# Patient Record
Sex: MALE | Race: WHITE | NOT HISPANIC OR LATINO | Employment: OTHER | URBAN - METROPOLITAN AREA
[De-identification: names, ages, dates, MRNs, and addresses within clinical notes are randomized per-mention and may not be internally consistent; named-entity substitution may affect disease eponyms.]

---

## 2017-01-03 DIAGNOSIS — I10 ESSENTIAL HYPERTENSION: ICD-10-CM

## 2017-01-05 RX ORDER — TERAZOSIN 5 MG/1
CAPSULE ORAL
Qty: 60 CAP | Refills: 11 | Status: SHIPPED | OUTPATIENT
Start: 2017-01-05 | End: 2018-01-28 | Stop reason: SDUPTHER

## 2017-02-10 ENCOUNTER — TELEPHONE (OUTPATIENT)
Dept: CARDIOLOGY | Facility: MEDICAL CENTER | Age: 67
End: 2017-02-10

## 2017-02-10 DIAGNOSIS — I10 ESSENTIAL HYPERTENSION: ICD-10-CM

## 2017-02-10 RX ORDER — CLONIDINE HYDROCHLORIDE 0.2 MG/1
0.2 TABLET ORAL 2 TIMES DAILY
Qty: 60 TAB | Refills: 11 | OUTPATIENT
Start: 2017-02-10 | End: 2018-02-20 | Stop reason: SDUPTHER

## 2017-02-10 NOTE — TELEPHONE ENCOUNTER
Received call back from patient. Advised him of Dr. Zimmer's instructions and medication precautions. Patient verbalized understanding and agrees with plan.    Updated Clonidine prescription called in to [facility].    LOLI DON

## 2017-02-10 NOTE — TELEPHONE ENCOUNTER
----- Message from Carolyn Salvador sent at 2/10/2017  9:08 AM PST -----  Regarding: clonidine not working well  Contact: [phone]  IVANA/sophia    Pt calling about med change RG made 2 months ago, clonidine is not working as RG had anticipated. Please call pt for details & to discuss, [phone].

## 2017-02-10 NOTE — TELEPHONE ENCOUNTER
Left patient a voicemail with instructions to call the office for instructions from Dr. Zimmer.    LOLI RN

## 2017-02-10 NOTE — TELEPHONE ENCOUNTER
Called patient. He states that his blood pressure has been elevated, and he would like to know if his Clonidine dose should be increased. He currently takes Clonidine 0.1 mg BID. He also takes Amlodipine 10 mg daily, Fosinopril 40 mg daily, Carvedilol 25 mg BID, and Terazosin 10 mg QHS.    Patient's blood pressure reading last night (2/9/2017) was 162/83 with HR 67. About a week ago his blood pressure was 168/86. He has not been recording his other blood pressure readings, but he states that his blood pressure is usually in the 160s/80s.    Forwarded to Dr. Zimmer, please advise. Thank you.    LOLI RN

## 2017-02-10 NOTE — ADDENDUM NOTE
Addended by: ARABELLA CONTRERAS on: 2/10/2017 03:43 PM     Modules accepted: Orders, Medications

## 2017-04-30 DIAGNOSIS — E78.5 HYPERLIPIDEMIA, UNSPECIFIED HYPERLIPIDEMIA TYPE: ICD-10-CM

## 2017-05-01 RX ORDER — LOVASTATIN 20 MG/1
TABLET ORAL
Qty: 90 TAB | Refills: 2 | Status: SHIPPED | OUTPATIENT
Start: 2017-05-01 | End: 2018-02-20 | Stop reason: SDUPTHER

## 2017-06-11 DIAGNOSIS — I10 ESSENTIAL HYPERTENSION: ICD-10-CM

## 2017-06-12 RX ORDER — CARVEDILOL 25 MG/1
25 TABLET ORAL 2 TIMES DAILY WITH MEALS
Qty: 60 TAB | Refills: 5 | OUTPATIENT
Start: 2017-06-12 | End: 2017-12-23 | Stop reason: SDUPTHER

## 2017-07-21 ENCOUNTER — OFFICE VISIT (OUTPATIENT)
Dept: CARDIOLOGY | Facility: MEDICAL CENTER | Age: 67
End: 2017-07-21
Payer: COMMERCIAL

## 2017-07-21 VITALS
DIASTOLIC BLOOD PRESSURE: 78 MMHG | HEART RATE: 56 BPM | SYSTOLIC BLOOD PRESSURE: 138 MMHG | OXYGEN SATURATION: 97 % | HEIGHT: 72 IN | BODY MASS INDEX: 34.67 KG/M2 | WEIGHT: 256 LBS

## 2017-07-21 DIAGNOSIS — I10 ESSENTIAL HYPERTENSION: ICD-10-CM

## 2017-07-21 DIAGNOSIS — G47.30 SLEEP APNEA, UNSPECIFIED TYPE: ICD-10-CM

## 2017-07-21 DIAGNOSIS — E78.5 HYPERLIPIDEMIA, UNSPECIFIED HYPERLIPIDEMIA TYPE: ICD-10-CM

## 2017-07-21 PROCEDURE — 99213 OFFICE O/P EST LOW 20 MIN: CPT | Performed by: INTERNAL MEDICINE

## 2017-07-21 RX ORDER — TRIAMTERENE AND HYDROCHLOROTHIAZIDE 37.5; 25 MG/1; MG/1
1 CAPSULE ORAL EVERY MORNING
COMMUNITY
End: 2018-02-02

## 2017-07-21 RX ORDER — GABAPENTIN 300 MG/1
300 CAPSULE ORAL 3 TIMES DAILY
COMMUNITY
End: 2018-02-02

## 2017-07-21 ASSESSMENT — ENCOUNTER SYMPTOMS
COUGH: 0
FEVER: 0
CHILLS: 0
BLURRED VISION: 0
SHORTNESS OF BREATH: 0
DIZZINESS: 0
BACK PAIN: 1
PALPITATIONS: 0
HEADACHES: 0
SENSORY CHANGE: 1
NERVOUS/ANXIOUS: 0
PND: 0
HEARTBURN: 0
NAUSEA: 0
MYALGIAS: 0
EYE DISCHARGE: 0
DEPRESSION: 0
BRUISES/BLEEDS EASILY: 0

## 2017-07-21 NOTE — MR AVS SNAPSHOT
"        Ramu Allred May   2017 10:00 AM   Office Visit   MRN: [id number]    Department:  Heart [facility]   Dept Phone:  [phone]    Description:  Male : 1950   Provider:  Bear Zimmer M.D.           Allergies as of 2017     No Known Allergies      You were diagnosed with     Essential hypertension   [2250606]       Hyperlipidemia, unspecified hyperlipidemia type   [6175194]       Sleep apnea, unspecified type   [9774111]         Vital Signs     Blood Pressure Pulse Height Weight Body Mass Index Oxygen Saturation    138/78 mmHg 56 1.829 m (6' 0.01\") 116.121 kg (256 lb) 34.71 kg/m2 97%    Smoking Status                   Never Smoker            Basic Information     Date Of Birth Sex Race Ethnicity Preferred Language    1950 Male White Non- English      Problem List              ICD-10-CM Priority Class Noted - Resolved    Blood glucose abnormal (Chronic) R73.09   Unknown - Present    Polycythemia, secondary (Chronic) D75.1   Unknown - Present    Sleep apnea (Chronic) G47.30   Unknown - Present    HTN (hypertension) (Chronic) I10   Unknown - Present    Overweight (Chronic) E66.3   Unknown - Present    Pyoderma gangrenosum (Chronic) L88   Unknown - Present    Spinal stenosis, lumbar region, without neurogenic claudication M48.06   2015 - Present    Right inguinal hernia K40.90   2016 - Present      Health Maintenance        Date Due Completion Dates    DIABETES MONOFILAMENT / LE EXAM 3/22/1951 ---    RETINAL SCREENING 1968 ---    URINE ACR / MICROALBUMIN 1968 ---    COLONOSCOPY 2000 ---    IMM ZOSTER VACCINE 2010 ---    A1C SCREENING 2015    IMM PNEUMOCOCCAL 65+ (ADULT) LOW/MEDIUM RISK SERIES (1 of 2 - PCV13) 2016    FASTING LIPID PROFILE 2016, 2015    SERUM CREATININE 2017, 2015, 2015, 2014, 2013    IMM INFLUENZA (1) 2017    IMM DTaP/Tdap/Td Vaccine " (2 - Td) 7/26/2024 7/26/2014            Current Immunizations     Influenza TIV (IM) 11/1/2014    Pneumococcal polysaccharide vaccine (PPSV-23) 1/23/2015  8:29 AM    Tdap Vaccine 7/26/2014      Below and/or attached are the medications your provider expects you to take. Review all of your home medications and newly ordered medications with your provider and/or pharmacist. Follow medication instructions as directed by your provider and/or pharmacist. Please keep your medication list with you and share with your provider. Update the information when medications are discontinued, doses are changed, or new medications (including over-the-counter products) are added; and carry medication information at all times in the event of emergency situations     Allergies:  No Known Allergies          Medications  Valid as of: July 21, 2017 - 10:20 AM    Generic Name Brand Name Tablet Size Instructions for use    AmLODIPine Besylate (Tab) NORVASC 10 MG TAKE ONE TABLET BY MOUTH DAILY        Aspirin (Tab) aspirin 81 MG Take 81 mg by mouth every day.        Carvedilol (Tab) COREG 25 MG Take 1 Tab by mouth 2 times a day, with meals.        CloNIDine HCl (Tab) CATAPRESS 0.2 MG Take 1 Tab by mouth 2 times a day.        Fosinopril Sodium (Tab) MONOPRIL 40 MG TAKE ONE TABLET BY MOUTH DAILY GENERIC FOR MONOPRIL        Furosemide (Tab) LASIX 20 MG TAKE ONE TABLET BY MOUTH DAILY (GENERIC FOR LASIX)        Gabapentin (Cap) NEURONTIN 300 MG Take 300 mg by mouth 3 times a day.        Lovastatin (Tab) MEVACOR 20 MG TAKE ONE TABLET BY MOUTH EVERY EVENING (GENERIC MEVACOR)        MetFORMIN HCl (Tab) GLUCOPHAGE 850 MG Take 850 mg by mouth 2 times a day.        Terazosin HCl (Cap) HYTRIN 5 MG TAKE TWO CAPSULES BY MOUTH EVERY NIGHT AT BEDTIME  (GENERIC HYTRIN)        Triamterene-HCTZ (Cap) MAXZIDE-25/DYAZIDE 37.5-25 MG Take 1 Cap by mouth every morning.        .                 Medicines prescribed today were sent to:     \[facility]\"" [facility] #[id number] -  [address] [place] [address]    Phone: [phone] Fax: [phone]    Open 24 Hours?: No      Medication refill instructions:       If your prescription bottle indicates you have medication refills left, it is not necessary to call your provider’s office. Please contact your pharmacy and they will refill your medication.    If your prescription bottle indicates you do not have any refills left, you may request refills at any time through one of the following ways: The online [employer] system (except Urgent Care), by calling your provider’s office, or by asking your pharmacy to contact your provider’s office with a refill request. Medication refills are processed only during regular business hours and may not be available until the next business day. Your provider may request additional information or to have a follow-up visit with you prior to refilling your medication.   *Please Note: Medication refills are assigned a new Rx number when refilled electronically. Your pharmacy may indicate that no refills were authorized even though a new prescription for the same medication is available at the pharmacy. Please request the medicine by name with the pharmacy before contacting your provider for a refill.           [employer] Access Code: [email or web address]  Expires: 8/20/2017 10:20 AM    [employer]  A secure, online tool to manage your health information     [employer]® is a secure, online tool that connects you to your personalized health information from the privacy of your home -- day or night - making it very easy for you to manage your healthcare. Once the activation process is completed, you can even access your medical information using the [employer] agusto, which is available for free in the Apple Agusto store or Google Play store.     [employer] provides the following levels of access (as shown below):   My Chart Features   [facility] Primary Care Doctor [facility]  Specialists  [facility]  Urgent  Care [facility]  Primary Care  Doctor   Email your healthcare team securely and privately 24/7 X X X    Manage appointments: schedule your next appointment; view details of past/upcoming appointments X      Request prescription refills. X      View recent personal medical records, including lab and immunizations X X X X   View health record, including health history, allergies, medications X X X X   Read reports about your outpatient visits, procedures, consult and ER notes X X X X   See your discharge summary, which is a recap of your hospital and/or ER visit that includes your diagnosis, lab results, and care plan. X X       How to register for [employer]:  1. Go to  [email or web address].  2. Click on the Sign Up Now box, which takes you to the New Member Sign Up page. You will need to provide the following information:  a. Enter your [employer] Access Code exactly as it appears at the top of this page. (You will not need to use this code after you’ve completed the sign-up process. If you do not sign up before the expiration date, you must request a new code.)   b. Enter your date of birth.   c. Enter your home email address.   d. Click Submit, and follow the next screen’s instructions.  3. Create a [employer] ID. This will be your [employer] login ID and cannot be changed, so think of one that is secure and easy to remember.  4. Create a [employer] password. You can change your password at any time.  5. Enter your Password Reset Question and Answer. This can be used at a later time if you forget your password.   6. Enter your e-mail address. This allows you to receive e-mail notifications when new information is available in [employer].  7. Click Sign Up. You can now view your health information.    For assistance activating your [employer] account, call [phone]        Quit Tobacco Information     Do you want to quit using tobacco?    Quitting tobacco decreases risks of cancer, heart and lung disease,  increases life expectancy, improves sense of taste and smell, and increases spending money, among other benefits.    If you are thinking about quitting, we can help.  • [facility] Quit Tobacco Program: 738.917.8222  o Program occurs weekly for four weeks and includes pharmacist consultation on products to support quitting smoking or chewing tobacco. A provider referral is needed for pharmacist consultation.  • Tobacco Users Help Hotline: 2-531-QUIT-NOW (783-6489) or https://[place].quitlogix.org/  o Free, confidential telephone and online coaching for [place] residents. Sessions are designed on a schedule that is convenient for you. Eligible clients receive free nicotine replacement therapy.  • Nationally: www.smokefree.gov  o Information and professional assistance to support both immediate and long-term needs as you become, and remain, a non-smoker. Smokefree.gov allows you to choose the help that best fits your needs.

## 2017-07-21 NOTE — PROGRESS NOTES
"Subjective:   Ramu Barber is a 66 y.o. male who presents today in follow-up for hypertension and sleep apnea.  Compliant with sleep apnea  He has symptoms reportedly \"peripheral neuropathy\" possibly related to previous lumbar surgery. He is due to see a neurologist. He has chronic low back pain.  He states that all home blood pressures are less than 140 systolic with a typical diastolic of below 90.  He has a slight bit of chronic lower extremity ankle edema, nonprogressive    Past Medical History   Diagnosis Date   • Abnormal glucose    • Polycythemia, secondary    • HTN (hypertension)    • Overweight(278.02)    • Type II or unspecified type diabetes mellitus without mention of complication, not stated as uncontrolled      oral meds only   • Sleep apnea      cpap   • High cholesterol    • Snoring    • Arthritis      back, hips     Past Surgical History   Procedure Laterality Date   • Lumbar fusion posterior  1/21/2015     Performed by Ko Suarez M.D. at SURGERY [facility]   • Other orthopedic surgery  2002     shoulder   • Other neurological surg       neck fusion 2000, lumbar laminectomy 2008   • Inguinal hernia repair Right 2/16/2016     Procedure: INGUINAL HERNIA REPAIR;  Surgeon: Sj Baird M.D.;  Location: SURGERY [facility];  Service:      No family history on file.  History   Smoking status   • Never Smoker    Smokeless tobacco   • Current User   • Types: Chew     No Known Allergies  Outpatient Encounter Prescriptions as of 7/21/2017   Medication Sig Dispense Refill   • gabapentin (NEURONTIN) 300 MG Cap Take 300 mg by mouth 3 times a day.     • triamterene/hctz (MAXZIDE-25/DYAZIDE) 37.5-25 MG Cap Take 1 Cap by mouth every morning.     • carvedilol (COREG) 25 MG Tab Take 1 Tab by mouth 2 times a day, with meals. 60 Tab 5   • fosinopril (MONOPRIL) 40 MG tablet TAKE ONE TABLET BY MOUTH DAILY GENERIC FOR MONOPRIL 90 Tab 0   • amlodipine (NORVASC) 10 MG Tab TAKE ONE TABLET BY MOUTH " "DAILY 90 Tab 0   • lovastatin (MEVACOR) 20 MG Tab TAKE ONE TABLET BY MOUTH EVERY EVENING (GENERIC MEVACOR) 90 Tab 2   • terazosin (HYTRIN) 5 MG Cap TAKE TWO CAPSULES BY MOUTH EVERY NIGHT AT BEDTIME  (GENERIC HYTRIN) 60 Cap 11   • metformin (GLUCOPHAGE) 850 MG Tab Take 850 mg by mouth 2 times a day.     • aspirin 81 MG tablet Take 81 mg by mouth every day.     • clonidine (CATAPRESS) 0.2 MG Tab Take 1 Tab by mouth 2 times a day. 60 Tab 11   • furosemide (LASIX) 20 MG Tab TAKE ONE TABLET BY MOUTH DAILY (GENERIC FOR LASIX) 90 Tab 3     No facility-administered encounter medications on file as of 7/21/2017.     Review of Systems   Constitutional: Negative for fever, chills and malaise/fatigue.   Eyes: Negative for blurred vision and discharge.   Respiratory: Negative for cough and shortness of breath.    Cardiovascular: Negative for chest pain, palpitations, leg swelling and PND.   Gastrointestinal: Negative for heartburn and nausea.   Genitourinary: Positive for frequency. Negative for dysuria and urgency.   Musculoskeletal: Positive for back pain and joint pain. Negative for myalgias.   Skin: Negative for itching and rash.   Neurological: Positive for sensory change. Negative for dizziness and headaches.   Endo/Heme/Allergies: Negative for environmental allergies. Does not bruise/bleed easily.   Psychiatric/Behavioral: Negative for depression. The patient is not nervous/anxious.         Objective:   /78 mmHg  Pulse 56  Ht 1.829 m (6' 0.01\")  Wt 116.121 kg (256 lb)  BMI 34.71 kg/m2  SpO2 97%    Physical Exam   Constitutional: He is oriented to person, place, and time. He appears well-developed and well-nourished.   HENT:   Head: Normocephalic and atraumatic.   Eyes: Conjunctivae and EOM are normal. No scleral icterus.   Neck: Neck supple. No JVD present. No thyromegaly present.   Cardiovascular: Normal rate, regular rhythm and normal heart sounds.  Exam reveals no gallop and no friction rub.    No murmur " heard.  Pulmonary/Chest: Effort normal and breath sounds normal. No respiratory distress. He has no wheezes. He has no rales. He exhibits no tenderness.   Abdominal: Soft. Bowel sounds are normal. He exhibits no distension and no mass. There is no tenderness.   Truncal obesity   Musculoskeletal: He exhibits edema.   Trace pedal edema   Neurological: He is alert and oriented to person, place, and time. Coordination normal.   Skin: Skin is warm and dry. No rash noted. No pallor.   Psychiatric: He has a normal mood and affect. His behavior is normal. Judgment and thought content normal.       Assessment:     1. Essential hypertension     2. Hyperlipidemia, unspecified hyperlipidemia type     3. Sleep apnea, unspecified type         Medical Decision Making:  Today's Assessment / Status / Plan:   Blood pressure appears well controlled at this time with current medication  No change in medication  Continued drugs including clonidine twice daily  Follow-up annually if no problems.

## 2017-07-21 NOTE — Clinical Note
"     [facility]   [address]   [address]  [place] [address]  Phone: [phone]  Fax: [phone]              Ramu Barber  1950    Encounter Date: 7/21/2017    Bear Zimmer M.D.          PROGRESS NOTE:  Subjective:   Ramu Barber is a 66 y.o. male who presents today in follow-up for hypertension and sleep apnea.  Compliant with sleep apnea  He has symptoms reportedly \"peripheral neuropathy\" possibly related to previous lumbar surgery. He is due to see a neurologist. He has chronic low back pain.  He states that all home blood pressures are less than 140 systolic with a typical diastolic of below 90.  He has a slight bit of chronic lower extremity ankle edema, nonprogressive    Past Medical History   Diagnosis Date   • Abnormal glucose    • Polycythemia, secondary    • HTN (hypertension)    • Overweight(278.02)    • Type II or unspecified type diabetes mellitus without mention of complication, not stated as uncontrolled      oral meds only   • Sleep apnea      cpap   • High cholesterol    • Snoring    • Arthritis      back, hips     Past Surgical History   Procedure Laterality Date   • Lumbar fusion posterior  1/21/2015     Performed by Ko Suarez M.D. at [facility]   • Other orthopedic surgery  2002     shoulder   • Other neurological surg       neck fusion 2000, lumbar laminectomy 2008   • Inguinal hernia repair Right 2/16/2016     Procedure: INGUINAL HERNIA REPAIR;  Surgeon: Sj Baird M.D.;  Location: SURGERY [facility];  Service:      No family history on file.  History   Smoking status   • Never Smoker    Smokeless tobacco   • Current User   • Types: Chew     No Known Allergies  Outpatient Encounter Prescriptions as of 7/21/2017   Medication Sig Dispense Refill   • gabapentin (NEURONTIN) 300 MG Cap Take 300 mg by mouth 3 times a day.     • triamterene/hctz (MAXZIDE-25/DYAZIDE) 37.5-25 " "MG Cap Take 1 Cap by mouth every morning.     • carvedilol (COREG) 25 MG Tab Take 1 Tab by mouth 2 times a day, with meals. 60 Tab 5   • fosinopril (MONOPRIL) 40 MG tablet TAKE ONE TABLET BY MOUTH DAILY GENERIC FOR MONOPRIL 90 Tab 0   • amlodipine (NORVASC) 10 MG Tab TAKE ONE TABLET BY MOUTH DAILY 90 Tab 0   • lovastatin (MEVACOR) 20 MG Tab TAKE ONE TABLET BY MOUTH EVERY EVENING (GENERIC MEVACOR) 90 Tab 2   • terazosin (HYTRIN) 5 MG Cap TAKE TWO CAPSULES BY MOUTH EVERY NIGHT AT BEDTIME  (GENERIC HYTRIN) 60 Cap 11   • metformin (GLUCOPHAGE) 850 MG Tab Take 850 mg by mouth 2 times a day.     • aspirin 81 MG tablet Take 81 mg by mouth every day.     • clonidine (CATAPRESS) 0.2 MG Tab Take 1 Tab by mouth 2 times a day. 60 Tab 11   • furosemide (LASIX) 20 MG Tab TAKE ONE TABLET BY MOUTH DAILY (GENERIC FOR LASIX) 90 Tab 3     No facility-administered encounter medications on file as of 7/21/2017.     Review of Systems   Constitutional: Negative for fever, chills and malaise/fatigue.   Eyes: Negative for blurred vision and discharge.   Respiratory: Negative for cough and shortness of breath.    Cardiovascular: Negative for chest pain, palpitations, leg swelling and PND.   Gastrointestinal: Negative for heartburn and nausea.   Genitourinary: Positive for frequency. Negative for dysuria and urgency.   Musculoskeletal: Positive for back pain and joint pain. Negative for myalgias.   Skin: Negative for itching and rash.   Neurological: Positive for sensory change. Negative for dizziness and headaches.   Endo/Heme/Allergies: Negative for environmental allergies. Does not bruise/bleed easily.   Psychiatric/Behavioral: Negative for depression. The patient is not nervous/anxious.         Objective:   /78 mmHg  Pulse 56  Ht 1.829 m (6' 0.01\")  Wt 116.121 kg (256 lb)  BMI 34.71 kg/m2  SpO2 97%    Physical Exam   Constitutional: He is oriented to person, place, and time. He appears well-developed and well-nourished.   HENT: "   Head: Normocephalic and atraumatic.   Eyes: Conjunctivae and EOM are normal. No scleral icterus.   Neck: Neck supple. No JVD present. No thyromegaly present.   Cardiovascular: Normal rate, regular rhythm and normal heart sounds.  Exam reveals no gallop and no friction rub.    No murmur heard.  Pulmonary/Chest: Effort normal and breath sounds normal. No respiratory distress. He has no wheezes. He has no rales. He exhibits no tenderness.   Abdominal: Soft. Bowel sounds are normal. He exhibits no distension and no mass. There is no tenderness.   Truncal obesity   Musculoskeletal: He exhibits edema.   Trace pedal edema   Neurological: He is alert and oriented to person, place, and time. Coordination normal.   Skin: Skin is warm and dry. No rash noted. No pallor.   Psychiatric: He has a normal mood and affect. His behavior is normal. Judgment and thought content normal.       Assessment:     1. Essential hypertension     2. Hyperlipidemia, unspecified hyperlipidemia type     3. Sleep apnea, unspecified type         Medical Decision Making:  Today's Assessment / Status / Plan:   Blood pressure appears well controlled at this time with current medication  No change in medication  Continued drugs including clonidine twice daily  Follow-up annually if no problems.        Chuck Chappell M.D.  [address]  [address]  [place] [address]  VIA Facsimile: [phone]

## 2017-09-11 DIAGNOSIS — I10 ESSENTIAL HYPERTENSION: ICD-10-CM

## 2017-09-11 RX ORDER — AMLODIPINE BESYLATE 10 MG/1
10 TABLET ORAL DAILY
Qty: 90 TAB | Refills: 3 | Status: SHIPPED | OUTPATIENT
Start: 2017-09-11 | End: 2018-10-31 | Stop reason: SDUPTHER

## 2017-09-11 RX ORDER — FOSINOPRIL SODIUM 40 MG/1
40 TABLET ORAL DAILY
Qty: 90 TAB | Refills: 3 | Status: SHIPPED | OUTPATIENT
Start: 2017-09-11 | End: 2018-10-31 | Stop reason: SDUPTHER

## 2017-12-23 DIAGNOSIS — I10 ESSENTIAL HYPERTENSION: ICD-10-CM

## 2017-12-28 RX ORDER — CARVEDILOL 25 MG/1
TABLET ORAL
Qty: 180 TAB | Refills: 2 | Status: SHIPPED | OUTPATIENT
Start: 2017-12-28 | End: 2018-10-18 | Stop reason: SDUPTHER

## 2018-01-28 DIAGNOSIS — I10 ESSENTIAL HYPERTENSION: ICD-10-CM

## 2018-01-29 RX ORDER — TERAZOSIN 5 MG/1
CAPSULE ORAL
Qty: 180 CAP | Refills: 10 | Status: ON HOLD | OUTPATIENT
Start: 2018-01-29 | End: 2023-04-16

## 2018-02-02 ENCOUNTER — HOSPITAL ENCOUNTER (OUTPATIENT)
Dept: RADIOLOGY | Facility: MEDICAL CENTER | Age: 68
DRG: 519 | End: 2018-02-02
Attending: NEUROLOGICAL SURGERY | Admitting: NEUROLOGICAL SURGERY
Payer: COMMERCIAL

## 2018-02-02 DIAGNOSIS — Z01.811 PRE-OPERATIVE RESPIRATORY EXAMINATION: ICD-10-CM

## 2018-02-02 DIAGNOSIS — Z01.810 PRE-OPERATIVE CARDIOVASCULAR EXAMINATION: ICD-10-CM

## 2018-02-02 DIAGNOSIS — Z01.812 PRE-OPERATIVE LABORATORY EXAMINATION: ICD-10-CM

## 2018-02-02 LAB
ANION GAP SERPL CALC-SCNC: 7 MMOL/L (ref 0–11.9)
APPEARANCE UR: CLEAR
APTT PPP: 24.5 SEC (ref 24.7–36)
BACTERIA #/AREA URNS HPF: NEGATIVE /HPF
BASOPHILS # BLD AUTO: 1.1 % (ref 0–1.8)
BASOPHILS # BLD: 0.08 K/UL (ref 0–0.12)
BILIRUB UR QL STRIP.AUTO: NEGATIVE
BUN SERPL-MCNC: 20 MG/DL (ref 8–22)
CALCIUM SERPL-MCNC: 8.8 MG/DL (ref 8.5–10.5)
CHLORIDE SERPL-SCNC: 104 MMOL/L (ref 96–112)
CO2 SERPL-SCNC: 27 MMOL/L (ref 20–33)
COLOR UR: YELLOW
CREAT SERPL-MCNC: 1.02 MG/DL (ref 0.5–1.4)
CULTURE IF INDICATED INDCX: YES UA CULTURE
EKG IMPRESSION: NORMAL
EOSINOPHIL # BLD AUTO: 0.37 K/UL (ref 0–0.51)
EOSINOPHIL NFR BLD: 4.9 % (ref 0–6.9)
EPI CELLS #/AREA URNS HPF: NEGATIVE /HPF
ERYTHROCYTE [DISTWIDTH] IN BLOOD BY AUTOMATED COUNT: 43.6 FL (ref 35.9–50)
EST. AVERAGE GLUCOSE BLD GHB EST-MCNC: 143 MG/DL
GLUCOSE SERPL-MCNC: 148 MG/DL (ref 65–99)
GLUCOSE UR STRIP.AUTO-MCNC: NEGATIVE MG/DL
HBA1C MFR BLD: 6.6 % (ref 0–5.6)
HCT VFR BLD AUTO: 43.2 % (ref 42–52)
HGB BLD-MCNC: 14.8 G/DL (ref 14–18)
HYALINE CASTS #/AREA URNS LPF: ABNORMAL /LPF
IMM GRANULOCYTES # BLD AUTO: 0.02 K/UL (ref 0–0.11)
IMM GRANULOCYTES NFR BLD AUTO: 0.3 % (ref 0–0.9)
INR PPP: 1.04 (ref 0.87–1.13)
KETONES UR STRIP.AUTO-MCNC: NEGATIVE MG/DL
LEUKOCYTE ESTERASE UR QL STRIP.AUTO: ABNORMAL
LYMPHOCYTES # BLD AUTO: 1.23 K/UL (ref 1–4.8)
LYMPHOCYTES NFR BLD: 16.4 % (ref 22–41)
MCH RBC QN AUTO: 32.1 PG (ref 27–33)
MCHC RBC AUTO-ENTMCNC: 34.3 G/DL (ref 33.7–35.3)
MCV RBC AUTO: 93.7 FL (ref 81.4–97.8)
MICRO URNS: ABNORMAL
MONOCYTES # BLD AUTO: 0.7 K/UL (ref 0–0.85)
MONOCYTES NFR BLD AUTO: 9.4 % (ref 0–13.4)
NEUTROPHILS # BLD AUTO: 5.08 K/UL (ref 1.82–7.42)
NEUTROPHILS NFR BLD: 67.9 % (ref 44–72)
NITRITE UR QL STRIP.AUTO: NEGATIVE
NRBC # BLD AUTO: 0 K/UL
NRBC BLD-RTO: 0 /100 WBC
PH UR STRIP.AUTO: 5.5 [PH]
PLATELET # BLD AUTO: 220 K/UL (ref 164–446)
PMV BLD AUTO: 10.4 FL (ref 9–12.9)
POTASSIUM SERPL-SCNC: 3.8 MMOL/L (ref 3.6–5.5)
PROT UR QL STRIP: NEGATIVE MG/DL
PROTHROMBIN TIME: 13.3 SEC (ref 12–14.6)
RBC # BLD AUTO: 4.61 M/UL (ref 4.7–6.1)
RBC # URNS HPF: ABNORMAL /HPF
RBC UR QL AUTO: NEGATIVE
SODIUM SERPL-SCNC: 138 MMOL/L (ref 135–145)
SP GR UR STRIP.AUTO: 1.02
UROBILINOGEN UR STRIP.AUTO-MCNC: 0.2 MG/DL
WBC # BLD AUTO: 7.5 K/UL (ref 4.8–10.8)
WBC #/AREA URNS HPF: ABNORMAL /HPF

## 2018-02-02 PROCEDURE — 85610 PROTHROMBIN TIME: CPT

## 2018-02-02 PROCEDURE — 83036 HEMOGLOBIN GLYCOSYLATED A1C: CPT

## 2018-02-02 PROCEDURE — 71046 X-RAY EXAM CHEST 2 VIEWS: CPT

## 2018-02-02 PROCEDURE — 85730 THROMBOPLASTIN TIME PARTIAL: CPT

## 2018-02-02 PROCEDURE — 93005 ELECTROCARDIOGRAM TRACING: CPT

## 2018-02-02 PROCEDURE — 85025 COMPLETE CBC W/AUTO DIFF WBC: CPT

## 2018-02-02 PROCEDURE — 81001 URINALYSIS AUTO W/SCOPE: CPT

## 2018-02-02 PROCEDURE — 87086 URINE CULTURE/COLONY COUNT: CPT

## 2018-02-02 PROCEDURE — 93010 ELECTROCARDIOGRAM REPORT: CPT | Performed by: INTERNAL MEDICINE

## 2018-02-02 PROCEDURE — 36415 COLL VENOUS BLD VENIPUNCTURE: CPT

## 2018-02-02 PROCEDURE — 80048 BASIC METABOLIC PNL TOTAL CA: CPT

## 2018-02-02 RX ORDER — IBUPROFEN 800 MG/1
800 TABLET ORAL EVERY 8 HOURS PRN
Status: ON HOLD | COMMUNITY
End: 2018-02-19

## 2018-02-02 RX ORDER — TRIAMTERENE AND HYDROCHLOROTHIAZIDE 37.5; 25 MG/1; MG/1
1 TABLET ORAL EVERY MORNING
COMMUNITY

## 2018-02-04 LAB
BACTERIA UR CULT: NORMAL
SIGNIFICANT IND 70042: NORMAL
SITE SITE: NORMAL
SOURCE SOURCE: NORMAL

## 2018-02-18 ENCOUNTER — APPOINTMENT (OUTPATIENT)
Dept: RADIOLOGY | Facility: MEDICAL CENTER | Age: 68
DRG: 519 | End: 2018-02-18
Attending: NEUROLOGICAL SURGERY
Payer: COMMERCIAL

## 2018-02-18 ENCOUNTER — HOSPITAL ENCOUNTER (INPATIENT)
Facility: MEDICAL CENTER | Age: 68
LOS: 1 days | DRG: 519 | End: 2018-02-19
Attending: NEUROLOGICAL SURGERY | Admitting: NEUROLOGICAL SURGERY
Payer: COMMERCIAL

## 2018-02-18 DIAGNOSIS — M48.062 SPINAL STENOSIS, LUMBAR REGION, WITH NEUROGENIC CLAUDICATION: ICD-10-CM

## 2018-02-18 DIAGNOSIS — M48.061 SPINAL STENOSIS, LUMBAR REGION, WITHOUT NEUROGENIC CLAUDICATION: ICD-10-CM

## 2018-02-18 LAB
GLUCOSE BLD-MCNC: 133 MG/DL (ref 65–99)
GLUCOSE BLD-MCNC: 142 MG/DL (ref 65–99)
GLUCOSE BLD-MCNC: 168 MG/DL (ref 65–99)
GLUCOSE BLD-MCNC: 194 MG/DL (ref 65–99)

## 2018-02-18 PROCEDURE — 0ST20ZZ RESECTION OF LUMBAR VERTEBRAL DISC, OPEN APPROACH: ICD-10-PCS | Performed by: NEUROLOGICAL SURGERY

## 2018-02-18 PROCEDURE — 500122 HCHG BOVIE, BLADE: Performed by: NEUROLOGICAL SURGERY

## 2018-02-18 PROCEDURE — 700102 HCHG RX REV CODE 250 W/ 637 OVERRIDE(OP)

## 2018-02-18 PROCEDURE — 160048 HCHG OR STATISTICAL LEVEL 1-5: Performed by: NEUROLOGICAL SURGERY

## 2018-02-18 PROCEDURE — 00Q20ZZ REPAIR DURA MATER, OPEN APPROACH: ICD-10-PCS | Performed by: NEUROLOGICAL SURGERY

## 2018-02-18 PROCEDURE — 700101 HCHG RX REV CODE 250: Performed by: NURSE PRACTITIONER

## 2018-02-18 PROCEDURE — A9270 NON-COVERED ITEM OR SERVICE: HCPCS | Performed by: PHYSICIAN ASSISTANT

## 2018-02-18 PROCEDURE — 160002 HCHG RECOVERY MINUTES (STAT): Performed by: NEUROLOGICAL SURGERY

## 2018-02-18 PROCEDURE — 82962 GLUCOSE BLOOD TEST: CPT | Mod: 91

## 2018-02-18 PROCEDURE — 770001 HCHG ROOM/CARE - MED/SURG/GYN PRIV*

## 2018-02-18 PROCEDURE — 700102 HCHG RX REV CODE 250 W/ 637 OVERRIDE(OP): Performed by: PHYSICIAN ASSISTANT

## 2018-02-18 PROCEDURE — A9270 NON-COVERED ITEM OR SERVICE: HCPCS | Performed by: NURSE PRACTITIONER

## 2018-02-18 PROCEDURE — 700112 HCHG RX REV CODE 229: Performed by: NURSE PRACTITIONER

## 2018-02-18 PROCEDURE — 95937 NEUROMUSCULAR JUNCTION TEST: CPT | Performed by: NEUROLOGICAL SURGERY

## 2018-02-18 PROCEDURE — 72020 X-RAY EXAM OF SPINE 1 VIEW: CPT

## 2018-02-18 PROCEDURE — A6223 GAUZE >16<=48 NO W/SAL W/O B: HCPCS | Performed by: NEUROLOGICAL SURGERY

## 2018-02-18 PROCEDURE — 95861 NEEDLE EMG 2 EXTREMITIES: CPT | Performed by: NEUROLOGICAL SURGERY

## 2018-02-18 PROCEDURE — 110454 HCHG SHELL REV 250: Performed by: NEUROLOGICAL SURGERY

## 2018-02-18 PROCEDURE — A4450 NON-WATERPROOF TAPE: HCPCS | Performed by: NEUROLOGICAL SURGERY

## 2018-02-18 PROCEDURE — 01NB0ZZ RELEASE LUMBAR NERVE, OPEN APPROACH: ICD-10-PCS | Performed by: NEUROLOGICAL SURGERY

## 2018-02-18 PROCEDURE — 160029 HCHG SURGERY MINUTES - 1ST 30 MINS LEVEL 4: Performed by: NEUROLOGICAL SURGERY

## 2018-02-18 PROCEDURE — 110371 HCHG SHELL REV 272: Performed by: NEUROLOGICAL SURGERY

## 2018-02-18 PROCEDURE — 501838 HCHG SUTURE GENERAL: Performed by: NEUROLOGICAL SURGERY

## 2018-02-18 PROCEDURE — 95940 IONM IN OPERATNG ROOM 15 MIN: CPT | Performed by: NEUROLOGICAL SURGERY

## 2018-02-18 PROCEDURE — 160035 HCHG PACU - 1ST 60 MINS PHASE I: Performed by: NEUROLOGICAL SURGERY

## 2018-02-18 PROCEDURE — 95938 SOMATOSENSORY TESTING: CPT | Performed by: NEUROLOGICAL SURGERY

## 2018-02-18 PROCEDURE — 700111 HCHG RX REV CODE 636 W/ 250 OVERRIDE (IP)

## 2018-02-18 PROCEDURE — 700101 HCHG RX REV CODE 250

## 2018-02-18 PROCEDURE — A9270 NON-COVERED ITEM OR SERVICE: HCPCS

## 2018-02-18 PROCEDURE — 160036 HCHG PACU - EA ADDL 30 MINS PHASE I: Performed by: NEUROLOGICAL SURGERY

## 2018-02-18 PROCEDURE — 160009 HCHG ANES TIME/MIN: Performed by: NEUROLOGICAL SURGERY

## 2018-02-18 PROCEDURE — 0SP004Z REMOVAL OF INTERNAL FIXATION DEVICE FROM LUMBAR VERTEBRAL JOINT, OPEN APPROACH: ICD-10-PCS | Performed by: NEUROLOGICAL SURGERY

## 2018-02-18 PROCEDURE — 160041 HCHG SURGERY MINUTES - EA ADDL 1 MIN LEVEL 4: Performed by: NEUROLOGICAL SURGERY

## 2018-02-18 PROCEDURE — 94760 N-INVAS EAR/PLS OXIMETRY 1: CPT

## 2018-02-18 PROCEDURE — 700102 HCHG RX REV CODE 250 W/ 637 OVERRIDE(OP): Performed by: NURSE PRACTITIONER

## 2018-02-18 PROCEDURE — 700111 HCHG RX REV CODE 636 W/ 250 OVERRIDE (IP): Performed by: NURSE PRACTITIONER

## 2018-02-18 PROCEDURE — 4A11X4G MONITORING OF PERIPHERAL NERVOUS ELECTRICAL ACTIVITY, INTRAOPERATIVE, EXTERNAL APPROACH: ICD-10-PCS | Performed by: NEUROLOGICAL SURGERY

## 2018-02-18 PROCEDURE — 502240 HCHG MISC OR SUPPLY RC 0272: Performed by: NEUROLOGICAL SURGERY

## 2018-02-18 DEVICE — DURASEAL SEALANT SYSTEM 5ML - (5/BX): Type: IMPLANTABLE DEVICE | Status: FUNCTIONAL

## 2018-02-18 RX ORDER — OXYCODONE HYDROCHLORIDE AND ACETAMINOPHEN 5; 325 MG/1; MG/1
TABLET ORAL
Status: COMPLETED
Start: 2018-02-18 | End: 2018-02-18

## 2018-02-18 RX ORDER — SODIUM CHLORIDE 9 MG/ML
INJECTION, SOLUTION INTRAVENOUS CONTINUOUS
Status: DISCONTINUED | OUTPATIENT
Start: 2018-02-18 | End: 2018-02-19 | Stop reason: HOSPADM

## 2018-02-18 RX ORDER — FOSINOPRIL SODIUM 20 MG/1
40 TABLET ORAL DAILY
Status: DISCONTINUED | OUTPATIENT
Start: 2018-02-19 | End: 2018-02-19 | Stop reason: HOSPADM

## 2018-02-18 RX ORDER — BACITRACIN 50000 [IU]/1
INJECTION, POWDER, FOR SOLUTION INTRAMUSCULAR
Status: DISCONTINUED | OUTPATIENT
Start: 2018-02-18 | End: 2018-02-18 | Stop reason: HOSPADM

## 2018-02-18 RX ORDER — AMOXICILLIN 250 MG
1 CAPSULE ORAL
Status: DISCONTINUED | OUTPATIENT
Start: 2018-02-18 | End: 2018-02-19 | Stop reason: HOSPADM

## 2018-02-18 RX ORDER — TIZANIDINE 4 MG/1
2 TABLET ORAL 3 TIMES DAILY PRN
Status: DISCONTINUED | OUTPATIENT
Start: 2018-02-18 | End: 2018-02-19 | Stop reason: HOSPADM

## 2018-02-18 RX ORDER — ONDANSETRON 4 MG/1
4 TABLET, ORALLY DISINTEGRATING ORAL EVERY 4 HOURS PRN
Status: DISCONTINUED | OUTPATIENT
Start: 2018-02-18 | End: 2018-02-19 | Stop reason: HOSPADM

## 2018-02-18 RX ORDER — SODIUM CHLORIDE AND POTASSIUM CHLORIDE 150; 900 MG/100ML; MG/100ML
INJECTION, SOLUTION INTRAVENOUS CONTINUOUS
Status: DISCONTINUED | OUTPATIENT
Start: 2018-02-18 | End: 2018-02-19

## 2018-02-18 RX ORDER — ENEMA 19; 7 G/133ML; G/133ML
1 ENEMA RECTAL
Status: DISCONTINUED | OUTPATIENT
Start: 2018-02-18 | End: 2018-02-19 | Stop reason: HOSPADM

## 2018-02-18 RX ORDER — DIPHENHYDRAMINE HYDROCHLORIDE 50 MG/ML
25 INJECTION INTRAMUSCULAR; INTRAVENOUS EVERY 6 HOURS PRN
Status: DISCONTINUED | OUTPATIENT
Start: 2018-02-18 | End: 2018-02-19 | Stop reason: HOSPADM

## 2018-02-18 RX ORDER — LIDOCAINE AND PRILOCAINE 25; 25 MG/G; MG/G
1 CREAM TOPICAL
Status: ACTIVE | OUTPATIENT
Start: 2018-02-18 | End: 2018-02-19

## 2018-02-18 RX ORDER — DIPHENHYDRAMINE HCL 25 MG
25 TABLET ORAL EVERY 6 HOURS PRN
Status: DISCONTINUED | OUTPATIENT
Start: 2018-02-18 | End: 2018-02-19 | Stop reason: HOSPADM

## 2018-02-18 RX ORDER — LIDOCAINE HYDROCHLORIDE 10 MG/ML
0.5 INJECTION, SOLUTION INFILTRATION; PERINEURAL
Status: ACTIVE | OUTPATIENT
Start: 2018-02-18 | End: 2018-02-19

## 2018-02-18 RX ORDER — TRIAMTERENE AND HYDROCHLOROTHIAZIDE 37.5; 25 MG/1; MG/1
1 TABLET ORAL DAILY
Status: DISCONTINUED | OUTPATIENT
Start: 2018-02-19 | End: 2018-02-19 | Stop reason: HOSPADM

## 2018-02-18 RX ORDER — CARVEDILOL 6.25 MG/1
25 TABLET ORAL 2 TIMES DAILY WITH MEALS
Status: DISCONTINUED | OUTPATIENT
Start: 2018-02-18 | End: 2018-02-19 | Stop reason: HOSPADM

## 2018-02-18 RX ORDER — TERAZOSIN 5 MG/1
10 CAPSULE ORAL EVERY EVENING
Status: DISCONTINUED | OUTPATIENT
Start: 2018-02-18 | End: 2018-02-19 | Stop reason: HOSPADM

## 2018-02-18 RX ORDER — ONDANSETRON 2 MG/ML
4 INJECTION INTRAMUSCULAR; INTRAVENOUS EVERY 4 HOURS PRN
Status: DISCONTINUED | OUTPATIENT
Start: 2018-02-18 | End: 2018-02-19 | Stop reason: HOSPADM

## 2018-02-18 RX ORDER — BISACODYL 10 MG
10 SUPPOSITORY, RECTAL RECTAL
Status: DISCONTINUED | OUTPATIENT
Start: 2018-02-18 | End: 2018-02-19 | Stop reason: HOSPADM

## 2018-02-18 RX ORDER — POLYETHYLENE GLYCOL 3350 17 G/17G
1 POWDER, FOR SOLUTION ORAL 2 TIMES DAILY PRN
Status: DISCONTINUED | OUTPATIENT
Start: 2018-02-18 | End: 2018-02-19 | Stop reason: HOSPADM

## 2018-02-18 RX ORDER — BUPIVACAINE HYDROCHLORIDE AND EPINEPHRINE 2.5; 5 MG/ML; UG/ML
INJECTION, SOLUTION EPIDURAL; INFILTRATION; INTRACAUDAL; PERINEURAL
Status: DISCONTINUED | OUTPATIENT
Start: 2018-02-18 | End: 2018-02-18 | Stop reason: HOSPADM

## 2018-02-18 RX ORDER — LOVASTATIN 20 MG/1
20 TABLET ORAL
Status: DISCONTINUED | OUTPATIENT
Start: 2018-02-18 | End: 2018-02-19 | Stop reason: HOSPADM

## 2018-02-18 RX ORDER — NICOTINE 21 MG/24HR
14 PATCH, TRANSDERMAL 24 HOURS TRANSDERMAL
Status: DISCONTINUED | OUTPATIENT
Start: 2018-02-18 | End: 2018-02-19 | Stop reason: HOSPADM

## 2018-02-18 RX ORDER — CEFAZOLIN SODIUM 2 G/100ML
2 INJECTION, SOLUTION INTRAVENOUS EVERY 8 HOURS
Status: COMPLETED | OUTPATIENT
Start: 2018-02-18 | End: 2018-02-18

## 2018-02-18 RX ORDER — AMLODIPINE BESYLATE 5 MG/1
10 TABLET ORAL DAILY
Status: DISCONTINUED | OUTPATIENT
Start: 2018-02-19 | End: 2018-02-19 | Stop reason: HOSPADM

## 2018-02-18 RX ORDER — DEXTROSE MONOHYDRATE 25 G/50ML
25 INJECTION, SOLUTION INTRAVENOUS
Status: DISCONTINUED | OUTPATIENT
Start: 2018-02-18 | End: 2018-02-19 | Stop reason: HOSPADM

## 2018-02-18 RX ORDER — CLONIDINE HYDROCHLORIDE 0.1 MG/1
0.2 TABLET ORAL 2 TIMES DAILY
Status: DISCONTINUED | OUTPATIENT
Start: 2018-02-18 | End: 2018-02-19 | Stop reason: HOSPADM

## 2018-02-18 RX ORDER — DOCUSATE SODIUM 100 MG/1
100 CAPSULE, LIQUID FILLED ORAL 2 TIMES DAILY
Status: DISCONTINUED | OUTPATIENT
Start: 2018-02-18 | End: 2018-02-19 | Stop reason: HOSPADM

## 2018-02-18 RX ADMIN — LOVASTATIN 20 MG: 20 TABLET ORAL at 21:53

## 2018-02-18 RX ADMIN — OXYCODONE AND ACETAMINOPHEN 1 TABLET: 5; 325 TABLET ORAL at 11:10

## 2018-02-18 RX ADMIN — DOCUSATE SODIUM 100 MG: 100 CAPSULE ORAL at 21:53

## 2018-02-18 RX ADMIN — POTASSIUM CHLORIDE AND SODIUM CHLORIDE: 900; 150 INJECTION, SOLUTION INTRAVENOUS at 14:23

## 2018-02-18 RX ADMIN — CEFAZOLIN SODIUM 2 G: 2 INJECTION, SOLUTION INTRAVENOUS at 14:27

## 2018-02-18 RX ADMIN — INSULIN HUMAN 1 UNITS: 100 INJECTION, SOLUTION PARENTERAL at 17:05

## 2018-02-18 RX ADMIN — CARVEDILOL 25 MG: 6.25 TABLET, FILM COATED ORAL at 17:05

## 2018-02-18 RX ADMIN — INSULIN HUMAN 1 UNITS: 100 INJECTION, SOLUTION PARENTERAL at 21:59

## 2018-02-18 RX ADMIN — METFORMIN HYDROCHLORIDE 850 MG: 850 TABLET ORAL at 21:53

## 2018-02-18 RX ADMIN — CEFAZOLIN SODIUM 2 G: 2 INJECTION, SOLUTION INTRAVENOUS at 21:52

## 2018-02-18 RX ADMIN — TERAZOSIN HYDROCHLORIDE ANHYDROUS 10 MG: 5 CAPSULE ORAL at 21:52

## 2018-02-18 RX ADMIN — CLONIDINE HYDROCHLORIDE 0.2 MG: 0.1 TABLET ORAL at 21:53

## 2018-02-18 RX ADMIN — NICOTINE 14 MG: 14 PATCH, EXTENDED RELEASE TRANSDERMAL at 18:28

## 2018-02-18 RX ADMIN — DOCUSATE SODIUM 100 MG: 100 CAPSULE ORAL at 14:27

## 2018-02-18 ASSESSMENT — PATIENT HEALTH QUESTIONNAIRE - PHQ9
2. FEELING DOWN, DEPRESSED, IRRITABLE, OR HOPELESS: NOT AT ALL
SUM OF ALL RESPONSES TO PHQ9 QUESTIONS 1 AND 2: 0
SUM OF ALL RESPONSES TO PHQ QUESTIONS 1-9: 0
1. LITTLE INTEREST OR PLEASURE IN DOING THINGS: NOT AT ALL

## 2018-02-18 ASSESSMENT — PAIN SCALES - GENERAL
PAINLEVEL_OUTOF10: 0
PAINLEVEL_OUTOF10: 3
PAINLEVEL_OUTOF10: 0

## 2018-02-18 ASSESSMENT — LIFESTYLE VARIABLES
EVER_SMOKED: NEVER
DO YOU DRINK ALCOHOL: NO

## 2018-02-18 NOTE — PROGRESS NOTES
Med rec completed by interview with patient and patient's wife at bedside with medication list  No abx in past 30 days  NKDA confirmed

## 2018-02-18 NOTE — PROGRESS NOTES
Updated wife Nona earlier via her cell phone. She will meet patient in his assigned room when PACU stay complete.

## 2018-02-18 NOTE — PROGRESS NOTES
Pt arrived on the unit A&Ox4. Pt is on bedrest but can roll side to side. Pt has refused the PCA machine at this time but understands that if his pain gets to be too much than we will place him on it. Pt has been educated on pain control and has agreed to call us to let us know when he wants pain medication. Pt is on 2L today and has brought his own CPAP machine with him from home to use. Call light, personal belongings and phone within reach.

## 2018-02-18 NOTE — OR SURGEON
Immediate Post OP Note    PreOp Diagnosis: 1) L23 lumbar fusion, with L12 decompression  2) L12 herniated disc with radiculopathy    PostOp Diagnosis: same    Procedure(s):  LUMBAR LAMINECTOMY DISKECTOMY- LT L1-2 HEMILAMI- - Wound Class: Clean  HARDWARE REMOVAL NEURO- L2-3 PEDICLE SCREWS - Wound Class: Clean    Surgeon(s):  Tom Pittman M.D.    Anesthesiologist/Type of Anesthesia:  Anesthesiologist: Sj Cutler M.D./General    Surgical Staff:  Circulator: Huber Mendez R.N.  Scrub Person: Ceasar Manning  First Assist: KIERA Koehler  Count [place]: David Parker R.N.  Radiology Technologist: Bear Lozoya    Specimens:  * No specimens in log *    Estimated Blood Loss: 70cc    Findings: as above    Complications: smal dural tear at the disc space at L12        2/18/2018 10:44 AM Tom Pittman

## 2018-02-18 NOTE — OP REPORT
DATE OF SERVICE:  02/18/2018    PREOPERATIVE DIAGNOSES:  1.  Status post L2-L3 fusion, and status post L2 laminectomy.  2.  L1-L2 herniated disk with radiculopathy.    POSTOPERATIVE DIAGNOSES:  1.  Status post L2-L3 fusion, and status post L2 laminectomy.  2.  L1-L2 herniated disk with radiculopathy.    PROCEDURES:  1.  Removal of hardware and inspection of fusion L2-L3 on the left.  2.  L1-L2 hemilaminotomy, medial facetectomy and diskectomy.  3.  Microscope use with microsurgical technique.  4.  Repair of durotomy.    SURGEON:  Tom Pittman MD    ASSISTANT:  MEHREEN Hallman    ANESTHESIA:  General anesthetic.    ANESTHESIOLOGIST:  Sj Cutler MD    PREPARATION:  Routine.    DESCRIPTION OF PROCEDURE:  The patient was brought to the operating room and   following induction, the patient was intubated and placed under general   anesthetic.  We attempted to place a Michaels catheter, but his meatus was   scarred in.  We did not place a Michaels, and therefore, I did this 2-hour   procedure without a Michaels catheter.  The patient was rolled prone onto the OSI   table using the chest, hip, thigh pads.  All pressure points were padded.    Sequential stockings were in place.  The back was prepped and draped in a   sterile fashion and a time-out was performed.  An incision was made at L1-L2   and L2-L3, carried down to the subcutaneous tissue.  We dissected out over the   instrumentation at L2-L3 and the lamina at L1-L2.  Set screws were removed   followed by removal of the franc and removal of the OIC pedicle screws.  We   found the fusion to be quite solid.  Going superiorly, we dissected off the   scar tissue off the previous laminectomy site at L2 and off the lamina of the   remaining of L1.    Under the scope, we drilled the medial facet to a thin shelf and dissected   thecal sac free.  The #2 and #3 Kerrisons were used to perform a medial   facetectomy and foraminotomy to decompress over the L1 and L2 nerve  root.    Drawing the thecal sac medially, there was a subligamentous bulging disk.  We   opened the disk space with 11-bladed scalpel and we were able to curette out   multiple disk fragments with downbiting curettes and pituitary rongeurs were   utilized in the disk space.  During our dissection, there was a small dural   tear from the nerve hook.  There was no CSF leak here, and I was able to close   this with 3 stitches from a 6-0 Gile-Jed.    Our somatosensory evoked potentials improved following our decompression.  We   found no further herniated disk.  We placed fat over the dural repair,   followed by blue glue and then obtained meticulous hemostasis, closing up   fascia with #1 Vicryl suture, subcutaneous tissue with 2-0 Vicryl,   subcuticular with 3-0 Vicryl, and skin with staples.  All sponge and needle   counts were correct.  Estimated blood loss was less than 60 mL.       ____________________________________     MD LASHELL WHELAN / ROSARIO    DD:  02/18/2018 10:51:23  DT:  02/18/2018 12:21:36    D#:  [id number]  Job#:  [id number]    cc: DION VERMA MD

## 2018-02-18 NOTE — PROGRESS NOTES
2 RN skin assessment done. Surgical incision on back and left ankle is a little pink and blanching.

## 2018-02-19 VITALS
SYSTOLIC BLOOD PRESSURE: 134 MMHG | DIASTOLIC BLOOD PRESSURE: 71 MMHG | BODY MASS INDEX: 35.92 KG/M2 | OXYGEN SATURATION: 94 % | TEMPERATURE: 99.3 F | RESPIRATION RATE: 17 BRPM | HEIGHT: 72 IN | HEART RATE: 66 BPM | WEIGHT: 265.21 LBS

## 2018-02-19 LAB
ANION GAP SERPL CALC-SCNC: 8 MMOL/L (ref 0–11.9)
BUN SERPL-MCNC: 19 MG/DL (ref 8–22)
CALCIUM SERPL-MCNC: 8.6 MG/DL (ref 8.5–10.5)
CHLORIDE SERPL-SCNC: 106 MMOL/L (ref 96–112)
CO2 SERPL-SCNC: 23 MMOL/L (ref 20–33)
CREAT SERPL-MCNC: 0.92 MG/DL (ref 0.5–1.4)
GLUCOSE BLD-MCNC: 113 MG/DL (ref 65–99)
GLUCOSE BLD-MCNC: 116 MG/DL (ref 65–99)
GLUCOSE BLD-MCNC: 130 MG/DL (ref 65–99)
GLUCOSE SERPL-MCNC: 134 MG/DL (ref 65–99)
POTASSIUM SERPL-SCNC: 3.9 MMOL/L (ref 3.6–5.5)
SODIUM SERPL-SCNC: 137 MMOL/L (ref 135–145)

## 2018-02-19 PROCEDURE — 82962 GLUCOSE BLOOD TEST: CPT

## 2018-02-19 PROCEDURE — 80048 BASIC METABOLIC PNL TOTAL CA: CPT

## 2018-02-19 PROCEDURE — 700102 HCHG RX REV CODE 250 W/ 637 OVERRIDE(OP): Performed by: NURSE PRACTITIONER

## 2018-02-19 PROCEDURE — 36415 COLL VENOUS BLD VENIPUNCTURE: CPT

## 2018-02-19 PROCEDURE — A9270 NON-COVERED ITEM OR SERVICE: HCPCS | Performed by: NURSE PRACTITIONER

## 2018-02-19 PROCEDURE — 700112 HCHG RX REV CODE 229: Performed by: NURSE PRACTITIONER

## 2018-02-19 RX ORDER — HYDROCODONE BITARTRATE AND ACETAMINOPHEN 10; 325 MG/1; MG/1
1 TABLET ORAL EVERY 4 HOURS PRN
Status: DISCONTINUED | OUTPATIENT
Start: 2018-02-19 | End: 2018-02-19 | Stop reason: HOSPADM

## 2018-02-19 RX ORDER — TIZANIDINE 2 MG/1
4 TABLET ORAL 3 TIMES DAILY PRN
Qty: 40 TAB | Refills: 0 | Status: SHIPPED | OUTPATIENT
Start: 2018-02-19 | End: 2018-03-16

## 2018-02-19 RX ORDER — HYDROCODONE BITARTRATE AND ACETAMINOPHEN 10; 325 MG/1; MG/1
1 TABLET ORAL EVERY 4 HOURS PRN
Qty: 40 TAB | Refills: 0 | Status: SHIPPED | OUTPATIENT
Start: 2018-02-19 | End: 2018-02-26

## 2018-02-19 RX ADMIN — HYDROCODONE BITARTRATE AND ACETAMINOPHEN 1 TABLET: 10; 325 TABLET ORAL at 14:43

## 2018-02-19 RX ADMIN — AMLODIPINE BESYLATE 10 MG: 5 TABLET ORAL at 09:09

## 2018-02-19 RX ADMIN — METFORMIN HYDROCHLORIDE 850 MG: 850 TABLET ORAL at 09:10

## 2018-02-19 RX ADMIN — FOSINOPRIL 40 MG: 20 TABLET ORAL at 09:10

## 2018-02-19 RX ADMIN — CARVEDILOL 25 MG: 6.25 TABLET, FILM COATED ORAL at 16:21

## 2018-02-19 RX ADMIN — CLONIDINE HYDROCHLORIDE 0.2 MG: 0.1 TABLET ORAL at 09:09

## 2018-02-19 RX ADMIN — DOCUSATE SODIUM 100 MG: 100 CAPSULE ORAL at 09:11

## 2018-02-19 RX ADMIN — TRIAMTERENE AND HYDROCHLOROTHIAZIDE 1 TABLET: 37.5; 25 TABLET ORAL at 09:10

## 2018-02-19 ASSESSMENT — PAIN SCALES - GENERAL: PAINLEVEL_OUTOF10: 2

## 2018-02-19 ASSESSMENT — LIFESTYLE VARIABLES: EVER_SMOKED: NEVER

## 2018-02-19 NOTE — PROGRESS NOTES
Patient resting quietly in bed, no S/S of distress, AA&Ox4. Patient has no report of surgical pain and denies pain medication at this time. Denies SOB, chest pain, dizziness. CPAP in place. Bed alarm on, call light within reach, pt calls appropriately and does not get out of bed. Bed in lowest position, bed locked, RN and CNA numbers provided, no further needs at this time. No changes from EPIC. Hourly rounding in place.

## 2018-02-19 NOTE — PROGRESS NOTES
Pt's wife called and asked if pt could have an nicotine patch ordered because he was getting upset and angry. Pt chews a can of tobacco a day. [place] was [place] to the [place] [facility] [place]IA [place] called back and said that it was okay for pt to have a nicotine patch tonight and the team for Dr Pittman would address it in the morning.

## 2018-02-19 NOTE — THERAPY
OT orders received. Pt currently on bedrest. Will attempt again as able and appropriate.     Tika Bui, OTR/L  Pager: [phone]

## 2018-02-19 NOTE — PROGRESS NOTES
Neurosurgery Progress Note    Subjective:  Pt states he wants to go home, no h/a back is sore but not bad enough to warrant him taking anything. He is voiding well, dressing was with ssang drainage - i removed and this will be replaced. Legs  bilat    Exam:  Intact - as above    BP  Min: 116/47  Max: 158/88  Pulse  Av.2  Min: 46  Max: 65  Resp  Av.2  Min: 14  Max: 18  Temp  Av.6 °C (97.8 °F)  Min: 36.2 °C (97.1 °F)  Max: 37 °C (98.6 °F)  SpO2  Av.8 %  Min: 92 %  Max: 99 %    No Data Recorded        Recent Labs      18   0205   SODIUM  137   POTASSIUM  3.9   CHLORIDE  106   CO2  23   GLUCOSE  134*   BUN  19               Intake/Output       18 0700 - 18 0659 18 0700 - 18 0659      5037-1574 2008-3842 Total [id number] 8364-3616 Total       Intake    P.O.  120  -- 120  --  -- --    P.O. 120 -- 120 -- -- --    I.V.  1500  800 2300  --  -- --    Crystalloid Intake 1500 -- 1500 -- -- --    IV Volume (NS w/ 20 KCL) -- 800 800 -- -- --    Total Intake 1241 098 7077 -- -- --       Output    Urine  1400  875 2275  --  -- --    Number of Times Voided 2 x -- 2 x -- -- --    Void (ml) 0753 202 0447 -- -- --    Blood  100  -- 100  --  -- --    Est. Blood Loss (mL) 100 -- 100 -- -- --    Total Output 5088 133 3512 -- -- --       Net I/O     120 -75 45 -- -- --            Intake/Output Summary (Last 24 hours) at 18 0937  Last data filed at 18 0400   Gross per 24 hour   Intake             2420 ml   Output             2375 ml   Net               45 ml            • HYDROcodone/acetaminophen  1 Tab Q4HRS PRN   • NS   Continuous   • amLODIPine  10 mg DAILY   • carvedilol  25 mg BID WITH MEALS   • cloNIDine  0.2 mg BID   • fosinopril  40 mg DAILY   • lovastatin  20 mg QHS   • metFORMIN  850 mg BID   • triamterene-hctz  1 Tab DAILY   • terazosin  10 mg Q EVENING   • Pharmacy Consult Request  1 Each PRN   • MD ALERT...Do not administer NSAIDS or ASPIRIN unless ORDERED By  Neurosurgery  1 Each PRN   • docusate sodium  100 mg BID   • senna-docusate  1 Tab Q24HRS PRN   • polyethylene glycol/lytes  1 Packet BID PRN   • magnesium hydroxide  30 mL QDAY PRN   • bisacodyl  10 mg Q24HRS PRN   • fleet  1 Each Once PRN   • diphenhydrAMINE  25 mg Q6HRS PRN    Or   • diphenhydrAMINE  25 mg Q6HRS PRN   • ondansetron  4 mg Q4HRS PRN   • ondansetron  4 mg Q4HRS PRN   • tizanidine  2 mg TID PRN   • benzocaine-menthol  1 Lozenge Q2HRS PRN   • insulin regular  1-6 Units 4X/DAY ACHS    And   • glucose 4 g  16 g Q15 MIN PRN    And   • dextrose 50%  25 mL Q15 MIN PRN   • nicotine  14 mg Daily-0600       Assessment and Plan:  Hospital day # 2  POD # 1 Removal of hardware and inspection of fusion L2-L3 on the left.  2.  L1-L2 hemilaminotomy, medial facetectomy and diskectomy. With repair of durotomy  NM as above  Begin to raise hob at 11am, 10 degrees at a time. If no h/a and pt mobilizing may dc home later today vs. In am    Prophylactic anticoagulation: no         Start date/time: tbd

## 2018-02-19 NOTE — PROGRESS NOTES
Pt refused to eat his dinner tonight because he did not want a full liquid diet, he wanted to eat finger foods. Pt was told that we could advance his diet tomorrow as long as he was able to tolerate a full liquid diet and not aspirate. Pt said he understood.

## 2018-02-20 DIAGNOSIS — E78.5 HYPERLIPIDEMIA, UNSPECIFIED HYPERLIPIDEMIA TYPE: ICD-10-CM

## 2018-02-20 DIAGNOSIS — I10 ESSENTIAL HYPERTENSION: ICD-10-CM

## 2018-02-20 NOTE — DISCHARGE INSTRUCTIONS
Discharge Instructions    Discharged to home by car with relative. Discharged via wheelchair, hospital escort: Yes.  Special equipment needed: Not Applicable    Be sure to schedule a follow-up appointment with your primary care doctor or any specialists as instructed.     Discharge Plan:   Diet Plan: Discussed  Activity Level: Discussed  Confirmed Follow up Appointment: Appointment Scheduled  Confirmed Symptoms Management: Discussed  Medication Reconciliation Updated: Yes  Influenza Vaccine Indication: Not indicated: Previously immunized this influenza season and > 8 years of age    I understand that a diet low in cholesterol, fat, and sodium is recommended for good health. Unless I have been given specific instructions below for another diet, I accept this instruction as my diet prescription.   Other diet:Diabetic, Heart Healthy    Special Instructions: None    · Is patient discharged on Warfarin / Coumadin?   No     Depression / Suicide Risk    As you are discharged from this [facility] facility, it is important to learn how to keep safe from harming yourself.    Recognize the warning signs:  · Abrupt changes in personality, positive or negative- including increase in energy   · Giving away possessions  · Change in eating patterns- significant weight changes-  positive or negative  · Change in sleeping patterns- unable to sleep or sleeping all the time   · Unwillingness or inability to communicate  · Depression  · Unusual sadness, discouragement and loneliness  · Talk of wanting to die  · Neglect of personal appearance   · Rebelliousness- reckless behavior  · Withdrawal from people/activities they love  · Confusion- inability to concentrate     If you or a loved one observes any of these behaviors or has concerns about self-harm, here's what you can do:  · Talk about it- your feelings and reasons for harming yourself  · Remove any means that you might use to hurt yourself (examples: pills, rope, extension  cords, firearm)  · Get professional help from the community (Mental Health, Substance Abuse, psychological counseling)  · Do not be alone:Call your Safe Contact- someone whom you trust who will be there for you.  · Call your local CRISIS HOTLINE 354-6635 or 410-182-8615  · Call your local Children's Mobile Crisis Response Team [place] (363) 970-3343 or www.iGuiders  · Call the toll free National Suicide Prevention Hotlines   · National Suicide Prevention Lifeline 983-309-SXNZ (6722)  · National Hope Line Network 800-SUICIDE (166-2051)

## 2018-02-21 RX ORDER — CLONIDINE HYDROCHLORIDE 0.2 MG/1
TABLET ORAL
Qty: 180 TAB | Refills: 2 | Status: SHIPPED | OUTPATIENT
Start: 2018-02-21 | End: 2018-12-31 | Stop reason: SDUPTHER

## 2018-02-21 RX ORDER — LOVASTATIN 20 MG/1
TABLET ORAL
Qty: 90 TAB | Refills: 2 | Status: SHIPPED | OUTPATIENT
Start: 2018-02-21 | End: 2018-11-20 | Stop reason: SDUPTHER

## 2018-03-16 ENCOUNTER — OFFICE VISIT (OUTPATIENT)
Dept: URGENT CARE | Facility: PHYSICIAN GROUP | Age: 68
End: 2018-03-16
Payer: COMMERCIAL

## 2018-03-16 ENCOUNTER — HOSPITAL ENCOUNTER (OUTPATIENT)
Facility: MEDICAL CENTER | Age: 68
End: 2018-03-16
Attending: NURSE PRACTITIONER
Payer: COMMERCIAL

## 2018-03-16 VITALS
TEMPERATURE: 99.5 F | HEART RATE: 66 BPM | HEIGHT: 72 IN | SYSTOLIC BLOOD PRESSURE: 116 MMHG | BODY MASS INDEX: 35.05 KG/M2 | DIASTOLIC BLOOD PRESSURE: 70 MMHG | WEIGHT: 258.8 LBS | OXYGEN SATURATION: 95 %

## 2018-03-16 DIAGNOSIS — N39.0 ACUTE UTI (URINARY TRACT INFECTION): Primary | ICD-10-CM

## 2018-03-16 LAB
APPEARANCE UR: NORMAL
BILIRUB UR STRIP-MCNC: NORMAL MG/DL
COLOR UR AUTO: NORMAL
GLUCOSE UR STRIP.AUTO-MCNC: NORMAL MG/DL
KETONES UR STRIP.AUTO-MCNC: 5 MG/DL
LEUKOCYTE ESTERASE UR QL STRIP.AUTO: NORMAL
NITRITE UR QL STRIP.AUTO: NORMAL
PH UR STRIP.AUTO: 6 [PH] (ref 5–8)
PROT UR QL STRIP: 100 MG/DL
RBC UR QL AUTO: NORMAL
SP GR UR STRIP.AUTO: 1.02
UROBILINOGEN UR STRIP-MCNC: NORMAL MG/DL

## 2018-03-16 PROCEDURE — 87186 SC STD MICRODIL/AGAR DIL: CPT

## 2018-03-16 PROCEDURE — 81002 URINALYSIS NONAUTO W/O SCOPE: CPT | Performed by: NURSE PRACTITIONER

## 2018-03-16 PROCEDURE — 87086 URINE CULTURE/COLONY COUNT: CPT

## 2018-03-16 PROCEDURE — 87077 CULTURE AEROBIC IDENTIFY: CPT

## 2018-03-16 PROCEDURE — 99214 OFFICE O/P EST MOD 30 MIN: CPT | Performed by: NURSE PRACTITIONER

## 2018-03-16 RX ORDER — CIPROFLOXACIN 500 MG/1
500 TABLET, FILM COATED ORAL 2 TIMES DAILY
Qty: 20 TAB | Refills: 0 | Status: SHIPPED | OUTPATIENT
Start: 2018-03-16 | End: 2019-07-17

## 2018-03-16 ASSESSMENT — ENCOUNTER SYMPTOMS
WEAKNESS: 0
CHILLS: 0
MYALGIAS: 0
ABDOMINAL PAIN: 0
VOMITING: 0
HEADACHES: 0
FEVER: 0
FLANK PAIN: 0
BACK PAIN: 0
NAUSEA: 0

## 2018-03-16 NOTE — PROGRESS NOTES
"Subjective:      Ramu Barber is a 67 y.o. male who presents with UTI (urgency, painful urination, x3 weeks )            Medications, Allergies and Prior Medical Hx reviewed and updated in [facility].with patient/family today       Pt states he was in hospital 3 wks ago and there were several unsuccessful attempts to place a urinary catheter.       Dysuria    This is a new problem. The current episode started 1 to 4 weeks ago (3 wks). The problem has been gradually worsening. The quality of the pain is described as burning. The pain is moderate. There has been no fever. Associated symptoms include frequency and urgency. Pertinent negatives include no chills, flank pain, hematuria, nausea or vomiting. He has tried nothing for the symptoms. The treatment provided no relief. His past medical history is significant for catheterization.       Review of Systems   Constitutional: Negative for chills, fever and malaise/fatigue.   Gastrointestinal: Negative for abdominal pain, nausea and vomiting.   Genitourinary: Positive for dysuria, frequency and urgency. Negative for flank pain and hematuria.   Musculoskeletal: Negative for back pain (no new back pain) and myalgias.   Neurological: Negative for weakness and headaches.          Objective:     /70   Pulse 66   Temp 37.5 °C (99.5 °F)   Ht 1.829 m (6' 0.01\")   Wt 117.4 kg (258 lb 12.8 oz)   SpO2 95%   BMI 35.09 kg/m²      Physical Exam   Constitutional: He appears well-developed and well-nourished. No distress.   HENT:   Head: Normocephalic and atraumatic.   Eyes: Conjunctivae are normal. Pupils are equal, round, and reactive to light.   Neck: Neck supple.   Cardiovascular: Normal rate, regular rhythm and normal heart sounds.    Pulmonary/Chest: Effort normal and breath sounds normal. No respiratory distress.   Abdominal: There is no tenderness.   Neurological: He is alert.   Awake, alert, answering questions appropriately, moving all extremeties   Skin: Skin is " warm and dry. Capillary refill takes less than 2 seconds. No rash noted.   Psychiatric: He has a normal mood and affect. His behavior is normal.   Vitals reviewed.              Assessment/Plan:     1. Acute UTI (urinary tract infection)  ciprofloxacin (CIPRO) 500 MG Tab       Poct UA - positive for leuks, nitrates, blood and protien      Drink fluids  Pt will go to the ER for worsening or changing symptoms as discusse, high fever, body aches, vomiting, flank pain  Follow-up with your primary care provider or return here if not improving in 5 days   Discharge instructions discussed with pt/family who verbalize understanding and agreement with poc    Urine Culture     Urine Culture  (A)   Escherichia coli   >100,000 cfu/mL        Resulting Agency M   Susceptibility      Escherichia coli     Not Specified     Ampicillin Sensitive     Cefepime Sensitive     Cefotaxime Sensitive     Cefotetan Sensitive     Ceftazidime Sensitive     Ceftriaxone Sensitive     Cefuroxime Sensitive     Cephalothin Sensitive     Ciprofloxacin Sensitive     Gentamicin Sensitive     Levofloxacin Sensitive     Nitrofurantoin Sensitive     Pip/Tazobactam Sensitive     Piperacillin Sensitive     Tigecycline Sensitive     Tobramycin Sensitive     Trimeth/Sulfa Sensitive

## 2018-03-17 DIAGNOSIS — N39.0 ACUTE UTI (URINARY TRACT INFECTION): ICD-10-CM

## 2018-03-19 LAB
BACTERIA UR CULT: ABNORMAL
BACTERIA UR CULT: ABNORMAL
SIGNIFICANT IND 70042: ABNORMAL
SITE SITE: ABNORMAL
SOURCE SOURCE: ABNORMAL

## 2018-03-23 ENCOUNTER — HOSPITAL ENCOUNTER (OUTPATIENT)
Dept: LAB | Facility: MEDICAL CENTER | Age: 68
End: 2018-03-23
Attending: FAMILY MEDICINE
Payer: COMMERCIAL

## 2018-03-23 LAB
ALBUMIN SERPL BCP-MCNC: 3.9 G/DL (ref 3.2–4.9)
ALBUMIN/GLOB SERPL: 1.2 G/DL
ALP SERPL-CCNC: 56 U/L (ref 30–99)
ALT SERPL-CCNC: 20 U/L (ref 2–50)
ANION GAP SERPL CALC-SCNC: 10 MMOL/L (ref 0–11.9)
AST SERPL-CCNC: 21 U/L (ref 12–45)
BASOPHILS # BLD AUTO: 1.3 % (ref 0–1.8)
BASOPHILS # BLD: 0.11 K/UL (ref 0–0.12)
BILIRUB SERPL-MCNC: 0.4 MG/DL (ref 0.1–1.5)
BUN SERPL-MCNC: 18 MG/DL (ref 8–22)
CALCIUM SERPL-MCNC: 9.2 MG/DL (ref 8.5–10.5)
CHLORIDE SERPL-SCNC: 105 MMOL/L (ref 96–112)
CHOLEST SERPL-MCNC: 127 MG/DL (ref 100–199)
CO2 SERPL-SCNC: 24 MMOL/L (ref 20–33)
CREAT SERPL-MCNC: 0.95 MG/DL (ref 0.5–1.4)
CREAT UR-MCNC: 101.9 MG/DL
EOSINOPHIL # BLD AUTO: 0.33 K/UL (ref 0–0.51)
EOSINOPHIL NFR BLD: 4 % (ref 0–6.9)
ERYTHROCYTE [DISTWIDTH] IN BLOOD BY AUTOMATED COUNT: 46.3 FL (ref 35.9–50)
EST. AVERAGE GLUCOSE BLD GHB EST-MCNC: 151 MG/DL
GLOBULIN SER CALC-MCNC: 3.3 G/DL (ref 1.9–3.5)
GLUCOSE SERPL-MCNC: 143 MG/DL (ref 65–99)
HBA1C MFR BLD: 6.9 % (ref 0–5.6)
HCT VFR BLD AUTO: 46.5 % (ref 42–52)
HDLC SERPL-MCNC: 42 MG/DL
HGB BLD-MCNC: 15.2 G/DL (ref 14–18)
IMM GRANULOCYTES # BLD AUTO: 0.08 K/UL (ref 0–0.11)
IMM GRANULOCYTES NFR BLD AUTO: 1 % (ref 0–0.9)
LDLC SERPL CALC-MCNC: 72 MG/DL
LYMPHOCYTES # BLD AUTO: 1.32 K/UL (ref 1–4.8)
LYMPHOCYTES NFR BLD: 16.1 % (ref 22–41)
MCH RBC QN AUTO: 30.8 PG (ref 27–33)
MCHC RBC AUTO-ENTMCNC: 32.7 G/DL (ref 33.7–35.3)
MCV RBC AUTO: 94.1 FL (ref 81.4–97.8)
MICROALBUMIN UR-MCNC: 14.1 MG/DL
MICROALBUMIN/CREAT UR: 138 MG/G (ref 0–30)
MONOCYTES # BLD AUTO: 0.69 K/UL (ref 0–0.85)
MONOCYTES NFR BLD AUTO: 8.4 % (ref 0–13.4)
NEUTROPHILS # BLD AUTO: 5.65 K/UL (ref 1.82–7.42)
NEUTROPHILS NFR BLD: 69.2 % (ref 44–72)
NRBC # BLD AUTO: 0 K/UL
NRBC BLD-RTO: 0 /100 WBC
PLATELET # BLD AUTO: 331 K/UL (ref 164–446)
PMV BLD AUTO: 10 FL (ref 9–12.9)
POTASSIUM SERPL-SCNC: 3.6 MMOL/L (ref 3.6–5.5)
PROT SERPL-MCNC: 7.2 G/DL (ref 6–8.2)
RBC # BLD AUTO: 4.94 M/UL (ref 4.7–6.1)
SODIUM SERPL-SCNC: 139 MMOL/L (ref 135–145)
TRIGL SERPL-MCNC: 63 MG/DL (ref 0–149)
TSH SERPL DL<=0.005 MIU/L-ACNC: 1.38 UIU/ML (ref 0.38–5.33)
WBC # BLD AUTO: 8.2 K/UL (ref 4.8–10.8)

## 2018-03-23 PROCEDURE — 80053 COMPREHEN METABOLIC PANEL: CPT

## 2018-03-23 PROCEDURE — 82043 UR ALBUMIN QUANTITATIVE: CPT

## 2018-03-23 PROCEDURE — 83036 HEMOGLOBIN GLYCOSYLATED A1C: CPT

## 2018-03-23 PROCEDURE — 36415 COLL VENOUS BLD VENIPUNCTURE: CPT

## 2018-03-23 PROCEDURE — 84443 ASSAY THYROID STIM HORMONE: CPT

## 2018-03-23 PROCEDURE — 84681 ASSAY OF C-PEPTIDE: CPT

## 2018-03-23 PROCEDURE — 82570 ASSAY OF URINE CREATININE: CPT

## 2018-03-23 PROCEDURE — 85025 COMPLETE CBC W/AUTO DIFF WBC: CPT

## 2018-03-23 PROCEDURE — 80061 LIPID PANEL: CPT

## 2018-03-25 LAB — C PEPTIDE SERPL-MCNC: 2.8 NG/ML (ref 0.8–3.5)

## 2018-10-18 DIAGNOSIS — I10 ESSENTIAL HYPERTENSION: ICD-10-CM

## 2018-10-18 RX ORDER — CARVEDILOL 25 MG/1
TABLET ORAL
Qty: 180 TAB | Refills: 0 | Status: ON HOLD | OUTPATIENT
Start: 2018-10-18 | End: 2023-04-18

## 2018-10-31 DIAGNOSIS — I10 ESSENTIAL HYPERTENSION: ICD-10-CM

## 2018-10-31 RX ORDER — AMLODIPINE BESYLATE 10 MG/1
10 TABLET ORAL DAILY
Qty: 30 TAB | Refills: 0 | Status: ON HOLD | OUTPATIENT
Start: 2018-10-31 | End: 2023-04-16

## 2018-10-31 RX ORDER — FOSINOPRIL SODIUM 40 MG/1
40 TABLET ORAL DAILY
Qty: 30 TAB | Refills: 0 | Status: ON HOLD | OUTPATIENT
Start: 2018-10-31 | End: 2023-04-18

## 2018-11-12 ENCOUNTER — TELEPHONE (OUTPATIENT)
Dept: CARDIOLOGY | Facility: MEDICAL CENTER | Age: 68
End: 2018-11-12

## 2018-11-20 DIAGNOSIS — E78.5 HYPERLIPIDEMIA, UNSPECIFIED HYPERLIPIDEMIA TYPE: ICD-10-CM

## 2018-11-26 RX ORDER — LOVASTATIN 20 MG/1
20 TABLET ORAL EVERY EVENING
Qty: 90 TAB | Refills: 1 | Status: ON HOLD | OUTPATIENT
Start: 2018-11-26 | End: 2023-04-18

## 2018-12-31 DIAGNOSIS — I10 ESSENTIAL HYPERTENSION: ICD-10-CM

## 2019-01-02 RX ORDER — CLONIDINE HYDROCHLORIDE 0.2 MG/1
0.2 TABLET ORAL 2 TIMES DAILY
Qty: 60 TAB | Refills: 0 | Status: SHIPPED | OUTPATIENT
Start: 2019-01-02 | End: 2022-09-25

## 2019-03-12 ENCOUNTER — HOSPITAL ENCOUNTER (OUTPATIENT)
Dept: RADIOLOGY | Facility: MEDICAL CENTER | Age: 69
End: 2019-03-12
Attending: PHYSICIAN ASSISTANT
Payer: COMMERCIAL

## 2019-03-12 DIAGNOSIS — M54.16 RADICULOPATHY, LUMBAR REGION: ICD-10-CM

## 2019-03-12 DIAGNOSIS — M54.50 ACUTE MIDLINE LOW BACK PAIN WITHOUT SCIATICA: ICD-10-CM

## 2019-03-12 PROCEDURE — 72110 X-RAY EXAM L-2 SPINE 4/>VWS: CPT

## 2019-03-12 PROCEDURE — 72148 MRI LUMBAR SPINE W/O DYE: CPT

## 2019-03-25 ENCOUNTER — HOSPITAL ENCOUNTER (OUTPATIENT)
Dept: LAB | Facility: MEDICAL CENTER | Age: 69
End: 2019-03-25
Attending: FAMILY MEDICINE
Payer: COMMERCIAL

## 2019-03-25 ENCOUNTER — HOSPITAL ENCOUNTER (OUTPATIENT)
Dept: LAB | Facility: MEDICAL CENTER | Age: 69
End: 2019-03-25
Attending: INTERNAL MEDICINE
Payer: COMMERCIAL

## 2019-03-25 LAB
ALBUMIN SERPL BCP-MCNC: 4.2 G/DL (ref 3.2–4.9)
ALBUMIN SERPL BCP-MCNC: 4.2 G/DL (ref 3.2–4.9)
ALBUMIN/GLOB SERPL: 1.2 G/DL
ALBUMIN/GLOB SERPL: 1.3 G/DL
ALP SERPL-CCNC: 55 U/L (ref 30–99)
ALP SERPL-CCNC: 56 U/L (ref 30–99)
ALT SERPL-CCNC: 15 U/L (ref 2–50)
ALT SERPL-CCNC: 16 U/L (ref 2–50)
AMYLASE SERPL-CCNC: 43 U/L (ref 20–103)
ANION GAP SERPL CALC-SCNC: 7 MMOL/L (ref 0–11.9)
ANION GAP SERPL CALC-SCNC: 7 MMOL/L (ref 0–11.9)
APPEARANCE UR: CLEAR
AST SERPL-CCNC: 22 U/L (ref 12–45)
AST SERPL-CCNC: 22 U/L (ref 12–45)
BACTERIA #/AREA URNS HPF: NEGATIVE /HPF
BASOPHILS # BLD AUTO: 1.3 % (ref 0–1.8)
BASOPHILS # BLD AUTO: 1.4 % (ref 0–1.8)
BASOPHILS # BLD: 0.1 K/UL (ref 0–0.12)
BASOPHILS # BLD: 0.11 K/UL (ref 0–0.12)
BILIRUB SERPL-MCNC: 0.5 MG/DL (ref 0.1–1.5)
BILIRUB SERPL-MCNC: 0.5 MG/DL (ref 0.1–1.5)
BILIRUB UR QL STRIP.AUTO: NEGATIVE
BUN SERPL-MCNC: 33 MG/DL (ref 8–22)
BUN SERPL-MCNC: 34 MG/DL (ref 8–22)
CALCIUM SERPL-MCNC: 9.2 MG/DL (ref 8.5–10.5)
CALCIUM SERPL-MCNC: 9.3 MG/DL (ref 8.5–10.5)
CHLORIDE SERPL-SCNC: 103 MMOL/L (ref 96–112)
CHLORIDE SERPL-SCNC: 103 MMOL/L (ref 96–112)
CHOLEST SERPL-MCNC: 139 MG/DL (ref 100–199)
CHOLEST SERPL-MCNC: 140 MG/DL (ref 100–199)
CO2 SERPL-SCNC: 29 MMOL/L (ref 20–33)
CO2 SERPL-SCNC: 29 MMOL/L (ref 20–33)
COLOR UR: YELLOW
CREAT SERPL-MCNC: 1.42 MG/DL (ref 0.5–1.4)
CREAT SERPL-MCNC: 1.49 MG/DL (ref 0.5–1.4)
CREAT UR-MCNC: 107.7 MG/DL
EOSINOPHIL # BLD AUTO: 0.45 K/UL (ref 0–0.51)
EOSINOPHIL # BLD AUTO: 0.45 K/UL (ref 0–0.51)
EOSINOPHIL NFR BLD: 5.8 % (ref 0–6.9)
EOSINOPHIL NFR BLD: 5.8 % (ref 0–6.9)
EPI CELLS #/AREA URNS HPF: NEGATIVE /HPF
ERYTHROCYTE [DISTWIDTH] IN BLOOD BY AUTOMATED COUNT: 50.4 FL (ref 35.9–50)
ERYTHROCYTE [DISTWIDTH] IN BLOOD BY AUTOMATED COUNT: 51.3 FL (ref 35.9–50)
FASTING STATUS PATIENT QL REPORTED: NORMAL
FASTING STATUS PATIENT QL REPORTED: NORMAL
GLOBULIN SER CALC-MCNC: 3.2 G/DL (ref 1.9–3.5)
GLOBULIN SER CALC-MCNC: 3.4 G/DL (ref 1.9–3.5)
GLUCOSE SERPL-MCNC: 145 MG/DL (ref 65–99)
GLUCOSE SERPL-MCNC: 148 MG/DL (ref 65–99)
GLUCOSE UR STRIP.AUTO-MCNC: NEGATIVE MG/DL
HCT VFR BLD AUTO: 48 % (ref 42–52)
HCT VFR BLD AUTO: 48.3 % (ref 42–52)
HDLC SERPL-MCNC: 59 MG/DL
HDLC SERPL-MCNC: 60 MG/DL
HGB BLD-MCNC: 15.5 G/DL (ref 14–18)
HGB BLD-MCNC: 15.6 G/DL (ref 14–18)
HYALINE CASTS #/AREA URNS LPF: ABNORMAL /LPF
IMM GRANULOCYTES # BLD AUTO: 0.02 K/UL (ref 0–0.11)
IMM GRANULOCYTES # BLD AUTO: 0.02 K/UL (ref 0–0.11)
IMM GRANULOCYTES NFR BLD AUTO: 0.3 % (ref 0–0.9)
IMM GRANULOCYTES NFR BLD AUTO: 0.3 % (ref 0–0.9)
KETONES UR STRIP.AUTO-MCNC: NEGATIVE MG/DL
LDLC SERPL CALC-MCNC: 67 MG/DL
LDLC SERPL CALC-MCNC: 67 MG/DL
LEUKOCYTE ESTERASE UR QL STRIP.AUTO: ABNORMAL
LIPASE SERPL-CCNC: 25 U/L (ref 11–82)
LYMPHOCYTES # BLD AUTO: 1.39 K/UL (ref 1–4.8)
LYMPHOCYTES # BLD AUTO: 1.42 K/UL (ref 1–4.8)
LYMPHOCYTES NFR BLD: 17.9 % (ref 22–41)
LYMPHOCYTES NFR BLD: 18.2 % (ref 22–41)
MCH RBC QN AUTO: 31.4 PG (ref 27–33)
MCH RBC QN AUTO: 31.5 PG (ref 27–33)
MCHC RBC AUTO-ENTMCNC: 32.3 G/DL (ref 33.7–35.3)
MCHC RBC AUTO-ENTMCNC: 32.3 G/DL (ref 33.7–35.3)
MCV RBC AUTO: 97.2 FL (ref 81.4–97.8)
MCV RBC AUTO: 97.6 FL (ref 81.4–97.8)
MICRO URNS: ABNORMAL
MICROALBUMIN UR-MCNC: 3.1 MG/DL
MICROALBUMIN/CREAT UR: 29 MG/G (ref 0–30)
MONOCYTES # BLD AUTO: 0.71 K/UL (ref 0–0.85)
MONOCYTES # BLD AUTO: 0.76 K/UL (ref 0–0.85)
MONOCYTES NFR BLD AUTO: 9.1 % (ref 0–13.4)
MONOCYTES NFR BLD AUTO: 9.8 % (ref 0–13.4)
NEUTROPHILS # BLD AUTO: 5.05 K/UL (ref 1.82–7.42)
NEUTROPHILS # BLD AUTO: 5.1 K/UL (ref 1.82–7.42)
NEUTROPHILS NFR BLD: 64.8 % (ref 44–72)
NEUTROPHILS NFR BLD: 65.3 % (ref 44–72)
NITRITE UR QL STRIP.AUTO: NEGATIVE
NRBC # BLD AUTO: 0 K/UL
NRBC # BLD AUTO: 0 K/UL
NRBC BLD-RTO: 0 /100 WBC
NRBC BLD-RTO: 0 /100 WBC
PH UR STRIP.AUTO: 5.5 [PH]
PLATELET # BLD AUTO: 224 K/UL (ref 164–446)
PLATELET # BLD AUTO: 233 K/UL (ref 164–446)
PMV BLD AUTO: 11.2 FL (ref 9–12.9)
PMV BLD AUTO: 11.2 FL (ref 9–12.9)
POTASSIUM SERPL-SCNC: 4 MMOL/L (ref 3.6–5.5)
POTASSIUM SERPL-SCNC: 4.1 MMOL/L (ref 3.6–5.5)
PROT SERPL-MCNC: 7.4 G/DL (ref 6–8.2)
PROT SERPL-MCNC: 7.6 G/DL (ref 6–8.2)
PROT UR QL STRIP: NEGATIVE MG/DL
PSA SERPL-MCNC: 0.13 NG/ML (ref 0–4)
RBC # BLD AUTO: 4.92 M/UL (ref 4.7–6.1)
RBC # BLD AUTO: 4.97 M/UL (ref 4.7–6.1)
RBC # URNS HPF: ABNORMAL /HPF
RBC UR QL AUTO: NEGATIVE
SODIUM SERPL-SCNC: 139 MMOL/L (ref 135–145)
SODIUM SERPL-SCNC: 139 MMOL/L (ref 135–145)
SP GR UR STRIP.AUTO: 1.02
TRIGL SERPL-MCNC: 66 MG/DL (ref 0–149)
TRIGL SERPL-MCNC: 67 MG/DL (ref 0–149)
TSH SERPL DL<=0.005 MIU/L-ACNC: 2.77 UIU/ML (ref 0.38–5.33)
UROBILINOGEN UR STRIP.AUTO-MCNC: 0.2 MG/DL
WBC # BLD AUTO: 7.8 K/UL (ref 4.8–10.8)
WBC # BLD AUTO: 7.8 K/UL (ref 4.8–10.8)
WBC #/AREA URNS HPF: ABNORMAL /HPF

## 2019-03-25 PROCEDURE — 80053 COMPREHEN METABOLIC PANEL: CPT

## 2019-03-25 PROCEDURE — 83036 HEMOGLOBIN GLYCOSYLATED A1C: CPT | Mod: GA,91

## 2019-03-25 PROCEDURE — 82570 ASSAY OF URINE CREATININE: CPT

## 2019-03-25 PROCEDURE — 36415 COLL VENOUS BLD VENIPUNCTURE: CPT

## 2019-03-25 PROCEDURE — 84153 ASSAY OF PSA TOTAL: CPT | Mod: GA

## 2019-03-25 PROCEDURE — 80053 COMPREHEN METABOLIC PANEL: CPT | Mod: 91

## 2019-03-25 PROCEDURE — 80061 LIPID PANEL: CPT

## 2019-03-25 PROCEDURE — 82043 UR ALBUMIN QUANTITATIVE: CPT

## 2019-03-25 PROCEDURE — 82150 ASSAY OF AMYLASE: CPT

## 2019-03-25 PROCEDURE — 83036 HEMOGLOBIN GLYCOSYLATED A1C: CPT | Mod: GA

## 2019-03-25 PROCEDURE — 83690 ASSAY OF LIPASE: CPT

## 2019-03-25 PROCEDURE — 80061 LIPID PANEL: CPT | Mod: 91

## 2019-03-25 PROCEDURE — 81001 URINALYSIS AUTO W/SCOPE: CPT

## 2019-03-25 PROCEDURE — 85025 COMPLETE CBC W/AUTO DIFF WBC: CPT | Mod: 91

## 2019-03-25 PROCEDURE — 84443 ASSAY THYROID STIM HORMONE: CPT

## 2019-03-25 PROCEDURE — 85025 COMPLETE CBC W/AUTO DIFF WBC: CPT

## 2019-03-28 ENCOUNTER — HOSPITAL ENCOUNTER (OUTPATIENT)
Dept: CARDIOLOGY | Facility: MEDICAL CENTER | Age: 69
End: 2019-03-28
Attending: INTERNAL MEDICINE
Payer: COMMERCIAL

## 2019-03-28 ENCOUNTER — HOSPITAL ENCOUNTER (OUTPATIENT)
Dept: RADIOLOGY | Facility: MEDICAL CENTER | Age: 69
End: 2019-03-28
Attending: FAMILY MEDICINE
Payer: COMMERCIAL

## 2019-03-28 DIAGNOSIS — R00.1 SEVERE SINUS BRADYCARDIA: ICD-10-CM

## 2019-03-28 DIAGNOSIS — I10 ESSENTIAL HYPERTENSION, MALIGNANT: ICD-10-CM

## 2019-03-28 DIAGNOSIS — K86.9 DISEASE OF PANCREAS: ICD-10-CM

## 2019-03-28 LAB
EST. AVERAGE GLUCOSE BLD GHB EST-MCNC: 157 MG/DL
EST. AVERAGE GLUCOSE BLD GHB EST-MCNC: 157 MG/DL
HBA1C MFR BLD: 7.1 % (ref 0–5.6)
HBA1C MFR BLD: 7.1 % (ref 0–5.6)

## 2019-03-28 PROCEDURE — 700117 HCHG RX CONTRAST REV CODE 255: Performed by: FAMILY MEDICINE

## 2019-03-28 PROCEDURE — 74170 CT ABD WO CNTRST FLWD CNTRST: CPT

## 2019-03-28 PROCEDURE — 93306 TTE W/DOPPLER COMPLETE: CPT

## 2019-03-28 RX ADMIN — IOHEXOL 100 ML: 350 INJECTION, SOLUTION INTRAVENOUS at 12:00

## 2019-03-29 LAB
LV EJECT FRACT MOD 2C 99903: 61.38
LV EJECT FRACT MOD 4C 99902: 71.2
LV EJECT FRACT MOD BP 99901: 65.2

## 2019-04-09 ENCOUNTER — HOSPITAL ENCOUNTER (OUTPATIENT)
Dept: LAB | Facility: MEDICAL CENTER | Age: 69
End: 2019-04-09
Attending: FAMILY MEDICINE
Payer: COMMERCIAL

## 2019-04-09 LAB
ALBUMIN SERPL BCP-MCNC: 4 G/DL (ref 3.2–4.9)
ALBUMIN/GLOB SERPL: 1.4 G/DL
ALP SERPL-CCNC: 48 U/L (ref 30–99)
ALT SERPL-CCNC: 27 U/L (ref 2–50)
ANION GAP SERPL CALC-SCNC: 9 MMOL/L (ref 0–11.9)
APPEARANCE UR: CLEAR
AST SERPL-CCNC: 26 U/L (ref 12–45)
BACTERIA #/AREA URNS HPF: NEGATIVE /HPF
BILIRUB SERPL-MCNC: 0.4 MG/DL (ref 0.1–1.5)
BILIRUB UR QL STRIP.AUTO: NEGATIVE
BUN SERPL-MCNC: 21 MG/DL (ref 8–22)
CALCIUM SERPL-MCNC: 9.1 MG/DL (ref 8.5–10.5)
CHLORIDE SERPL-SCNC: 104 MMOL/L (ref 96–112)
CO2 SERPL-SCNC: 25 MMOL/L (ref 20–33)
COLOR UR: YELLOW
CREAT SERPL-MCNC: 1.44 MG/DL (ref 0.5–1.4)
EPI CELLS #/AREA URNS HPF: NEGATIVE /HPF
GLOBULIN SER CALC-MCNC: 2.9 G/DL (ref 1.9–3.5)
GLUCOSE SERPL-MCNC: 168 MG/DL (ref 65–99)
GLUCOSE UR STRIP.AUTO-MCNC: NEGATIVE MG/DL
HYALINE CASTS #/AREA URNS LPF: ABNORMAL /LPF
KETONES UR STRIP.AUTO-MCNC: ABNORMAL MG/DL
LEUKOCYTE ESTERASE UR QL STRIP.AUTO: ABNORMAL
MICRO URNS: ABNORMAL
NITRITE UR QL STRIP.AUTO: NEGATIVE
PH UR STRIP.AUTO: 5.5 [PH]
POTASSIUM SERPL-SCNC: 4.2 MMOL/L (ref 3.6–5.5)
PROT SERPL-MCNC: 6.9 G/DL (ref 6–8.2)
PROT UR QL STRIP: NEGATIVE MG/DL
RBC # URNS HPF: ABNORMAL /HPF
RBC UR QL AUTO: NEGATIVE
SODIUM SERPL-SCNC: 138 MMOL/L (ref 135–145)
SP GR UR STRIP.AUTO: 1.02
UROBILINOGEN UR STRIP.AUTO-MCNC: 0.2 MG/DL
WBC #/AREA URNS HPF: ABNORMAL /HPF

## 2019-04-09 PROCEDURE — 36415 COLL VENOUS BLD VENIPUNCTURE: CPT

## 2019-04-09 PROCEDURE — 81001 URINALYSIS AUTO W/SCOPE: CPT

## 2019-04-09 PROCEDURE — 80053 COMPREHEN METABOLIC PANEL: CPT

## 2019-04-17 ENCOUNTER — HOSPITAL ENCOUNTER (OUTPATIENT)
Dept: LAB | Facility: MEDICAL CENTER | Age: 69
End: 2019-04-17
Attending: FAMILY MEDICINE
Payer: COMMERCIAL

## 2019-04-17 LAB
ALBUMIN SERPL BCP-MCNC: 3.9 G/DL (ref 3.2–4.9)
ALBUMIN/GLOB SERPL: 1.6 G/DL
ALP SERPL-CCNC: 46 U/L (ref 30–99)
ALT SERPL-CCNC: 20 U/L (ref 2–50)
ANION GAP SERPL CALC-SCNC: 9 MMOL/L (ref 0–11.9)
AST SERPL-CCNC: 20 U/L (ref 12–45)
BILIRUB SERPL-MCNC: 0.6 MG/DL (ref 0.1–1.5)
BUN SERPL-MCNC: 22 MG/DL (ref 8–22)
CALCIUM SERPL-MCNC: 8.5 MG/DL (ref 8.5–10.5)
CHLORIDE SERPL-SCNC: 104 MMOL/L (ref 96–112)
CO2 SERPL-SCNC: 26 MMOL/L (ref 20–33)
CREAT SERPL-MCNC: 1.16 MG/DL (ref 0.5–1.4)
FASTING STATUS PATIENT QL REPORTED: NORMAL
GLOBULIN SER CALC-MCNC: 2.5 G/DL (ref 1.9–3.5)
GLUCOSE SERPL-MCNC: 139 MG/DL (ref 65–99)
POTASSIUM SERPL-SCNC: 3.9 MMOL/L (ref 3.6–5.5)
PROT SERPL-MCNC: 6.4 G/DL (ref 6–8.2)
SODIUM SERPL-SCNC: 139 MMOL/L (ref 135–145)

## 2019-04-17 PROCEDURE — 80053 COMPREHEN METABOLIC PANEL: CPT

## 2019-04-17 PROCEDURE — 36415 COLL VENOUS BLD VENIPUNCTURE: CPT

## 2019-04-30 ENCOUNTER — HOSPITAL ENCOUNTER (OUTPATIENT)
Dept: LAB | Facility: MEDICAL CENTER | Age: 69
End: 2019-04-30
Attending: FAMILY MEDICINE
Payer: COMMERCIAL

## 2019-04-30 LAB
EST. AVERAGE GLUCOSE BLD GHB EST-MCNC: 166 MG/DL
HBA1C MFR BLD: 7.4 % (ref 0–5.6)

## 2019-04-30 PROCEDURE — 83036 HEMOGLOBIN GLYCOSYLATED A1C: CPT | Mod: GA

## 2019-04-30 PROCEDURE — 36415 COLL VENOUS BLD VENIPUNCTURE: CPT | Mod: GA

## 2019-05-20 NOTE — ADDENDUM NOTE
Encounter addended by: Naina Laura, Med Ass't on: 5/20/2019  8:50 AM<BR>    Actions taken: Imaging Exam ended, MAR administration accepted

## 2019-05-20 NOTE — ADDENDUM NOTE
Encounter addended by: Naina Laura, Med Ass't on: 5/20/2019  8:57 AM<BR>    Actions taken: Imaging Exam ended

## 2019-06-06 ENCOUNTER — HOSPITAL ENCOUNTER (OUTPATIENT)
Facility: MEDICAL CENTER | Age: 69
End: 2019-06-06
Attending: PAIN MEDICINE | Admitting: PAIN MEDICINE
Payer: COMMERCIAL

## 2019-06-17 ENCOUNTER — SLEEP CENTER VISIT (OUTPATIENT)
Dept: SLEEP MEDICINE | Facility: MEDICAL CENTER | Age: 69
End: 2019-06-17
Payer: COMMERCIAL

## 2019-06-17 VITALS
HEART RATE: 57 BPM | RESPIRATION RATE: 15 BRPM | DIASTOLIC BLOOD PRESSURE: 72 MMHG | OXYGEN SATURATION: 94 % | WEIGHT: 258 LBS | BODY MASS INDEX: 34.95 KG/M2 | SYSTOLIC BLOOD PRESSURE: 122 MMHG | HEIGHT: 72 IN

## 2019-06-17 DIAGNOSIS — G47.33 OBSTRUCTIVE SLEEP APNEA SYNDROME: Chronic | ICD-10-CM

## 2019-06-17 DIAGNOSIS — E66.9 OBESITY (BMI 30-39.9): ICD-10-CM

## 2019-06-17 PROCEDURE — 99203 OFFICE O/P NEW LOW 30 MIN: CPT | Performed by: FAMILY MEDICINE

## 2019-06-17 RX ORDER — TAMSULOSIN HYDROCHLORIDE 0.4 MG/1
CAPSULE ORAL
COMMUNITY
Start: 2019-06-16 | End: 2019-07-17

## 2019-06-17 RX ORDER — FINASTERIDE 5 MG/1
5 TABLET, FILM COATED ORAL DAILY
COMMUNITY
End: 2020-08-12

## 2019-06-17 RX ORDER — GABAPENTIN 600 MG/1
600 TABLET ORAL 4 TIMES DAILY
Status: ON HOLD | COMMUNITY
Start: 2019-05-30 | End: 2020-08-15

## 2019-06-17 RX ORDER — SULFAMETHOXAZOLE AND TRIMETHOPRIM 800; 160 MG/1; MG/1
TABLET ORAL
COMMUNITY
Start: 2019-03-28 | End: 2019-07-17

## 2019-06-17 RX ORDER — IBUPROFEN 800 MG/1
800 TABLET ORAL EVERY 8 HOURS PRN
Status: ON HOLD | COMMUNITY
End: 2020-08-24

## 2019-06-17 NOTE — PROGRESS NOTES
"     [facility]  Consult Note     Date: 6/17/2019 / Time: 8:54 AM    Patient ID:   Name:             Ramu Barber   YOB: 1950  Age:                 68 y.o.  male   MRN:               [id number]      Thank you for requesting a sleep medicine consultation on Ramu Barber at the sleep center. He presents today with the chief complaints of LYLE and to establish as a new pt. He was previously seen by PMA. Pt was diagnosed with LYLE on 9/20/01 with severe LYLE, the AHI owas 72/hr and O2 syed 74%. He was started on Auto CPAP at the time.  He has been on CPAP 8 cm since then.The initial presenting symptoms were choking, gasping and witnessed apneas. He is referred by Dr. Ramirez for evaluation and treatment of sleep disorder breathing.     HISTORY OF PRESENT ILLNESS:       At night,  Ramu Barber goes to bed around 8 pm on weekdays and on the weekends. He gets out of bed at 5:30 am on weekdays and on the weekends. He  averages about 8 hrs of sleep on a good night. Pt rarely has bad nights. He falls asleep within 10 minutes. He awakens 1 time during the night due to bathroom use. It takes him few min to fall back asleep.      He is not aware of snoring/witnessed apneas/gasping or choking in sleep since he has been on the CPAP.  He  denies any symptoms of restless legs syndrome such as an \"urge to move\"  He  legs in the evening or bedtime. He  denies any symptoms of narcolepsy such as sleep paralysis or cataplexy, or any symptoms to suggest parasomnias such as sleep walking or acting out of dreams. He  has not used any medications for the sleep problem.    Overall, he does finds his sleep refreshing. When He  wakes up in the morning, He does not feel tired. In terms of  excessive daytime sleepiness,  He denies of sleepiness while  at work, while reading or watching TV or while driving. [place] sleepiness scale score is abnormal at  9/24.He does not take regular naps.    The 30 day compliance was " downloaded which shows adequate compliance with more that 4 hr usage about 100%. The AHI is has improved to 2.0/hr. The mask leak is normal.       REVIEW OF SYSTEMS:       Constitutional: Denies fevers, Denies weight changes  Eyes: Denies changes in vision, no eye pain  Ears/Nose/Throat/Mouth: Denies nasal congestion or sore throat   Cardiovascular: Denies chest pain or palpitations   Respiratory: Denies shortness of breath , Denies cough  Gastrointestinal/Hepatic: Denies abdominal pain, nausea, vomiting, diarrhea, constipation or GI bleeding   Genitourinary: Denies bladder dysfunction, dysuria or frequency  Musculoskeletal/Rheum: Denies  joint pain and swelling   Skin/Breast: Denies rash  Neurological: Denies headache, confusion, memory loss or focal weakness/parasthesias  Psychiatric: denies mood disorder     Comprehensive review of systems form is reviewed with the patient and is attached in the EMR.     PMH:  has a past medical history of Abnormal glucose; Arthritis; Back pain; Chickenpox; High cholesterol; HTN (hypertension); Obesity; Overweight(278.02); Pain; Pneumonia; Polycythemia, secondary; Sleep apnea; Snoring; and Type II or unspecified type diabetes mellitus without mention of complication, not stated as uncontrolled.  MEDS:   Current Outpatient Prescriptions:   •  tamsulosin (FLOMAX) 0.4 MG capsule, , Disp: , Rfl:   •  sulfamethoxazole-trimethoprim (BACTRIM DS) 800-160 MG tablet, , Disp: , Rfl:   •  gabapentin (NEURONTIN) 600 MG tablet, , Disp: , Rfl:   •  finasteride (PROSCAR) 5 MG Tab, Take 5 mg by mouth every day., Disp: , Rfl:   •  Omega-3 Fatty Acids (FISH OIL PO), Take  by mouth., Disp: , Rfl:   •  ASPIRIN 81 PO, Take  by mouth., Disp: , Rfl:   •  ibuprofen (MOTRIN) 800 MG Tab, Take 800 mg by mouth every 8 hours as needed., Disp: , Rfl:   •  cloNIDine (CATAPRESS) 0.2 MG Tab, Take 1 Tab by mouth 2 times a day. For further refills please contact new cardiologist. Thank you, Disp: 60 Tab, Rfl: 0  •   lovastatin (MEVACOR) 20 MG Tab, Take 1 Tab by mouth every evening., Disp: 90 Tab, Rfl: 1  •  fosinopril (MONOPRIL) 40 MG tablet, Take 1 Tab by mouth every day. For further refills please contact new cardiologist. Thank you, Disp: 30 Tab, Rfl: 0  •  amLODIPine (NORVASC) 10 MG Tab, Take 1 Tab by mouth every day. For further refills please contact new cardiologist. Thank you, Disp: 30 Tab, Rfl: 0  •  carvedilol (COREG) 25 MG Tab, TAKE ONE TABLET BY MOUTH TWICE A DAY WITH MEALS, Disp: 180 Tab, Rfl: 0  •  triamterene-hctz (MAXZIDE-25/DYAZIDE) 37.5-25 MG Tab, Take 1 Tab by mouth every day., Disp: , Rfl:   •  terazosin (HYTRIN) 5 MG Cap, TAKE TWO CAPSULES BY MOUTH EVERY NIGHT AT BEDTIME (GENERIC HYTRIN), Disp: 180 Cap, Rfl: 10  •  metformin (GLUCOPHAGE) 850 MG Tab, Take 850 mg by mouth 2 times a day., Disp: , Rfl:   •  ciprofloxacin (CIPRO) 500 MG Tab, Take 1 Tab by mouth 2 times a day., Disp: 20 Tab, Rfl: 0  ALLERGIES: No Known Allergies  SURGHX:   Past Surgical History:   Procedure Laterality Date   • LUMBAR LAMINECTOMY DISKECTOMY  2/18/2018    Procedure: LUMBAR LAMINECTOMY DISKECTOMY- LT L1-2 HEMILAMI-;  Surgeon: Tom Pittman M.D.;  Location: [facility];  Service: Neurosurgery   • HARDWARE REMOVAL NEURO  2/18/2018    Procedure: HARDWARE REMOVAL NEURO- L2-3 PEDICLE SCREWS;  Surgeon: Tom Pittman M.D.;  Location: [facility];  Service: Neurosurgery   • INGUINAL HERNIA REPAIR Right 2/16/2016    Procedure: INGUINAL HERNIA REPAIR;  Surgeon: Sj Baird M.D.;  Location: [facility];  Service:    • LUMBAR FUSION POSTERIOR  1/21/2015    Performed by Ko Suarez M.D. at [facility]   • OTHER ORTHOPEDIC SURGERY  2002    shoulder   • CARPAL TUNNEL RELEASE     • LAMINOTOMY     • OTHER NEUROLOGICAL SURG      neck fusion 2000, lumbar laminectomy 2008     SOCHX:  reports that he has never smoked. He has quit using smokeless tobacco. His smokeless tobacco use included Chew.  He reports that he drinks alcohol. He reports that he does not use drugs..   FH:   Family History   Problem Relation Age of Onset   • Sleep Apnea Mother    • Heart Attack Father    • Heart Attack Brother            Physical Exam:  Vitals/ General Appearance:   Weight/BMI: Body mass index is 34.99 kg/m².  /72 (BP Location: Right arm, Patient Position: Sitting, BP Cuff Size: Adult)   Pulse (!) 57   Resp 15   Ht 1.829 m (6')   Wt 117 kg (258 lb)   SpO2 94%   Vitals:    06/17/19 0854   BP: 122/72   BP Location: Right arm   Patient Position: Sitting   BP Cuff Size: Adult   Pulse: (!) 57   Resp: 15   SpO2: 94%   Weight: 117 kg (258 lb)   Height: 1.829 m (6')       Pt. is alert and oriented to time, place and person. Cooperative and in no apparent distress.       -Head: Atraumatic, normocephalic.   -. Ears: Normal tympanic membrane and no discharge  -. Nose: No inferior turbinate hypertophy  -. Throat: Oropharynx appears crowded in that the palate is overhanging (Malam Martita scale 3. Tonsils are not enlarged, uvula is large, pharynx not inflamed.   -. Neck: Supple. No thyromegaly  -. Chest: Trachea central, no spine deformity   -. Lungs auscultation: B/L good air entry, vesicular breath sounds, no adventitious sounds  -. Heart auscultation: 1st and 2nd heart sounds normal, regular rhythm. No appreciable murmur.  - Extremities: no clubbing, no pedal edema.  - Skin: No rash  - NEUROLOGICAL EXAMINATION: On neurological exam, the patient was alert and oriented x3. speech was clear and fluent without dysarthria.      INVESTIGATIONS:       ASSESSMENT AND PLAN     1.Obstructive Sleep Apnea .He is currently on CPAP 8 cm with nasal pillow mask. 30 day compliance was downloaded which shows adequate compliance.     The pathophysiology of sleep anea and the increased risk of cardiovascular morbidity from untreated sleep apnea is discussed in detail with the patient. He  also has HTN which can be worsened by her LYLE.     He  is urged to avoid supine sleep, weight gain and alcoholic beverages since all of these can worsen sleep apnea. He is cautioned against drowsy driving. If He feels sleepy while driving, He must pull over for a break/nap, rather than persist on the road, in the interest of He own safety and that of others on the road.   Plan   - Continue CPAP 8 cm with nasal pillow mask    - mask fitting was done and M/L nuance was given to try. Supplies refilled    - compliance download and previous SS was reviewed and discussed with the pt   - compliance was reinforced       Patient's body mass index is 34.99 kg/m². Exercise and nutrition counseling were performed at this visit.

## 2019-07-08 ENCOUNTER — HOSPITAL ENCOUNTER (OUTPATIENT)
Dept: LAB | Facility: MEDICAL CENTER | Age: 69
End: 2019-07-08
Attending: FAMILY MEDICINE
Payer: COMMERCIAL

## 2019-07-08 LAB
EST. AVERAGE GLUCOSE BLD GHB EST-MCNC: 163 MG/DL
HBA1C MFR BLD: 7.3 % (ref 0–5.6)

## 2019-07-08 PROCEDURE — 36415 COLL VENOUS BLD VENIPUNCTURE: CPT | Mod: GA

## 2019-07-08 PROCEDURE — 83036 HEMOGLOBIN GLYCOSYLATED A1C: CPT | Mod: GA

## 2019-07-17 ENCOUNTER — HOSPITAL ENCOUNTER (OUTPATIENT)
Dept: RADIOLOGY | Facility: MEDICAL CENTER | Age: 69
End: 2019-07-17
Attending: PAIN MEDICINE | Admitting: PAIN MEDICINE
Payer: COMMERCIAL

## 2019-07-17 DIAGNOSIS — Z01.811 PRE-OPERATIVE RESPIRATORY EXAMINATION: ICD-10-CM

## 2019-07-17 DIAGNOSIS — Z01.812 PRE-OPERATIVE LABORATORY EXAMINATION: ICD-10-CM

## 2019-07-17 DIAGNOSIS — Z01.810 PRE-OPERATIVE CARDIOVASCULAR EXAMINATION: ICD-10-CM

## 2019-07-17 LAB
APPEARANCE UR: CLEAR
BACTERIA #/AREA URNS HPF: ABNORMAL /HPF
BILIRUB UR QL STRIP.AUTO: NEGATIVE
COLOR UR: YELLOW
EKG IMPRESSION: NORMAL
EPI CELLS #/AREA URNS HPF: NEGATIVE /HPF
GLUCOSE UR STRIP.AUTO-MCNC: NEGATIVE MG/DL
HYALINE CASTS #/AREA URNS LPF: ABNORMAL /LPF
KETONES UR STRIP.AUTO-MCNC: NEGATIVE MG/DL
LEUKOCYTE ESTERASE UR QL STRIP.AUTO: ABNORMAL
MICRO URNS: ABNORMAL
NITRITE UR QL STRIP.AUTO: POSITIVE
PH UR STRIP.AUTO: 7 [PH]
PROT UR QL STRIP: NEGATIVE MG/DL
RBC # URNS HPF: ABNORMAL /HPF
RBC UR QL AUTO: NEGATIVE
SP GR UR STRIP.AUTO: 1.01
UROBILINOGEN UR STRIP.AUTO-MCNC: 0.2 MG/DL
WBC #/AREA URNS HPF: ABNORMAL /HPF

## 2019-07-17 PROCEDURE — 36415 COLL VENOUS BLD VENIPUNCTURE: CPT

## 2019-07-17 PROCEDURE — 93010 ELECTROCARDIOGRAM REPORT: CPT | Performed by: INTERNAL MEDICINE

## 2019-07-17 PROCEDURE — 85610 PROTHROMBIN TIME: CPT

## 2019-07-17 PROCEDURE — 81001 URINALYSIS AUTO W/SCOPE: CPT

## 2019-07-17 PROCEDURE — 71045 X-RAY EXAM CHEST 1 VIEW: CPT

## 2019-07-17 PROCEDURE — 87077 CULTURE AEROBIC IDENTIFY: CPT

## 2019-07-17 PROCEDURE — 93005 ELECTROCARDIOGRAM TRACING: CPT

## 2019-07-17 PROCEDURE — 85025 COMPLETE CBC W/AUTO DIFF WBC: CPT

## 2019-07-17 PROCEDURE — 87086 URINE CULTURE/COLONY COUNT: CPT

## 2019-07-17 PROCEDURE — 87186 SC STD MICRODIL/AGAR DIL: CPT

## 2019-07-17 PROCEDURE — 80053 COMPREHEN METABOLIC PANEL: CPT

## 2019-07-17 PROCEDURE — 85730 THROMBOPLASTIN TIME PARTIAL: CPT

## 2019-07-17 RX ORDER — TAMSULOSIN HYDROCHLORIDE 0.4 MG/1
0.4 CAPSULE ORAL
COMMUNITY
End: 2022-09-25

## 2019-07-17 NOTE — OR NURSING
"Preadmit appointment: \" Preparing for your Procedure information\" sheet given to patient with verbal and written instructions. Patient instructed to continue prescribed medications through the day before surgery, instructed to take the following medications the day of surgery per anesthesia protocol: Amlodipine, Carvedilol, Clonidine, Gabapentin, Tamsulosin.  Pt denies any issues with anesthesia.   "

## 2019-07-18 LAB
ALBUMIN SERPL BCP-MCNC: 3.9 G/DL (ref 3.2–4.9)
ALBUMIN/GLOB SERPL: 1.3 G/DL
ALP SERPL-CCNC: 60 U/L (ref 30–99)
ALT SERPL-CCNC: 11 U/L (ref 2–50)
ANION GAP SERPL CALC-SCNC: 7 MMOL/L (ref 0–11.9)
APTT PPP: 26.7 SEC (ref 24.7–36)
AST SERPL-CCNC: 13 U/L (ref 12–45)
BASOPHILS # BLD AUTO: 1 % (ref 0–1.8)
BASOPHILS # BLD: 0.09 K/UL (ref 0–0.12)
BILIRUB SERPL-MCNC: 0.5 MG/DL (ref 0.1–1.5)
BUN SERPL-MCNC: 22 MG/DL (ref 8–22)
CALCIUM SERPL-MCNC: 9 MG/DL (ref 8.5–10.5)
CHLORIDE SERPL-SCNC: 104 MMOL/L (ref 96–112)
CO2 SERPL-SCNC: 26 MMOL/L (ref 20–33)
CREAT SERPL-MCNC: 1.3 MG/DL (ref 0.5–1.4)
EOSINOPHIL # BLD AUTO: 0.27 K/UL (ref 0–0.51)
EOSINOPHIL NFR BLD: 3.1 % (ref 0–6.9)
ERYTHROCYTE [DISTWIDTH] IN BLOOD BY AUTOMATED COUNT: 47.3 FL (ref 35.9–50)
GLOBULIN SER CALC-MCNC: 3 G/DL (ref 1.9–3.5)
GLUCOSE SERPL-MCNC: 150 MG/DL (ref 65–99)
HCT VFR BLD AUTO: 42.4 % (ref 42–52)
HGB BLD-MCNC: 14.3 G/DL (ref 14–18)
IMM GRANULOCYTES # BLD AUTO: 0.03 K/UL (ref 0–0.11)
IMM GRANULOCYTES NFR BLD AUTO: 0.3 % (ref 0–0.9)
INR PPP: 1.07 (ref 0.87–1.13)
LYMPHOCYTES # BLD AUTO: 1.24 K/UL (ref 1–4.8)
LYMPHOCYTES NFR BLD: 14.1 % (ref 22–41)
MCH RBC QN AUTO: 32.1 PG (ref 27–33)
MCHC RBC AUTO-ENTMCNC: 33.7 G/DL (ref 33.7–35.3)
MCV RBC AUTO: 95.1 FL (ref 81.4–97.8)
MONOCYTES # BLD AUTO: 0.71 K/UL (ref 0–0.85)
MONOCYTES NFR BLD AUTO: 8.1 % (ref 0–13.4)
NEUTROPHILS # BLD AUTO: 6.45 K/UL (ref 1.82–7.42)
NEUTROPHILS NFR BLD: 73.4 % (ref 44–72)
NRBC # BLD AUTO: 0 K/UL
NRBC BLD-RTO: 0 /100 WBC
PLATELET # BLD AUTO: 222 K/UL (ref 164–446)
PMV BLD AUTO: 11 FL (ref 9–12.9)
POTASSIUM SERPL-SCNC: 4 MMOL/L (ref 3.6–5.5)
PROT SERPL-MCNC: 6.9 G/DL (ref 6–8.2)
PROTHROMBIN TIME: 14.2 SEC (ref 12–14.6)
RBC # BLD AUTO: 4.46 M/UL (ref 4.7–6.1)
SODIUM SERPL-SCNC: 137 MMOL/L (ref 135–145)
WBC # BLD AUTO: 8.8 K/UL (ref 4.8–10.8)

## 2019-07-23 ENCOUNTER — HOSPITAL ENCOUNTER (OUTPATIENT)
Facility: MEDICAL CENTER | Age: 69
End: 2019-07-23
Attending: PAIN MEDICINE | Admitting: PAIN MEDICINE
Payer: COMMERCIAL

## 2019-07-23 ENCOUNTER — APPOINTMENT (OUTPATIENT)
Dept: RADIOLOGY | Facility: MEDICAL CENTER | Age: 69
End: 2019-07-23
Attending: PAIN MEDICINE
Payer: COMMERCIAL

## 2019-07-23 ENCOUNTER — ANESTHESIA EVENT (OUTPATIENT)
Dept: SURGERY | Facility: MEDICAL CENTER | Age: 69
End: 2019-07-23
Payer: COMMERCIAL

## 2019-07-23 ENCOUNTER — ANESTHESIA (OUTPATIENT)
Dept: SURGERY | Facility: MEDICAL CENTER | Age: 69
End: 2019-07-23
Payer: COMMERCIAL

## 2019-07-23 VITALS
OXYGEN SATURATION: 93 % | SYSTOLIC BLOOD PRESSURE: 128 MMHG | HEIGHT: 72 IN | HEART RATE: 49 BPM | BODY MASS INDEX: 34.73 KG/M2 | RESPIRATION RATE: 18 BRPM | DIASTOLIC BLOOD PRESSURE: 76 MMHG | TEMPERATURE: 96.8 F | WEIGHT: 256.39 LBS

## 2019-07-23 LAB — GLUCOSE BLD-MCNC: 145 MG/DL (ref 65–99)

## 2019-07-23 PROCEDURE — 502000 HCHG MISC OR IMPLANTS RC 0278: Performed by: PAIN MEDICINE

## 2019-07-23 PROCEDURE — 160025 RECOVERY II MINUTES (STATS): Performed by: PAIN MEDICINE

## 2019-07-23 PROCEDURE — 160036 HCHG PACU - EA ADDL 30 MINS PHASE I: Performed by: PAIN MEDICINE

## 2019-07-23 PROCEDURE — 700101 HCHG RX REV CODE 250: Performed by: PAIN MEDICINE

## 2019-07-23 PROCEDURE — 160029 HCHG SURGERY MINUTES - 1ST 30 MINS LEVEL 4: Performed by: PAIN MEDICINE

## 2019-07-23 PROCEDURE — A9270 NON-COVERED ITEM OR SERVICE: HCPCS | Performed by: ANESTHESIOLOGY

## 2019-07-23 PROCEDURE — C1883 ADAPT/EXT, PACING/NEURO LEAD: HCPCS | Performed by: PAIN MEDICINE

## 2019-07-23 PROCEDURE — 160046 HCHG PACU - 1ST 60 MINS PHASE II: Performed by: PAIN MEDICINE

## 2019-07-23 PROCEDURE — 82962 GLUCOSE BLOOD TEST: CPT

## 2019-07-23 PROCEDURE — 160002 HCHG RECOVERY MINUTES (STAT): Performed by: PAIN MEDICINE

## 2019-07-23 PROCEDURE — 501445 HCHG STAPLER, SKIN DISP: Performed by: PAIN MEDICINE

## 2019-07-23 PROCEDURE — 160041 HCHG SURGERY MINUTES - EA ADDL 1 MIN LEVEL 4: Performed by: PAIN MEDICINE

## 2019-07-23 PROCEDURE — 160047 HCHG PACU  - EA ADDL 30 MINS PHASE II: Performed by: PAIN MEDICINE

## 2019-07-23 PROCEDURE — 700105 HCHG RX REV CODE 258: Performed by: PAIN MEDICINE

## 2019-07-23 PROCEDURE — 160035 HCHG PACU - 1ST 60 MINS PHASE I: Performed by: PAIN MEDICINE

## 2019-07-23 PROCEDURE — 700111 HCHG RX REV CODE 636 W/ 250 OVERRIDE (IP): Performed by: ANESTHESIOLOGY

## 2019-07-23 PROCEDURE — 502240 HCHG MISC OR SUPPLY RC 0272: Performed by: PAIN MEDICINE

## 2019-07-23 PROCEDURE — 700111 HCHG RX REV CODE 636 W/ 250 OVERRIDE (IP): Performed by: PAIN MEDICINE

## 2019-07-23 PROCEDURE — 160009 HCHG ANES TIME/MIN: Performed by: PAIN MEDICINE

## 2019-07-23 PROCEDURE — 700105 HCHG RX REV CODE 258: Performed by: ANESTHESIOLOGY

## 2019-07-23 PROCEDURE — 700102 HCHG RX REV CODE 250 W/ 637 OVERRIDE(OP): Performed by: ANESTHESIOLOGY

## 2019-07-23 PROCEDURE — 700101 HCHG RX REV CODE 250: Performed by: ANESTHESIOLOGY

## 2019-07-23 PROCEDURE — 72020 X-RAY EXAM OF SPINE 1 VIEW: CPT

## 2019-07-23 PROCEDURE — A6402 STERILE GAUZE <= 16 SQ IN: HCPCS | Performed by: PAIN MEDICINE

## 2019-07-23 PROCEDURE — C1820 GENERATOR NEURO RECHG BAT SY: HCPCS | Performed by: PAIN MEDICINE

## 2019-07-23 PROCEDURE — 500064 HCHG BINDER, 4-PANEL MED/LG: Performed by: PAIN MEDICINE

## 2019-07-23 PROCEDURE — 160048 HCHG OR STATISTICAL LEVEL 1-5: Performed by: PAIN MEDICINE

## 2019-07-23 DEVICE — IMPLANTABLE DEVICE: Type: IMPLANTABLE DEVICE | Site: BACK | Status: FUNCTIONAL

## 2019-07-23 RX ORDER — MEPERIDINE HYDROCHLORIDE 25 MG/ML
6.25 INJECTION INTRAMUSCULAR; INTRAVENOUS; SUBCUTANEOUS
Status: DISCONTINUED | OUTPATIENT
Start: 2019-07-23 | End: 2019-07-23 | Stop reason: HOSPADM

## 2019-07-23 RX ORDER — BACITRACIN 50000 [IU]/1
INJECTION, POWDER, FOR SOLUTION INTRAMUSCULAR
Status: DISCONTINUED | OUTPATIENT
Start: 2019-07-23 | End: 2019-07-23 | Stop reason: HOSPADM

## 2019-07-23 RX ORDER — HYDROMORPHONE HYDROCHLORIDE 1 MG/ML
0.2 INJECTION, SOLUTION INTRAMUSCULAR; INTRAVENOUS; SUBCUTANEOUS
Status: DISCONTINUED | OUTPATIENT
Start: 2019-07-23 | End: 2019-07-23 | Stop reason: HOSPADM

## 2019-07-23 RX ORDER — OXYCODONE HCL 5 MG/5 ML
5 SOLUTION, ORAL ORAL
Status: DISCONTINUED | OUTPATIENT
Start: 2019-07-23 | End: 2019-07-23 | Stop reason: HOSPADM

## 2019-07-23 RX ORDER — ACETAMINOPHEN 500 MG
1000 TABLET ORAL ONCE
Status: COMPLETED | OUTPATIENT
Start: 2019-07-23 | End: 2019-07-23

## 2019-07-23 RX ORDER — SODIUM CHLORIDE, SODIUM LACTATE, POTASSIUM CHLORIDE, CALCIUM CHLORIDE 600; 310; 30; 20 MG/100ML; MG/100ML; MG/100ML; MG/100ML
1000 INJECTION, SOLUTION INTRAVENOUS CONTINUOUS
Status: DISCONTINUED | OUTPATIENT
Start: 2019-07-23 | End: 2019-07-23 | Stop reason: HOSPADM

## 2019-07-23 RX ORDER — MIDAZOLAM HYDROCHLORIDE 1 MG/ML
1 INJECTION INTRAMUSCULAR; INTRAVENOUS
Status: DISCONTINUED | OUTPATIENT
Start: 2019-07-23 | End: 2019-07-23 | Stop reason: HOSPADM

## 2019-07-23 RX ORDER — OXYCODONE HCL 5 MG/5 ML
10 SOLUTION, ORAL ORAL
Status: DISCONTINUED | OUTPATIENT
Start: 2019-07-23 | End: 2019-07-23 | Stop reason: HOSPADM

## 2019-07-23 RX ORDER — HYDRALAZINE HYDROCHLORIDE 20 MG/ML
5 INJECTION INTRAMUSCULAR; INTRAVENOUS
Status: DISCONTINUED | OUTPATIENT
Start: 2019-07-23 | End: 2019-07-23 | Stop reason: HOSPADM

## 2019-07-23 RX ORDER — CLINDAMYCIN PHOSPHATE 600 MG/50ML
600 INJECTION, SOLUTION INTRAVENOUS EVERY 8 HOURS
Status: DISCONTINUED | OUTPATIENT
Start: 2019-07-23 | End: 2019-07-23 | Stop reason: HOSPADM

## 2019-07-23 RX ORDER — LIDOCAINE HYDROCHLORIDE 10 MG/ML
INJECTION, SOLUTION INFILTRATION; PERINEURAL
Status: DISCONTINUED | OUTPATIENT
Start: 2019-07-23 | End: 2019-07-23 | Stop reason: HOSPADM

## 2019-07-23 RX ORDER — HYDROMORPHONE HYDROCHLORIDE 1 MG/ML
0.4 INJECTION, SOLUTION INTRAMUSCULAR; INTRAVENOUS; SUBCUTANEOUS
Status: DISCONTINUED | OUTPATIENT
Start: 2019-07-23 | End: 2019-07-23 | Stop reason: HOSPADM

## 2019-07-23 RX ORDER — SODIUM CHLORIDE, SODIUM LACTATE, POTASSIUM CHLORIDE, CALCIUM CHLORIDE 600; 310; 30; 20 MG/100ML; MG/100ML; MG/100ML; MG/100ML
INJECTION, SOLUTION INTRAVENOUS CONTINUOUS
Status: DISCONTINUED | OUTPATIENT
Start: 2019-07-23 | End: 2019-07-23 | Stop reason: HOSPADM

## 2019-07-23 RX ORDER — BUPIVACAINE HYDROCHLORIDE AND EPINEPHRINE 5; 5 MG/ML; UG/ML
INJECTION, SOLUTION EPIDURAL; INTRACAUDAL; PERINEURAL
Status: DISCONTINUED | OUTPATIENT
Start: 2019-07-23 | End: 2019-07-23 | Stop reason: HOSPADM

## 2019-07-23 RX ORDER — LABETALOL HYDROCHLORIDE 5 MG/ML
5 INJECTION, SOLUTION INTRAVENOUS
Status: DISCONTINUED | OUTPATIENT
Start: 2019-07-23 | End: 2019-07-23 | Stop reason: HOSPADM

## 2019-07-23 RX ORDER — DIPHENHYDRAMINE HYDROCHLORIDE 50 MG/ML
12.5 INJECTION INTRAMUSCULAR; INTRAVENOUS
Status: DISCONTINUED | OUTPATIENT
Start: 2019-07-23 | End: 2019-07-23 | Stop reason: HOSPADM

## 2019-07-23 RX ORDER — HYDROMORPHONE HYDROCHLORIDE 1 MG/ML
0.1 INJECTION, SOLUTION INTRAMUSCULAR; INTRAVENOUS; SUBCUTANEOUS
Status: DISCONTINUED | OUTPATIENT
Start: 2019-07-23 | End: 2019-07-23 | Stop reason: HOSPADM

## 2019-07-23 RX ORDER — CELECOXIB 200 MG/1
200 CAPSULE ORAL ONCE
Status: COMPLETED | OUTPATIENT
Start: 2019-07-23 | End: 2019-07-23

## 2019-07-23 RX ORDER — HALOPERIDOL 5 MG/ML
1 INJECTION INTRAMUSCULAR
Status: DISCONTINUED | OUTPATIENT
Start: 2019-07-23 | End: 2019-07-23 | Stop reason: HOSPADM

## 2019-07-23 RX ORDER — ONDANSETRON 2 MG/ML
4 INJECTION INTRAMUSCULAR; INTRAVENOUS
Status: DISCONTINUED | OUTPATIENT
Start: 2019-07-23 | End: 2019-07-23 | Stop reason: HOSPADM

## 2019-07-23 RX ADMIN — PROPOFOL 50 MG: 10 INJECTION, EMULSION INTRAVENOUS at 09:10

## 2019-07-23 RX ADMIN — CLINDAMYCIN IN 5 PERCENT DEXTROSE 600 MG: 12 INJECTION, SOLUTION INTRAVENOUS at 11:47

## 2019-07-23 RX ADMIN — CELECOXIB 200 MG: 200 CAPSULE ORAL at 08:24

## 2019-07-23 RX ADMIN — VANCOMYCIN HYDROCHLORIDE 1750 MG: 500 INJECTION, POWDER, LYOPHILIZED, FOR SOLUTION INTRAVENOUS at 08:29

## 2019-07-23 RX ADMIN — FENTANYL CITRATE 100 MCG: 50 INJECTION, SOLUTION INTRAMUSCULAR; INTRAVENOUS at 08:55

## 2019-07-23 RX ADMIN — ACETAMINOPHEN 1000 MG: 500 TABLET, FILM COATED ORAL at 08:23

## 2019-07-23 RX ADMIN — MIDAZOLAM HYDROCHLORIDE 4 MG: 1 INJECTION, SOLUTION INTRAMUSCULAR; INTRAVENOUS at 08:55

## 2019-07-23 RX ADMIN — SODIUM CHLORIDE, POTASSIUM CHLORIDE, SODIUM LACTATE AND CALCIUM CHLORIDE 1000 ML: 600; 310; 30; 20 INJECTION, SOLUTION INTRAVENOUS at 08:29

## 2019-07-23 ASSESSMENT — PAIN SCALES - GENERAL: PAIN_LEVEL: 0

## 2019-07-23 NOTE — OR NURSING
1024: To PACU post neuro stim implant. Dressings are c/d/i. Denies pain or nausea.  1040: Pain is 3/10.  1105: No change. Wide awake drinking water.  1124: Meets criteria for stage ll.

## 2019-07-23 NOTE — ANESTHESIA TIME REPORT
Anesthesia Start and Stop Event Times     Date Time Event    7/23/2019 0854 Anesthesia Start     1026 Anesthesia Stop        Responsible Staff  07/23/19    Name Role Begin End    Beto Smith M.D. Anesth 0854 1026        Preop Diagnosis (Free Text):  Pre-op Diagnosis     LUMBAR POST-LAMINECTOMY        Preop Diagnosis (Codes):  Diagnosis Information     Diagnosis Code(s):         Post op Diagnosis  Post laminectomy syndrome      Premium Reason  Non-Premium    Comments:

## 2019-07-23 NOTE — OR SURGEON
Immediate Post OP Note    PreOp Diagnosis: failed back surgery    PostOp Diagnosis: as above    Procedure(s):  INSERTION, NEUROSTIMULATOR, PERMANENT, SPINAL CORD - Wound Class: Clean    Surgeon(s):  Teddy Santos M.D.    Anesthesiologist/Type of Anesthesia:  Anesthesiologist: Beto Smith M.D./MAC    Surgical Staff:  Circulator: Supa Aldrich R.N.  Relief Circulator: Gris Simental R.N.  Scrub Person: Anthony Vallejo; Willem Garcia  Radiology Technologist: Kristine Townsend    Specimens removed if any:  * No specimens in log *    Estimated Blood Loss: <50cc    Findings: none    Complications: none        7/23/2019 10:21 AM Teddy Santos M.D.

## 2019-07-23 NOTE — ANESTHESIA PREPROCEDURE EVALUATION
69 yo for placement of spinal cord stimulator    Relevant Problems   (+) HTN (hypertension)   (+) Sleep apnea       Physical Exam    Airway   Mallampati: III  TM distance: >3 FB  Neck ROM: full       Cardiovascular - normal exam  Rhythm: regular  Rate: normal  (-) murmur     Dental    Pulmonary - normal exam  Breath sounds clear to auscultation     Abdominal   (+) obese  Abdomen: soft  Bowel sounds: normal   Neurological - abnormal exam                 Anesthesia Plan    ASA 3   ASA physical status 3 criteria: morbid obesity - BMI greater than or equal to 40    Plan - MAC             Induction: intravenous    Postoperative Plan: Postoperative administration of opioids is intended.    Pertinent diagnostic labs and testing reviewed    Informed Consent:    Anesthetic plan and risks discussed with patient.    Use of blood products discussed with: patient whom consented to blood products.

## 2019-07-23 NOTE — OP REPORT
DATE OF SERVICE:  07/23/2019    PROCEDURE:  Implantation of dual Octad Medtronic spinal cord stimulator leads   and Intellis rechargeable battery.    PREPROCEDURAL DIAGNOSES:  Patient with failed back surgery syndrome,   intractable low back pain and bilateral lower extremity radicular symptoms   secondary to nerve root scarring.    POSTPROCEDURAL DIAGNOSES:  Patient with failed back surgery syndrome,   intractable low back pain and bilateral lower extremity radicular symptoms   secondary to nerve root scarring.    PROCEDURE PERFORMED BY:  Teddy Santos MD    ANESTHESIA:  Local standby with IV sedation.    ANESTHESIOLOGIST:  Beto Smith MD    PROCEDURE NOTE:  Patient was seen in the preop holding area.  He and his wife   were there, discussed the procedure itself and possible complications.  They   understood and accepted the risks and wished to proceed with the surgery.  At   that time, he was brought to the operating room and placed in a prone   position.  The back and right gluteal region were sterilely prepped.  Patient   was sterilely draped.  Physician gowned, gloved, and masked.  A total of 30 mL   of 0.5% bupivacaine 1:200,000 epinephrine mixed with 1% lidocaine plain was   infiltrated over the right gluteal region and also the paraspinous region at   the T12, L1, L2 level.  Under fluoroscopic imaging, two 14-gauge Tuohy needles   advanced paramedian approach, starting at the L1 spinous process entering at   the T11-T12 interspace.  Loss of resistance was obtained, negative aspiration,   negative paresthesia.  The leads were then guided under fluoroscopic imaging   in a staggered fashion up to the T8, T9, T10 level.  Upon positioning the   leads, stimulation was performed.  Patient stated that the stimulation covered   100% of his pain and was identical to the previous stimulation from the   spinal cord stimulator trial.  At that point, a 5 cm incision was made in the   midline between the two  14-gauge needles.  Dissection down to the fascia was   performed.  ____ flapping of the skin at those levels to identify the needles   in the fascial plane was performed.  Electrocautery was used to staunch the   bleeding.  The needles were then backed out leaving the leads.  Rubber shod   anchors were used to grab the leads and the leads were pulled into the wound   site and anchored using 0 Ethibond sutures and Silastic rubber anchors were   deployed.  Multiple images were taken of the spinal cord stimulator leads to   confirm no migration of the leads.  After anchoring the leads, a 5 cm incision   was made over the gluteal region.  Blunt dissection was used to make a pocket   for the Intellis battery.  Again, the electrocautery was used to staunch the   bleeding.  A tunneling franc was passed from the gluteal wound site to the   lumbar wound site.  The leads were passed back through and connected to the   new Intellis battery.  Impedances were checked and all found to be within   normal limits.  The battery was then anchored to the fascia at the gluteal   wound site with a single Ethibond suture.  Irrigation of both the wound site   was performed using bacitracin solution.  The wounds were closed using 3-0   Vicryls and staples on the skin.  Patient tolerated the procedure quite well.    There were no apparent complications.       ____________________________________     MD BRAULIO MCLEAN / ROSARIO    DD:  07/23/2019 10:26:37  DT:  07/23/2019 10:40:11    D#:  [id number]  Job#:  [id number]

## 2019-07-23 NOTE — ANESTHESIA POSTPROCEDURE EVALUATION
Patient: Ramu Barber    Procedure Summary     Date:  07/23/19 Room / Location:   OR  / SURGERY [facility]    Anesthesia Start:  0854 Anesthesia Stop:  1026    Procedure:  INSERTION, NEUROSTIMULATOR, PERMANENT, SPINAL CORD (Back) Diagnosis:  (LUMBAR POST-LAMINECTOMY)    Surgeon:  Teddy Santos M.D. Responsible Provider:  Beto Smith M.D.    Anesthesia Type:  MAC ASA Status:  3          Final Anesthesia Type: MAC  Last vitals  BP   Blood Pressure : 123/76    Temp   36.9 °C (98.5 °F)    Pulse   Pulse: (!) 49   Resp   18    SpO2   97 %      Anesthesia Post Evaluation    Patient location during evaluation: PACU  Patient participation: complete - patient participated  Level of consciousness: awake and alert  Pain score: 0    Airway patency: patent  Anesthetic complications: no  Cardiovascular status: hemodynamically stable  Respiratory status: acceptable  Hydration status: euvolemic    PONV: none           Nurse Pain Score: 0 (NPRS)

## 2019-08-07 ENCOUNTER — TELEPHONE (OUTPATIENT)
Dept: PULMONOLOGY | Facility: HOSPICE | Age: 69
End: 2019-08-07

## 2019-08-07 DIAGNOSIS — G47.33 OSA (OBSTRUCTIVE SLEEP APNEA): ICD-10-CM

## 2019-08-07 NOTE — TELEPHONE ENCOUNTER
Please sign updated mask and supply order to switch pt to [facility]     There is a recent order but I am unsure if [employer] will want it dated after the change in insurance as they have been quite picky lately

## 2019-08-26 ENCOUNTER — HOSPITAL ENCOUNTER (OUTPATIENT)
Dept: LAB | Facility: MEDICAL CENTER | Age: 69
End: 2019-08-26
Attending: FAMILY MEDICINE
Payer: COMMERCIAL

## 2019-08-26 LAB
APPEARANCE UR: CLEAR
BILIRUB UR QL STRIP.AUTO: NEGATIVE
COLOR UR: YELLOW
GLUCOSE UR STRIP.AUTO-MCNC: NEGATIVE MG/DL
KETONES UR STRIP.AUTO-MCNC: NEGATIVE MG/DL
LEUKOCYTE ESTERASE UR QL STRIP.AUTO: NEGATIVE
MICRO URNS: NORMAL
NITRITE UR QL STRIP.AUTO: NEGATIVE
PH UR STRIP.AUTO: 6 [PH] (ref 5–8)
PROT UR QL STRIP: NEGATIVE MG/DL
RBC UR QL AUTO: NEGATIVE
SP GR UR STRIP.AUTO: 1.02
UROBILINOGEN UR STRIP.AUTO-MCNC: 0.2 MG/DL

## 2019-08-26 PROCEDURE — 81003 URINALYSIS AUTO W/O SCOPE: CPT

## 2020-05-23 ENCOUNTER — OFFICE VISIT (OUTPATIENT)
Dept: URGENT CARE | Facility: PHYSICIAN GROUP | Age: 70
End: 2020-05-23
Payer: COMMERCIAL

## 2020-05-23 ENCOUNTER — APPOINTMENT (OUTPATIENT)
Dept: RADIOLOGY | Facility: IMAGING CENTER | Age: 70
End: 2020-05-23
Attending: PHYSICIAN ASSISTANT
Payer: COMMERCIAL

## 2020-05-23 VITALS
HEART RATE: 58 BPM | DIASTOLIC BLOOD PRESSURE: 72 MMHG | SYSTOLIC BLOOD PRESSURE: 124 MMHG | OXYGEN SATURATION: 95 % | BODY MASS INDEX: 35.21 KG/M2 | RESPIRATION RATE: 18 BRPM | HEIGHT: 72 IN | WEIGHT: 260 LBS | TEMPERATURE: 98.3 F

## 2020-05-23 DIAGNOSIS — M79.644 PAIN OF RIGHT THUMB: ICD-10-CM

## 2020-05-23 DIAGNOSIS — M18.11 OSTEOARTHRITIS OF RIGHT THUMB: ICD-10-CM

## 2020-05-23 PROCEDURE — 99214 OFFICE O/P EST MOD 30 MIN: CPT | Performed by: PHYSICIAN ASSISTANT

## 2020-05-23 PROCEDURE — 73130 X-RAY EXAM OF HAND: CPT | Mod: TC,RT | Performed by: PHYSICIAN ASSISTANT

## 2020-05-23 RX ORDER — CHLORAL HYDRATE 500 MG
1000 CAPSULE ORAL EVERY MORNING
Status: ON HOLD | COMMUNITY
End: 2020-08-24

## 2020-05-23 ASSESSMENT — ENCOUNTER SYMPTOMS
SHORTNESS OF BREATH: 0
NAUSEA: 0
JOINT SWELLING: 1
EYE REDNESS: 0
SORE THROAT: 0
HEADACHES: 0
FEVER: 0
EYE DISCHARGE: 0
VOMITING: 0
COUGH: 0

## 2020-05-23 ASSESSMENT — FIBROSIS 4 INDEX: FIB4 SCORE: 1.22

## 2020-05-23 NOTE — PROGRESS NOTES
Subjective:      Ramu Barber is a 69 y.o. male who presents with Hand Pain (R thumb, painful, hard to bend, swelling, x4 days )        This is a new problem.  The patient presents to clinic complaining of right thumb pain x3-4 days.  He reports associated swelling to the right thumb.  The patient believes he may have rolled onto the right thumb while sleeping.  He reports no known injury or trauma to the right thumb.  The patient reports slight decreased range of motion secondary to swelling.  No numbness, tingling, or weakness.  No redness.  No increased warmth.  No bruising.  The patient has not taken any medications for his current symptoms.    The patient reports a remote history of gout many years ago. The patient states his previous gout was located to his feet. He does not recall the symptoms of his previous gout flare.     Hand Injury   This is a new problem. Episode onset: x 3-4 days ago. The problem occurs constantly. The problem has been unchanged. Associated symptoms include joint swelling. Pertinent negatives include no chest pain, congestion, coughing, fever, headaches, nausea, rash, sore throat or vomiting. Nothing aggravates the symptoms. He has tried nothing for the symptoms.     PMH:  has a past medical history of Abnormal glucose, Arthritis, Back pain, Chickenpox, High cholesterol, HTN (hypertension), Obesity, Overweight(278.02), Pain, Pneumonia, Polycythemia, secondary, Sleep apnea, Snoring, and Type II or unspecified type diabetes mellitus without mention of complication, not stated as uncontrolled.  MEDS:   Current Outpatient Medications:   •  tamsulosin (FLOMAX) 0.4 MG capsule, Take 0.4 mg by mouth ONE-HALF HOUR AFTER BREAKFAST., Disp: , Rfl:   •  gabapentin (NEURONTIN) 600 MG tablet, 2 times a day., Disp: , Rfl:   •  finasteride (PROSCAR) 5 MG Tab, Take 5 mg by mouth every day., Disp: , Rfl:   •  Omega-3 Fatty Acids (FISH OIL PO), Take  by mouth every evening., Disp: , Rfl:   •  ASPIRIN  81 PO, Take  by mouth., Disp: , Rfl:   •  ibuprofen (MOTRIN) 800 MG Tab, Take 800 mg by mouth every 8 hours as needed., Disp: , Rfl:   •  cloNIDine (CATAPRESS) 0.2 MG Tab, Take 1 Tab by mouth 2 times a day. For further refills please contact new cardiologist. Thank you, Disp: 60 Tab, Rfl: 0  •  lovastatin (MEVACOR) 20 MG Tab, Take 1 Tab by mouth every evening., Disp: 90 Tab, Rfl: 1  •  fosinopril (MONOPRIL) 40 MG tablet, Take 1 Tab by mouth every day. For further refills please contact new cardiologist. Thank you, Disp: 30 Tab, Rfl: 0  •  amLODIPine (NORVASC) 10 MG Tab, Take 1 Tab by mouth every day. For further refills please contact new cardiologist. Thank you, Disp: 30 Tab, Rfl: 0  •  carvedilol (COREG) 25 MG Tab, TAKE ONE TABLET BY MOUTH TWICE A DAY WITH MEALS, Disp: 180 Tab, Rfl: 0  •  triamterene-hctz (MAXZIDE-25/DYAZIDE) 37.5-25 MG Tab, Take 1 Tab by mouth every day., Disp: , Rfl:   •  terazosin (HYTRIN) 5 MG Cap, TAKE TWO CAPSULES BY MOUTH EVERY NIGHT AT BEDTIME (GENERIC HYTRIN), Disp: 180 Cap, Rfl: 10  •  metformin (GLUCOPHAGE) 850 MG Tab, Take 850 mg by mouth 2 times a day., Disp: , Rfl:   •  Omega-3 Fatty Acids (FISH OIL) 1000 MG Cap capsule, Fish Oil, Disp: , Rfl:   ALLERGIES: No Known Allergies  SURGHX:   Past Surgical History:   Procedure Laterality Date   • PB IMPLANT NEUROSTIM/  7/23/2019    Procedure: INSERTION, NEUROSTIMULATOR, PERMANENT, SPINAL CORD;  Surgeon: Teddy Santos M.D.;  Location: SURGERY [facility];  Service: Pain Management   • LUMBAR LAMINECTOMY DISKECTOMY  2/18/2018    Procedure: LUMBAR LAMINECTOMY DISKECTOMY- LT L1-2 HEMILAMI-;  Surgeon: Tom iPttman M.D.;  Location: SURGERY [facility];  Service: Neurosurgery   • HARDWARE REMOVAL NEURO  2/18/2018    Procedure: HARDWARE REMOVAL NEURO- L2-3 PEDICLE SCREWS;  Surgeon: Tom Pittman M.D.;  Location: SURGERY [facility];  Service: Neurosurgery   • INGUINAL HERNIA REPAIR Right 2/16/2016    Procedure: INGUINAL HERNIA  REPAIR;  Surgeon: Sj Baird M.D.;  Location: SURGERY [facility] [place] [facility];  Service:    • LUMBAR FUSION POSTERIOR  1/21/2015    Performed by Ko Suarez M.D. at SURGERY [facility] [place]   • CARPAL TUNNEL RELEASE Right 2014   • SHOULDER ARTHROSCOPY Right 2014   • CERVICAL DISK AND FUSION ANTERIOR  2005   • LUMBAR DECOMPRESSION  2004   • LUMBAR DECOMPRESSION  2003     SOCHX:  reports that he has never smoked. He quit smokeless tobacco use about 17 months ago.  His smokeless tobacco use included chew. He reports current alcohol use. He reports that he does not use drugs.  FH: Family history was reviewed, no pertinent findings to report    Review of Systems   Constitutional: Negative for fever.   HENT: Negative for congestion, ear pain and sore throat.    Eyes: Negative for discharge and redness.   Respiratory: Negative for cough and shortness of breath.    Cardiovascular: Negative for chest pain and leg swelling.   Gastrointestinal: Negative for nausea and vomiting.   Musculoskeletal: Positive for joint pain (right thumb pain) and joint swelling.   Skin: Negative for rash.   Neurological: Negative for headaches.          Objective:     /72 (BP Location: Left arm, Patient Position: Sitting, BP Cuff Size: Large adult)   Pulse (!) 58   Temp 36.8 °C (98.3 °F) (Temporal)   Resp 18   Ht 1.829 m (6')   Wt 117.9 kg (260 lb)   SpO2 95%   BMI 35.26 kg/m²      Physical Exam  Constitutional:       General: He is not in acute distress.     Appearance: Normal appearance. He is well-developed. He is not ill-appearing.   HENT:      Head: Normocephalic and atraumatic.      Right Ear: External ear normal.      Left Ear: External ear normal.      Nose: Nose normal.   Eyes:      Conjunctiva/sclera: Conjunctivae normal.   Neck:      Musculoskeletal: Normal range of motion and neck supple.   Cardiovascular:      Rate and Rhythm: Normal rate.   Pulmonary:      Effort: Pulmonary effort is normal.   Musculoskeletal:       Comments:   Right Thumb:  Trace swelling to the right thumb, localized over the 1st MCP joint. No circumferential swelling.  No tenderness to palpation.  No ecchymosis.  No erythema.  No increased warmth.  No open wounds/lesions  Decreased ROM -the patient demonstrates limited range of motion of the DIP joint of the right thumb secondary to swelling; the patient demonstrates full active range of motion of the first MCP joint  Neurovascular intact  Strength 5/5 -no increasing pain with flexion/extension of the right thumb against resistance  Capillary refill less than 2 seconds  Radial pulse 2+   Skin:     General: Skin is warm and dry.   Neurological:      Mental Status: He is alert and oriented to person, place, and time.            Progress:  Right Hand XR:   FINDINGS:     BONE MINERALIZATION: Normal.  JOINTS: Mild to moderate multifocal osteoarthrosis, most pronounced at the first carpometacarpal and metacarpophalangeal joints. No erosions.  FRACTURE: No acute fracture. Chronic deformity of the distal radius.  DISLOCATION: None.  SOFT TISSUES: No mass.     IMPRESSION:  1.  No acute fracture or dislocation.  2.  Mild to moderate multifocal osteoarthrosis, most pronounced at the first carpometacarpal and metacarpophalangeal joints.     Assessment/Plan:     1. Pain of right thumb  - DX-HAND 3+ RIGHT; Future  - REFERRAL TO SPORTS MEDICINE    2. Osteoarthritis of right thumb  - REFERRAL TO SPORTS MEDICINE    The patient's presenting symptoms and physical exam findings are consistent with pain of the right thumb likely secondary to osteoarthritis.  On physical exam, the patient had trace swelling to the right thumb localized over the first MCP joint.  No circumferential swelling was appreciated.  Additionally, no tenderness to palpation, ecchymosis, erythema, increased warmth, or open wounds/lesions were noted.  X-ray was obtained to further evaluate the patient's right thumb pain the patient's x-ray showed no acute  fracture or dislocation with mild to moderate multifocal osteoarthrosis, most pronounced at the first carpometacarpal and metacarpophalangeal joints.  Based on the patient's physical exam findings, it is unlikely the patient's symptoms are due to an acute infection given the absence of erythema and increased warmth.  Will place a referral to sports medicine for further evaluation of the patient's symptoms.  Recommend OTC medications and supportive care for symptomatic management.  Recommend patient follow-up with his PCP.  Discussed return precautions with the patient, and he verbalized understanding.    Differential diagnoses, supportive care, and indications for immediate follow-up discussed with patient.   Instructed to return to clinic or nearest emergency department for any change in condition, further concerns, or worsening of symptoms.    OTC NSAIDs for pain/discomfort   RICE  Referral to Sports Medicine   Follow-up with PCP   Return to clinic or go tot he ED if symptoms worsen or fail to improve, or if the patient should develop worsening/increasing pain/tenderness, swelling, bruising, redness or warmth to the affected area, decreased ROM, numbness, tingling or weakness, difficulty walking, fever/chills, and/or any concerning symptoms.     Discussed plan with the patient, and he agrees to the above.    Please note that this dictation was created using voice recognition software. I have made every reasonable attempt to correct obvious errors, but I expect that there may be errors of grammar and possibly content that I did not discover before finalizing the note.

## 2020-05-28 ENCOUNTER — PHYSICAL THERAPY (OUTPATIENT)
Dept: PHYSICAL THERAPY | Facility: REHABILITATION | Age: 70
End: 2020-05-28
Attending: ORTHOPAEDIC SURGERY
Payer: COMMERCIAL

## 2020-05-28 DIAGNOSIS — M16.0 BILATERAL PRIMARY OSTEOARTHRITIS OF HIP: ICD-10-CM

## 2020-05-28 PROCEDURE — 97110 THERAPEUTIC EXERCISES: CPT

## 2020-05-28 PROCEDURE — 97161 PT EVAL LOW COMPLEX 20 MIN: CPT

## 2020-05-28 ASSESSMENT — ENCOUNTER SYMPTOMS
QUALITY: SHARP
PAIN TIMING: CONSTANT
QUALITY: ACHING
EXACERBATED BY: WALKING
PAIN SCALE: 0
QUALITY: SHOOTING
EXACERBATED BY: STAIR CLIMBING
PAIN SCALE AT LOWEST: 0
EXACERBATED BY: SQUATTING
ALLEVIATING FACTORS: REST
PAIN SCALE AT HIGHEST: 9
EXACERBATED BY: STANDING
QUALITY: TINGLING

## 2020-05-28 NOTE — Clinical Note
May 28, 2020    Ananth Jack M.D.  [address]  [facility] [address]  [facility] [address]    Patient: Ramu Barber   YOB: 1950   Date of Visit: 5/28/2020       Dear Ananth Jack M.d.  [address]  [place] [address] [phone]    The attached plan of care is being sent to you because your patient’s medical reimbursement requires that you certify the plan of care. Your signature is required to allow uninterrupted insurance coverage.      You may indicate your approval by signing below and faxing this form back to us at Dept Fax: [phone].    Please call Dept: [phone] with any questions or concerns.    Thank you for this referral,        Vianey Stahl, PT, DPT  [address] [address] [phone]    Payer: Payor: [employer] / [employer] / Plan: [employer] / [employer] / Product Type: *No Product type* /                                                                         Date:     Dear Vianey Stahl PT, DPT,     Re: Mr. Ramu Barber, MRN:[id number]    I certify that I have reviewed the attached plan of care and it is medically necessary for Mr. Ramu Barber (1950) who is under my care.          ______________________________________                    _________________  Provider name and credentials                                           Date and time                                                                                           Specialty Plan of Care 05/28/20   Effective from: 5/28/2020  Effective to: 7/23/2020    Plan ID: [id number]           Participants     Name Type Comments Contact Info    Ananth Jack M.D. Provider  [phone]    Vianey Stahl PT, DPT PT        Evaluation/Progress Summary     Author: Vianey Stahl PT, DPT Status: Sign when Signing Visit Last edited: 5/28/2020  1:30 PM         Outpatient Physical Therapy  INITIAL EVALUATION    [facility] [address]  [address] [place] [address]  [address] [place] [address]  Phone:  [phone]  Fax:  [phone]    Date of Evaluation: 2020    Patient: Ramu Barber  YOB: 1950  MRN: [id number]     Referring Provider: Ananth Jack M.D.  [address] [place] [address]   Referring Diagnosis Bilateral primary osteoarthritis of hip [M16.0]     Time Calculation    Start time: 1330  Stop time: 1420 Time Calculation (min): 50 minutes         Chief Complaint: No chief complaint on file.    Visit Diagnoses     ICD-10-CM   1. Bilateral primary osteoarthritis of hip  M16.0         Subjective:   History of Present Illness:     Mechanism of injury:  Patient reports he is having hip and lower extremity pain. Patient has a PMH significant for multiple lumbar surgeries and bilateral hip osteoarthritis. He is reporting difficulty with stairs, walking any distance. Patient reports both LEs are painful and has difficulty with both side.     He reports he had a spinal stimulator implanted last year.  Patient sees Dr. Santos for pain management.   Sleep disturbance:  Not disrupted  Pain:     Current pain ratin    At best pain ratin    At worst pain ratin    Quality:  Aching, sharp, shooting and tingling    Pain timing:  Constant    Relieving factors:  Rest    Aggravating factors:  Walking, standing, stair climbing and squatting  Social Support:     Lives in:  One-story house    Lives with:  Spouse  Hand dominance:  Right  Treatments:     None    Activities of Daily Living:     Patient reported ADL status: Patient reports he is currently retired.  He enjoys working in the yard and enjoys going fishing.   Patient reports he went to the gym regularly before COVID.  He reports he would like to return to the gym, but has hesitation.      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   Patient Goals:     Other patient goals:  See if we can get some of the pain decreased and to  improve maneuverability      Past Medical History:   Diagnosis Date   • Abnormal glucose    • Arthritis     back, hips   • Back pain    • Chickenpox    • High cholesterol    • HTN (hypertension)    • Obesity    • Overweight(278.02)    • Pain     back pain   • Pneumonia    • Polycythemia, secondary    • Sleep apnea     cpap   • Snoring    • Type II or unspecified type diabetes mellitus without mention of complication, not stated as uncontrolled     oral meds only     Past Surgical History:   Procedure Laterality Date   • PB IMPLANT NEUROSTIM/  7/23/2019    Procedure: INSERTION, NEUROSTIMULATOR, PERMANENT, SPINAL CORD;  Surgeon: Teddy Santos M.D.;  Location: SURGERY [facility];  Service: Pain Management   • LUMBAR LAMINECTOMY DISKECTOMY  2/18/2018    Procedure: LUMBAR LAMINECTOMY DISKECTOMY- LT L1-2 HEMILAMI-;  Surgeon: Tom Pittman M.D.;  Location: [facility];  Service: Neurosurgery   • HARDWARE REMOVAL NEURO  2/18/2018    Procedure: HARDWARE REMOVAL NEURO- L2-3 PEDICLE SCREWS;  Surgeon: Tom Pittman M.D.;  Location: SURGERY [facility];  Service: Neurosurgery   • INGUINAL HERNIA REPAIR Right 2/16/2016    Procedure: INGUINAL HERNIA REPAIR;  Surgeon: Sj Baird M.D.;  Location: SURGERY [facility];  Service:    • LUMBAR FUSION POSTERIOR  1/21/2015    Performed by Ko Suarez M.D. at [facility]   • CARPAL TUNNEL RELEASE Right 2014   • SHOULDER ARTHROSCOPY Right 2014   • CERVICAL DISK AND FUSION ANTERIOR  2005   • LUMBAR DECOMPRESSION  2004   • LUMBAR DECOMPRESSION  2003     Social History     Tobacco Use   • Smoking status: Never Smoker   • Smokeless tobacco: Former User     Types: Chew   Substance Use Topics   • Alcohol use: Yes     Comment: twice a month     Family and Occupational History     Socioeconomic History   • Marital status:      Spouse name: Not on file   • Number of children: Not on file   • Years of education: Not on file   •  Highest education level: Not on file   Occupational History   • Not on file       Objective     Hip Screen   Hip range of motion within functional limits with the following exceptions: Left hip ER WFL  L HIR 10 degrees with pain        Rhip ER WFL  L HIR 20 degrees with pain   Hip strength within functional limits with the following exceptions: Decreased hip strength  HIp abduction 3+/5 B    Quad strength 4-/5         Neurological Testing     Additional Neurological Details  Impaired bilateral LE sensation due to neuropathy.     Active Range of Motion     Lumbar   Flexion: decreased  Extension: decreased  Left lateral flexion: decreased  Right lateral flexion: decreased  Left rotation: decreased  Right rotation: decreased    Additional Active Range of Motion Details  significantly limited lumbar extension     Tests     Left Hip   SLR: Positive.     Right Hip   SLR: Positive.     Additional Tests Details  - hip scour B         Therapeutic Exercises (CPT 70627):       Therapeutic Exercise Summary: Access Code: [email or web address]   URL: [email or web address]   Date: 05/28/2020   Prepared by: Vianey Stahl     Exercises  Sit to Stand - 10 reps - 2 sets - 1x daily - 7x weekly  Standing Hip Abduction - 10 reps - 2 sets - 1x daily - 7x weekly  Standing Heel Raise with Support - 10 reps - 3 sets - 1x daily - 7x weekly      Time-based treatments/modalities:    Physical Therapy Timed Treatment Charges  Therapeutic exercise minutes (CPT 22010): 12 minutes      Assessment, Response and Plan:   Impairments: abnormal gait, abnormal muscle tone, activity intolerance, impaired physical strength, lacks appropriate home exercise program, limited ADL's and pain with function    Assessment details:  Patient is a 69 year old male with a complex medical history including SPINAL STIMULATOR and multiple lumbar surgeries including a fusion and neuropathy who reports complaints of pain and worsening strength and functional mobility. Patient  would benefit from skilled PT with a focus on the deficits above to improve overall strength, balance and activity tolerance in order to improve patients quality of life.   Barriers to therapy:  None  Prognosis: fair    Prognosis details:  Comorbidities  Goals:   Short Term Goals:   1. Patient will report performing HEP with written instructions.   Short term goal time span:  2-4 weeks      Long Term Goals:    1. Patient will report being able to walk at least 2 blocks without being severely limited by pain and weakness.   2. Patient will score <50% impairment on the LEFS  3. Patient will be able to ambulate up and down 5 stairs with step through gait independently.   4. Patient will be independent in upgraded HEP With written instructions.   Long term goal time span:  1-2 months    Plan:   Therapy options:  Physical therapy treatment to continue  Planned therapy interventions:  Therapeutic Exercise (CPT 57047), Therapeutic Activities (CPT 11359), Neuromuscular Re-education (CPT 62209) and Gait Training (CPT 42781)  Frequency:  2x week  Duration in weeks:  8  Discussed with:  Patient      Functional Assessment Used  Lower Extremity Functional Scale Total: 31.25     Referring provider co-signature:  I have reviewed this plan of care and my co-signature certifies the need for services.    Physician Signature: ________________________________ Date: ______________                      Updated Participants     Name Type Comments Contact Info    Ananth Jack M.D. Provider  [phone]    Signature pending    Vianey Stahl, PT, DPT PT

## 2020-05-28 NOTE — OP THERAPY EVALUATION
Outpatient Physical Therapy  INITIAL EVALUATION    [facility] [address]  [address]  [place] [address]  Phone:  [phone]  Fax:  [phone]    Date of Evaluation: 2020    Patient: Ramu Barber  YOB: 1950  MRN: [id number]     Referring Provider: Ananth Jack M.D.  [address] [place] [address]   Referring Diagnosis Bilateral primary osteoarthritis of hip [M16.0]     Time Calculation    Start time: 1330  Stop time: 1420 Time Calculation (min): 50 minutes         Chief Complaint: No chief complaint on file.    Visit Diagnoses     ICD-10-CM   1. Bilateral primary osteoarthritis of hip  M16.0         Subjective:   History of Present Illness:     Mechanism of injury:  Patient reports he is having hip and lower extremity pain. Patient has a PMH significant for multiple lumbar surgeries and bilateral hip osteoarthritis. He is reporting difficulty with stairs, walking any distance. Patient reports both LEs are painful and has difficulty with both side.     He reports he had a spinal stimulator implanted last year.  Patient sees Dr. Santos for pain management.   Sleep disturbance:  Not disrupted  Pain:     Current pain ratin    At best pain ratin    At worst pain ratin    Quality:  Aching, sharp, shooting and tingling    Pain timing:  Constant    Relieving factors:  Rest    Aggravating factors:  Walking, standing, stair climbing and squatting  Social Support:     Lives in:  One-story house    Lives with:  Spouse  Hand dominance:  Right  Treatments:     None    Activities of Daily Living:     Patient reported ADL status: Patient reports he is currently retired.  He enjoys working in the yard and enjoys going fishing.   Patient reports he went to the gym regularly before COVID.  He reports he would like to return to the gym, but has hesitation.      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   Patient  Goals:     Other patient goals:  See if we can get some of the pain decreased and to improve maneuverability      Past Medical History:   Diagnosis Date   • Abnormal glucose    • Arthritis     back, hips   • Back pain    • Chickenpox    • High cholesterol    • HTN (hypertension)    • Obesity    • Overweight(278.02)    • Pain     back pain   • Pneumonia    • Polycythemia, secondary    • Sleep apnea     cpap   • Snoring    • Type II or unspecified type diabetes mellitus without mention of complication, not stated as uncontrolled     oral meds only     Past Surgical History:   Procedure Laterality Date   • PB IMPLANT NEUROSTIM/  7/23/2019    Procedure: INSERTION, NEUROSTIMULATOR, PERMANENT, SPINAL CORD;  Surgeon: Teddy Santos M.D.;  Location: SURGERY [facility];  Service: Pain Management   • LUMBAR LAMINECTOMY DISKECTOMY  2/18/2018    Procedure: LUMBAR LAMINECTOMY DISKECTOMY- LT L1-2 HEMILAMI-;  Surgeon: Tom Pittman M.D.;  Location: [facility];  Service: Neurosurgery   • HARDWARE REMOVAL NEURO  2/18/2018    Procedure: HARDWARE REMOVAL NEURO- L2-3 PEDICLE SCREWS;  Surgeon: Tom Pittman M.D.;  Location: SURGERY [facility];  Service: Neurosurgery   • INGUINAL HERNIA REPAIR Right 2/16/2016    Procedure: INGUINAL HERNIA REPAIR;  Surgeon: Sj Baird M.D.;  Location: SURGERY [facility];  Service:    • LUMBAR FUSION POSTERIOR  1/21/2015    Performed by Ko Suarez M.D. at [facility]   • CARPAL TUNNEL RELEASE Right 2014   • SHOULDER ARTHROSCOPY Right 2014   • CERVICAL DISK AND FUSION ANTERIOR  2005   • LUMBAR DECOMPRESSION  2004   • LUMBAR DECOMPRESSION  2003     Social History     Tobacco Use   • Smoking status: Never Smoker   • Smokeless tobacco: Former User     Types: Chew   Substance Use Topics   • Alcohol use: Yes     Comment: twice a month     Family and Occupational History     Socioeconomic History   • Marital status:      Spouse name: Not on  file   • Number of children: Not on file   • Years of education: Not on file   • Highest education level: Not on file   Occupational History   • Not on file       Objective     Hip Screen   Hip range of motion within functional limits with the following exceptions: Left hip ER WFL  L HIR 10 degrees with pain        Rhip ER WFL  L HIR 20 degrees with pain   Hip strength within functional limits with the following exceptions: Decreased hip strength  HIp abduction 3+/5 B    Quad strength 4-/5         Neurological Testing     Additional Neurological Details  Impaired bilateral LE sensation due to neuropathy.     Active Range of Motion     Lumbar   Flexion: decreased  Extension: decreased  Left lateral flexion: decreased  Right lateral flexion: decreased  Left rotation: decreased  Right rotation: decreased    Additional Active Range of Motion Details  significantly limited lumbar extension     Tests     Left Hip   SLR: Positive.     Right Hip   SLR: Positive.     Additional Tests Details  - hip scour B         Therapeutic Exercises (CPT 14314):       Therapeutic Exercise Summary: Access Code: [email or web address]   URL: [email or web address]   Date: 05/28/2020   Prepared by: Vianey Stahl     Exercises  Sit to Stand - 10 reps - 2 sets - 1x daily - 7x weekly  Standing Hip Abduction - 10 reps - 2 sets - 1x daily - 7x weekly  Standing Heel Raise with Support - 10 reps - 3 sets - 1x daily - 7x weekly      Time-based treatments/modalities:    Physical Therapy Timed Treatment Charges  Therapeutic exercise minutes (CPT 68422): 12 minutes      Assessment, Response and Plan:   Impairments: abnormal gait, abnormal muscle tone, activity intolerance, impaired physical strength, lacks appropriate home exercise program, limited ADL's and pain with function    Assessment details:  Patient is a 69 year old male with a complex medical history including SPINAL STIMULATOR and multiple lumbar surgeries including a fusion and neuropathy who  reports complaints of pain and worsening strength and functional mobility. Patient would benefit from skilled PT with a focus on the deficits above to improve overall strength, balance and activity tolerance in order to improve patients quality of life.   Barriers to therapy:  None  Prognosis: fair    Prognosis details:  Comorbidities  Goals:   Short Term Goals:   1. Patient will report performing HEP with written instructions.   Short term goal time span:  2-4 weeks      Long Term Goals:    1. Patient will report being able to walk at least 2 blocks without being severely limited by pain and weakness.   2. Patient will score <50% impairment on the LEFS  3. Patient will be able to ambulate up and down 5 stairs with step through gait independently.   4. Patient will be independent in upgraded HEP With written instructions.   Long term goal time span:  1-2 months    Plan:   Therapy options:  Physical therapy treatment to continue  Planned therapy interventions:  Therapeutic Exercise (CPT 41178), Therapeutic Activities (CPT 54068), Neuromuscular Re-education (CPT 46962) and Gait Training (CPT 48867)  Frequency:  2x week  Duration in weeks:  8  Discussed with:  Patient      Functional Assessment Used  Lower Extremity Functional Scale Total: 31.25     Referring provider co-signature:  I have reviewed this plan of care and my co-signature certifies the need for services.    Physician Signature: ________________________________ Date: ______________

## 2020-06-05 ENCOUNTER — PHYSICAL THERAPY (OUTPATIENT)
Dept: PHYSICAL THERAPY | Facility: REHABILITATION | Age: 70
End: 2020-06-05
Attending: ORTHOPAEDIC SURGERY
Payer: COMMERCIAL

## 2020-06-05 DIAGNOSIS — M16.0 BILATERAL PRIMARY OSTEOARTHRITIS OF HIP: ICD-10-CM

## 2020-06-05 PROCEDURE — 97110 THERAPEUTIC EXERCISES: CPT

## 2020-06-05 PROCEDURE — 97112 NEUROMUSCULAR REEDUCATION: CPT

## 2020-06-05 NOTE — OP THERAPY DAILY TREATMENT
"  Outpatient Physical Therapy  DAILY TREATMENT     [facility] [address].  [address]  [place] [address]  Phone:  [phone]  Fax:  [phone]    Date: 06/05/2020    Patient: Ramu Allred May  YOB: 1950  MRN: [id number]     Time Calculation    Start time: 0945  Stop time: 1030 Time Calculation (min): 45 minutes         Chief Complaint: Back Problem and Difficulty Walking    Visit #: 2    SUBJECTIVE:  Pt \"William\" states everything kind of hurts because he was doing a lot of yardwork. He was edging his backyard with his electric edger and that really bothers his back.     OBJECTIVE:  Current objective measures:   Increased torso movement with standing hip exercises, can correct with cueing and HHA  *        Therapeutic Exercises (CPT 59021):     1. Nu-step, L3, 6 min, S12, A12    2. Bridge, 5 x 1, 10\" x 1    3. Bridge on ball, 10x     4. Sit<>stand, 10x    5. Sit<>stand on foam, 10x, slightly raised table    6. Sit<>stand with SL raised on 4\" block, 10x, slightly raised table    7. Standing hip abduction, 10x, use of HHA    8. Standing heel raises, 10x, use of HHA    9. Standing marching, 10x, use of HHA    10. Standing hip abduction on foam, 5 x 2, use of HHA    11. Standing heel raises on foam, 10x, use of HHA    12. Standing marching on foam, 10x, use of HHA    15. UPOC: 07/23/2020      Therapeutic Exercise Summary: HEP: sit<>stand, standing hip abduction, standing heel raise    No estim - spinal stimulator    Therapeutic Treatments and Modalities:     1. Neuromuscular Re-education (CPT 34067), see below    Therapeutic Treatment and Modalities Summary: Neuro:   -marching fwd with SPC, 10ft x 2  -backward walking with SPC, 10ft x 2  -side stepping, no AD. 10ft x 2  -head turns, 10ft x 2 - decreased gait speed, increased RADU, increased lateral weaving, no LOB  -head up and down, 10ft x 2, decreased gait speed, increased RADU, no LOB    Time-based " treatments/modalities:    Physical Therapy Timed Treatment Charges  Neuromusc re-ed, balance, coor, post minutes (CPT 94477): 15 minutes  Therapeutic exercise minutes (CPT 82945): 30 minutes        ASSESSMENT:   Response to treatment: Pt presents with B back pain. Pt had difficulty dissociating hip from pelvis and torso movement in standing exercises, but can correct with cueing and HHA. Pt had increased RADU and decreased gait speed with head movement in gait, but no LOB. Pt will benefit from continued focus on core and hip strength and dynamic balance.    PLAN/RECOMMENDATIONS:   Plan for treatment: therapy treatment to continue next visit.  Planned interventions for next visit: continue with current treatment. More standing balance (wobble board, foam), dynamic balance. Consider step ups, walking for distance, practice getting up from floor.

## 2020-06-10 ENCOUNTER — PHYSICAL THERAPY (OUTPATIENT)
Dept: PHYSICAL THERAPY | Facility: REHABILITATION | Age: 70
End: 2020-06-10
Attending: ORTHOPAEDIC SURGERY
Payer: COMMERCIAL

## 2020-06-10 DIAGNOSIS — M16.0 BILATERAL PRIMARY OSTEOARTHRITIS OF HIP: ICD-10-CM

## 2020-06-10 PROCEDURE — 97110 THERAPEUTIC EXERCISES: CPT

## 2020-06-10 PROCEDURE — 97112 NEUROMUSCULAR REEDUCATION: CPT

## 2020-06-10 NOTE — OP THERAPY DAILY TREATMENT
"  Outpatient Physical Therapy  DAILY TREATMENT     [facility] [address].  [address]  [place] [address]  Phone:  [phone]  Fax:  [phone]    Date: 06/10/2020    Patient: Ramu Allred May  YOB: 1950  MRN: [id number]     Time Calculation    Start time: 0900  Stop time: 0955 Time Calculation (min): 55 minutes         Chief Complaint: Hip Problem and Difficulty Walking    Visit #: 3    SUBJECTIVE:  Pt states he is sore today, was cleaning and arranging his garage to fit his new boat yesterday.    OBJECTIVE:  Current objective measures:   6MWT: 747ft w/ SPC, wide RADU, no LOB. Pt felt most limited by fatigue and anterior hip pain          Therapeutic Exercises (CPT 95806):     1. Nu step, L3, 5 min    2. Standing hip abduction, 10x    3. Standing toe taps, 6\", 20x, no HHA    4. Step ups, 6\", 10x each leg    5. Sit<>stand, 10x    6. 6 MWT, 747 ft with SPC    7. SKTC, 20\" x 2    8. Piriforms str, 20\" x 2    9. Standing hip flexor stretch, 20\" x 2,      15. UPOC: 07/23/2020      Therapeutic Exercise Summary: No estim - spinal stimulator    Therapeutic Treatments and Modalities:     Therapeutic Treatment and Modalities Summary: Neuro:   -marching fwd with SPC, 10ft x 2  -backward walking with SPC, 10ft x 2  -side stepping, no AD. 10ft x 2  -marching fwd no SPC, 10ft x 2  -backward walking no SPC, 10ft x 2      Time-based treatments/modalities:    Physical Therapy Timed Treatment Charges  Neuromusc re-ed, balance, coor, post minutes (CPT 97895): 10 minutes  Therapeutic exercise minutes (CPT 07934): 45 minutes      ASSESSMENT:   Response to treatment: Pt presents with B back pain. Pt able to tolerate progression to toe taps and step ups without pain, but continues to require HHA to perform safely. Pt able to ambulate for full 6 min in 6 MWT, but did not increased anterior hip pain and SOB with the activity.      PLAN/RECOMMENDATIONS:   Plan for treatment: therapy treatment " to continue next visit.  Planned interventions for next visit: continue with current treatment. More standing balance (wobble board, foam), dynamic balance. Practice getting up from floor.

## 2020-06-12 ENCOUNTER — PHYSICAL THERAPY (OUTPATIENT)
Dept: PHYSICAL THERAPY | Facility: REHABILITATION | Age: 70
End: 2020-06-12
Attending: ORTHOPAEDIC SURGERY
Payer: COMMERCIAL

## 2020-06-12 DIAGNOSIS — M16.0 BILATERAL PRIMARY OSTEOARTHRITIS OF HIP: ICD-10-CM

## 2020-06-12 PROCEDURE — 97110 THERAPEUTIC EXERCISES: CPT

## 2020-06-12 PROCEDURE — 97112 NEUROMUSCULAR REEDUCATION: CPT

## 2020-06-12 NOTE — OP THERAPY DAILY TREATMENT
"  Outpatient Physical Therapy  DAILY TREATMENT     [facility] [address].  [address]  [place] [address]  Phone:  [phone]  Fax:  [phone]    Date: 06/12/2020    Patient: Ramu Allred May  YOB: 1950  MRN: [id number]     Time Calculation    Start time: 0825  Stop time: 0915 Time Calculation (min): 50 minutes         Chief Complaint: No chief complaint on file.    Visit #: 4    SUBJECTIVE:  I am pretty dilma     OBJECTIVE:    Therapeutic Exercises (CPT 49359):     1. Nu step, L3, 7 min    2. Partial bridge on swiss ball , 10x    3. SKTC, 20\"    4. Supine piriformis stretch     5. Supine gentle trunk rotation , 10x    6. Standing rows , 20x     7. SKTC, 20\" x 2    8. Piriforms str, 20\" x 2    9. Standing hip flexor stretch, 20\" x 2,      10. Lat rows, 20x green tb     11. Alt lat rows , cues for lumbar stab    12. Sit to stand, 3 x 5    15. UPOC: 07/23/2020      Therapeutic Exercise Summary: No estim - spinal stimulator    Therapeutic Treatments and Modalities:     Therapeutic Treatment and Modalities Summary: Neuro:   - forward stepping 10x each LE   -backward walking with SPC, 10ft x 2  -side stepping, no AD. 10ft x 6  -marching fwd no SPC, 10ft x 2  -backward walking no SPC, 10ft x 2  Single leg stance  Tandem stance       Time-based treatments/modalities:    Physical Therapy Timed Treatment Charges  Neuromusc re-ed, balance, coor, post minutes (CPT 26835): 25 minutes  Therapeutic exercise minutes (CPT 74889): 25 minutes      ASSESSMENT:   Response to treatment: Patient tolerated treatment well, but reports he is sore overall from doing a lot of yard work and rearranging the garage.     PLAN/RECOMMENDATIONS:   Plan for treatment: therapy treatment to continue next visit.  Planned interventions for next visit: continue with current treatment.       "

## 2020-06-15 ENCOUNTER — PHYSICAL THERAPY (OUTPATIENT)
Dept: PHYSICAL THERAPY | Facility: REHABILITATION | Age: 70
End: 2020-06-15
Attending: ORTHOPAEDIC SURGERY
Payer: COMMERCIAL

## 2020-06-15 DIAGNOSIS — M16.0 BILATERAL PRIMARY OSTEOARTHRITIS OF HIP: ICD-10-CM

## 2020-06-15 PROCEDURE — 97112 NEUROMUSCULAR REEDUCATION: CPT

## 2020-06-15 PROCEDURE — 97110 THERAPEUTIC EXERCISES: CPT

## 2020-06-15 NOTE — OP THERAPY DAILY TREATMENT
"  Outpatient Physical Therapy  DAILY TREATMENT     [facility] [address]  [address]  [place] [address]  Phone:  [phone]  Fax:  [phone]    Date: 06/15/2020    Patient: Ramu Allred May  YOB: 1950  MRN: [id number]     Time Calculation    Start time: 1430  Stop time: 1530 Time Calculation (min): 60 minutes         Chief Complaint: No chief complaint on file.    Visit #: 5    SUBJECTIVE:  I went fishing and did yard work, so I am really sore    OBJECTIVE:    Therapeutic Exercises (CPT 62305):     1. Nu step, L3, 8 min    2. Bridge 2 x 12, 10x    3. SKTC, 20\"    4. Supine piriformis stretch     5. Sidelying clams, 20x    6. Standing rows , 20x ,      15. UPOC: 07/23/2020      Therapeutic Exercise Summary: No estim - spinal stimulator    Therapeutic Treatments and Modalities:     Therapeutic Treatment and Modalities Summary: Neuro:   - forward stepping 10x each LE   Standing on foam   Feet together and feet apart  Head turns to bilateral directions  Marching on foam   Squats on foam       Time-based treatments/modalities:    Physical Therapy Timed Treatment Charges  Neuromusc re-ed, balance, coor, post minutes (CPT 53524): 15 minutes  Therapeutic exercise minutes (CPT 46634): 25 minutes        ASSESSMENT:   Response to treatment: Patient reports overall he is feeling generalized improvement.     PLAN/RECOMMENDATIONS:   Plan for treatment: therapy treatment to continue next visit.  Planned interventions for next visit: continue with current treatment.       "

## 2020-06-19 ENCOUNTER — APPOINTMENT (OUTPATIENT)
Dept: PHYSICAL THERAPY | Facility: REHABILITATION | Age: 70
End: 2020-06-19
Attending: ORTHOPAEDIC SURGERY
Payer: COMMERCIAL

## 2020-06-22 ENCOUNTER — APPOINTMENT (OUTPATIENT)
Dept: PHYSICAL THERAPY | Facility: REHABILITATION | Age: 70
End: 2020-06-22
Attending: ORTHOPAEDIC SURGERY
Payer: COMMERCIAL

## 2020-06-24 NOTE — OP THERAPY DAILY TREATMENT
"  Outpatient Physical Therapy  DAILY TREATMENT     [facility] [address]  [address]  [place] [address]  Phone:  [phone]  Fax:  [phone]    Date: 06/25/2020    Patient: Ramu Barber  YOB: 1950  MRN: [id number]     Time Calculation    Start time: 0830  Stop time: 0915 Time Calculation (min): 45 minutes         Chief Complaint: Hip Problem; Back Problem; and Leg Problem    Visit #: 6    SUBJECTIVE:  The therapy does help. I guess it has helped a little bit overall. I think I'm ready to DC today. I'm going to go see my doctor and see what he says.         OBJECTIVE:    Therapeutic Exercises (CPT 22807):     1. Nu step, L3, 8 min, warm up    2. Bridge 2 x 12, 10x    3. SKTC, 10x ea    4. Supine piriformis stretch , 2x30 sec     5. Sidelying clams, 20xea    6. Mini squats, x15, forward flexed posture; chair provided for UE support; pt denied use; \"I think I can do it without the chair\",      15. UPOC: 07/23/2020      Therapeutic Exercise Summary: No estim - spinal stimulator    Therapeutic Treatments and Modalities:     1. Neuromuscular Re-education (CPT 06424), x14 min    Therapeutic Treatment and Modalities Summary: Neuro:   - Standing feet together with head turns frontal and sagittal planes 15x ea    Standing on foam; 10x ea  Feet together and feet apart  Head turns to bilateral directions  Marching on foam   Squats on foam     SBA provided for foam activities      Time-based treatments/modalities:    Physical Therapy Timed Treatment Charges  Neuromusc re-ed, balance, coor, post minutes (CPT 39052): 14 minutes  Therapeutic exercise minutes (CPT 99930): 31 minutes  ASSESSMENT:   Response to treatment: See DC note.       PLAN/RECOMMENDATIONS:   Plan for treatment: DC pt today with independent hep. See DC note.        "

## 2020-06-25 ENCOUNTER — PHYSICAL THERAPY (OUTPATIENT)
Dept: PHYSICAL THERAPY | Facility: REHABILITATION | Age: 70
End: 2020-06-25
Attending: ORTHOPAEDIC SURGERY
Payer: COMMERCIAL

## 2020-06-25 DIAGNOSIS — M16.0 BILATERAL PRIMARY OSTEOARTHRITIS OF HIP: ICD-10-CM

## 2020-06-25 PROCEDURE — 97112 NEUROMUSCULAR REEDUCATION: CPT

## 2020-06-25 PROCEDURE — 97110 THERAPEUTIC EXERCISES: CPT

## 2020-06-25 NOTE — OP THERAPY DISCHARGE SUMMARY
Outpatient Physical Therapy  DISCHARGE SUMMARY NOTE      [address]  [address]  [place] [address]  Phone:  [phone]  Fax:  [phone]    Date of Visit: 06/25/2020    Patient: Ramu Barber  YOB: 1950  MRN: [id number]     Referring Provider: Ananth Jack M.D.  [address] [place] [id number]   Referring Diagnosis Bilateral primary osteoarthritis of hip [M16.0]         Functional Assessment Used        Your patient is being discharged from Physical Therapy with the following comments:   · Goals partially met   · Pt reports read to DC today    Comments:  Goals:   Short Term Goals:   1. Patient will report performing HEP with written instructions. (Met)  Short term goal time span:  2-4 weeks      Long Term Goals:    1. Patient will report being able to walk at least 2 blocks without being severely limited by pain and weakness. (Not met; pt reports being able to walk 100 yards).  2. Patient will score <50% impairment on the LEFS (out of 20 pts)  3. Patient will be able to ambulate up and down 5 stairs with step through gait independently. (Not met; pt able to perform step through with 1 HR with ascending stairs; performs step through gait pattern with descending with use of 1 HR)  4. Patient will be independent in upgraded HEP With written instructions. (Met)  Long term goal time span:  1-2 months      Limitations Remaining:  Pt has attended 6 visits of physical therapy and reports 50% improvement since initiating physical therapy. Physical therapy treatment consisted of: Posterior chain and core strengthening, neuro-re education, pt education. Pt reports improvements in: Getting out of sitting position, stair navigation and balance. Pt reports continued limitations with: Pain with ambulation, stairs.     Pt has reportedly improved ADL's; however symptoms remain unchanged. Continues to have difficulty with ambulation, stair navigation,  and transfers.      Recommendations:  Pt's requesting to DC today with independent hep. Reports will see MD and discuss future actions. All questions answered and verbally reviewed independent hep with pt. Expressed to pt that he can return to skilled physical therapy if condition should change or worsen in future.    Sixto Sarkar, PT    Date: 6/25/2020

## 2020-08-12 ENCOUNTER — HOSPITAL ENCOUNTER (OUTPATIENT)
Dept: LAB | Facility: MEDICAL CENTER | Age: 70
End: 2020-08-12
Attending: INTERNAL MEDICINE
Payer: COMMERCIAL

## 2020-08-12 ENCOUNTER — APPOINTMENT (OUTPATIENT)
Dept: RADIOLOGY | Facility: MEDICAL CENTER | Age: 70
DRG: 872 | End: 2020-08-12
Attending: HOSPITALIST
Payer: COMMERCIAL

## 2020-08-12 ENCOUNTER — HOSPITAL ENCOUNTER (OUTPATIENT)
Dept: LAB | Facility: MEDICAL CENTER | Age: 70
End: 2020-08-12
Attending: FAMILY MEDICINE
Payer: COMMERCIAL

## 2020-08-12 ENCOUNTER — APPOINTMENT (OUTPATIENT)
Dept: RADIOLOGY | Facility: MEDICAL CENTER | Age: 70
DRG: 872 | End: 2020-08-12
Attending: EMERGENCY MEDICINE
Payer: COMMERCIAL

## 2020-08-12 ENCOUNTER — HOSPITAL ENCOUNTER (INPATIENT)
Facility: MEDICAL CENTER | Age: 70
LOS: 3 days | DRG: 872 | End: 2020-08-15
Attending: EMERGENCY MEDICINE | Admitting: HOSPITALIST
Payer: COMMERCIAL

## 2020-08-12 DIAGNOSIS — R79.89 PRERENAL AZOTEMIA: ICD-10-CM

## 2020-08-12 DIAGNOSIS — D72.829 LEUKOCYTOSIS, UNSPECIFIED TYPE: ICD-10-CM

## 2020-08-12 DIAGNOSIS — N17.9 ACUTE RENAL FAILURE, UNSPECIFIED ACUTE RENAL FAILURE TYPE (HCC): ICD-10-CM

## 2020-08-12 DIAGNOSIS — R79.89 ELEVATED TROPONIN: ICD-10-CM

## 2020-08-12 DIAGNOSIS — N30.00 ACUTE CYSTITIS WITHOUT HEMATURIA: ICD-10-CM

## 2020-08-12 DIAGNOSIS — I95.9 ACUTE HYPOTENSION: ICD-10-CM

## 2020-08-12 DIAGNOSIS — R00.1 BRADYCARDIA: ICD-10-CM

## 2020-08-12 DIAGNOSIS — R53.1 WEAKNESS: ICD-10-CM

## 2020-08-12 PROBLEM — R35.0 BENIGN PROSTATIC HYPERPLASIA WITH URINARY FREQUENCY: Status: ACTIVE | Noted: 2020-08-12

## 2020-08-12 PROBLEM — N40.1 BENIGN PROSTATIC HYPERPLASIA WITH URINARY FREQUENCY: Status: ACTIVE | Noted: 2020-08-12

## 2020-08-12 PROBLEM — A41.9 SEPSIS (HCC): Status: ACTIVE | Noted: 2020-08-12

## 2020-08-12 LAB
ALBUMIN SERPL BCP-MCNC: 3.9 G/DL (ref 3.2–4.9)
ALBUMIN/GLOB SERPL: 1.1 G/DL
ALBUMIN/GLOB SERPL: 1.1 G/DL
ALBUMIN/GLOB SERPL: 1.2 G/DL
ALP SERPL-CCNC: 77 U/L (ref 30–99)
ALP SERPL-CCNC: 84 U/L (ref 30–99)
ALP SERPL-CCNC: 88 U/L (ref 30–99)
ALT SERPL-CCNC: 13 U/L (ref 2–50)
ALT SERPL-CCNC: 15 U/L (ref 2–50)
ALT SERPL-CCNC: 18 U/L (ref 2–50)
ANION GAP SERPL CALC-SCNC: 15 MMOL/L (ref 7–16)
ANION GAP SERPL CALC-SCNC: 18 MMOL/L (ref 7–16)
ANION GAP SERPL CALC-SCNC: 20 MMOL/L (ref 7–16)
APPEARANCE UR: ABNORMAL
AST SERPL-CCNC: 18 U/L (ref 12–45)
AST SERPL-CCNC: 20 U/L (ref 12–45)
AST SERPL-CCNC: 25 U/L (ref 12–45)
BACTERIA #/AREA URNS HPF: ABNORMAL /HPF
BASOPHILS # BLD AUTO: 0.3 % (ref 0–1.8)
BASOPHILS # BLD AUTO: 0.3 % (ref 0–1.8)
BASOPHILS # BLD AUTO: 0.5 % (ref 0–1.8)
BASOPHILS # BLD: 0.05 K/UL (ref 0–0.12)
BASOPHILS # BLD: 0.06 K/UL (ref 0–0.12)
BASOPHILS # BLD: 0.1 K/UL (ref 0–0.12)
BILIRUB SERPL-MCNC: 0.6 MG/DL (ref 0.1–1.5)
BILIRUB SERPL-MCNC: 0.6 MG/DL (ref 0.1–1.5)
BILIRUB SERPL-MCNC: 0.7 MG/DL (ref 0.1–1.5)
BILIRUB UR QL STRIP.AUTO: NEGATIVE
BUN SERPL-MCNC: 36 MG/DL (ref 8–22)
BUN SERPL-MCNC: 36 MG/DL (ref 8–22)
BUN SERPL-MCNC: 40 MG/DL (ref 8–22)
CALCIUM SERPL-MCNC: 9.4 MG/DL (ref 8.5–10.5)
CALCIUM SERPL-MCNC: 9.5 MG/DL (ref 8.5–10.5)
CALCIUM SERPL-MCNC: 9.6 MG/DL (ref 8.5–10.5)
CHLORIDE SERPL-SCNC: 100 MMOL/L (ref 96–112)
CHLORIDE SERPL-SCNC: 98 MMOL/L (ref 96–112)
CHLORIDE SERPL-SCNC: 98 MMOL/L (ref 96–112)
CHOLEST SERPL-MCNC: 134 MG/DL (ref 100–199)
CHOLEST SERPL-MCNC: 135 MG/DL (ref 100–199)
CO2 SERPL-SCNC: 19 MMOL/L (ref 20–33)
CO2 SERPL-SCNC: 21 MMOL/L (ref 20–33)
CO2 SERPL-SCNC: 21 MMOL/L (ref 20–33)
COLOR UR: ABNORMAL
COVID ORDER STATUS COVID19: NORMAL
CREAT SERPL-MCNC: 2.25 MG/DL (ref 0.5–1.4)
CREAT SERPL-MCNC: 2.28 MG/DL (ref 0.5–1.4)
CREAT SERPL-MCNC: 2.72 MG/DL (ref 0.5–1.4)
EKG IMPRESSION: NORMAL
EOSINOPHIL # BLD AUTO: 0.01 K/UL (ref 0–0.51)
EOSINOPHIL # BLD AUTO: 0.01 K/UL (ref 0–0.51)
EOSINOPHIL # BLD AUTO: 0.02 K/UL (ref 0–0.51)
EOSINOPHIL NFR BLD: 0.1 % (ref 0–6.9)
EPI CELLS #/AREA URNS HPF: NEGATIVE /HPF
ERYTHROCYTE [DISTWIDTH] IN BLOOD BY AUTOMATED COUNT: 48.6 FL (ref 35.9–50)
ERYTHROCYTE [DISTWIDTH] IN BLOOD BY AUTOMATED COUNT: 48.7 FL (ref 35.9–50)
ERYTHROCYTE [DISTWIDTH] IN BLOOD BY AUTOMATED COUNT: 50.1 FL (ref 35.9–50)
EST. AVERAGE GLUCOSE BLD GHB EST-MCNC: 169 MG/DL
FASTING STATUS PATIENT QL REPORTED: NORMAL
FASTING STATUS PATIENT QL REPORTED: NORMAL
GLOBULIN SER CALC-MCNC: 3.3 G/DL (ref 1.9–3.5)
GLOBULIN SER CALC-MCNC: 3.4 G/DL (ref 1.9–3.5)
GLOBULIN SER CALC-MCNC: 3.5 G/DL (ref 1.9–3.5)
GLUCOSE BLD-MCNC: 157 MG/DL (ref 65–99)
GLUCOSE SERPL-MCNC: 182 MG/DL (ref 65–99)
GLUCOSE SERPL-MCNC: 216 MG/DL (ref 65–99)
GLUCOSE SERPL-MCNC: 218 MG/DL (ref 65–99)
GLUCOSE UR STRIP.AUTO-MCNC: NEGATIVE MG/DL
HBA1C MFR BLD: 7.5 % (ref 0–5.6)
HCT VFR BLD AUTO: 40.7 % (ref 42–52)
HCT VFR BLD AUTO: 41.1 % (ref 42–52)
HCT VFR BLD AUTO: 41.6 % (ref 42–52)
HDLC SERPL-MCNC: 68 MG/DL
HDLC SERPL-MCNC: 68 MG/DL
HGB BLD-MCNC: 13.3 G/DL (ref 14–18)
HGB BLD-MCNC: 13.4 G/DL (ref 14–18)
HGB BLD-MCNC: 13.5 G/DL (ref 14–18)
HYALINE CASTS #/AREA URNS LPF: ABNORMAL /LPF
IMM GRANULOCYTES # BLD AUTO: 0.13 K/UL (ref 0–0.11)
IMM GRANULOCYTES # BLD AUTO: 0.17 K/UL (ref 0–0.11)
IMM GRANULOCYTES # BLD AUTO: 0.19 K/UL (ref 0–0.11)
IMM GRANULOCYTES NFR BLD AUTO: 0.7 % (ref 0–0.9)
IMM GRANULOCYTES NFR BLD AUTO: 0.9 % (ref 0–0.9)
IMM GRANULOCYTES NFR BLD AUTO: 1 % (ref 0–0.9)
INR PPP: 1.19 (ref 0.87–1.13)
KETONES UR STRIP.AUTO-MCNC: NEGATIVE MG/DL
LACTATE BLD-SCNC: 1.4 MMOL/L (ref 0.5–2)
LACTATE BLD-SCNC: 2.1 MMOL/L (ref 0.5–2)
LACTATE BLD-SCNC: 2.4 MMOL/L (ref 0.5–2)
LDLC SERPL CALC-MCNC: 55 MG/DL
LDLC SERPL CALC-MCNC: 56 MG/DL
LEUKOCYTE ESTERASE UR QL STRIP.AUTO: ABNORMAL
LIPASE SERPL-CCNC: 19 U/L (ref 11–82)
LYMPHOCYTES # BLD AUTO: 0.73 K/UL (ref 1–4.8)
LYMPHOCYTES # BLD AUTO: 0.75 K/UL (ref 1–4.8)
LYMPHOCYTES # BLD AUTO: 1.09 K/UL (ref 1–4.8)
LYMPHOCYTES NFR BLD: 3.8 % (ref 22–41)
LYMPHOCYTES NFR BLD: 3.9 % (ref 22–41)
LYMPHOCYTES NFR BLD: 5.9 % (ref 22–41)
MCH RBC QN AUTO: 31.6 PG (ref 27–33)
MCH RBC QN AUTO: 31.8 PG (ref 27–33)
MCH RBC QN AUTO: 31.8 PG (ref 27–33)
MCHC RBC AUTO-ENTMCNC: 32.2 G/DL (ref 33.7–35.3)
MCHC RBC AUTO-ENTMCNC: 32.7 G/DL (ref 33.7–35.3)
MCHC RBC AUTO-ENTMCNC: 32.8 G/DL (ref 33.7–35.3)
MCV RBC AUTO: 96.7 FL (ref 81.4–97.8)
MCV RBC AUTO: 96.9 FL (ref 81.4–97.8)
MCV RBC AUTO: 98.6 FL (ref 81.4–97.8)
MICRO URNS: ABNORMAL
MONOCYTES # BLD AUTO: 1.6 K/UL (ref 0–0.85)
MONOCYTES # BLD AUTO: 1.64 K/UL (ref 0–0.85)
MONOCYTES # BLD AUTO: 1.64 K/UL (ref 0–0.85)
MONOCYTES NFR BLD AUTO: 8.5 % (ref 0–13.4)
MONOCYTES NFR BLD AUTO: 8.6 % (ref 0–13.4)
MONOCYTES NFR BLD AUTO: 8.7 % (ref 0–13.4)
NEUTROPHILS # BLD AUTO: 15.55 K/UL (ref 1.82–7.42)
NEUTROPHILS # BLD AUTO: 16.44 K/UL (ref 1.82–7.42)
NEUTROPHILS # BLD AUTO: 16.54 K/UL (ref 1.82–7.42)
NEUTROPHILS NFR BLD: 84.3 % (ref 44–72)
NEUTROPHILS NFR BLD: 86 % (ref 44–72)
NEUTROPHILS NFR BLD: 86.3 % (ref 44–72)
NITRITE UR QL STRIP.AUTO: NEGATIVE
NRBC # BLD AUTO: 0 K/UL
NRBC BLD-RTO: 0 /100 WBC
NT-PROBNP SERPL IA-MCNC: 1441 PG/ML (ref 0–125)
PH UR STRIP.AUTO: 5 [PH] (ref 5–8)
PLATELET # BLD AUTO: 217 K/UL (ref 164–446)
PLATELET # BLD AUTO: 224 K/UL (ref 164–446)
PLATELET # BLD AUTO: 225 K/UL (ref 164–446)
PMV BLD AUTO: 10.5 FL (ref 9–12.9)
PMV BLD AUTO: 10.7 FL (ref 9–12.9)
PMV BLD AUTO: 11.1 FL (ref 9–12.9)
POTASSIUM SERPL-SCNC: 3.5 MMOL/L (ref 3.6–5.5)
POTASSIUM SERPL-SCNC: 3.6 MMOL/L (ref 3.6–5.5)
POTASSIUM SERPL-SCNC: 3.7 MMOL/L (ref 3.6–5.5)
PROCALCITONIN SERPL-MCNC: 1.28 NG/ML
PROT SERPL-MCNC: 7.2 G/DL (ref 6–8.2)
PROT SERPL-MCNC: 7.3 G/DL (ref 6–8.2)
PROT SERPL-MCNC: 7.4 G/DL (ref 6–8.2)
PROT UR QL STRIP: 100 MG/DL
PROTHROMBIN TIME: 15.5 SEC (ref 12–14.6)
PSA SERPL-MCNC: 0.17 NG/ML (ref 0–4)
RBC # BLD AUTO: 4.21 M/UL (ref 4.7–6.1)
RBC # BLD AUTO: 4.22 M/UL (ref 4.7–6.1)
RBC # BLD AUTO: 4.24 M/UL (ref 4.7–6.1)
RBC # URNS HPF: ABNORMAL /HPF
RBC UR QL AUTO: ABNORMAL
SARS-COV-2 RNA RESP QL NAA+PROBE: NOTDETECTED
SODIUM SERPL-SCNC: 136 MMOL/L (ref 135–145)
SODIUM SERPL-SCNC: 137 MMOL/L (ref 135–145)
SODIUM SERPL-SCNC: 137 MMOL/L (ref 135–145)
SP GR UR STRIP.AUTO: 1.02
SPECIMEN SOURCE: NORMAL
TRIGL SERPL-MCNC: 55 MG/DL (ref 0–149)
TRIGL SERPL-MCNC: 55 MG/DL (ref 0–149)
TROPONIN T SERPL-MCNC: 60 NG/L (ref 6–19)
TROPONIN T SERPL-MCNC: 75 NG/L (ref 6–19)
TSH SERPL DL<=0.005 MIU/L-ACNC: 1.01 UIU/ML (ref 0.38–5.33)
UROBILINOGEN UR STRIP.AUTO-MCNC: 0.2 MG/DL
WBC # BLD AUTO: 18.5 K/UL (ref 4.8–10.8)
WBC # BLD AUTO: 19 K/UL (ref 4.8–10.8)
WBC # BLD AUTO: 19.2 K/UL (ref 4.8–10.8)
WBC #/AREA URNS HPF: ABNORMAL /HPF

## 2020-08-12 PROCEDURE — 84145 PROCALCITONIN (PCT): CPT

## 2020-08-12 PROCEDURE — 80053 COMPREHEN METABOLIC PANEL: CPT

## 2020-08-12 PROCEDURE — 83880 ASSAY OF NATRIURETIC PEPTIDE: CPT

## 2020-08-12 PROCEDURE — 83605 ASSAY OF LACTIC ACID: CPT | Mod: 91

## 2020-08-12 PROCEDURE — 87077 CULTURE AEROBIC IDENTIFY: CPT | Mod: 91

## 2020-08-12 PROCEDURE — 71045 X-RAY EXAM CHEST 1 VIEW: CPT

## 2020-08-12 PROCEDURE — 99285 EMERGENCY DEPT VISIT HI MDM: CPT

## 2020-08-12 PROCEDURE — 76775 US EXAM ABDO BACK WALL LIM: CPT

## 2020-08-12 PROCEDURE — 85025 COMPLETE CBC W/AUTO DIFF WBC: CPT

## 2020-08-12 PROCEDURE — A9270 NON-COVERED ITEM OR SERVICE: HCPCS | Performed by: HOSPITALIST

## 2020-08-12 PROCEDURE — 700105 HCHG RX REV CODE 258: Performed by: EMERGENCY MEDICINE

## 2020-08-12 PROCEDURE — 81001 URINALYSIS AUTO W/SCOPE: CPT

## 2020-08-12 PROCEDURE — 80053 COMPREHEN METABOLIC PANEL: CPT | Mod: 91

## 2020-08-12 PROCEDURE — 99223 1ST HOSP IP/OBS HIGH 75: CPT | Performed by: HOSPITALIST

## 2020-08-12 PROCEDURE — 700102 HCHG RX REV CODE 250 W/ 637 OVERRIDE(OP): Performed by: HOSPITALIST

## 2020-08-12 PROCEDURE — 36415 COLL VENOUS BLD VENIPUNCTURE: CPT

## 2020-08-12 PROCEDURE — 83036 HEMOGLOBIN GLYCOSYLATED A1C: CPT

## 2020-08-12 PROCEDURE — 93005 ELECTROCARDIOGRAM TRACING: CPT

## 2020-08-12 PROCEDURE — 85025 COMPLETE CBC W/AUTO DIFF WBC: CPT | Mod: 91

## 2020-08-12 PROCEDURE — 80061 LIPID PANEL: CPT

## 2020-08-12 PROCEDURE — 93005 ELECTROCARDIOGRAM TRACING: CPT | Performed by: EMERGENCY MEDICINE

## 2020-08-12 PROCEDURE — 84484 ASSAY OF TROPONIN QUANT: CPT | Mod: 91

## 2020-08-12 PROCEDURE — 84443 ASSAY THYROID STIM HORMONE: CPT

## 2020-08-12 PROCEDURE — 85610 PROTHROMBIN TIME: CPT

## 2020-08-12 PROCEDURE — 770020 HCHG ROOM/CARE - TELE (206)

## 2020-08-12 PROCEDURE — 700111 HCHG RX REV CODE 636 W/ 250 OVERRIDE (IP): Performed by: HOSPITALIST

## 2020-08-12 PROCEDURE — 96365 THER/PROPH/DIAG IV INF INIT: CPT

## 2020-08-12 PROCEDURE — 87040 BLOOD CULTURE FOR BACTERIA: CPT | Mod: 91

## 2020-08-12 PROCEDURE — 87186 SC STD MICRODIL/AGAR DIL: CPT

## 2020-08-12 PROCEDURE — 700111 HCHG RX REV CODE 636 W/ 250 OVERRIDE (IP): Performed by: EMERGENCY MEDICINE

## 2020-08-12 PROCEDURE — U0003 INFECTIOUS AGENT DETECTION BY NUCLEIC ACID (DNA OR RNA); SEVERE ACUTE RESPIRATORY SYNDROME CORONAVIRUS 2 (SARS-COV-2) (CORONAVIRUS DISEASE [COVID-19]), AMPLIFIED PROBE TECHNIQUE, MAKING USE OF HIGH THROUGHPUT TECHNOLOGIES AS DESCRIBED BY CMS-2020-01-R: HCPCS

## 2020-08-12 PROCEDURE — 700105 HCHG RX REV CODE 258: Performed by: HOSPITALIST

## 2020-08-12 PROCEDURE — C9803 HOPD COVID-19 SPEC COLLECT: HCPCS | Performed by: EMERGENCY MEDICINE

## 2020-08-12 PROCEDURE — 83690 ASSAY OF LIPASE: CPT

## 2020-08-12 PROCEDURE — 84153 ASSAY OF PSA TOTAL: CPT | Mod: GA

## 2020-08-12 PROCEDURE — 80061 LIPID PANEL: CPT | Mod: 91

## 2020-08-12 PROCEDURE — 87086 URINE CULTURE/COLONY COUNT: CPT

## 2020-08-12 PROCEDURE — 82962 GLUCOSE BLOOD TEST: CPT

## 2020-08-12 RX ORDER — SODIUM CHLORIDE, SODIUM LACTATE, POTASSIUM CHLORIDE, CALCIUM CHLORIDE 600; 310; 30; 20 MG/100ML; MG/100ML; MG/100ML; MG/100ML
1000 INJECTION, SOLUTION INTRAVENOUS ONCE
Status: COMPLETED | OUTPATIENT
Start: 2020-08-12 | End: 2020-08-12

## 2020-08-12 RX ORDER — GABAPENTIN 300 MG/1
300 CAPSULE ORAL 4 TIMES DAILY
Status: DISCONTINUED | OUTPATIENT
Start: 2020-08-12 | End: 2020-08-15 | Stop reason: HOSPADM

## 2020-08-12 RX ORDER — CARVEDILOL 25 MG/1
25 TABLET ORAL 2 TIMES DAILY WITH MEALS
Status: DISCONTINUED | OUTPATIENT
Start: 2020-08-13 | End: 2020-08-15 | Stop reason: HOSPADM

## 2020-08-12 RX ORDER — AMLODIPINE BESYLATE 10 MG/1
10 TABLET ORAL DAILY
Status: DISCONTINUED | OUTPATIENT
Start: 2020-08-13 | End: 2020-08-15 | Stop reason: HOSPADM

## 2020-08-12 RX ORDER — CLONIDINE HYDROCHLORIDE 0.1 MG/1
0.2 TABLET ORAL 2 TIMES DAILY
Status: DISCONTINUED | OUTPATIENT
Start: 2020-08-12 | End: 2020-08-15 | Stop reason: HOSPADM

## 2020-08-12 RX ORDER — SODIUM CHLORIDE, SODIUM LACTATE, POTASSIUM CHLORIDE, AND CALCIUM CHLORIDE .6; .31; .03; .02 G/100ML; G/100ML; G/100ML; G/100ML
500 INJECTION, SOLUTION INTRAVENOUS
Status: DISCONTINUED | OUTPATIENT
Start: 2020-08-12 | End: 2020-08-15 | Stop reason: HOSPADM

## 2020-08-12 RX ORDER — HEPARIN SODIUM 5000 [USP'U]/ML
5000 INJECTION, SOLUTION INTRAVENOUS; SUBCUTANEOUS EVERY 8 HOURS
Status: DISCONTINUED | OUTPATIENT
Start: 2020-08-12 | End: 2020-08-15 | Stop reason: HOSPADM

## 2020-08-12 RX ORDER — FOSINOPRIL SODIUM 40 MG/1
40 TABLET ORAL DAILY
Status: DISCONTINUED | OUTPATIENT
Start: 2020-08-13 | End: 2020-08-12

## 2020-08-12 RX ORDER — POLYETHYLENE GLYCOL 3350 17 G/17G
1 POWDER, FOR SOLUTION ORAL
Status: DISCONTINUED | OUTPATIENT
Start: 2020-08-12 | End: 2020-08-15 | Stop reason: HOSPADM

## 2020-08-12 RX ORDER — BISACODYL 10 MG
10 SUPPOSITORY, RECTAL RECTAL
Status: DISCONTINUED | OUTPATIENT
Start: 2020-08-12 | End: 2020-08-15 | Stop reason: HOSPADM

## 2020-08-12 RX ORDER — DEXTROSE MONOHYDRATE 25 G/50ML
50 INJECTION, SOLUTION INTRAVENOUS
Status: DISCONTINUED | OUTPATIENT
Start: 2020-08-12 | End: 2020-08-15 | Stop reason: HOSPADM

## 2020-08-12 RX ORDER — AMOXICILLIN 250 MG
2 CAPSULE ORAL 2 TIMES DAILY
Status: DISCONTINUED | OUTPATIENT
Start: 2020-08-12 | End: 2020-08-15 | Stop reason: HOSPADM

## 2020-08-12 RX ORDER — TAMSULOSIN HYDROCHLORIDE 0.4 MG/1
0.4 CAPSULE ORAL
Status: DISCONTINUED | OUTPATIENT
Start: 2020-08-12 | End: 2020-08-15 | Stop reason: HOSPADM

## 2020-08-12 RX ORDER — SODIUM CHLORIDE, SODIUM LACTATE, POTASSIUM CHLORIDE, AND CALCIUM CHLORIDE .6; .31; .03; .02 G/100ML; G/100ML; G/100ML; G/100ML
30 INJECTION, SOLUTION INTRAVENOUS
Status: DISCONTINUED | OUTPATIENT
Start: 2020-08-12 | End: 2020-08-15 | Stop reason: HOSPADM

## 2020-08-12 RX ORDER — ACETAMINOPHEN 325 MG/1
650 TABLET ORAL EVERY 6 HOURS PRN
Status: DISCONTINUED | OUTPATIENT
Start: 2020-08-12 | End: 2020-08-15 | Stop reason: HOSPADM

## 2020-08-12 RX ORDER — TERAZOSIN 5 MG/1
5 CAPSULE ORAL EVERY EVENING
Status: DISCONTINUED | OUTPATIENT
Start: 2020-08-12 | End: 2020-08-15 | Stop reason: HOSPADM

## 2020-08-12 RX ORDER — SODIUM CHLORIDE, SODIUM LACTATE, POTASSIUM CHLORIDE, CALCIUM CHLORIDE 600; 310; 30; 20 MG/100ML; MG/100ML; MG/100ML; MG/100ML
INJECTION, SOLUTION INTRAVENOUS CONTINUOUS
Status: DISCONTINUED | OUTPATIENT
Start: 2020-08-12 | End: 2020-08-15 | Stop reason: HOSPADM

## 2020-08-12 RX ORDER — ASPIRIN 81 MG/1
81 TABLET, CHEWABLE ORAL EVERY MORNING
Status: DISCONTINUED | OUTPATIENT
Start: 2020-08-13 | End: 2020-08-15 | Stop reason: HOSPADM

## 2020-08-12 RX ORDER — LOVASTATIN 20 MG/1
20 TABLET ORAL EVERY EVENING
Status: DISCONTINUED | OUTPATIENT
Start: 2020-08-12 | End: 2020-08-15 | Stop reason: HOSPADM

## 2020-08-12 RX ADMIN — MEROPENEM 500 MG: 500 INJECTION, POWDER, FOR SOLUTION INTRAVENOUS at 20:38

## 2020-08-12 RX ADMIN — SODIUM CHLORIDE, POTASSIUM CHLORIDE, SODIUM LACTATE AND CALCIUM CHLORIDE 1000 ML: 600; 310; 30; 20 INJECTION, SOLUTION INTRAVENOUS at 16:35

## 2020-08-12 RX ADMIN — CLONIDINE HYDROCHLORIDE 0.2 MG: 0.1 TABLET ORAL at 20:33

## 2020-08-12 RX ADMIN — CEFTRIAXONE SODIUM 2 G: 2 INJECTION, POWDER, FOR SOLUTION INTRAMUSCULAR; INTRAVENOUS at 17:19

## 2020-08-12 RX ADMIN — HEPARIN SODIUM 5000 UNITS: 5000 INJECTION, SOLUTION INTRAVENOUS; SUBCUTANEOUS at 20:34

## 2020-08-12 RX ADMIN — TAMSULOSIN HYDROCHLORIDE 0.4 MG: 0.4 CAPSULE ORAL at 20:33

## 2020-08-12 RX ADMIN — GABAPENTIN 300 MG: 300 CAPSULE ORAL at 20:33

## 2020-08-12 RX ADMIN — SODIUM CHLORIDE, POTASSIUM CHLORIDE, SODIUM LACTATE AND CALCIUM CHLORIDE: 600; 310; 30; 20 INJECTION, SOLUTION INTRAVENOUS at 20:34

## 2020-08-12 RX ADMIN — INSULIN HUMAN 2 UNITS: 100 INJECTION, SOLUTION PARENTERAL at 21:20

## 2020-08-12 RX ADMIN — TERAZOSIN HYDROCHLORIDE 5 MG: 5 CAPSULE ORAL at 20:33

## 2020-08-12 RX ADMIN — LOVASTATIN 20 MG: 20 TABLET ORAL at 20:34

## 2020-08-12 ASSESSMENT — ENCOUNTER SYMPTOMS
ABDOMINAL PAIN: 0
COUGH: 0
WEAKNESS: 1
BACK PAIN: 0
SORE THROAT: 0
CHILLS: 0
FOCAL WEAKNESS: 0
STRIDOR: 0
NERVOUS/ANXIOUS: 0
DIZZINESS: 0
BLOOD IN STOOL: 0
BLURRED VISION: 0
VOMITING: 0
SPEECH CHANGE: 0
SENSORY CHANGE: 0
FEVER: 0
PALPITATIONS: 0
DIARRHEA: 0
SHORTNESS OF BREATH: 0
NAUSEA: 0
HEADACHES: 0

## 2020-08-12 ASSESSMENT — LIFESTYLE VARIABLES
HAVE YOU EVER FELT YOU SHOULD CUT DOWN ON YOUR DRINKING: NO
AVERAGE NUMBER OF DAYS PER WEEK YOU HAVE A DRINK CONTAINING ALCOHOL: 1
ON A TYPICAL DAY WHEN YOU DRINK ALCOHOL HOW MANY DRINKS DO YOU HAVE: 1
DOES PATIENT WANT TO STOP DRINKING: NO
CONSUMPTION TOTAL: NEGATIVE
HOW MANY TIMES IN THE PAST YEAR HAVE YOU HAD 5 OR MORE DRINKS IN A DAY: 0
HAVE PEOPLE ANNOYED YOU BY CRITICIZING YOUR DRINKING: NO
TOTAL SCORE: 0
TOTAL SCORE: 0
EVER HAD A DRINK FIRST THING IN THE MORNING TO STEADY YOUR NERVES TO GET RID OF A HANGOVER: NO
ALCOHOL_USE: YES
EVER FELT BAD OR GUILTY ABOUT YOUR DRINKING: NO
TOTAL SCORE: 0

## 2020-08-12 ASSESSMENT — COGNITIVE AND FUNCTIONAL STATUS - GENERAL
SUGGESTED CMS G CODE MODIFIER DAILY ACTIVITY: CH
DAILY ACTIVITIY SCORE: 24
MOBILITY SCORE: 21
SUGGESTED CMS G CODE MODIFIER MOBILITY: CJ
WALKING IN HOSPITAL ROOM: A LITTLE
CLIMB 3 TO 5 STEPS WITH RAILING: A LITTLE
STANDING UP FROM CHAIR USING ARMS: A LITTLE

## 2020-08-12 ASSESSMENT — PATIENT HEALTH QUESTIONNAIRE - PHQ9
2. FEELING DOWN, DEPRESSED, IRRITABLE, OR HOPELESS: NOT AT ALL
SUM OF ALL RESPONSES TO PHQ9 QUESTIONS 1 AND 2: 0
1. LITTLE INTEREST OR PLEASURE IN DOING THINGS: NOT AT ALL

## 2020-08-12 ASSESSMENT — FIBROSIS 4 INDEX: FIB4 SCORE: 1.59

## 2020-08-12 NOTE — ED NOTES
Blood drawn and nasal swab collected. IVFs infusing, see MAR. Pt and family aware of POC. Call light within reach.

## 2020-08-12 NOTE — ED TRIAGE NOTES
"Ramu Allred May 69 y.o. male to triage with personal walker for     Chief Complaint   Patient presents with   • Weakness     \"progressive weakness to his arms and legs and back\".  Pt \"slid out of the bed this morning onto the ground and it took 2 hours to get him up\"   • Fatigue     \"falling asleep for hours at a time, all the time, during phone calls\"     Wife reports fatigue x 1 week and weakness significantly worsened since yesterday.  Pt also reports foul-smelling urine over the weekend.  Pt denies any urinary symptoms.  NAD  BP (!) 86/51   Pulse 62   Temp 35.9 °C (96.6 °F) (Temporal)   Resp 18   Ht 1.829 m (6')   Wt 121 kg (266 lb 12.1 oz)   SpO2 95%   BMI 36.18 kg/m²   Charge RN aware of the patient.    "

## 2020-08-12 NOTE — ED PROVIDER NOTES
"ED Provider  Scribed for Elias Martinez D.O. by Chuck Katz. 8/12/2020  4:01 PM    Means of arrival: walk-in  History obtained from: patient  History limited by: none    CHIEF COMPLAINT  Chief Complaint   Patient presents with   • Weakness     \"progressive weakness to his arms and legs and back\".  Pt \"slid out of the bed this morning onto the ground and it took 2 hours to get him up\"   • Fatigue     \"falling asleep for hours at a time, all the time, during phone calls\"       PPE Note: I personally donned full PPE for all patient encounters during this visit, including being clean-shaven with an N95 respirator mask, gloves, and eye protection. Scribe remained outside the patient's room and did not have any contact with the patient for the duration of patient encounter.       HPI  Ramu Barber is a 69 y.o. male who presents with complaints of generalized weakness and fatigue that onset 1 week ago. He localizes his weakness to his arms, legs, and hips. His weakness worsened this morning, and he states that he slid onto the ground off the bed and was unable to get up for about 2 hours even with the help of his wife. He also reports having dysuria and foul-smelling urine for the past 2 weeks. His wife notes that he has been sleeping more than normal as well. Patient states he felt chilled this morning, but he has not had any fevers. He also had an episode of indigestion this morning which has resolved, but he has not had any He denies any nausea, vomiting, diarrhea, shortness of breath, or cough. He reports he is currently taking several HTN medications including carvedilol, amlodipine, clonidine, fosinopril, Hytrin, and triamterene-HCTZ. No recent changes to his medications. No history of thyroid issues.     REVIEW OF SYSTEMS  See HPI for further details. All other systems are negative.     PAST MEDICAL HISTORY   has a past medical history of Abnormal glucose, Arthritis, Back pain, Chickenpox, High cholesterol, " HTN (hypertension), Obesity, Overweight(278.02), Pain, Pneumonia, Polycythemia, secondary, Sleep apnea, Snoring, and Type II or unspecified type diabetes mellitus without mention of complication, not stated as uncontrolled.    SOCIAL HISTORY  Social History     Tobacco Use   • Smoking status: Never Smoker   • Smokeless tobacco: Former User     Types: Chew   Substance and Sexual Activity   • Alcohol use: Yes     Comment: twice a month   • Drug use: No       SURGICAL HISTORY   has a past surgical history that includes lumbar fusion posterior (1/21/2015); inguinal hernia repair (Right, 2/16/2016); lumbar laminectomy diskectomy (2/18/2018); hardware removal neuro (2/18/2018); carpal tunnel release (Right, 2014); shoulder arthroscopy (Right, 2014); lumbar decompression (2004); lumbar decompression (2003); cervical disk and fusion anterior (2005); and implant neurostim/ (7/23/2019).    CURRENT MEDICATIONS  Current Outpatient Medications   Medication Instructions   • amLODIPine (NORVASC) 10 mg, Oral, DAILY, For further refills please contact new cardiologist. Thank you   • ASPIRIN 81 PO Oral   • carvedilol (COREG) 25 MG Tab TAKE ONE TABLET BY MOUTH TWICE A DAY WITH MEALS   • cloNIDine (CATAPRES) 0.2 mg, Oral, 2 TIMES DAILY, For further refills please contact new cardiologist. Thank you   • finasteride (PROSCAR) 5 mg, Oral, DAILY   • fosinopril (MONOPRIL) 40 mg, Oral, DAILY, For further refills please contact new cardiologist. Thank you   • gabapentin (NEURONTIN) 600 MG tablet 2 TIMES DAILY   • ibuprofen (MOTRIN) 800 mg, Oral, EVERY 8 HOURS PRN   • lovastatin (MEVACOR) 20 mg, Oral, EVERY EVENING   • metFORMIN (GLUCOPHAGE) 850 mg, Oral, 2 TIMES DAILY   • Omega-3 Fatty Acids (FISH OIL PO) Oral, EVERY EVENING   • Omega-3 Fatty Acids (FISH OIL) 1000 MG Cap capsule Fish Oil   • tamsulosin (FLOMAX) 0.4 mg, Oral, AFTER BREAKFAST   • terazosin (HYTRIN) 5 MG Cap TAKE TWO CAPSULES BY MOUTH EVERY NIGHT AT BEDTIME (GENERIC  HYTRIN)   • triamterene-hctz (MAXZIDE-25/DYAZIDE) 37.5-25 MG Tab 1 Tab, Oral, DAILY         ALLERGIES  No Known Allergies    PHYSICAL EXAM  VITAL SIGNS: BP (!) 89/57   Pulse (!) 49   Temp 35.9 °C (96.6 °F) (Temporal)   Resp 14   Ht 1.829 m (6')   Wt 121 kg (266 lb 12.1 oz)   SpO2 93%   BMI 36.18 kg/m²   Constitutional: Alert in no apparent distress.  HENT: No signs of trauma, mucous membranes are moist  Eyes: Conjunctiva normal, Non-icteric.   Neck: Normal range of motion, No tenderness, Supple.  Lymphatic: No lymphadenopathy noted.   Cardiovascular: Bradycardic rate and regular rhythm, no murmurs.   Thorax & Lungs: Normal breath sounds, No respiratory distress, No wheezing, No chest tenderness.   Abdomen: Bowel sounds normal, Soft, No tenderness, No masses, No pulsatile masses. No peritoneal signs.  Skin: Warm, Dry, normal color.   Back: No bony tenderness, No CVA tenderness.   Extremities: No edema, No tenderness, No cyanosis  Musculoskeletal: Good range of motion in all major joints. No tenderness to palpation or major deformities noted.   Neurologic: Alert and oriented x4, Normal motor function, Normal sensory function, No focal deficits noted.   Psychiatric: Affect normal, Judgment normal, Mood normal.     DIAGNOSTIC STUDIES / PROCEDURES    EKG  12 Lead EKG interpreted by me shown below.      LABS  Results for orders placed or performed during the hospital encounter of 08/12/20   CBC WITH DIFFERENTIAL   Result Value Ref Range    WBC 18.5 (H) 4.8 - 10.8 K/uL    RBC 4.21 (L) 4.70 - 6.10 M/uL    Hemoglobin 13.3 (L) 14.0 - 18.0 g/dL    Hematocrit 40.7 (L) 42.0 - 52.0 %    MCV 96.7 81.4 - 97.8 fL    MCH 31.6 27.0 - 33.0 pg    MCHC 32.7 (L) 33.7 - 35.3 g/dL    RDW 48.7 35.9 - 50.0 fL    Platelet Count 217 164 - 446 K/uL    MPV 10.5 9.0 - 12.9 fL    Neutrophils-Polys 84.30 (H) 44.00 - 72.00 %    Lymphocytes 5.90 (L) 22.00 - 41.00 %    Monocytes 8.70 0.00 - 13.40 %    Eosinophils 0.10 0.00 - 6.90 %    Basophils  0.30 0.00 - 1.80 %    Immature Granulocytes 0.70 0.00 - 0.90 %    Nucleated RBC 0.00 /100 WBC    Neutrophils (Absolute) 15.55 (H) 1.82 - 7.42 K/uL    Lymphs (Absolute) 1.09 1.00 - 4.80 K/uL    Monos (Absolute) 1.60 (H) 0.00 - 0.85 K/uL    Eos (Absolute) 0.02 0.00 - 0.51 K/uL    Baso (Absolute) 0.06 0.00 - 0.12 K/uL    Immature Granulocytes (abs) 0.13 (H) 0.00 - 0.11 K/uL    NRBC (Absolute) 0.00 K/uL   COMP METABOLIC PANEL   Result Value Ref Range    Sodium 137 135 - 145 mmol/L    Potassium 3.5 (L) 3.6 - 5.5 mmol/L    Chloride 98 96 - 112 mmol/L    Co2 19 (L) 20 - 33 mmol/L    Anion Gap 20.0 (H) 7.0 - 16.0    Glucose 182 (H) 65 - 99 mg/dL    Bun 40 (H) 8 - 22 mg/dL    Creatinine 2.72 (H) 0.50 - 1.40 mg/dL    Calcium 9.4 8.5 - 10.5 mg/dL    AST(SGOT) 25 12 - 45 U/L    ALT(SGPT) 18 2 - 50 U/L    Alkaline Phosphatase 77 30 - 99 U/L    Total Bilirubin 0.6 0.1 - 1.5 mg/dL    Albumin 3.9 3.2 - 4.9 g/dL    Total Protein 7.2 6.0 - 8.2 g/dL    Globulin 3.3 1.9 - 3.5 g/dL    A-G Ratio 1.2 g/dL   LIPASE   Result Value Ref Range    Lipase 19 11 - 82 U/L   URINALYSIS,CULTURE IF INDICATED    Specimen: Urine   Result Value Ref Range    Color DK Yellow     Character Turbid (A)     Specific Gravity 1.016 <1.035    Ph 5.0 5.0 - 8.0    Glucose Negative Negative mg/dL    Ketones Negative Negative mg/dL    Protein 100 (A) Negative mg/dL    Bilirubin Negative Negative    Urobilinogen, Urine 0.2 Negative    Nitrite Negative Negative    Leukocyte Esterase Large (A) Negative    Occult Blood Large (A) Negative    Micro Urine Req Microscopic    Lactic Acid -STAT Once   Result Value Ref Range    Lactic Acid 2.1 (H) 0.5 - 2.0 mmol/L   Prothrombin time (INR)   Result Value Ref Range    PT 15.5 (H) 12.0 - 14.6 sec    INR 1.19 (H) 0.87 - 1.13   TROPONIN   Result Value Ref Range    Troponin T 75 (H) 6 - 19 ng/L   COVID/SARS CoV-2 PCR    Specimen: Nasopharyngeal; Respirate   Result Value Ref Range    COVID Order Status Received    ESTIMATED GFR    Result Value Ref Range    GFR If  28 (A) >60 mL/min/1.73 m 2    GFR If Non African American 23 (A) >60 mL/min/1.73 m 2   SARS-CoV-2, PCR (In-House)   Result Value Ref Range    SARS-CoV-2 Source NP Swab     SARS-CoV-2 by PCR NotDetected    URINE MICROSCOPIC (W/UA)   Result Value Ref Range    WBC Packed (A) /hpf    RBC 20-50 (A) /hpf    Bacteria Many (A) None /hpf    Epithelial Cells Negative /hpf    Hyaline Cast 3-5 (A) /lpf   Lactic Acid -STAT Once   Result Value Ref Range    Lactic Acid 1.4 0.5 - 2.0 mmol/L   proBrain Natriuretic Peptide, NT   Result Value Ref Range    NT-proBNP 1441 (H) 0 - 125 pg/mL   TROPONIN   Result Value Ref Range    Troponin T 60 (H) 6 - 19 ng/L   PROCALCITONIN   Result Value Ref Range    Procalcitonin 1.28 (H) <0.25 ng/mL   Lactic Acid Every four hours after STAT order   Result Value Ref Range    Lactic Acid 2.4 (H) 0.5 - 2.0 mmol/L   ACCU-CHEK GLUCOSE   Result Value Ref Range    Glucose - Accu-Ck 157 (H) 65 - 99 mg/dL   EKG (NOW)   Result Value Ref Range    Report       [facility] Emergency Dept.    Test Date:  2020  Pt Name:    SEAMUS ALVAREZ                   Department: ER  MRN:        [id number]                      Room:       [facility]  Gender:     Male                         Technician: ALNA  :        1950                   Requested By:ER TRIAGE PROTOCOL  Order #:    [id number]                    Reading MD: SAMI GOLD D.O.    Measurements  Intervals                                Axis  Rate:       53                           P:          51  OR:         196                          QRS:        58  QRSD:       100                          T:          17  QT:         468  QTc:        440    Interpretive Statements  SINUS BRADYCARDIA  ATRIAL PREMATURE COMPLEX  Compared to ECG 2019 09:05:54  No significant changes  Electronically Signed On 2020 17:49:34 PDT by SAMI GOLD D.O.        All labs reviewed by  me.    COURSE  Pertinent Labs & Imaging studies reviewed. (See chart for details)    4:01 PM - Patient seen and examined at bedside. Discussed plan of care. The patient will be resuscitated with an LR bolus Ordered for CBC w/ diff, CMP, lipase, UA, blood cultures, PT/INR, lactic acid, COVID, and troponin to evaluate his symptoms.     4:12 PM - Patient's WBC count is elevated at 18k with elevated immature cells. Patient's symptoms are consistent with UTI, so the patient will be treated with Rocephin 2 g IVPB.    5:00 PM - Patient's blood pressure appears to be improving in response to the fluid bolus. Last measured blood pressure was 104/56.    5:29 PM - Paged hospitalist.    5:40 PM - Patient was reevaluated at bedside. Discussed lab and radiology results with the patient and discussed concerns for infection, medication reaction, possible heart injury, and renal issues. I informed them of the plan for admission. UA is still pending as the patient has not yet been able to provide a urine sample. Patient verbalizes understanding and agreement to this plan of care.        5:43 PM - I discussed the patient's case and the above findings with Dr. Sean Roy (Hospitalist) who agrees to evaluate the patient for hospitalization.      HYDRATION: Based on the patient's presentation of Hypotension the patient was given IV fluids. IV Hydration was used because oral hydration was not adequate alone. Upon recheck following hydration, the patient was improved.    CRITICAL CARE  The very real possibilty of a deterioration of this patient's condition required the highest level of my preparedness for sudden, emergent intervention.  I provided critical care services, which included medication orders, frequent reevaluations of the patient's condition and response to treatment, ordering and reviewing test results, and discussing the case with various consultants.  The critical care time associated with the care of the patient was 30  minutes. Review chart for interventions. This time is exclusive of any other billable procedures.       MEDICAL DECISION MAKING  This is a 69 y.o. male who presents with generalized weakness progressively worsening, said he was on the phone his wife took 2 hours to get him up.  On his evaluation he has an elevated white blood cell count with immature cells was concerns for infection, he does have UTI symptoms and will be treated empirically for that.  He was hypotensive bradycardic on arrival.  He received IV fluids and his blood pressure is improved.  He still remains mildly bradycardic in the 50s.  His labs showed an acute renal failure probably mid possibly due to his medications, and dehydration.  Some hydration has been initiated the patient will be admitted with further evaluation and treatment.      DISPOSITION:  Patient will be hospitalized by Dr. Sean Aj in guarded condition.     FINAL IMPRESSION  1. Weakness    2. Leukocytosis, unspecified type    3. Acute hypotension    4. Bradycardia    5. Acute renal failure, unspecified acute renal failure type (HCC)    6. Prerenal azotemia    7. Elevated troponin      The critical care time associated with the care of the patient was 30 minutes.     Chuck BURRLEL (Luisibe), am scribing for, and in the presence of, Elias Martinez D.O..    Electronically signed by: Chuck Katz (Aga), 8/12/2020    Elias BURRELL D.O. personally performed the services described in this documentation, as scribed by Chuck Katz in my presence, and it is both accurate and complete.    The note accurately reflects work and decisions made by me.  Elias Martinez D.O.  8/13/2020  12:03 AM

## 2020-08-13 PROBLEM — E11.9 TYPE 2 DIABETES MELLITUS WITHOUT COMPLICATION, WITHOUT LONG-TERM CURRENT USE OF INSULIN (HCC): Status: ACTIVE | Noted: 2020-08-13

## 2020-08-13 LAB
ANION GAP SERPL CALC-SCNC: 16 MMOL/L (ref 7–16)
BASOPHILS # BLD AUTO: 0.5 % (ref 0–1.8)
BASOPHILS # BLD: 0.06 K/UL (ref 0–0.12)
BLOOD CULTURE HOLD CXBCH: NORMAL
BLOOD CULTURE HOLD CXBCH: NORMAL
BUN SERPL-MCNC: 30 MG/DL (ref 8–22)
CALCIUM SERPL-MCNC: 9.3 MG/DL (ref 8.5–10.5)
CHLORIDE SERPL-SCNC: 99 MMOL/L (ref 96–112)
CO2 SERPL-SCNC: 20 MMOL/L (ref 20–33)
CREAT SERPL-MCNC: 1.61 MG/DL (ref 0.5–1.4)
EOSINOPHIL # BLD AUTO: 0.13 K/UL (ref 0–0.51)
EOSINOPHIL NFR BLD: 1.1 % (ref 0–6.9)
ERYTHROCYTE [DISTWIDTH] IN BLOOD BY AUTOMATED COUNT: 47.6 FL (ref 35.9–50)
GLUCOSE BLD-MCNC: 142 MG/DL (ref 65–99)
GLUCOSE BLD-MCNC: 172 MG/DL (ref 65–99)
GLUCOSE BLD-MCNC: 192 MG/DL (ref 65–99)
GLUCOSE BLD-MCNC: 202 MG/DL (ref 65–99)
GLUCOSE SERPL-MCNC: 159 MG/DL (ref 65–99)
HCT VFR BLD AUTO: 42.1 % (ref 42–52)
HGB BLD-MCNC: 14 G/DL (ref 14–18)
IMM GRANULOCYTES # BLD AUTO: 0.05 K/UL (ref 0–0.11)
IMM GRANULOCYTES NFR BLD AUTO: 0.4 % (ref 0–0.9)
LACTATE BLD-SCNC: 1.3 MMOL/L (ref 0.5–2)
LYMPHOCYTES # BLD AUTO: 1.03 K/UL (ref 1–4.8)
LYMPHOCYTES NFR BLD: 9.1 % (ref 22–41)
MCH RBC QN AUTO: 31.7 PG (ref 27–33)
MCHC RBC AUTO-ENTMCNC: 33.3 G/DL (ref 33.7–35.3)
MCV RBC AUTO: 95.2 FL (ref 81.4–97.8)
MONOCYTES # BLD AUTO: 1.02 K/UL (ref 0–0.85)
MONOCYTES NFR BLD AUTO: 9 % (ref 0–13.4)
NEUTROPHILS # BLD AUTO: 9.09 K/UL (ref 1.82–7.42)
NEUTROPHILS NFR BLD: 79.9 % (ref 44–72)
NRBC # BLD AUTO: 0 K/UL
NRBC BLD-RTO: 0 /100 WBC
PLATELET # BLD AUTO: 217 K/UL (ref 164–446)
PMV BLD AUTO: 10.5 FL (ref 9–12.9)
POTASSIUM SERPL-SCNC: 3.4 MMOL/L (ref 3.6–5.5)
RBC # BLD AUTO: 4.42 M/UL (ref 4.7–6.1)
SODIUM SERPL-SCNC: 135 MMOL/L (ref 135–145)
TROPONIN T SERPL-MCNC: 53 NG/L (ref 6–19)
WBC # BLD AUTO: 11.4 K/UL (ref 4.8–10.8)

## 2020-08-13 PROCEDURE — A9270 NON-COVERED ITEM OR SERVICE: HCPCS | Performed by: HOSPITALIST

## 2020-08-13 PROCEDURE — 94660 CPAP INITIATION&MGMT: CPT

## 2020-08-13 PROCEDURE — 83605 ASSAY OF LACTIC ACID: CPT

## 2020-08-13 PROCEDURE — 80048 BASIC METABOLIC PNL TOTAL CA: CPT

## 2020-08-13 PROCEDURE — A9270 NON-COVERED ITEM OR SERVICE: HCPCS | Performed by: INTERNAL MEDICINE

## 2020-08-13 PROCEDURE — 36415 COLL VENOUS BLD VENIPUNCTURE: CPT

## 2020-08-13 PROCEDURE — 700102 HCHG RX REV CODE 250 W/ 637 OVERRIDE(OP): Performed by: HOSPITALIST

## 2020-08-13 PROCEDURE — 82962 GLUCOSE BLOOD TEST: CPT | Mod: 91

## 2020-08-13 PROCEDURE — 99233 SBSQ HOSP IP/OBS HIGH 50: CPT | Performed by: INTERNAL MEDICINE

## 2020-08-13 PROCEDURE — 700102 HCHG RX REV CODE 250 W/ 637 OVERRIDE(OP): Performed by: INTERNAL MEDICINE

## 2020-08-13 PROCEDURE — 700111 HCHG RX REV CODE 636 W/ 250 OVERRIDE (IP): Performed by: HOSPITALIST

## 2020-08-13 PROCEDURE — 700105 HCHG RX REV CODE 258: Performed by: HOSPITALIST

## 2020-08-13 PROCEDURE — 770020 HCHG ROOM/CARE - TELE (206)

## 2020-08-13 PROCEDURE — 84484 ASSAY OF TROPONIN QUANT: CPT

## 2020-08-13 PROCEDURE — 85025 COMPLETE CBC W/AUTO DIFF WBC: CPT

## 2020-08-13 RX ORDER — POTASSIUM CHLORIDE 20 MEQ/1
40 TABLET, EXTENDED RELEASE ORAL ONCE
Status: COMPLETED | OUTPATIENT
Start: 2020-08-13 | End: 2020-08-13

## 2020-08-13 RX ADMIN — HEPARIN SODIUM 5000 UNITS: 5000 INJECTION, SOLUTION INTRAVENOUS; SUBCUTANEOUS at 20:46

## 2020-08-13 RX ADMIN — SODIUM CHLORIDE, POTASSIUM CHLORIDE, SODIUM LACTATE AND CALCIUM CHLORIDE: 600; 310; 30; 20 INJECTION, SOLUTION INTRAVENOUS at 15:22

## 2020-08-13 RX ADMIN — INSULIN HUMAN 3 UNITS: 100 INJECTION, SOLUTION PARENTERAL at 20:46

## 2020-08-13 RX ADMIN — GABAPENTIN 300 MG: 300 CAPSULE ORAL at 09:08

## 2020-08-13 RX ADMIN — SODIUM CHLORIDE, POTASSIUM CHLORIDE, SODIUM LACTATE AND CALCIUM CHLORIDE: 600; 310; 30; 20 INJECTION, SOLUTION INTRAVENOUS at 06:17

## 2020-08-13 RX ADMIN — GABAPENTIN 300 MG: 300 CAPSULE ORAL at 17:25

## 2020-08-13 RX ADMIN — INSULIN HUMAN 2 UNITS: 100 INJECTION, SOLUTION PARENTERAL at 17:31

## 2020-08-13 RX ADMIN — HEPARIN SODIUM 5000 UNITS: 5000 INJECTION, SOLUTION INTRAVENOUS; SUBCUTANEOUS at 14:06

## 2020-08-13 RX ADMIN — AMLODIPINE BESYLATE 10 MG: 10 TABLET ORAL at 05:43

## 2020-08-13 RX ADMIN — ASPIRIN 81 MG 81 MG: 81 TABLET ORAL at 05:49

## 2020-08-13 RX ADMIN — SODIUM CHLORIDE, POTASSIUM CHLORIDE, SODIUM LACTATE AND CALCIUM CHLORIDE: 600; 310; 30; 20 INJECTION, SOLUTION INTRAVENOUS at 23:57

## 2020-08-13 RX ADMIN — MEROPENEM 500 MG: 500 INJECTION, POWDER, FOR SOLUTION INTRAVENOUS at 05:44

## 2020-08-13 RX ADMIN — INSULIN HUMAN 2 UNITS: 100 INJECTION, SOLUTION PARENTERAL at 12:06

## 2020-08-13 RX ADMIN — GABAPENTIN 300 MG: 300 CAPSULE ORAL at 20:47

## 2020-08-13 RX ADMIN — MEROPENEM 500 MG: 500 INJECTION, POWDER, FOR SOLUTION INTRAVENOUS at 20:47

## 2020-08-13 RX ADMIN — HEPARIN SODIUM 5000 UNITS: 5000 INJECTION, SOLUTION INTRAVENOUS; SUBCUTANEOUS at 05:44

## 2020-08-13 RX ADMIN — CLONIDINE HYDROCHLORIDE 0.2 MG: 0.1 TABLET ORAL at 17:26

## 2020-08-13 RX ADMIN — POTASSIUM CHLORIDE 40 MEQ: 1500 TABLET, EXTENDED RELEASE ORAL at 17:26

## 2020-08-13 RX ADMIN — CARVEDILOL 25 MG: 25 TABLET, FILM COATED ORAL at 17:25

## 2020-08-13 RX ADMIN — CARVEDILOL 25 MG: 25 TABLET, FILM COATED ORAL at 05:49

## 2020-08-13 RX ADMIN — CLONIDINE HYDROCHLORIDE 0.2 MG: 0.1 TABLET ORAL at 05:43

## 2020-08-13 RX ADMIN — GABAPENTIN 300 MG: 300 CAPSULE ORAL at 14:06

## 2020-08-13 RX ADMIN — TERAZOSIN HYDROCHLORIDE 5 MG: 5 CAPSULE ORAL at 17:26

## 2020-08-13 RX ADMIN — TAMSULOSIN HYDROCHLORIDE 0.4 MG: 0.4 CAPSULE ORAL at 09:47

## 2020-08-13 RX ADMIN — LOVASTATIN 20 MG: 20 TABLET ORAL at 17:26

## 2020-08-13 RX ADMIN — MEROPENEM 500 MG: 500 INJECTION, POWDER, FOR SOLUTION INTRAVENOUS at 14:06

## 2020-08-13 ASSESSMENT — ENCOUNTER SYMPTOMS
SHORTNESS OF BREATH: 0
WEAKNESS: 0
VOMITING: 0
NERVOUS/ANXIOUS: 0
ABDOMINAL PAIN: 0
BACK PAIN: 1
COUGH: 0
FLANK PAIN: 0

## 2020-08-13 ASSESSMENT — FIBROSIS 4 INDEX: FIB4 SCORE: 1.87

## 2020-08-13 NOTE — PROGRESS NOTES
Generalized weakness with fall earlier today.  Noted to have leukocytosis and GENI.  Urinalysis pending. low blood pressure responded to fluids    Dr. Loving will evaluate patient for admission

## 2020-08-13 NOTE — H&P
"Hospital Medicine History & Physical Note    Date of Service  8/12/2020    Primary Care Physician  Chuck Chappell M.D.    Consultants  None    Code Status  Full Code    Chief Complaint  Chief Complaint   Patient presents with   • Weakness     \"progressive weakness to his arms and legs and back\".  Pt \"slid out of the bed this morning onto the ground and it took 2 hours to get him up\"   • Fatigue     \"falling asleep for hours at a time, all the time, during phone calls\"       History of Presenting Illness  69 y.o. male with a history of obesity, sleep apnea, diabetes mellitus, hypertension, dyslipidemia who presented 8/12/2020 with increasing lethargy and weakness over the last 3 days.  The wife is at bedside and gives most of the history although the patient is alert.  Wife states that they had their 50th anniversary up at [facility] and patient at that point in time seemed much more subdued.  She noted that he had had an urinary incontinent episode while they are out.  Wants to go back to [place] to their house the patient was more lethargic sleeping more and not himself.  This morning she try to get the patient out of bed the patient slid out of bed without any significant trauma.  This point time she tried to help him get back in the bed with struggles and ultimately got him here to the hospital.  Here in the hospital he is found to have a significant leukocytosis, acute renal failure and a UTI.  He will be admitted for sepsis.    Review of Systems  Review of Systems   Constitutional: Positive for malaise/fatigue. Negative for chills and fever.   HENT: Negative for sore throat.    Eyes: Negative for blurred vision.   Respiratory: Negative for cough, shortness of breath and stridor.    Cardiovascular: Positive for leg swelling. Negative for chest pain and palpitations.   Gastrointestinal: Negative for abdominal pain, blood in stool, diarrhea, nausea and vomiting.   Genitourinary: Positive for dysuria and frequency. " Negative for hematuria.        Urinary incontinence   Musculoskeletal: Negative for back pain and joint pain.   Skin: Negative for rash.   Neurological: Positive for weakness. Negative for dizziness, sensory change, speech change, focal weakness and headaches.   Psychiatric/Behavioral: The patient is not nervous/anxious.        Past Medical History   has a past medical history of Abnormal glucose, Arthritis, Back pain, Chickenpox, High cholesterol, HTN (hypertension), Obesity, Overweight(278.02), Pain, Pneumonia, Polycythemia, secondary, Sleep apnea, Snoring, and Type II or unspecified type diabetes mellitus without mention of complication, not stated as uncontrolled.    Surgical History   has a past surgical history that includes lumbar fusion posterior (1/21/2015); inguinal hernia repair (Right, 2/16/2016); lumbar laminectomy diskectomy (2/18/2018); hardware removal neuro (2/18/2018); carpal tunnel release (Right, 2014); shoulder arthroscopy (Right, 2014); lumbar decompression (2004); lumbar decompression (2003); cervical disk and fusion anterior (2005); and pr implant neurostim/ (7/23/2019).     Family History  family history includes Heart Attack in his brother and father; Sleep Apnea in his mother.     Social History   reports that he has never smoked. He quit smokeless tobacco use about 20 months ago.  His smokeless tobacco use included chew. He reports current alcohol use. He reports that he does not use drugs.    Allergies  No Known Allergies    Medications  Prior to Admission Medications   Prescriptions Last Dose Informant Patient Reported? Taking?   ASPIRIN 81 PO 8/12/2020 at AM Patient Yes No   Sig: Take 81 mg by mouth every morning.   Omega-3 Fatty Acids (FISH OIL) 1000 MG Cap capsule 8/12/2020 at AM Patient Yes No   Sig: Take 1,000 mg by mouth every morning.   amLODIPine (NORVASC) 10 MG Tab 8/12/2020 at AM Patient No No   Sig: Take 1 Tab by mouth every day. For further refills please contact new  cardiologist. Thank you   carvedilol (COREG) 25 MG Tab 8/12/2020 at AM Patient No No   Sig: TAKE ONE TABLET BY MOUTH TWICE A DAY WITH MEALS   cloNIDine (CATAPRESS) 0.2 MG Tab 8/12/2020 at AM Patient No No   Sig: Take 1 Tab by mouth 2 times a day. For further refills please contact new cardiologist. Thank you   fosinopril (MONOPRIL) 40 MG tablet 8/12/2020 at AM Patient No No   Sig: Take 1 Tab by mouth every day. For further refills please contact new cardiologist. Thank you   gabapentin (NEURONTIN) 600 MG tablet 8/12/2020 at 1200 Patient Yes No   Sig: Take 600 mg by mouth 4 times a day.   ibuprofen (MOTRIN) 800 MG Tab 8/12/2020 at AM Patient Yes No   Sig: Take 800 mg by mouth every 8 hours as needed for Mild Pain.   lovastatin (MEVACOR) 20 MG Tab 8/11/2020 at PM Patient No No   Sig: Take 1 Tab by mouth every evening.   metformin (GLUCOPHAGE) 850 MG Tab 8/12/2020 at AM Patient Yes No   Sig: Take 850 mg by mouth 2 times a day.   tamsulosin (FLOMAX) 0.4 MG capsule 8/12/2020 at AM Patient Yes No   Sig: Take 0.4 mg by mouth ONE-HALF HOUR AFTER BREAKFAST.   terazosin (HYTRIN) 5 MG Cap 8/11/2020 at PM Patient No No   Sig: TAKE TWO CAPSULES BY MOUTH EVERY NIGHT AT BEDTIME (GENERIC HYTRIN)   triamterene-hctz (MAXZIDE-25/DYAZIDE) 37.5-25 MG Tab 8/12/2020 at AM Patient Yes No   Sig: Take 1 Tab by mouth every morning.      Facility-Administered Medications: None       Physical Exam  Temp:  [35.9 °C (96.6 °F)] 35.9 °C (96.6 °F)  Pulse:  [47-79] 54  Resp:  [14-21] 18  BP: ()/(49-87) 153/87  SpO2:  [89 %-95 %] 92 %    Physical Exam  Vitals signs reviewed.   Constitutional:       Appearance: Normal appearance. He is obese. He is not diaphoretic.   HENT:      Head: Normocephalic and atraumatic.      Nose: Nose normal.      Mouth/Throat:      Mouth: Mucous membranes are moist.      Pharynx: No oropharyngeal exudate.   Eyes:      General: No scleral icterus.        Right eye: No discharge.         Left eye: No discharge.       Extraocular Movements: Extraocular movements intact.      Conjunctiva/sclera: Conjunctivae normal.   Neck:      Musculoskeletal: No muscular tenderness.      Vascular: No carotid bruit.   Cardiovascular:      Rate and Rhythm: Normal rate and regular rhythm.      Pulses:           Radial pulses are 2+ on the right side and 2+ on the left side.        Dorsalis pedis pulses are 2+ on the right side and 2+ on the left side.      Heart sounds: No murmur.   Pulmonary:      Effort: Pulmonary effort is normal. No respiratory distress.      Breath sounds: Normal breath sounds. No wheezing or rales.   Abdominal:      General: Bowel sounds are normal. There is no distension.      Palpations: Abdomen is soft.   Musculoskeletal:         General: No swelling or tenderness.      Right lower leg: Edema present.      Left lower leg: Edema present.   Lymphadenopathy:      Cervical: No cervical adenopathy.   Skin:     Coloration: Skin is not jaundiced or pale.   Neurological:      General: No focal deficit present.      Mental Status: He is alert and oriented to person, place, and time. Mental status is at baseline.      Cranial Nerves: No cranial nerve deficit.   Psychiatric:         Mood and Affect: Mood normal.         Behavior: Behavior normal.         Laboratory:  Recent Labs     08/12/20  0913 08/12/20  0915 08/12/20  1545   WBC 19.0* 19.2* 18.5*   RBC 4.22* 4.24* 4.21*   HEMOGLOBIN 13.4* 13.5* 13.3*   HEMATOCRIT 41.6* 41.1* 40.7*   MCV 98.6* 96.9 96.7   MCH 31.8 31.8 31.6   MCHC 32.2* 32.8* 32.7*   RDW 50.1* 48.6 48.7   PLATELETCT 225 224 217   MPV 11.1 10.7 10.5     Recent Labs     08/12/20  0913 08/12/20  0915 08/12/20  1545   SODIUM 136 137 137   POTASSIUM 3.7 3.6 3.5*   CHLORIDE 100 98 98   CO2 21 21 19*   GLUCOSE 216* 218* 182*   BUN 36* 36* 40*   CREATININE 2.25* 2.28* 2.72*   CALCIUM 9.5 9.6 9.4     Recent Labs     08/12/20  0913 08/12/20  0915 08/12/20  1545   ALTSGPT 13 15 18   ASTSGOT 18 20 25   ALKPHOSPHAT 88 84 77    TBILIRUBIN 0.6 0.7 0.6   LIPASE  --   --  19   GLUCOSE 216* 218* 182*     Recent Labs     08/12/20  1640   INR 1.19*     No results for input(s): NTPROBNP in the last 72 hours.  Recent Labs     08/12/20  0913 08/12/20  0915   TRIGLYCERIDE 55 55   HDL 68 68   LDL 56 55     Recent Labs     08/12/20  1640   TROPONINT 75*       Imaging:  DX-CHEST-PORTABLE (1 VIEW)   Final Result         No acute cardiac or pulmonary abnormality is identified.      US-RENAL    (Results Pending)         Assessment/Plan:  I anticipate this patient will require at least two midnights for appropriate medical management, necessitating inpatient admission.    * Sepsis ([facility])  Assessment & Plan  This is Severe Sepsis Present on admission  SIRS criteria identified on my evaluation include: Tachypnea, with respirations greater than 20 per minute and Leukocytosis, with WBC greater than 12,000  Source of infection is   Clinical indicators of end organ dysfunction include Creatinine >2.0 (Without ESRD or CKD) and Lactate >2 mmol/L (18.0 mg/dL)  Sepsis protocol initiated  Fluid resuscitation ordered per protocol  IV antibiotics as appropriate for source of sepsis  Reassessment: I have reassessed the patient's hemodynamic status  End organ dysfunction include(s):  Acute kidney failure    Acute cystitis without hematuria  Assessment & Plan  One year ago urine grew out ESBL  Initate on meropenem until urine culture grows out other organism.  IV fluids    Acute renal failure (ARF) ([facility])  Assessment & Plan  IV fluids  Check US Renal given his hx of BPH and now UTI  Check BNP and echocardiogram (last echo 1 yr ago with preserved EF but grade I diastolic)  Am [facility]  Renal adjust meds as discussed with pharmd    Essential hypertension  Assessment & Plan  Monitor vitals.  Continue outpatient meds with parameters and holding some due to his renal function    Sleep apnea- (present on admission)  Assessment & Plan  Assess for nocturnal hypoxia.  Encourage  lifestyle modification for ongoing weight loss.    Benign prostatic hyperplasia with urinary frequency  Assessment & Plan  PSA within normal limits  Continue active therapy with outpatient medications with parameters    Obesity (BMI 30-39.9)- (present on admission)  Assessment & Plan  Body mass index is 36.18 kg/m².  Encouraged weight loss via lifestyle modifications

## 2020-08-13 NOTE — CARE PLAN
Problem: Safety  Goal: Will remain free from falls  Outcome: PROGRESSING AS EXPECTED  Intervention: Implement fall precautions  Flowsheets  Taken 8/13/2020 1456  Bed Alarm: Yes - Alarm On  IV Pole on Same Side of Bed as Bathroom: Yes  Bedrails: Bedrails Closest to Bathroom Down  Chair/Bed Strip Alarm: Yes - Alarm On  Taken 8/13/2020 0887  Environmental Precautions:   Treaded Slipper Socks on Patient   Personal Belongings, Wastebasket, Call Bell etc. in Easy Reach   Transferred to Stronger Side   Bed in Low Position   Report Given to Other Health Care Providers Regarding Fall Risk   Communication Sign for Patients & Families   Mobility Assessed & Appropriate Sign Placed     Problem: Infection  Goal: Will remain free from infection  Outcome: PROGRESSING AS EXPECTED  Intervention: Implement standard precautions and perform hand washing before and after patient contact  Note: Patient educated on the importance of hand hygiene to prevent infections, verbalized understanding.

## 2020-08-13 NOTE — ASSESSMENT & PLAN NOTE
IV fluids  CPK was elevated but low level  Renal US unremarkable  BNP elevated, TTE pending  Creatinine improving  Renal adjust meds as discussed with pharmd

## 2020-08-13 NOTE — ED NOTES
Gave report to Ashanti DON. Pt being transported to [facility] 8 in stable condition with ACLS RN and cuauhtemoc

## 2020-08-13 NOTE — ASSESSMENT & PLAN NOTE
Trop down trending  Denies chest pain  EKG showed sinus nick, no significant ST changes  May be secondary to GENI  TTE pending  Monitor on telemetry  He is on aspirin and statin  Heart score is moderate, MACE 12-16.6.%. I will discuss stress testing with him when more stablized

## 2020-08-13 NOTE — ASSESSMENT & PLAN NOTE
One year ago urine grew out ESBL  Continue meropenem  Blood cultures negative  Urine with E coli ESBL  I consulted ID

## 2020-08-13 NOTE — PROGRESS NOTES
Pt received from ED Red 1. Assumed patient care. Brought up to T828,1 with ACLS RN. Tele monitor in place. VSS. Pt oriented to room. Educated on use of the call light. Pt demonstrated use of the call light. Discussed POC. All questions answered.

## 2020-08-13 NOTE — CARE PLAN
Problem: Communication  Goal: The ability to communicate needs accurately and effectively will improve  Outcome: PROGRESSING AS EXPECTED  Patient communicates appropriately, calls for assistance.     Problem: Safety  Goal: Will remain free from injury  Outcome: PROGRESSING AS EXPECTED  Goal: Will remain free from falls  Outcome: PROGRESSING AS EXPECTED   Safety precautions in place, Patient verbalizes understanding of safety education.

## 2020-08-13 NOTE — PROGRESS NOTES
[facility] Medicine Daily Progress Note    Date of Service  8/13/2020    Chief Complaint  69 y.o. male admitted 8/12/2020 with weakness.    Hospital Course    PMH HTN, HLD, DM who presented with 1 week of weakness, dysuria, and chills. UA concerning for infection. Renal US showed bilateral cysts, otherwise unremarkable. He was admitted for sepsis and UTI, GENI. He was started on meropenem because of prior ESBL UTI. He also had elevated troponin and BNP.        Interval Problem Update  Trop 75, 60, 53  BNP 1441  He has some dysuria. Denies flank pain or abd pain. Denies CP or SOB. He does have chronic back pain that is ok right now.  He sees Dr. Jimenez with cardiology for HTN and family history of CHF, but he himself has no h/o of CHF. Never had stress testing before. He is not very active, but denies chest pain with activity.    Consultants/Specialty  None    Code Status  Full Code    Disposition  TBA    Review of Systems  Review of Systems   Constitutional: Negative for malaise/fatigue.   HENT: Negative for congestion.    Respiratory: Negative for cough and shortness of breath.    Cardiovascular: Negative for chest pain.   Gastrointestinal: Negative for abdominal pain and vomiting.   Genitourinary: Positive for dysuria. Negative for flank pain and hematuria.   Musculoskeletal: Positive for back pain (chronic).   Neurological: Negative for weakness.   Psychiatric/Behavioral: The patient is not nervous/anxious.         Physical Exam  Temp:  [36 °C (96.8 °F)-37.1 °C (98.7 °F)] 37.1 °C (98.7 °F)  Pulse:  [47-79] 68  Resp:  [14-21] 20  BP: ()/(52-87) 119/60  SpO2:  [89 %-95 %] 93 %    Physical Exam  Vitals signs and nursing note reviewed.   Constitutional:       General: He is not in acute distress.     Appearance: He is obese. He is not toxic-appearing or diaphoretic.   HENT:      Head: Normocephalic.      Mouth/Throat:      Mouth: Mucous membranes are moist.   Eyes:      General:         Right eye: No discharge.          Left eye: No discharge.   Neck:      Musculoskeletal: Neck supple.   Cardiovascular:      Rate and Rhythm: Normal rate and regular rhythm.   Pulmonary:      Effort: Pulmonary effort is normal. No respiratory distress.      Breath sounds: No wheezing or rales.   Abdominal:      Palpations: Abdomen is soft.      Tenderness: There is no abdominal tenderness. There is no guarding or rebound.   Musculoskeletal:      Right lower leg: Edema (trace) present.      Left lower leg: Edema (trace) present.   Skin:     General: Skin is warm and dry.   Neurological:      Mental Status: He is alert and oriented to person, place, and time.   Psychiatric:         Mood and Affect: Mood normal.         Behavior: Behavior normal.         Fluids    Intake/Output Summary (Last 24 hours) at 8/13/2020 1419  Last data filed at 8/12/2020 1911  Gross per 24 hour   Intake 1100 ml   Output --   Net 1100 ml       Laboratory  Recent Labs     08/12/20  0913 08/12/20  0915 08/12/20  1545   WBC 19.0* 19.2* 18.5*   RBC 4.22* 4.24* 4.21*   HEMOGLOBIN 13.4* 13.5* 13.3*   HEMATOCRIT 41.6* 41.1* 40.7*   MCV 98.6* 96.9 96.7   MCH 31.8 31.8 31.6   MCHC 32.2* 32.8* 32.7*   RDW 50.1* 48.6 48.7   PLATELETCT 225 224 217   MPV 11.1 10.7 10.5     Recent Labs     08/12/20  0913 08/12/20  0915 08/12/20  1545   SODIUM 136 137 137   POTASSIUM 3.7 3.6 3.5*   CHLORIDE 100 98 98   CO2 21 21 19*   GLUCOSE 216* 218* 182*   BUN 36* 36* 40*   CREATININE 2.25* 2.28* 2.72*   CALCIUM 9.5 9.6 9.4     Recent Labs     08/12/20  1640   INR 1.19*         Recent Labs     08/12/20  0913 08/12/20  0915   TRIGLYCERIDE 55 55   HDL 68 68   LDL 56 55       Imaging  US-RENAL   Final Result      Bilateral renal cysts measuring up to 5.1 cm on the left, otherwise unremarkable renal ultrasound.      DX-CHEST-PORTABLE (1 VIEW)   Final Result         No acute cardiac or pulmonary abnormality is identified.      EC-ECHOCARDIOGRAM COMPLETE W/O CONT    (Results Pending)         Assessment/Plan  * Acute cystitis without hematuria  Assessment & Plan  One year ago urine grew out ESBL  Initate on meropenem for now  F/u urine and blood cultures    Elevated troponin  Assessment & Plan  Trop down trending  Denies chest pain  EKG showed sinus nick, no significant ST changes  May be secondary to GENI  TTE pending  Monitor on telemetry  He is on aspirin and statin  Heart score is moderate, MACE 12-16.6.%. I will discuss stress testing with him when more stablized    Acute renal failure (ARF) ([facility])  Assessment & Plan  IV fluids  Renal US unremarkable  BNP elevated, TTE pending  Monitor BMP  Renal adjust meds as discussed with pharmd    Sepsis ([facility])  Assessment & Plan  This is Severe Sepsis Present on admission  SIRS criteria identified on my evaluation include: Tachypnea, with respirations greater than 20 per minute and Leukocytosis, with WBC greater than 12,000  Source of infection is   Clinical indicators of end organ dysfunction include Creatinine >2.0 (Without ESRD or CKD) and Lactate >2 mmol/L (18.0 mg/dL)  Sepsis protocol initiated  Fluid resuscitation ordered per protocol  IV antibiotics as appropriate for source of sepsis  Reassessment: I have reassessed the patient's hemodynamic status  End organ dysfunction include(s):  Acute kidney failure    Essential hypertension  Assessment & Plan  BP stable on norvasc, coreg, clonidine    Sleep apnea- (present on admission)  Assessment & Plan  Assess for nocturnal hypoxia.  Encourage lifestyle modification for ongoing weight loss.    Type 2 diabetes mellitus without complication, without long-term current use of insulin ([facility])  Assessment & Plan  A1c 7.5%  Hold metformin  ISS  Diabetic diet    Elevated brain natriuretic peptide (BNP) level  Assessment & Plan  TTE pending    Benign prostatic hyperplasia with urinary frequency  Assessment & Plan  PSA within normal limits  Continue active therapy with outpatient medications with parameters    Obesity  (BMI 30-39.9)- (present on admission)  Assessment & Plan  Body mass index is 36.18 kg/m².  Encouraged weight loss via lifestyle modifications       VTE prophylaxis: heparin

## 2020-08-13 NOTE — PROGRESS NOTES
Monitor summary. .20/.08/.44 Sinus Axel-Sinus Rhythm 50-61, down to 44 non sustained. Rare PVC, rare PAC

## 2020-08-13 NOTE — ASSESSMENT & PLAN NOTE
This is Severe Sepsis Present on admission  SIRS criteria identified on my evaluation include: Tachypnea, with respirations greater than 20 per minute and Leukocytosis, with WBC greater than 12,000  Source of infection is   Clinical indicators of end organ dysfunction include Creatinine >2.0 (Without ESRD or CKD) and Lactate >2 mmol/L (18.0 mg/dL)  Sepsis protocol initiated  Fluid resuscitation ordered per protocol  IV antibiotics as appropriate for source of sepsis  Reassessment: I have reassessed the patient's hemodynamic status  End organ dysfunction include(s):  Acute kidney failure

## 2020-08-13 NOTE — ED NOTES
Med Rec complete per Pt and Pt's wife at bedside   Ok per Pt to discuss medications with visitor/s present    Allergies Reviewed  No ABX in the last 14 days     Pt takes ASPRIN

## 2020-08-13 NOTE — PROGRESS NOTES
2 RN skin check complete w/ RN Loren  Devices in place -NA.  Skin assessed under devices -NA.  Confirmed pressure ulcers found on -NA.  New potential pressure ulcers noted on -NA. Wound consult placed -NA.    Skin is intact. Scabs to bilateral elbows, knees. Skin dry/flaky.    The following interventions in place -Pillows in place for support/positioning. Patient encouraged to turn side to side frequently.

## 2020-08-14 ENCOUNTER — APPOINTMENT (OUTPATIENT)
Dept: CARDIOLOGY | Facility: MEDICAL CENTER | Age: 70
DRG: 872 | End: 2020-08-14
Attending: INTERNAL MEDICINE
Payer: COMMERCIAL

## 2020-08-14 LAB
ANION GAP SERPL CALC-SCNC: 14 MMOL/L (ref 7–16)
BACTERIA UR CULT: ABNORMAL
BACTERIA UR CULT: ABNORMAL
BASOPHILS # BLD AUTO: 0.4 % (ref 0–1.8)
BASOPHILS # BLD: 0.04 K/UL (ref 0–0.12)
BUN SERPL-MCNC: 24 MG/DL (ref 8–22)
CALCIUM SERPL-MCNC: 8.7 MG/DL (ref 8.5–10.5)
CHLORIDE SERPL-SCNC: 100 MMOL/L (ref 96–112)
CK SERPL-CCNC: 229 U/L (ref 0–154)
CO2 SERPL-SCNC: 23 MMOL/L (ref 20–33)
CREAT SERPL-MCNC: 1.33 MG/DL (ref 0.5–1.4)
EOSINOPHIL # BLD AUTO: 0.17 K/UL (ref 0–0.51)
EOSINOPHIL NFR BLD: 1.9 % (ref 0–6.9)
ERYTHROCYTE [DISTWIDTH] IN BLOOD BY AUTOMATED COUNT: 46.4 FL (ref 35.9–50)
GLUCOSE BLD-MCNC: 140 MG/DL (ref 65–99)
GLUCOSE BLD-MCNC: 166 MG/DL (ref 65–99)
GLUCOSE BLD-MCNC: 180 MG/DL (ref 65–99)
GLUCOSE SERPL-MCNC: 165 MG/DL (ref 65–99)
HCT VFR BLD AUTO: 37.5 % (ref 42–52)
HGB BLD-MCNC: 12.5 G/DL (ref 14–18)
IMM GRANULOCYTES # BLD AUTO: 0.05 K/UL (ref 0–0.11)
IMM GRANULOCYTES NFR BLD AUTO: 0.6 % (ref 0–0.9)
LV EJECT FRACT  99904: 60
LV EJECT FRACT MOD 2C 99903: 54.91
LV EJECT FRACT MOD 4C 99902: 53.53
LV EJECT FRACT MOD BP 99901: 51.88
LYMPHOCYTES # BLD AUTO: 1.29 K/UL (ref 1–4.8)
LYMPHOCYTES NFR BLD: 14.4 % (ref 22–41)
MCH RBC QN AUTO: 31.3 PG (ref 27–33)
MCHC RBC AUTO-ENTMCNC: 33.3 G/DL (ref 33.7–35.3)
MCV RBC AUTO: 93.8 FL (ref 81.4–97.8)
MONOCYTES # BLD AUTO: 1.07 K/UL (ref 0–0.85)
MONOCYTES NFR BLD AUTO: 11.9 % (ref 0–13.4)
NEUTROPHILS # BLD AUTO: 6.35 K/UL (ref 1.82–7.42)
NEUTROPHILS NFR BLD: 70.8 % (ref 44–72)
NRBC # BLD AUTO: 0 K/UL
NRBC BLD-RTO: 0 /100 WBC
PLATELET # BLD AUTO: 198 K/UL (ref 164–446)
PMV BLD AUTO: 10.5 FL (ref 9–12.9)
POTASSIUM SERPL-SCNC: 3.2 MMOL/L (ref 3.6–5.5)
RBC # BLD AUTO: 4 M/UL (ref 4.7–6.1)
SIGNIFICANT IND 70042: ABNORMAL
SITE SITE: ABNORMAL
SODIUM SERPL-SCNC: 137 MMOL/L (ref 135–145)
SOURCE SOURCE: ABNORMAL
WBC # BLD AUTO: 9 K/UL (ref 4.8–10.8)

## 2020-08-14 PROCEDURE — 700105 HCHG RX REV CODE 258

## 2020-08-14 PROCEDURE — 82550 ASSAY OF CK (CPK): CPT

## 2020-08-14 PROCEDURE — 93306 TTE W/DOPPLER COMPLETE: CPT | Mod: 26 | Performed by: INTERNAL MEDICINE

## 2020-08-14 PROCEDURE — 97161 PT EVAL LOW COMPLEX 20 MIN: CPT

## 2020-08-14 PROCEDURE — 700102 HCHG RX REV CODE 250 W/ 637 OVERRIDE(OP): Performed by: INTERNAL MEDICINE

## 2020-08-14 PROCEDURE — 80048 BASIC METABOLIC PNL TOTAL CA: CPT

## 2020-08-14 PROCEDURE — 82962 GLUCOSE BLOOD TEST: CPT

## 2020-08-14 PROCEDURE — 700102 HCHG RX REV CODE 250 W/ 637 OVERRIDE(OP): Performed by: HOSPITALIST

## 2020-08-14 PROCEDURE — 99255 IP/OBS CONSLTJ NEW/EST HI 80: CPT | Performed by: INTERNAL MEDICINE

## 2020-08-14 PROCEDURE — 93306 TTE W/DOPPLER COMPLETE: CPT

## 2020-08-14 PROCEDURE — 700117 HCHG RX CONTRAST REV CODE 255: Performed by: INTERNAL MEDICINE

## 2020-08-14 PROCEDURE — 700105 HCHG RX REV CODE 258: Performed by: HOSPITALIST

## 2020-08-14 PROCEDURE — 85025 COMPLETE CBC W/AUTO DIFF WBC: CPT

## 2020-08-14 PROCEDURE — 99232 SBSQ HOSP IP/OBS MODERATE 35: CPT | Performed by: INTERNAL MEDICINE

## 2020-08-14 PROCEDURE — 770020 HCHG ROOM/CARE - TELE (206)

## 2020-08-14 PROCEDURE — A9270 NON-COVERED ITEM OR SERVICE: HCPCS | Performed by: INTERNAL MEDICINE

## 2020-08-14 PROCEDURE — 700111 HCHG RX REV CODE 636 W/ 250 OVERRIDE (IP): Performed by: HOSPITALIST

## 2020-08-14 PROCEDURE — 36415 COLL VENOUS BLD VENIPUNCTURE: CPT

## 2020-08-14 PROCEDURE — A9270 NON-COVERED ITEM OR SERVICE: HCPCS | Performed by: HOSPITALIST

## 2020-08-14 PROCEDURE — 97165 OT EVAL LOW COMPLEX 30 MIN: CPT

## 2020-08-14 RX ORDER — SODIUM CHLORIDE 9 MG/ML
INJECTION, SOLUTION INTRAVENOUS
Status: COMPLETED
Start: 2020-08-14 | End: 2020-08-14

## 2020-08-14 RX ORDER — POTASSIUM CHLORIDE 20 MEQ/1
40 TABLET, EXTENDED RELEASE ORAL 2 TIMES DAILY
Status: COMPLETED | OUTPATIENT
Start: 2020-08-14 | End: 2020-08-14

## 2020-08-14 RX ADMIN — MEROPENEM 500 MG: 500 INJECTION, POWDER, FOR SOLUTION INTRAVENOUS at 21:12

## 2020-08-14 RX ADMIN — POTASSIUM CHLORIDE 40 MEQ: 1500 TABLET, EXTENDED RELEASE ORAL at 17:24

## 2020-08-14 RX ADMIN — GABAPENTIN 300 MG: 300 CAPSULE ORAL at 17:24

## 2020-08-14 RX ADMIN — AMLODIPINE BESYLATE 10 MG: 10 TABLET ORAL at 05:31

## 2020-08-14 RX ADMIN — GABAPENTIN 300 MG: 300 CAPSULE ORAL at 21:13

## 2020-08-14 RX ADMIN — TAMSULOSIN HYDROCHLORIDE 0.4 MG: 0.4 CAPSULE ORAL at 08:32

## 2020-08-14 RX ADMIN — INSULIN HUMAN 2 UNITS: 100 INJECTION, SOLUTION PARENTERAL at 08:35

## 2020-08-14 RX ADMIN — TERAZOSIN HYDROCHLORIDE 5 MG: 5 CAPSULE ORAL at 17:24

## 2020-08-14 RX ADMIN — MEROPENEM 500 MG: 500 INJECTION, POWDER, FOR SOLUTION INTRAVENOUS at 14:21

## 2020-08-14 RX ADMIN — SODIUM CHLORIDE, POTASSIUM CHLORIDE, SODIUM LACTATE AND CALCIUM CHLORIDE: 600; 310; 30; 20 INJECTION, SOLUTION INTRAVENOUS at 08:55

## 2020-08-14 RX ADMIN — MEROPENEM 500 MG: 500 INJECTION, POWDER, FOR SOLUTION INTRAVENOUS at 05:30

## 2020-08-14 RX ADMIN — SODIUM CHLORIDE, POTASSIUM CHLORIDE, SODIUM LACTATE AND CALCIUM CHLORIDE: 600; 310; 30; 20 INJECTION, SOLUTION INTRAVENOUS at 23:23

## 2020-08-14 RX ADMIN — CLONIDINE HYDROCHLORIDE 0.2 MG: 0.1 TABLET ORAL at 17:24

## 2020-08-14 RX ADMIN — HEPARIN SODIUM 5000 UNITS: 5000 INJECTION, SOLUTION INTRAVENOUS; SUBCUTANEOUS at 05:31

## 2020-08-14 RX ADMIN — GABAPENTIN 300 MG: 300 CAPSULE ORAL at 12:45

## 2020-08-14 RX ADMIN — HEPARIN SODIUM 5000 UNITS: 5000 INJECTION, SOLUTION INTRAVENOUS; SUBCUTANEOUS at 21:13

## 2020-08-14 RX ADMIN — POTASSIUM CHLORIDE 40 MEQ: 1500 TABLET, EXTENDED RELEASE ORAL at 08:35

## 2020-08-14 RX ADMIN — GABAPENTIN 300 MG: 300 CAPSULE ORAL at 08:33

## 2020-08-14 RX ADMIN — DOCUSATE SODIUM 50 MG AND SENNOSIDES 8.6 MG 2 TABLET: 8.6; 5 TABLET, FILM COATED ORAL at 05:31

## 2020-08-14 RX ADMIN — ASPIRIN 81 MG 81 MG: 81 TABLET ORAL at 05:31

## 2020-08-14 RX ADMIN — LOVASTATIN 20 MG: 20 TABLET ORAL at 17:24

## 2020-08-14 RX ADMIN — CLONIDINE HYDROCHLORIDE 0.2 MG: 0.1 TABLET ORAL at 05:31

## 2020-08-14 RX ADMIN — SODIUM CHLORIDE: 9 INJECTION, SOLUTION INTRAVENOUS at 21:00

## 2020-08-14 RX ADMIN — HUMAN ALBUMIN MICROSPHERES AND PERFLUTREN 3 ML: 10; .22 INJECTION, SOLUTION INTRAVENOUS at 20:52

## 2020-08-14 RX ADMIN — HEPARIN SODIUM 5000 UNITS: 5000 INJECTION, SOLUTION INTRAVENOUS; SUBCUTANEOUS at 14:20

## 2020-08-14 RX ADMIN — INSULIN HUMAN 2 UNITS: 100 INJECTION, SOLUTION PARENTERAL at 21:23

## 2020-08-14 RX ADMIN — SODIUM CHLORIDE, POTASSIUM CHLORIDE, SODIUM LACTATE AND CALCIUM CHLORIDE: 600; 310; 30; 20 INJECTION, SOLUTION INTRAVENOUS at 19:22

## 2020-08-14 RX ADMIN — INSULIN HUMAN 2 UNITS: 100 INJECTION, SOLUTION PARENTERAL at 12:34

## 2020-08-14 ASSESSMENT — COGNITIVE AND FUNCTIONAL STATUS - GENERAL
DAILY ACTIVITIY SCORE: 24
MOBILITY SCORE: 24
SUGGESTED CMS G CODE MODIFIER MOBILITY: CH
SUGGESTED CMS G CODE MODIFIER DAILY ACTIVITY: CH

## 2020-08-14 ASSESSMENT — GAIT ASSESSMENTS
DEVIATION: DECREASED HEEL STRIKE;DECREASED TOE OFF
ASSISTIVE DEVICE: SINGLE POINT CANE
GAIT LEVEL OF ASSIST: SUPERVISED
DISTANCE (FEET): 250

## 2020-08-14 ASSESSMENT — ENCOUNTER SYMPTOMS
BACK PAIN: 1
VOMITING: 0
ABDOMINAL PAIN: 0
COUGH: 0
FLANK PAIN: 0
WEAKNESS: 0
SHORTNESS OF BREATH: 0
NERVOUS/ANXIOUS: 0

## 2020-08-14 ASSESSMENT — ACTIVITIES OF DAILY LIVING (ADL): TOILETING: INDEPENDENT

## 2020-08-14 ASSESSMENT — FIBROSIS 4 INDEX: FIB4 SCORE: 2.05

## 2020-08-14 NOTE — PROGRESS NOTES
[facility] Medicine Daily Progress Note    Date of Service  8/14/2020    Chief Complaint  69 y.o. male admitted 8/12/2020 with weakness.    Hospital Course    PMH HTN, HLD, DM who presented with 1 week of weakness, dysuria, and chills. UA concerning for infection. Renal US showed bilateral cysts, otherwise unremarkable. He was admitted for sepsis and UTI, GENI. He was started on meropenem because of prior ESBL UTI. He also had elevated troponin and BNP.        Interval Problem Update    His strength is improved. Dysuria is less. He feels better today.    Consultants/Specialty  ID    Code Status  Full Code    Disposition  Home tomorrow    Review of Systems  Review of Systems   Constitutional: Negative for malaise/fatigue.   HENT: Negative for congestion.    Respiratory: Negative for cough and shortness of breath.    Cardiovascular: Negative for chest pain.   Gastrointestinal: Negative for abdominal pain and vomiting.   Genitourinary: Positive for dysuria (improved). Negative for flank pain and hematuria.   Musculoskeletal: Positive for back pain (chronic).   Neurological: Negative for weakness.   Psychiatric/Behavioral: The patient is not nervous/anxious.         Physical Exam  Temp:  [36.3 °C (97.3 °F)-37.6 °C (99.6 °F)] 36.3 °C (97.3 °F)  Pulse:  [55-73] 55  Resp:  [16-20] 18  BP: (124-138)/(64-82) 124/71  SpO2:  [91 %-96 %] 96 %    Physical Exam  Vitals signs and nursing note reviewed.   Constitutional:       General: He is not in acute distress.     Appearance: He is obese. He is not toxic-appearing or diaphoretic.   HENT:      Head: Normocephalic.      Mouth/Throat:      Mouth: Mucous membranes are moist.   Eyes:      General:         Right eye: No discharge.         Left eye: No discharge.   Neck:      Musculoskeletal: Neck supple.   Cardiovascular:      Rate and Rhythm: Normal rate and regular rhythm.   Pulmonary:      Effort: Pulmonary effort is normal. No respiratory distress.      Breath sounds: No  wheezing or rales.   Abdominal:      Palpations: Abdomen is soft.      Tenderness: There is no abdominal tenderness. There is no guarding or rebound.   Musculoskeletal:      Right lower leg: Edema (trace) present.      Left lower leg: Edema (trace) present.   Skin:     General: Skin is warm and dry.   Neurological:      Mental Status: He is alert and oriented to person, place, and time.   Psychiatric:         Mood and Affect: Mood normal.         Behavior: Behavior normal.         Fluids    Intake/Output Summary (Last 24 hours) at 8/14/2020 1257  Last data filed at 8/14/2020 0800  Gross per 24 hour   Intake 120 ml   Output 3475 ml   Net -3355 ml       Laboratory  Recent Labs     08/12/20  1545 08/13/20  1415 08/14/20  0409   WBC 18.5* 11.4* 9.0   RBC 4.21* 4.42* 4.00*   HEMOGLOBIN 13.3* 14.0 12.5*   HEMATOCRIT 40.7* 42.1 37.5*   MCV 96.7 95.2 93.8   MCH 31.6 31.7 31.3   MCHC 32.7* 33.3* 33.3*   RDW 48.7 47.6 46.4   PLATELETCT 217 217 198   MPV 10.5 10.5 10.5     Recent Labs     08/12/20  1545 08/13/20  1415 08/14/20  0411   SODIUM 137 135 137   POTASSIUM 3.5* 3.4* 3.2*   CHLORIDE 98 99 100   CO2 19* 20 23   GLUCOSE 182* 159* 165*   BUN 40* 30* 24*   CREATININE 2.72* 1.61* 1.33   CALCIUM 9.4 9.3 8.7     Recent Labs     08/12/20  1640   INR 1.19*         Recent Labs     08/12/20  0913 08/12/20  0915   TRIGLYCERIDE 55 55   HDL 68 68   LDL 56 55       Imaging  US-RENAL   Final Result      Bilateral renal cysts measuring up to 5.1 cm on the left, otherwise unremarkable renal ultrasound.      DX-CHEST-PORTABLE (1 VIEW)   Final Result         No acute cardiac or pulmonary abnormality is identified.      EC-ECHOCARDIOGRAM COMPLETE W/O CONT    (Results Pending)        Assessment/Plan  * Acute cystitis without hematuria  Assessment & Plan  One year ago urine grew out ESBL  Continue meropenem  Blood cultures negative  Urine with E coli ESBL  I consulted ID    Elevated troponin  Assessment & Plan  Trop down trending  Denies  chest pain  EKG showed sinus nick, no significant ST changes  May be secondary to GENI  TTE pending  Monitor on telemetry  He is on aspirin and statin  Heart score is moderate, MACE 12-16.6.%. I will discuss stress testing with him when more stablized    Acute renal failure (ARF) ([facility])  Assessment & Plan  IV fluids  CPK was elevated but low level  Renal US unremarkable  BNP elevated, TTE pending  Creatinine improving  Renal adjust meds as discussed with pharmd    Sepsis ([facility])  Assessment & Plan  This is Severe Sepsis Present on admission  SIRS criteria identified on my evaluation include: Tachypnea, with respirations greater than 20 per minute and Leukocytosis, with WBC greater than 12,000  Source of infection is   Clinical indicators of end organ dysfunction include Creatinine >2.0 (Without ESRD or CKD) and Lactate >2 mmol/L (18.0 mg/dL)  Sepsis protocol initiated  Fluid resuscitation ordered per protocol  IV antibiotics as appropriate for source of sepsis  Reassessment: I have reassessed the patient's hemodynamic status  End organ dysfunction include(s):  Acute kidney failure    Essential hypertension  Assessment & Plan  BP stable on norvasc, coreg, clonidine    Sleep apnea- (present on admission)  Assessment & Plan  Assess for nocturnal hypoxia.  Encourage lifestyle modification for ongoing weight loss.    Type 2 diabetes mellitus without complication, without long-term current use of insulin ([facility])  Assessment & Plan  A1c 7.5%  Hold metformin  ISS  Diabetic diet    Elevated brain natriuretic peptide (BNP) level  Assessment & Plan  TTE pending    Benign prostatic hyperplasia with urinary frequency  Assessment & Plan  PSA within normal limits  Continue active therapy with outpatient medications with parameters    Obesity (BMI 30-39.9)- (present on admission)  Assessment & Plan  Body mass index is 36.18 kg/m².  Encouraged weight loss via lifestyle modifications       VTE prophylaxis: heparin

## 2020-08-14 NOTE — THERAPY
Occupational Therapy   Initial Evaluation     Patient Name: Ramu Barber  Age:  69 y.o., Sex:  male  Medical Record #: [id number]  Today's Date: 8/14/2020     Precautions  Precautions: Fall Risk  Comments: Pt reports recent weakness has resolved and he is back to baseline.     Assessment  Patient is 69 y.o. male with a diagnosis of sepsis following UTI with associated weakness.  Pt demonstrates some mild baseline unsteadiness but uses a SPC as needed and demonstrates good safety awareness. Pt has 24 hour family assist and all needed AE at home. Pt reports he has returned to baseline level of function and is able to perform all ADLs at supervised level of assistance or higher. No further acute OT needs at this time.     Plan    Recommend Occupational Therapy for Evaluation only.    DC Equipment Recommendations: None  Discharge Recommendations: Anticipate that the patient will have no further occupational therapy needs after discharge from the hospital        08/14/20 1037   Prior Living Situation   Prior Services Home With Outpatient Therapy   Housing / Facility 1 Story House   Steps Into Home 0   Steps In Home 0   Bathroom Set up Walk In Shower;Shower Chair;Grab Bars   Equipment Owned Front-Wheel Walker;Single Point Cane   Lives with - Patient's Self Care Capacity Spouse;Other (Comments)  (Adult niece with developmental delays)   Comments Spouse can assist prn   Prior Level of ADL Function   Self Feeding Independent   Grooming / Hygiene Independent   Bathing Independent   Dressing Independent   Toileting Independent   Prior Level of IADL Function   Medication Management Independent   Laundry Independent   Kitchen Mobility Independent   Finances Independent   Home Management Independent   Shopping Independent   Prior Level Of Mobility Independent Without Device in Community   Driving / Transportation Driving Independent   Occupation (Pre-Hospital Vocational) Retired Due To Age   Comments uses AD occasionally; mainly  SPC; occasionally uses 4WW when fishing   History of Falls   History of Falls Yes   Date of Last Fall   (approx 3 weeks ago)   Precautions   Precautions Fall Risk   Comments Pt reports recent weakness has resolved and he is back to baseline.    Cognition    Cognition / Consciousness WDL   Level of Consciousness Alert   Comments Pleasant and cooperative; mild [place]   Balance Assessment   Sitting Balance (Static) Good   Sitting Balance (Dynamic) Good   Standing Balance (Static) Fair +   Standing Balance (Dynamic) Fair +   Weight Shift Sitting Good   Weight Shift Standing Good   Comments using SPC   Bed Mobility    Supine to Sit Modified Independent  (using rail)   Scooting Modified Independent   ADL Assessment   Eating Independent   Grooming Independent;Standing   Upper Body Dressing Independent   Lower Body Dressing Supervision   Toileting Supervision   Functional Mobility   Sit to Stand Supervised   Bed, Chair, Wheelchair Transfer Supervised   Toilet Transfers Supervised   Transfer Method Stand Pivot   Comments using SPC for mob; not for xfers. Shows good safety; prefers to use rail, which is avail at home.    Activity Tolerance   Sitting in Chair > 10 min; left up in chair   Sitting Edge of Bed 10 min   Standing 10 min   Comments NO c/o fatigue with standing at sink   Problem List   Problem List None   Anticipated Discharge Equipment and Recommendations   DC Equipment Recommendations None   Discharge Recommendations Anticipate that the patient will have no further occupational therapy needs after discharge from the hospital

## 2020-08-14 NOTE — PROGRESS NOTES
Care resumed from ARIAN Sheffield. Patient is resting with no complaints of pain. Lactated Ringer's infusing at 125mL/hr. Contact precautions initiated.

## 2020-08-14 NOTE — PROGRESS NOTES
Received report from day shift [place]. Pt is resting in bed and does not appear to be in distress. Bed in lowest locked position, personal belongings within reach. POC addressed, white board updated.  No further questions at this time.

## 2020-08-14 NOTE — CARE PLAN
Problem: Communication  Goal: The ability to communicate needs accurately and effectively will improve  Outcome: MET     Problem: Safety  Goal: Will remain free from injury  Outcome: MET  Goal: Will remain free from falls  Outcome: MET     Problem: Infection  Goal: Will remain free from infection  Outcome: MET     Problem: Venous Thromboembolism (VTW)/Deep Vein Thrombosis (DVT) Prevention:  Goal: Patient will participate in Venous Thrombosis (VTE)/Deep Vein Thrombosis (DVT)Prevention Measures  Outcome: MET     Problem: Bowel/Gastric:  Goal: Normal bowel function is maintained or improved  Outcome: MET  Goal: Will not experience complications related to bowel motility  Outcome: MET     Problem: Knowledge Deficit  Goal: Knowledge of disease process/condition, treatment plan, diagnostic tests, and medications will improve  Outcome: MET  Goal: Knowledge of the prescribed therapeutic regimen will improve  Outcome: MET     Problem: Discharge Barriers/Planning  Goal: Patient's continuum of care needs will be met  Outcome: MET     Problem: Fluid Volume:  Goal: Will maintain balanced intake and output  Outcome: MET     Problem: Respiratory:  Goal: Respiratory status will improve  Outcome: MET

## 2020-08-14 NOTE — THERAPY
"Physical Therapy   Initial Evaluation     Patient Name: Ramu Barber  Age:  69 y.o., Sex:  male  Medical Record #: [id number]  Today's Date: 8/14/2020     Precautions: (P) Fall Risk    Assessment  Patient is 69 y.o. male with a diagnosis of acute kidney injury w/ progressive weakness & fatigue. Wife observed him \"slip out of bed\" w/ no apparent injury. PMH includes obesity, apnea, HTN, and DM. Pt being seen for medical problems, w/ PLOF I ADLs/IADLs. Pt retired, performing regular yard work, fishing, but decreased community activity related to pandemic concerns. PLOF mobility w/o AD, but pt does own SPC and 4WW. Currently using SPC for stability. Pt has gross 5/5 BLE strength, and demonstrated SPV transfers, ambulation w/ SPC, and no reports of increasing fatigue, although noted that his RLE gait appeared fatiguing, shortened step length, w/ ambulation. Education included regular skin checks RE: DM  And BLE skin care, as well as benefits of outpatient PT for balance training. Pt appeared open to the idea of PT for balance training, although he recently completed outpatient PT for L hip pain and is unsure if his insurance will cover another episode of care. Pt appears functionally capable of DC home, with no immediate needs. Outpatient PT rec for balance training and higher level function(decrease future fall risk).      Plan    Recommend Physical Therapy for Evaluation only.     DC Equipment Recommendations: (P) None  Discharge Recommendations: (P) Anticipate that the patient will have no immedate physical therapy needs after discharge from the hospital. Rec outpatient PT for balance training to decrease fall risk.          08/14/20 0954   Prior Living Situation   Prior Services Home-Independent   Housing / Facility 1 Story House   Steps Into Home 1   Steps In Home 0   Bathroom Set up Walk In Shower;Grab Bars   Equipment Owned 4-Wheel Walker;Single Point Cane   Lives with - Patient's Self Care Capacity Spouse "   Comments recent outpatient PT for L hip pain   Prior Level of Functional Mobility   Bed Mobility Independent   Transfer Status Independent   Ambulation Independent   Distance Ambulation (Feet)   (community)   Assistive Devices Used Single Point Cane   Stairs Independent   Comments typically no AD, prefers SPC over 4WW; retired w/ regular yard work, fishing, etc   History of Falls   History of Falls Yes   Date of Last Fall   (~3 weeks ago tripped in garage)   Cognition    Cognition / Consciousness WDL   Level of Consciousness Alert   Comments hard of hearing   Passive ROM Lower Body   Passive ROM Lower Body WDL   Active ROM Lower Body    Active ROM Lower Body  WDL   Strength Lower Body   Lower Body Strength  WDL   Sensation Lower Body   Lower Extremity Sensation   X   Comments hx of BLE paresthesias and neuropathy   Coordination Lower Body    Coordination Lower Body  WDL   Balance Assessment   Sitting Balance (Static) Good   Sitting Balance (Dynamic) Good   Standing Balance (Static) Fair +   Standing Balance (Dynamic) Fair +   Weight Shift Sitting Good   Weight Shift Standing Good   Comments appeared slightly more unsteady w/ fatigue from ambulation   Gait Analysis   Gait Level Of Assist Supervised   Assistive Device Single Point Cane   Distance (Feet) 250   # of Times Distance was Traveled 1   Deviation Decreased Heel Strike;Decreased Toe Off  (R side w/ fatigue)   # of Stairs Climbed   (NT)   Weight Bearing Status no restrictions   Comments appears functionally capable of threshold to enter/exit home   Bed Mobility    Supine to Sit   (NT; pt seated chair pre/post visit)   Functional Mobility   Sit to Stand Supervised   Bed, Chair, Wheelchair Transfer Supervised   Toilet Transfers Minimal Assist   Transfer Method Stand Step   Mobility w/ SPC   How much difficulty does the patient currently have...   Turning over in bed (including adjusting bedclothes, sheets and blankets)? 4   Sitting down on and standing up from  a chair with arms (e.g., wheelchair, bedside commode, etc.) 4   Moving from lying on back to sitting on the side of the bed? 4   How much help from another person does the patient currently need...   Moving to and from a bed to a chair (including a wheelchair)? 4   Need to walk in a hospital room? 4   Climbing 3-5 steps with a railing? 4   6 clicks Mobility Score 24   Activity Tolerance   Sitting in Chair ~10 min   Standing ~7 min   Comments appeared to fatigue w/ ambulation   Edema / Skin Assessment   Edema / Skin  WDL   Comments ed RE: regular skin checks on feet   Education Group   Education Provided Role of Physical Therapist   Role of Physical Therapist Patient Response Patient;Acceptance;Explanation;Verbal Demonstration   Additional Comments ed RE: regular skin checks of feet due to neuropathy   Problem List    Problems None   Anticipated Discharge Equipment and Recommendations   DC Equipment Recommendations None   Discharge Recommendations Anticipate that the patient will have no further physical therapy needs after discharge from the hospital

## 2020-08-15 VITALS
WEIGHT: 276.02 LBS | HEIGHT: 72 IN | HEART RATE: 56 BPM | OXYGEN SATURATION: 96 % | SYSTOLIC BLOOD PRESSURE: 118 MMHG | BODY MASS INDEX: 37.39 KG/M2 | RESPIRATION RATE: 18 BRPM | TEMPERATURE: 97.6 F | DIASTOLIC BLOOD PRESSURE: 81 MMHG

## 2020-08-15 PROBLEM — R79.89 ELEVATED BRAIN NATRIURETIC PEPTIDE (BNP) LEVEL: Status: RESOLVED | Noted: 2020-08-12 | Resolved: 2020-08-15

## 2020-08-15 PROBLEM — A41.9 SEPSIS (HCC): Status: RESOLVED | Noted: 2020-08-12 | Resolved: 2020-08-15

## 2020-08-15 PROBLEM — N17.9 ACUTE RENAL FAILURE (ARF) (HCC): Status: RESOLVED | Noted: 2020-08-12 | Resolved: 2020-08-15

## 2020-08-15 LAB
ANION GAP SERPL CALC-SCNC: 12 MMOL/L (ref 7–16)
BASOPHILS # BLD AUTO: 0.7 % (ref 0–1.8)
BASOPHILS # BLD: 0.06 K/UL (ref 0–0.12)
BUN SERPL-MCNC: 21 MG/DL (ref 8–22)
CALCIUM SERPL-MCNC: 8.8 MG/DL (ref 8.5–10.5)
CHLORIDE SERPL-SCNC: 101 MMOL/L (ref 96–112)
CO2 SERPL-SCNC: 23 MMOL/L (ref 20–33)
CREAT SERPL-MCNC: 1.22 MG/DL (ref 0.5–1.4)
EOSINOPHIL # BLD AUTO: 0.29 K/UL (ref 0–0.51)
EOSINOPHIL NFR BLD: 3.4 % (ref 0–6.9)
ERYTHROCYTE [DISTWIDTH] IN BLOOD BY AUTOMATED COUNT: 45.1 FL (ref 35.9–50)
GLUCOSE BLD-MCNC: 162 MG/DL (ref 65–99)
GLUCOSE BLD-MCNC: 178 MG/DL (ref 65–99)
GLUCOSE SERPL-MCNC: 168 MG/DL (ref 65–99)
HCT VFR BLD AUTO: 37.7 % (ref 42–52)
HGB BLD-MCNC: 12.7 G/DL (ref 14–18)
IMM GRANULOCYTES # BLD AUTO: 0.05 K/UL (ref 0–0.11)
IMM GRANULOCYTES NFR BLD AUTO: 0.6 % (ref 0–0.9)
LYMPHOCYTES # BLD AUTO: 1.51 K/UL (ref 1–4.8)
LYMPHOCYTES NFR BLD: 17.7 % (ref 22–41)
MCH RBC QN AUTO: 31.2 PG (ref 27–33)
MCHC RBC AUTO-ENTMCNC: 33.7 G/DL (ref 33.7–35.3)
MCV RBC AUTO: 92.6 FL (ref 81.4–97.8)
MONOCYTES # BLD AUTO: 0.92 K/UL (ref 0–0.85)
MONOCYTES NFR BLD AUTO: 10.8 % (ref 0–13.4)
NEUTROPHILS # BLD AUTO: 5.72 K/UL (ref 1.82–7.42)
NEUTROPHILS NFR BLD: 66.8 % (ref 44–72)
NRBC # BLD AUTO: 0 K/UL
NRBC BLD-RTO: 0 /100 WBC
PLATELET # BLD AUTO: 217 K/UL (ref 164–446)
PMV BLD AUTO: 10.1 FL (ref 9–12.9)
POTASSIUM SERPL-SCNC: 3.8 MMOL/L (ref 3.6–5.5)
RBC # BLD AUTO: 4.07 M/UL (ref 4.7–6.1)
SODIUM SERPL-SCNC: 136 MMOL/L (ref 135–145)
WBC # BLD AUTO: 8.6 K/UL (ref 4.8–10.8)

## 2020-08-15 PROCEDURE — 36415 COLL VENOUS BLD VENIPUNCTURE: CPT

## 2020-08-15 PROCEDURE — 700105 HCHG RX REV CODE 258: Performed by: HOSPITALIST

## 2020-08-15 PROCEDURE — A9270 NON-COVERED ITEM OR SERVICE: HCPCS | Performed by: HOSPITALIST

## 2020-08-15 PROCEDURE — 82962 GLUCOSE BLOOD TEST: CPT

## 2020-08-15 PROCEDURE — 85025 COMPLETE CBC W/AUTO DIFF WBC: CPT

## 2020-08-15 PROCEDURE — 700111 HCHG RX REV CODE 636 W/ 250 OVERRIDE (IP): Performed by: HOSPITALIST

## 2020-08-15 PROCEDURE — 80048 BASIC METABOLIC PNL TOTAL CA: CPT

## 2020-08-15 PROCEDURE — 700102 HCHG RX REV CODE 250 W/ 637 OVERRIDE(OP): Performed by: HOSPITALIST

## 2020-08-15 PROCEDURE — 99239 HOSP IP/OBS DSCHRG MGMT >30: CPT | Performed by: INTERNAL MEDICINE

## 2020-08-15 RX ORDER — NITROFURANTOIN 25; 75 MG/1; MG/1
100 CAPSULE ORAL 2 TIMES DAILY
Qty: 12 CAP | Refills: 0 | Status: SHIPPED | OUTPATIENT
Start: 2020-08-15 | End: 2020-08-21

## 2020-08-15 RX ORDER — GABAPENTIN 300 MG/1
300 CAPSULE ORAL 4 TIMES DAILY
Qty: 90 CAP | Refills: 0 | Status: ON HOLD | OUTPATIENT
Start: 2020-08-15 | End: 2023-04-16

## 2020-08-15 RX ADMIN — DOCUSATE SODIUM 50 MG AND SENNOSIDES 8.6 MG 2 TABLET: 8.6; 5 TABLET, FILM COATED ORAL at 05:31

## 2020-08-15 RX ADMIN — TAMSULOSIN HYDROCHLORIDE 0.4 MG: 0.4 CAPSULE ORAL at 08:59

## 2020-08-15 RX ADMIN — HEPARIN SODIUM 5000 UNITS: 5000 INJECTION, SOLUTION INTRAVENOUS; SUBCUTANEOUS at 05:32

## 2020-08-15 RX ADMIN — MEROPENEM 500 MG: 500 INJECTION, POWDER, FOR SOLUTION INTRAVENOUS at 05:31

## 2020-08-15 RX ADMIN — CARVEDILOL 25 MG: 25 TABLET, FILM COATED ORAL at 05:31

## 2020-08-15 RX ADMIN — INSULIN HUMAN 2 UNITS: 100 INJECTION, SOLUTION PARENTERAL at 08:06

## 2020-08-15 RX ADMIN — GABAPENTIN 300 MG: 300 CAPSULE ORAL at 08:59

## 2020-08-15 RX ADMIN — AMLODIPINE BESYLATE 10 MG: 10 TABLET ORAL at 05:31

## 2020-08-15 RX ADMIN — ASPIRIN 81 MG 81 MG: 81 TABLET ORAL at 05:31

## 2020-08-15 RX ADMIN — CLONIDINE HYDROCHLORIDE 0.2 MG: 0.1 TABLET ORAL at 05:31

## 2020-08-15 NOTE — DISCHARGE SUMMARY
"Discharge Summary    CHIEF COMPLAINT ON ADMISSION  Chief Complaint   Patient presents with   • Weakness     \"progressive weakness to his arms and legs and back\".  Pt \"slid out of the bed this morning onto the ground and it took 2 hours to get him up\"   • Fatigue     \"falling asleep for hours at a time, all the time, during phone calls\"       Reason for Admission  Weakness     Admission Date  8/12/2020    CODE STATUS  Prior    HPI & HOSPITAL COURSE  This is a 69 y.o. male here with PMH HTN, HLD, DM who presented with 1 week of weakness, dysuria, and chills. UA concerning for infection. Renal US showed bilateral cysts, otherwise unremarkable. He was admitted for sepsis and UTI, GENI. He was started on meropenem because of prior ESBL UTI. His urine culture grew E Coli ESBL, blood cultures were negative. Dr. Leslie was consulted, recommended 10 days of antibiotics, can be discharged on macrobid. His renal function normalized with IVF. His sepsis resolved, leukocytosis resolved. I placed a referral to urology since this is his second UTI with ESBL.  He had elevated troponin and BNP without chest pain, shortness of breath, EKG did not show significant ST changes. This is likely demand ischemia insetting of sepsis and GENI. TTE showed pulmonary HTN. This may be from his sleep apnea. He had no SOB, CP and was stable on room air, less likely PE.    Therefore, he is discharged in good and stable condition to home with close outpatient follow-up.    The patient met 2-midnight criteria for an inpatient stay at the time of discharge.    Discharge Date  8/15/2020    FOLLOW UP ITEMS POST DISCHARGE  Urology referral for UTI x2 with ESBL  Consider stress testing for elevated troponin  Monitor pulm HTN    DISCHARGE DIAGNOSES  Principal Problem:    Acute cystitis without hematuria POA: Yes  Active Problems:    Elevated troponin POA: Yes    Sleep apnea (Chronic) POA: Yes    Essential hypertension POA: Yes    Type 2 diabetes mellitus " without complication, without long-term current use of insulin (HCC) POA: Yes    Obesity (BMI 30-39.9) POA: Yes    Benign prostatic hyperplasia with urinary frequency POA: Yes  Resolved Problems:    Acute renal failure (ARF) (HCC) POA: Unknown    Sepsis (HCC) POA: Unknown    Elevated brain natriuretic peptide (BNP) level POA: Unknown      FOLLOW UP  Chuck Chappell M.D.  [address] [facility] [id number]  [address]  [place] [address]  [phone]    Schedule an appointment as soon as possible for a visit in 1 week        MEDICATIONS ON DISCHARGE     Medication List      START taking these medications      Instructions   gabapentin 300 MG Caps  Commonly known as: NEURONTIN  Replaces: gabapentin 600 MG tablet   Take 1 Cap by mouth 4 times a day.  Dose: 300 mg     nitrofurantoin 100 MG Caps  Commonly known as: MACROBID   Take 1 Cap by mouth 2 times a day for 6 days.  Dose: 100 mg        CONTINUE taking these medications      Instructions   amLODIPine 10 MG Tabs  Commonly known as: NORVASC   Take 1 Tab by mouth every day. For further refills please contact new cardiologist. Thank you  Dose: 10 mg     ASPIRIN 81 PO   Take 81 mg by mouth every morning.  Dose: 81 mg     carvedilol 25 MG Tabs  Commonly known as: COREG   TAKE ONE TABLET BY MOUTH TWICE A DAY WITH MEALS     cloNIDine 0.2 MG Tabs  Commonly known as: CATAPRES   Take 1 Tab by mouth 2 times a day. For further refills please contact new cardiologist. Thank you  Dose: 0.2 mg     fish oil 1000 MG Caps capsule   Take 1,000 mg by mouth every morning.  Dose: 1,000 mg     fosinopril 40 MG tablet  Commonly known as: MONOPRIL   Take 1 Tab by mouth every day. For further refills please contact new cardiologist. Thank you  Dose: 40 mg     ibuprofen 800 MG Tabs  Commonly known as: MOTRIN   Take 800 mg by mouth every 8 hours as needed for Mild Pain.  Dose: 800 mg     lovastatin 20 MG Tabs  Commonly known as: MEVACOR   Take 1 Tab by mouth every evening.  Dose: 20 mg     metFORMIN 850 MG  Tabs  Commonly known as: GLUCOPHAGE   Take 850 mg by mouth 2 times a day.  Dose: 850 mg     tamsulosin 0.4 MG capsule  Commonly known as: FLOMAX   Take 0.4 mg by mouth ONE-HALF HOUR AFTER BREAKFAST.  Dose: 0.4 mg     terazosin 5 MG Caps  Commonly known as: HYTRIN   TAKE TWO CAPSULES BY MOUTH EVERY NIGHT AT BEDTIME (GENERIC HYTRIN)     triamterene-hctz 37.5-25 MG Tabs  Commonly known as: MAXZIDE-25/DYAZIDE   Take 1 Tab by mouth every morning.  Dose: 1 Tab        STOP taking these medications    gabapentin 600 MG tablet  Commonly known as: NEURONTIN  Replaced by: gabapentin 300 MG Caps            Allergies  No Known Allergies    DIET  No orders of the defined types were placed in this encounter.      ACTIVITY  As tolerated.  Weight bearing as tolerated    CONSULTATIONS  ID    PROCEDURES  EC-ECHOCARDIOGRAM COMPLETE W/ CONT   Final Result      US-RENAL   Final Result      Bilateral renal cysts measuring up to 5.1 cm on the left, otherwise unremarkable renal ultrasound.      DX-CHEST-PORTABLE (1 VIEW)   Final Result         No acute cardiac or pulmonary abnormality is identified.        Echocardiography Laboratory  CONCLUSIONS  Prior echocardiogram 3/28/2019.By comparison, the RV is now enlarged.  Technically difficult study incomplete information is obtained.   Contrast was used to enhance visualization of the endocardial border.  Left ventricular ejection fraction is visually estimated to be 60%.  Grossly normal regional wall motion.  Moderately dilated right ventricle.  Reduced right ventricular systolic function.  Right atrium not well visualized.  The mitral valve is not well visualized.  No stenosis or regurgitation seen.  Structurally normal aortic valve without significant stenosis or   regurgitation.    LABORATORY  Lab Results   Component Value Date    SODIUM 136 08/15/2020    POTASSIUM 3.8 08/15/2020    CHLORIDE 101 08/15/2020    CO2 23 08/15/2020    GLUCOSE 168 (H) 08/15/2020    BUN 21 08/15/2020    CREATININE  1.22 08/15/2020        Lab Results   Component Value Date    WBC 8.6 08/15/2020    HEMOGLOBIN 12.7 (L) 08/15/2020    HEMATOCRIT 37.7 (L) 08/15/2020    PLATELETCT 217 08/15/2020        Total time of the discharge process exceeds 34 minutes.

## 2020-08-15 NOTE — DISCHARGE INSTRUCTIONS
Discharge Instructions per Negro Sheth M.D.    DIAGNOSIS: Urinary tract infection with E coli ESBL and acute kidney injury. Please complete course of antibiotic Macrobid and stay hydrated for the next several days. Since this is your second infection with E coli ESBL, please follow up with a urologist. I placed a referral electronically.    Please follow up with your cardiologist to discuss a stress test.    Return to ER if you have shortness of breath, chest pain, abdominal or back pain, diarrhea, vomiting.    Discharge Instructions    Discharged to home by car with relative. Discharged via walking, hospital escort: Yes.  Special equipment needed: Not Applicable    Be sure to schedule a follow-up appointment with your primary care doctor or any specialists as instructed.     Discharge Plan:        I understand that a diet low in cholesterol, fat, and sodium is recommended for good health. Unless I have been given specific instructions below for another diet, I accept this instruction as my diet prescription.   Other diet: Diabetic    Special Instructions:   Sepsis, Diagnosis, Adult  Sepsis is a serious bodily reaction to an infection. The infection that triggers sepsis may be from a bacteria, virus, or fungus. Sepsis can result from an infection in any part of your body. Infections that commonly lead to sepsis include skin, lung, and urinary tract infections.  Sepsis is a medical emergency that must be treated right away in a hospital. In severe cases, it can lead to septic shock. Septic shock can weaken your heart and cause your blood pressure to drop. This can cause your central nervous system and your body's organs to stop working.  What are the causes?  This condition is caused by a severe reaction to infections from bacteria, viruses, or fungus. The germs that most often lead to sepsis include:  · Escherichia coli (E. coli) bacteria.  · Staphylococcus aureus (staph) bacteria.  · Some types of Streptococcus  bacteria.  The most common infections affect these organs:  · The lung (pneumonia).  · The kidneys or bladder (urinary tract infection).  · The skin (cellulitis).  · The bowel, gallbladder, or pancreas.  What increases the risk?  You are more likely to develop this condition if:  · Your body's disease-fighting system (immune system) is weakened.  · You are age 65 or older.  · You are male.  · You had surgery or you have been hospitalized.  · You have these devices inserted into your body:  ? A small, thin tube (catheter).  ? IV line.  ? Breathing tube.  ? Drainage tube.  · You are not getting enough nutrients from food (malnourished).  · You have a long-term (chronic) disease, such as cancer, lung disease, kidney disease, or diabetes.  · You are .  What are the signs or symptoms?  Symptoms of this condition may include:  · Fever.  · Chills or feeling very cold.  · Confusion or anxiety.  · Fatigue.  · Muscle aches.  · Shortness of breath.  · Nausea and vomiting.  · Urinating much less than usual.  · Fast heart rate (tachycardia).  · Rapid breathing (hyperventilation).  · Changes in skin color. Your skin may look blotchy, pale, or blue.  · Cool, clammy, or sweaty skin.  · Skin rash.  Other symptoms depend on the source of your infection.  How is this diagnosed?  This condition is diagnosed based on:  · Your symptoms.  · Your medical history.  · A physical exam.  Other tests may also be done to find out the cause of the infection and how severe the sepsis is. These tests may include:  · Blood tests.  · Urine tests.  · Swabs from other areas of your body that may have an infection. These samples may be tested (cultured) to find out what type of bacteria is causing the infection.  · Chest X-ray to check for pneumonia. Other imaging tests, such as a CT scan, may also be done.  · Lumbar puncture. This removes a small amount of the fluid that surrounds your brain and spinal cord. The fluid is then examined  for infection.  How is this treated?  This condition must be treated in a hospital. Based on the cause of your infection, you may be given an antibiotic, antiviral, or antifungal medicine.  You may also receive:  · Fluids through an IV.  · Oxygen and breathing assistance.  · Medicines to increase your blood pressure.  · Kidney dialysis. This process cleans your blood if your kidneys have failed.  · Surgery to remove infected tissue.  · Blood transfusion if needed.  · Medicine to prevent blood clots.  · Nutrients to correct imbalances in basic body function (metabolism). You may:  ? Receive important salts and minerals (electrolytes) through an IV.  ? Have your blood sugar level adjusted.  Follow these instructions at home:  Medicines    · Take over-the-counter and prescription medicines only as told by your health care provider.  · If you were prescribed an antibiotic, antiviral, or antifungal medicine, take it as told by your health care provider. Do not stop taking the medicine even if you start to feel better.  General instructions  · If you have a catheter or other indwelling device, ask to have it removed as soon as possible.  · Keep all follow-up visits as told by your health care provider. This is important.  Contact a health care provider if:  · You do not feel like you are getting better or regaining strength.  · You are having trouble coping with your recovery.  · You frequently feel tired.  · You feel worse or do not seem to get better after surgery.  · You think you may have an infection after surgery.  Get help right away if:  · You have any symptoms of sepsis.  · You have difficulty breathing.  · You have a rapid or skipping heartbeat.  · You become confused or disoriented.  · You have a high fever.  · Your skin becomes blotchy, pale, or blue.  · You have an infection that is getting worse or not getting better.  These symptoms may represent a serious problem that is an emergency. Do not wait to see if  the symptoms will go away. Get medical help right away. Call your local emergency services (911 in the [place] Do not drive yourself to the hospital.  Summary  · Sepsis is a medical emergency that requires immediate treatment in a hospital.  · This condition is caused by a severe reaction to infections from bacteria, viruses, or fungus.  · Based on the cause of your infection, you may be given an antibiotic, antiviral, or antifungal medicine.  · Treatment may also include IV fluids, breathing assistance, and kidney dialysis.  This information is not intended to replace advice given to you by your health care provider. Make sure you discuss any questions you have with your health care provider.  Document Released: 09/15/2004 Document Revised: 07/26/2019 Document Reviewed: 07/26/2019  [employer] Patient Education © 2020 [employer]      · Is patient discharged on Warfarin / Coumadin?   No     Depression / Suicide Risk    As you are discharged from this [facility] facility, it is important to learn how to keep safe from harming yourself.    Recognize the warning signs:  · Abrupt changes in personality, positive or negative- including increase in energy   · Giving away possessions  · Change in eating patterns- significant weight changes-  positive or negative  · Change in sleeping patterns- unable to sleep or sleeping all the time   · Unwillingness or inability to communicate  · Depression  · Unusual sadness, discouragement and loneliness  · Talk of wanting to die  · Neglect of personal appearance   · Rebelliousness- reckless behavior  · Withdrawal from people/activities they love  · Confusion- inability to concentrate     If you or a loved one observes any of these behaviors or has concerns about self-harm, here's what you can do:  · Talk about it- your feelings and reasons for harming yourself  · Remove any means that you might use to hurt yourself (examples: pills, rope, extension cords, firearm)  · Get  professional help from the community (Mental Health, Substance Abuse, psychological counseling)  · Do not be alone:Call your Safe Contact- someone whom you trust who will be there for you.  · Call your local CRISIS HOTLINE 964-2668 or 337-004-7903  · Call your local Children's Mobile Crisis Response Team [place] (807) 917-5416 or www.Foxfly  · Call the toll free National Suicide Prevention Hotlines   · National Suicide Prevention Lifeline 186-372-XKGL (2111)  · [place] [facility] Network 800-SUICIDE (566-0675)      Nitrofurantoin tablets or capsules  What is this medicine?  NITROFURANTOIN (mara troe fyoor AN toyn) is an antibiotic. It is used to treat urinary tract infections.  This medicine may be used for other purposes; ask your health care provider or pharmacist if you have questions.  COMMON BRAND NAME(S): Macrobid, Macrodantin, Urotoin  What should I tell my health care provider before I take this medicine?  They need to know if you have any of these conditions:  · anemia  · diabetes  · glucose-6-phosphate dehydrogenase deficiency  · kidney disease  · liver disease  · lung disease  · other chronic illness  · an unusual or allergic reaction to nitrofurantoin, other antibiotics, other medicines, foods, dyes or preservatives  · pregnant or trying to get pregnant  · breast-feeding  How should I use this medicine?  Take this medicine by mouth with a glass of water. Follow the directions on the prescription label. Take this medicine with food or milk. Take your doses at regular intervals. Do not take your medicine more often than directed. Do not stop taking except on your doctor's advice.  Talk to your pediatrician regarding the use of this medicine in children. While this drug may be prescribed for selected conditions, precautions do apply.  Overdosage: If you think you have taken too much of this medicine contact a poison control center or emergency room at once.  NOTE: This medicine is only for  you. Do not share this medicine with others.  What if I miss a dose?  If you miss a dose, take it as soon as you can. If it is almost time for your next dose, take only that dose. Do not take double or extra doses.  What may interact with this medicine?  · antacids containing magnesium trisilicate  · probenecid  · quinolone antibiotics like ciprofloxacin, lomefloxacin, norfloxacin and ofloxacin  · sulfinpyrazone  This list may not describe all possible interactions. Give your health care provider a list of all the medicines, herbs, non-prescription drugs, or dietary supplements you use. Also tell them if you smoke, drink alcohol, or use illegal drugs. Some items may interact with your medicine.  What should I watch for while using this medicine?  Tell your doctor or health care professional if your symptoms do not improve or if you get new symptoms. Drink several glasses of water a day. If you are taking this medicine for a long time, visit your doctor for regular checks on your progress.  If you are diabetic, you may get a false positive result for sugar in your urine with certain brands of urine tests. Check with your doctor.  What side effects may I notice from receiving this medicine?  Side effects that you should report to your doctor or health care professional as soon as possible:  · allergic reactions like skin rash or hives, swelling of the face, lips, or tongue  · chest pain  · cough  · difficulty breathing  · dizziness, drowsiness  · fever or infection  · joint aches or pains  · pale or blue-tinted skin  · redness, blistering, peeling or loosening of the skin, including inside the mouth  · tingling, burning, pain, or numbness in hands or feet  · unusual bleeding or bruising  · unusually weak or tired  · yellowing of eyes or skin  Side effects that usually do not require medical attention (report to your doctor or health care professional if they continue or are bothersome):  · dark  urine  · diarrhea  · headache  · loss of appetite  · nausea or vomiting  · temporary hair loss  This list may not describe all possible side effects. Call your doctor for medical advice about side effects. You may report side effects to FDA at 8-236-ZIB-3737.  Where should I keep my medicine?  Keep out of the reach of children.  Store at room temperature between 15 and 30 degrees C (59 and 86 degrees F). Protect from light. Throw away any unused medicine after the expiration date.  NOTE: This sheet is a summary. It may not cover all possible information. If you have questions about this medicine, talk to your doctor, pharmacist, or health care provider.  © 2020 [employer]/Gold Standard (2009-07-08 15:56:47)    Urinary Tract Infection, Adult  A urinary tract infection (UTI) is an infection of any part of the urinary tract. The urinary tract includes:  · The kidneys.  · The ureters.  · The bladder.  · The urethra.  These organs make, store, and get rid of pee (urine) in the body.  What are the causes?  This is caused by germs (bacteria) in your genital area. These germs grow and cause swelling (inflammation) of your urinary tract.  What increases the risk?  You are more likely to develop this condition if:  · You have a small, thin tube (catheter) to drain pee.  · You cannot control when you pee or poop (incontinence).  · You are female, and:  ? You use these methods to prevent pregnancy:  ? A medicine that kills sperm (spermicide).  ? A device that blocks sperm (diaphragm).  ? You have low levels of a female hormone (estrogen).  ? You are pregnant.  · You have genes that add to your risk.  · You are sexually active.  · You take antibiotic medicines.  · You have trouble peeing because of:  ? A prostate that is bigger than normal, if you are male.  ? A blockage in the part of your body that drains pee from the bladder (urethra).  ? A kidney stone.  ? A nerve condition that affects your bladder (neurogenic  bladder).  ? Not getting enough to drink.  ? Not peeing often enough.  · You have other conditions, such as:  ? Diabetes.  ? A weak disease-fighting system (immune system).  ? Sickle cell disease.  ? Gout.  ? Injury of the spine.  What are the signs or symptoms?  Symptoms of this condition include:  · Needing to pee right away (urgently).  · Peeing often.  · Peeing small amounts often.  · Pain or burning when peeing.  · Blood in the pee.  · Pee that smells bad or not like normal.  · Trouble peeing.  · Pee that is cloudy.  · Fluid coming from the vagina, if you are female.  · Pain in the belly or lower back.  Other symptoms include:  · Throwing up (vomiting).  · No urge to eat.  · Feeling mixed up (confused).  · Being tired and grouchy (irritable).  · A fever.  · Watery poop (diarrhea).  How is this treated?  This condition may be treated with:  · Antibiotic medicine.  · Other medicines.  · Drinking enough water.  Follow these instructions at home:    Medicines  · Take over-the-counter and prescription medicines only as told by your doctor.  · If you were prescribed an antibiotic medicine, take it as told by your doctor. Do not stop taking it even if you start to feel better.  General instructions  · Make sure you:  ? Pee until your bladder is empty.  ? Do not hold pee for a long time.  ? Empty your bladder after sex.  ? Wipe from front to back after pooping if you are a female. Use each tissue one time when you wipe.  · Drink enough fluid to keep your pee pale yellow.  · Keep all follow-up visits as told by your doctor. This is important.  Contact a doctor if:  · You do not get better after 1-2 days.  · Your symptoms go away and then come back.  Get help right away if:  · You have very bad back pain.  · You have very bad pain in your lower belly.  · You have a fever.  · You are sick to your stomach (nauseous).  · You are throwing up.  Summary  · A urinary tract infection (UTI) is an infection of any part of the  urinary tract.  · This condition is caused by germs in your genital area.  · There are many risk factors for a UTI. These include having a small, thin tube to drain pee and not being able to control when you pee or poop.  · Treatment includes antibiotic medicines for germs.  · Drink enough fluid to keep your pee pale yellow.  This information is not intended to replace advice given to you by your health care provider. Make sure you discuss any questions you have with your health care provider.  Document Released: 06/05/2009 Document Revised: 12/05/2019 Document Reviewed: 06/27/2019  [employer] Patient Education © 2020 [employer]

## 2020-08-15 NOTE — CONSULTS
[facility] INFECTIOUS DISEASES INPATIENT CONSULT NOTE     Date of Service: 8/14/2020    Consult Requested By: Negro Sheth M.D.    Reason for Consultation: ESBL E. coli UTI    Chief Complaint: Weakness    History of Present Illness:     Ramu Barber is a 69 y.o. male admitted 8/12/2020.  Patient has known history of obesity, sleep apnea, diabetes, hypertension, dyslipidemia, was admitted on 8/12 with 3-day history of increasing lethargy and weakness, and episode of urinary incontinence, as well as dysuria initially.  On the morning of admission, patient slid out of bed without any significant trauma.  Patient's wife found it difficult to get him back, thus he will presented to the hospital.  He was afebrile but significant leukocytosis of 18.5.  UA with packed white cells.  He was started on meropenem given UTI more than a year ago with an ESBL organism.  Since then, patient's white count is trending down nicely, leukocytosis resolved now.  Patient reports feeling much better, more energetic, urinary symptoms have resolved.  Renal ultrasound with no stones noted, renal cysts were noted.  ID consulted for recommendations.  Urine culture positive for ESBL E. coli.  Patient also noted to have an GENI.    Review of Systems:  All other systems reviewed and are negative expect as noted in HPI    Past Medical History:   Diagnosis Date   • Abnormal glucose    • Arthritis     back, hips   • Back pain    • Chickenpox    • High cholesterol    • HTN (hypertension)    • Obesity    • Overweight(278.02)    • Pain     back pain   • Pneumonia    • Polycythemia, secondary    • Sleep apnea     cpap   • Snoring    • Type II or unspecified type diabetes mellitus without mention of complication, not stated as uncontrolled     oral meds only       Past Surgical History:   Procedure Laterality Date   • PB IMPLANT NEUROSTIM/  7/23/2019    Procedure: INSERTION, NEUROSTIMULATOR, PERMANENT, SPINAL CORD;  Surgeon: Teddy Santos M.D.;   Location: SURGERY [facility];  Service: Pain Management   • LUMBAR LAMINECTOMY DISKECTOMY  2/18/2018    Procedure: LUMBAR LAMINECTOMY DISKECTOMY- LT L1-2 HEMILAMI-;  Surgeon: Tom Pittman M.D.;  Location: SURGERY [facility];  Service: Neurosurgery   • HARDWARE REMOVAL NEURO  2/18/2018    Procedure: HARDWARE REMOVAL NEURO- L2-3 PEDICLE SCREWS;  Surgeon: Tom Pittman M.D.;  Location: SURGERY [facility];  Service: Neurosurgery   • INGUINAL HERNIA REPAIR Right 2/16/2016    Procedure: INGUINAL HERNIA REPAIR;  Surgeon: Sj Baird M.D.;  Location: SURGERY [facility];  Service:    • LUMBAR FUSION POSTERIOR  1/21/2015    Performed by Ko Suarez M.D. at SURGERY [facility]   • CARPAL TUNNEL RELEASE Right 2014   • SHOULDER ARTHROSCOPY Right 2014   • CERVICAL DISK AND FUSION ANTERIOR  2005   • LUMBAR DECOMPRESSION  2004   • LUMBAR DECOMPRESSION  2003       Family History   Problem Relation Age of Onset   • Sleep Apnea Mother    • Heart Attack Father    • Heart Attack Brother        Social History     Socioeconomic History   • Marital status:      Spouse name: Not on file   • Number of children: Not on file   • Years of education: Not on file   • Highest education level: Not on file   Occupational History   • Not on file   Social Needs   • Financial resource strain: Not on file   • Food insecurity     Worry: Not on file     Inability: Not on file   • Transportation needs     Medical: Not on file     Non-medical: Not on file   Tobacco Use   • Smoking status: Never Smoker   • Smokeless tobacco: Former User     Types: Chew   Substance and Sexual Activity   • Alcohol use: Yes     Comment: twice a month   • Drug use: No   • Sexual activity: Not on file   Lifestyle   • Physical activity     Days per week: Not on file     Minutes per session: Not on file   • Stress: Not on file   Relationships   • Social connections     Talks on phone: Not on file     Gets together: Not on file     Attends  Mosque service: Not on file     Active member of club or organization: Not on file     Attends meetings of clubs or organizations: Not on file     Relationship status: Not on file   • Intimate partner violence     Fear of current or ex partner: Not on file     Emotionally abused: Not on file     Physically abused: Not on file     Forced sexual activity: Not on file   Other Topics Concern   • Not on file   Social History Narrative   • Not on file       No Known Allergies    Medications:    Current Facility-Administered Medications:   •  amLODIPine (NORVASC) tablet 10 mg, 10 mg, Oral, DAILY, ELAINE Paulino.O., 10 mg at 08/14/20 0531  •  aspirin (ASA) chewable tab 81 mg, 81 mg, Oral, QAM, ELAINE Paulino.O., 81 mg at 08/14/20 0531  •  carvedilol (COREG) tablet 25 mg, 25 mg, Oral, BID WITH MEALS, JUAQUIN PaulinoO., Stopped at 08/14/20 0730  •  cloNIDine (CATAPRES) tablet 0.2 mg, 0.2 mg, Oral, BID, ELAINE Paulino.O., 0.2 mg at 08/14/20 1724  •  gabapentin (NEURONTIN) capsule 300 mg, 300 mg, Oral, 4X/DAY, ELAINE Paulino.O., 300 mg at 08/14/20 1724  •  lovastatin (MEVACOR) tablet 20 mg, 20 mg, Oral, Q EVENING, ELAINE Paulino.O., 20 mg at 08/14/20 1724  •  tamsulosin (FLOMAX) capsule 0.4 mg, 0.4 mg, Oral, AFTER BREAKFAST, ELAINE Paulino.O., 0.4 mg at 08/14/20 0832  •  terazosin (HYTRIN) capsule 5 mg, 5 mg, Oral, Q EVENING, ELAINE Paulino.O., 5 mg at 08/14/20 1724  •  senna-docusate (PERICOLACE or SENOKOT S) 8.6-50 MG per tablet 2 Tab, 2 Tab, Oral, BID, 2 Tab at 08/14/20 0531 **AND** polyethylene glycol/lytes (MIRALAX) PACKET 1 Packet, 1 Packet, Oral, QDAY PRN **AND** magnesium hydroxide (MILK OF MAGNESIA) suspension 30 mL, 30 mL, Oral, QDAY PRN **AND** bisacodyl (DULCOLAX) suppository 10 mg, 10 mg, Rectal, QDAY PRN, Aleksander Loving D.O.  •  lactated ringers infusion (BOLUS), 500 mL, Intravenous, Once PRN, ELAINE Paulino.O.  •  lactated ringers infusion, , Intravenous, Continuous, Aleksander Loving,  DARIELA, Last Rate: 125 mL/hr at 20 0855  •  lactated ringers infusion (BOLUS): BMI less than or equal to 30, 30 mL/kg, Intravenous, Once PRN, Aleksander Loving D.O.  •  heparin injection 5,000 Units, 5,000 Units, Subcutaneous, Q8HRS, Aleksander Loving D.O., 5,000 Units at 20 1420  •  acetaminophen (TYLENOL) tablet 650 mg, 650 mg, Oral, Q6HRS PRN, Aleksander Loving D.O.  •  insulin regular (HumuLIN R,NovoLIN R) injection, 2-9 Units, Subcutaneous, 4X/DAY ACHS, Stopped at 20 1700 **AND** POC Blood Glucose, , , Q AC AND BEDTIME(S) **AND** NOTIFY MD and PharmD, , , Once **AND** glucose 4 g chewable tablet 16 g, 16 g, Oral, Q15 MIN PRN **AND** dextrose 50% (D50W) injection 50 mL, 50 mL, Intravenous, Q15 MIN PRN, Aleksander Loving D.O.  •  meropenem (MERREM) 500 mg in  mL IVPB, 500 mg, Intravenous, Q8HRS, Aleksander Loving D.O., Stopped at 20 1451    Physical Exam:   Vital Signs: /71   Pulse (!) 55   Temp 36.6 °C (97.9 °F) (Temporal)   Resp 18   Ht 1.829 m (6')   Wt 124.9 kg (275 lb 4 oz)   SpO2 96%   BMI 37.33 kg/m²   Temp  Av.7 °C (98 °F)  Min: 35.9 °C (96.6 °F)  Max: 37.6 °C (99.6 °F)  Vital signs reviewed  Constitutional: Patient is oriented to person, place, and time. Appears well-developed and well-nourished. No distress  Head: Atraumatic, normocephalic  Eyes: Conjunctivae normal, EOM intact. Pupils are equal, round, and reactive to light.   Mouth/Throat: Lips without lesions, good dentition, oropharynx is clear and moist.  Neck: Neck supple. No masses/lymphadenopathy  Cardiovascular: Normal rate, regular rhythm, normal S1S2 and intact distal pulses. No murmur, gallop, or friction rub. No pedal edema.  Pulmonary/Chest: No respiratory distress. Unlabored respiratory effort, lungs clear to auscultation. No wheezes or rales.   Abdominal: Soft, non tender. BS + x 4. No masses or hepatosplenomegaly.  No CVA tenderness  Musculoskeletal: No joint tenderness, swelling, erythema, or  restriction of motion noted.  Neurological: Alert and oriented to person, place, and time. No gross cranial nerve deficit. No focal neural deficit noted  Skin: Skin is warm and dry. No rashes or embolic phenomena noted on exposed skin  Psychiatric: Normal mood and affect. Behavior is normal.     LABS:  Recent Labs     08/12/20  1545 08/13/20  1415 08/14/20  0409   WBC 18.5* 11.4* 9.0      Recent Labs     08/12/20  1545 08/13/20  1415 08/14/20  0409   HEMOGLOBIN 13.3* 14.0 12.5*   HEMATOCRIT 40.7* 42.1 37.5*   MCV 96.7 95.2 93.8   MCH 31.6 31.7 31.3   PLATELETCT 217 217 198       Recent Labs     08/12/20  1545 08/13/20  1415 08/14/20  0411   SODIUM 137 135 137   POTASSIUM 3.5* 3.4* 3.2*   CHLORIDE 98 99 100   CO2 19* 20 23   CREATININE 2.72* 1.61* 1.33        Recent Labs     08/12/20  0913 08/12/20  0915 08/12/20  1545   ALBUMIN 3.9 3.9 3.9        MICRO:  Blood Culture Hold   Date Value Ref Range Status   08/12/2020 Collected  Final   08/12/2020 Collected  Final        Latest pertinent labs were reviewed    IMAGING STUDIES:  As above    Hospital Course/Assessment:   Ramu Barber is a 69 y.o. male with a history of obesity, sleep apnea, diabetes, hypertension, dyslipidemia, admitted with sepsis secondary to UTI with ESBL E. coli.  No evidence of upper tract infection    Pertinent Diagnoses:  ESBL E. coli UTI  GENI  Type 2 diabetes    Plan:   -Agree with IV meropenem 500 mg every 6 hours while inpatient  -On discharge, okay to switch to p.o. Macrobid 100 mg twice daily to complete a total 10-day course through 8/21/2020  -Renal function is improving.  Please ensure medication is renally dosed  -No evidence of stones on imaging.  Recommend urology referral outpatient given similar episode about a year ago    Plan was discussed with the primary team, Dr. Sheth    ID will sign off.  Please feel free to call with questions.    Cuco Elizondo M.D.    Please note that this dictation was created using voice recognition  software. I have worked with technical experts from [facility] to optimize the interface.  I have made every reasonable attempt to correct obvious errors, but there may be errors of grammar and possibly content that I did not discover before finalizing the note.

## 2020-08-15 NOTE — PROGRESS NOTES
Patient left with wife via car with his glasses, walker, and cane. Patient received discharge education along with wife and they have no questions at this time. Wife is picking up prescriptions this afternoon. IV and tele pack removed.

## 2020-08-15 NOTE — PROGRESS NOTES
Report received from ARIAN Thomas. Patient is resting with no complaints of pain. Bed is locked in lowest position with call light within reach. Lactated ringer's is infusing at 125mL/hr. POC discussed and white board updated. RN will continue to monitor.

## 2020-08-17 LAB
BACTERIA BLD CULT: NORMAL
BACTERIA BLD CULT: NORMAL
SIGNIFICANT IND 70042: NORMAL
SIGNIFICANT IND 70042: NORMAL
SITE SITE: NORMAL
SITE SITE: NORMAL
SOURCE SOURCE: NORMAL
SOURCE SOURCE: NORMAL

## 2020-08-18 NOTE — DOCUMENTATION QUERY
[facility]                                                                       Query Response Note      PATIENT:               SEAMUS ALVAREZ  ACCT #:                  [id number]  MRN:                     [id number]  :                      1950  ADMIT DATE:       2020 3:31 PM  DISCH DATE:        8/15/2020 9:58 AM  RESPONDING  PROVIDER #:        [id number]           QUERY TEXT:    Sepsis is documented in the Medical Record as well as acute cystitis. Urine culture: (+) Escherichia coli ESBL.    Per coding guidelines, the documentation needs to show a direct link between the sepsis and the causative organism in order to assign a code for Escherichia coli ESBL sepsis.  Please clarify the relationship, if any, between the sepsis and Escherichia coli ESBL.    NOTE:  If an appropriate response is not listed below, please respond with a new note.    The patient's Clinical Indicators include:  Clinical indicators-  Per dc summary:  Admitted for sepsis and UTI. Started on meropenem because of prior ESBL UTI. His urine cx grew E Coli ESBL, blood cultures were negative  Urine culture: (+) Escherichia coli ESBL     Treatments-   IV abx  IV fluid  Urine culture  Blood cultures    Risks-  Sepsis  Acute cystitis  +Urine culture: Escherichia coli ESBL    Thank you,  Elo Sands, CDI RN  Connect via Tiger Text  Options provided:   -- Sepsis is due to or associated with Escherichia coli ESBL   -- Sepsis is not due to or associated with Escherichia coli ESBL   -- Unable to determine      Query created by: Elo Sands on 2020 4:11 PM    RESPONSE TEXT:    Sepsis is due to or associated with Escherichia coli ESBL          Electronically signed by:  YANIV FERRIS MD 2020 5:23 PM

## 2020-08-20 ENCOUNTER — OFFICE VISIT (OUTPATIENT)
Dept: URGENT CARE | Facility: PHYSICIAN GROUP | Age: 70
End: 2020-08-20
Payer: COMMERCIAL

## 2020-08-20 ENCOUNTER — HOSPITAL ENCOUNTER (OUTPATIENT)
Facility: MEDICAL CENTER | Age: 70
End: 2020-08-20
Attending: PHYSICIAN ASSISTANT
Payer: COMMERCIAL

## 2020-08-20 VITALS
HEIGHT: 72 IN | HEART RATE: 66 BPM | OXYGEN SATURATION: 95 % | RESPIRATION RATE: 16 BRPM | WEIGHT: 260 LBS | TEMPERATURE: 97.2 F | DIASTOLIC BLOOD PRESSURE: 62 MMHG | BODY MASS INDEX: 35.21 KG/M2 | SYSTOLIC BLOOD PRESSURE: 104 MMHG

## 2020-08-20 DIAGNOSIS — R53.1 WEAKNESS GENERALIZED: ICD-10-CM

## 2020-08-20 DIAGNOSIS — Z92.89 HISTORY OF RECENT HOSPITALIZATION: ICD-10-CM

## 2020-08-20 DIAGNOSIS — R11.2 NON-INTRACTABLE VOMITING WITH NAUSEA, UNSPECIFIED VOMITING TYPE: ICD-10-CM

## 2020-08-20 DIAGNOSIS — R53.1 WEAKNESS GENERALIZED: Primary | ICD-10-CM

## 2020-08-20 LAB
COVID ORDER STATUS COVID19: NORMAL
FLUAV+FLUBV AG SPEC QL IA: NEGATIVE
INT CON NEG: NORMAL
INT CON POS: NORMAL
SARS-COV-2 RNA RESP QL NAA+PROBE: NOTDETECTED
SPECIMEN SOURCE: NORMAL

## 2020-08-20 PROCEDURE — 99214 OFFICE O/P EST MOD 30 MIN: CPT | Mod: 25 | Performed by: PHYSICIAN ASSISTANT

## 2020-08-20 PROCEDURE — U0003 INFECTIOUS AGENT DETECTION BY NUCLEIC ACID (DNA OR RNA); SEVERE ACUTE RESPIRATORY SYNDROME CORONAVIRUS 2 (SARS-COV-2) (CORONAVIRUS DISEASE [COVID-19]), AMPLIFIED PROBE TECHNIQUE, MAKING USE OF HIGH THROUGHPUT TECHNOLOGIES AS DESCRIBED BY CMS-2020-01-R: HCPCS

## 2020-08-20 PROCEDURE — 87804 INFLUENZA ASSAY W/OPTIC: CPT | Performed by: PHYSICIAN ASSISTANT

## 2020-08-20 RX ORDER — ONDANSETRON 2 MG/ML
4 INJECTION INTRAMUSCULAR; INTRAVENOUS ONCE
Status: COMPLETED | OUTPATIENT
Start: 2020-08-20 | End: 2020-08-20

## 2020-08-20 RX ORDER — PROMETHAZINE HYDROCHLORIDE 25 MG/1
25 TABLET ORAL EVERY 6 HOURS PRN
Qty: 30 TAB | Refills: 0 | Status: ON HOLD | OUTPATIENT
Start: 2020-08-20 | End: 2023-04-16

## 2020-08-20 RX ORDER — DICYCLOMINE HCL 20 MG
20 TABLET ORAL EVERY 6 HOURS
Qty: 20 TAB | Refills: 0 | Status: SHIPPED | OUTPATIENT
Start: 2020-08-20 | End: 2020-08-21

## 2020-08-20 RX ADMIN — ONDANSETRON 4 MG: 2 INJECTION INTRAMUSCULAR; INTRAVENOUS at 13:39

## 2020-08-20 ASSESSMENT — FIBROSIS 4 INDEX: FIB4 SCORE: 1.87

## 2020-08-20 NOTE — PATIENT INSTRUCTIONS
Weakness  Weakness is a lack of strength. You may feel weak all over your body (generalized), or you may feel weak in one part of your body (focal). There are many potential causes of weakness. Sometimes, the cause of your weakness may not be known. Some causes of weakness can be serious, so it is important to see your doctor.  Follow these instructions at home:  Activity  · Rest as needed.  · Try to get enough sleep. Most adults need 7-8 hours of sleep each night. Talk to your doctor about how much sleep you need each night.  · Do exercises, such as arm curls and leg raises, for 30 minutes at least 2 days a week or as told by your doctor.  · Think about working with a physical therapist or  to help you get stronger.  General instructions    · Take over-the-counter and prescription medicines only as told by your doctor.  · Eat a healthy, well-balanced diet. This includes:  ? Proteins to build muscles, such as lean meats and fish.  ? Fresh fruits and vegetables.  ? Carbohydrates to boost energy, such as whole grains.  · Drink enough fluid to keep your pee (urine) pale yellow.  · Keep all follow-up visits as told by your doctor. This is important.  Contact a doctor if:  · Your weakness does not get better or it gets worse.  · Your weakness affects your ability to:  ? Think clearly.  ? Do your normal daily activities.  Get help right away if you:  · Have sudden weakness on one side of your face or body.  · Have chest pain.  · Have trouble breathing or shortness of breath.  · Have problems with your vision.  · Have trouble talking or swallowing.  · Have trouble standing or walking.  · Are light-headed.  · Pass out (lose consciousness).  Summary  · Weakness is a lack of strength. You may feel weak all over your body or just in one part of your body.  · There are many potential causes of weakness. Sometimes, the cause of your weakness may not be known.  · Rest as needed, and try to get enough sleep. Most adults  need 7-8 hours of sleep each night.  · Eat a healthy, well-balanced diet.  This information is not intended to replace advice given to you by your health care provider. Make sure you discuss any questions you have with your health care provider.  Document Released: 11/30/2009 Document Revised: 07/24/2019 Document Reviewed: 07/24/2019  Elsevier Patient Education © 2020 [employer] Inc[employer]

## 2020-08-20 NOTE — PROGRESS NOTES
Subjective:      Pt is a 69 y.o. male who presents with weakness and N/V          weakness  This is a new problem. PT was recently hospitalized for sepsis and UTI and GENI from 8/12/20-8/15/20 and discharged on macrobid for ESBL E. Coli UTI which he has taken 8 of 10 days so far. Pt notes continuing weakness and nausea and vomiting and does not wish to go to the ER/hospital. Pt was tested for COVID on 8/12/20 and was NEG and now here today for another test. Pt has not taken any Rx medications for this condition. Pt states the pain is a 4/10, aching in nature and worse at night. Pt denies CP, SOB,  paresthesias, headaches, dizziness, change in vision, hives, or other joint pain. The pt's medication list, problem list, and allergies have been evaluated and reviewed during today's visit.    PMH:  Past Medical History:   Diagnosis Date   • Abnormal glucose    • Arthritis     back, hips   • Back pain    • Chickenpox    • High cholesterol    • HTN (hypertension)    • Obesity    • Overweight(278.02)    • Pain     back pain   • Pneumonia    • Polycythemia, secondary    • Sleep apnea     cpap   • Snoring    • Type II or unspecified type diabetes mellitus without mention of complication, not stated as uncontrolled     oral meds only       PSH:  Past Surgical History:   Procedure Laterality Date   • PB IMPLANT NEUROSTIM/  7/23/2019    Procedure: INSERTION, NEUROSTIMULATOR, PERMANENT, SPINAL CORD;  Surgeon: Teddy Santos M.D.;  Location: [facility];  Service: Pain Management   • LUMBAR LAMINECTOMY DISKECTOMY  2/18/2018    Procedure: LUMBAR LAMINECTOMY DISKECTOMY- LT L1-2 HEMILAMI-;  Surgeon: Tom Pittman M.D.;  Location: [facility];  Service: Neurosurgery   • HARDWARE REMOVAL NEURO  2/18/2018    Procedure: HARDWARE REMOVAL NEURO- L2-3 PEDICLE SCREWS;  Surgeon: Tom Pittman M.D.;  Location: [facility];  Service: Neurosurgery   • INGUINAL HERNIA REPAIR Right 2/16/2016    Procedure:  INGUINAL HERNIA REPAIR;  Surgeon: Sj Baird M.D.;  Location: SURGERY [facility];  Service:    • LUMBAR FUSION POSTERIOR  2015    Performed by Ko Suarez M.D. at SURGERY [facility]   • CARPAL TUNNEL RELEASE Right    • SHOULDER ARTHROSCOPY Right 2014   • CERVICAL DISK AND FUSION ANTERIOR     • LUMBAR DECOMPRESSION     • LUMBAR DECOMPRESSION         Fam Hx:    family history includes Heart Attack in his brother and father; Sleep Apnea in his mother.  Family Status   Relation Name Status   • Mo  Alive   • Fa     • Bro     • Bro  Alive   • Bro  Alive       Soc HX:  Social History     Socioeconomic History   • Marital status:      Spouse name: Not on file   • Number of children: Not on file   • Years of education: Not on file   • Highest education level: Not on file   Occupational History   • Not on file   Social Needs   • Financial resource strain: Not on file   • Food insecurity     Worry: Not on file     Inability: Not on file   • Transportation needs     Medical: Not on file     Non-medical: Not on file   Tobacco Use   • Smoking status: Never Smoker   • Smokeless tobacco: Former User     Types: Chew   Substance and Sexual Activity   • Alcohol use: Yes     Comment: twice a month   • Drug use: No   • Sexual activity: Not on file   Lifestyle   • Physical activity     Days per week: Not on file     Minutes per session: Not on file   • Stress: Not on file   Relationships   • Social connections     Talks on phone: Not on file     Gets together: Not on file     Attends Shinto service: Not on file     Active member of club or organization: Not on file     Attends meetings of clubs or organizations: Not on file     Relationship status: Not on file   • Intimate partner violence     Fear of current or ex partner: Not on file     Emotionally abused: Not on file     Physically abused: Not on file     Forced sexual activity: Not on file   Other Topics Concern    • Not on file   Social History Narrative   • Not on file         Medications:    Current Outpatient Medications:   •  nitrofurantoin (MACROBID) 100 MG Cap, Take 1 Cap by mouth 2 times a day for 6 days., Disp: 12 Cap, Rfl: 0  •  gabapentin (NEURONTIN) 300 MG Cap, Take 1 Cap by mouth 4 times a day., Disp: 90 Cap, Rfl: 0  •  Omega-3 Fatty Acids (FISH OIL) 1000 MG Cap capsule, Take 1,000 mg by mouth every morning., Disp: , Rfl:   •  tamsulosin (FLOMAX) 0.4 MG capsule, Take 0.4 mg by mouth ONE-HALF HOUR AFTER BREAKFAST., Disp: , Rfl:   •  ASPIRIN 81 PO, Take 81 mg by mouth every morning., Disp: , Rfl:   •  ibuprofen (MOTRIN) 800 MG Tab, Take 800 mg by mouth every 8 hours as needed for Mild Pain., Disp: , Rfl:   •  cloNIDine (CATAPRESS) 0.2 MG Tab, Take 1 Tab by mouth 2 times a day. For further refills please contact new cardiologist. Thank you, Disp: 60 Tab, Rfl: 0  •  lovastatin (MEVACOR) 20 MG Tab, Take 1 Tab by mouth every evening., Disp: 90 Tab, Rfl: 1  •  fosinopril (MONOPRIL) 40 MG tablet, Take 1 Tab by mouth every day. For further refills please contact new cardiologist. Thank you, Disp: 30 Tab, Rfl: 0  •  amLODIPine (NORVASC) 10 MG Tab, Take 1 Tab by mouth every day. For further refills please contact new cardiologist. Thank you, Disp: 30 Tab, Rfl: 0  •  carvedilol (COREG) 25 MG Tab, TAKE ONE TABLET BY MOUTH TWICE A DAY WITH MEALS, Disp: 180 Tab, Rfl: 0  •  triamterene-hctz (MAXZIDE-25/DYAZIDE) 37.5-25 MG Tab, Take 1 Tab by mouth every morning., Disp: , Rfl:   •  terazosin (HYTRIN) 5 MG Cap, TAKE TWO CAPSULES BY MOUTH EVERY NIGHT AT BEDTIME (GENERIC HYTRIN), Disp: 180 Cap, Rfl: 10  •  metformin (GLUCOPHAGE) 850 MG Tab, Take 850 mg by mouth 2 times a day., Disp: , Rfl:       Allergies:  Patient has no known allergies.      ROS  Constitutional: Negative for fever, chills and +malaise/fatigue.   HENT: Negative for congestion and sore throat.    Eyes: Negative for blurred vision, double vision and photophobia.    Respiratory: Negative for cough and shortness of breath.  Cardiovascular: Negative for chest pain and palpitations.   Gastrointestinal: Negative for heartburn, , abdominal pain, diarrhea and constipation. +nausea, vomiting  Genitourinary: Negative for dysuria and flank pain.   Musculoskeletal: Negative for joint pain and myalgias.   Skin: Negative for itching and rash.   Neurological: Negative for dizziness, tingling and headaches.   Endo/Heme/Allergies: Does not bruise/bleed easily.   Psychiatric/Behavioral: Negative for depression. The patient is not nervous/anxious.           Objective:     Vitals:    08/20/20 1307   BP: 104/62   Pulse: 66   Resp: 16   Temp: 36.2 °C (97.2 °F)   SpO2: 95%         Physical Exam      Constitutional: PT is oriented to person, place, and time. PT appears well-developed and well-nourished. No distress.   HENT:   Head: Normocephalic and atraumatic.   Mouth/Throat: Oropharynx is clear and moist. No oropharyngeal exudate.   Eyes: Conjunctivae normal and EOM are normal. Pupils are equal, round, and reactive to light.   Neck: Normal range of motion. Neck supple. No thyromegaly present.   Cardiovascular: Normal rate, regular rhythm, normal heart sounds and intact distal pulses.  Exam reveals no gallop and no friction rub.    No murmur heard.  Pulmonary/Chest: Effort normal and breath sounds normal. No respiratory distress. PT has no wheezes. PT has no rales. Pt exhibits no tenderness.   Abdominal: Soft. Bowel sounds are normal. PT exhibits no distension and no mass. There is no tenderness. There is no rebound and no guarding.   Musculoskeletal: Normal range of motion. PT exhibits no edema and no tenderness.   Neurological: PT is alert and oriented to person, place, and time. PT has normal reflexes. No cranial nerve deficit.   Skin: Skin is warm and dry. No rash noted. PT is not diaphoretic. No erythema.       Psychiatric: PT has a normal mood and affect. PT behavior is normal. Judgment and  thought content normal.          Assessment/Plan:        1. Weakness generalized    - POCT Influenza A/B-->NEG  - COVID/SARS COV-2 PCR; Future    2. Non-intractable vomiting with nausea, unspecified vomiting type    - POCT Influenza A/B-->NEG  - COVID/SARS COV-2 PCR; Future    3. History of recent hospitalization    Bentyl  Phenergan  Zofran injection in clinic  STRICT ER precautions given  PT declines going to ER or hospital today  Rest, fluids encouraged.  AVS with medical info given.  Pt was in full understanding and agreement with the plan.  Differential diagnosis, natural history, supportive care, and indications for immediate follow-up discussed. All questions answered. Patient agrees with the plan of care.  Follow-up as needed if symptoms worsen or fail to improve to PCP, Urgent care or Emergency Room.

## 2020-08-21 DIAGNOSIS — R11.2 NON-INTRACTABLE VOMITING WITH NAUSEA, UNSPECIFIED VOMITING TYPE: ICD-10-CM

## 2020-08-21 RX ORDER — DICYCLOMINE HCL 20 MG
TABLET ORAL
Qty: 20 TAB | Refills: 0 | Status: ON HOLD | OUTPATIENT
Start: 2020-08-21 | End: 2023-04-16

## 2020-08-21 NOTE — TELEPHONE ENCOUNTER
Called and spoke with pt about covid culture results which came back negative.   Pt thankful for the phone call.  Noé Bangura PA-C

## 2020-08-22 ENCOUNTER — ANESTHESIA EVENT (OUTPATIENT)
Dept: SURGERY | Facility: MEDICAL CENTER | Age: 70
DRG: 378 | End: 2020-08-22
Payer: COMMERCIAL

## 2020-08-22 ENCOUNTER — APPOINTMENT (OUTPATIENT)
Dept: RADIOLOGY | Facility: MEDICAL CENTER | Age: 70
DRG: 378 | End: 2020-08-22
Attending: EMERGENCY MEDICINE
Payer: COMMERCIAL

## 2020-08-22 ENCOUNTER — ANESTHESIA (OUTPATIENT)
Dept: SURGERY | Facility: MEDICAL CENTER | Age: 70
DRG: 378 | End: 2020-08-22
Payer: COMMERCIAL

## 2020-08-22 ENCOUNTER — HOSPITAL ENCOUNTER (INPATIENT)
Facility: MEDICAL CENTER | Age: 70
LOS: 2 days | DRG: 378 | End: 2020-08-24
Attending: EMERGENCY MEDICINE | Admitting: INTERNAL MEDICINE
Payer: COMMERCIAL

## 2020-08-22 DIAGNOSIS — R55 NEAR SYNCOPE: ICD-10-CM

## 2020-08-22 DIAGNOSIS — I95.9 HYPOTENSION, UNSPECIFIED HYPOTENSION TYPE: ICD-10-CM

## 2020-08-22 DIAGNOSIS — K92.2 GASTROINTESTINAL HEMORRHAGE, UNSPECIFIED GASTROINTESTINAL HEMORRHAGE TYPE: ICD-10-CM

## 2020-08-22 PROBLEM — N17.9 AKI (ACUTE KIDNEY INJURY) (HCC): Status: ACTIVE | Noted: 2020-08-22

## 2020-08-22 PROBLEM — R57.9 SHOCK (HCC): Status: ACTIVE | Noted: 2020-08-22

## 2020-08-22 PROBLEM — E78.5 HYPERLIPIDEMIA: Status: ACTIVE | Noted: 2020-08-22

## 2020-08-22 PROBLEM — K92.1 GASTROINTESTINAL HEMORRHAGE WITH MELENA: Status: ACTIVE | Noted: 2020-08-22

## 2020-08-22 LAB
ABO + RH BLD: NORMAL
ABO GROUP BLD: NORMAL
ALBUMIN SERPL BCP-MCNC: 2.9 G/DL (ref 3.2–4.9)
ALBUMIN/GLOB SERPL: 1.2 G/DL
ALP SERPL-CCNC: 54 U/L (ref 30–99)
ALT SERPL-CCNC: 18 U/L (ref 2–50)
ANION GAP SERPL CALC-SCNC: 13 MMOL/L (ref 7–16)
APPEARANCE UR: CLEAR
APTT PPP: 22 SEC (ref 24.7–36)
AST SERPL-CCNC: 11 U/L (ref 12–45)
BASOPHILS # BLD AUTO: 0.9 % (ref 0–1.8)
BASOPHILS # BLD: 0.1 K/UL (ref 0–0.12)
BILIRUB SERPL-MCNC: 0.2 MG/DL (ref 0.1–1.5)
BILIRUB UR QL STRIP.AUTO: NEGATIVE
BLD GP AB SCN SERPL QL: NORMAL
BUN SERPL-MCNC: 67 MG/DL (ref 8–22)
CALCIUM SERPL-MCNC: 8.7 MG/DL (ref 8.5–10.5)
CHLORIDE SERPL-SCNC: 106 MMOL/L (ref 96–112)
CO2 SERPL-SCNC: 18 MMOL/L (ref 20–33)
COLOR UR: YELLOW
COVID ORDER STATUS COVID19: NORMAL
CREAT SERPL-MCNC: 1.85 MG/DL (ref 0.5–1.4)
EKG IMPRESSION: NORMAL
EOSINOPHIL # BLD AUTO: 0.26 K/UL (ref 0–0.51)
EOSINOPHIL NFR BLD: 2.3 % (ref 0–6.9)
ERYTHROCYTE [DISTWIDTH] IN BLOOD BY AUTOMATED COUNT: 48.4 FL (ref 35.9–50)
GLOBULIN SER CALC-MCNC: 2.5 G/DL (ref 1.9–3.5)
GLUCOSE BLD-MCNC: 117 MG/DL (ref 65–99)
GLUCOSE SERPL-MCNC: 172 MG/DL (ref 65–99)
GLUCOSE UR STRIP.AUTO-MCNC: NEGATIVE MG/DL
HCT VFR BLD AUTO: 24.8 % (ref 42–52)
HGB BLD-MCNC: 7.8 G/DL (ref 14–18)
HGB BLD-MCNC: 7.9 G/DL (ref 14–18)
HGB BLD-MCNC: 7.9 G/DL (ref 14–18)
IMM GRANULOCYTES # BLD AUTO: 0.2 K/UL (ref 0–0.11)
IMM GRANULOCYTES NFR BLD AUTO: 1.8 % (ref 0–0.9)
INR PPP: 1.16 (ref 0.87–1.13)
KETONES UR STRIP.AUTO-MCNC: NEGATIVE MG/DL
LACTATE BLD-SCNC: 0.5 MMOL/L (ref 0.5–2)
LACTATE BLD-SCNC: 2.3 MMOL/L (ref 0.5–2)
LEUKOCYTE ESTERASE UR QL STRIP.AUTO: NEGATIVE
LYMPHOCYTES # BLD AUTO: 1.95 K/UL (ref 1–4.8)
LYMPHOCYTES NFR BLD: 17.4 % (ref 22–41)
MCH RBC QN AUTO: 31.7 PG (ref 27–33)
MCHC RBC AUTO-ENTMCNC: 31.9 G/DL (ref 33.7–35.3)
MCV RBC AUTO: 99.6 FL (ref 81.4–97.8)
MICRO URNS: NORMAL
MONOCYTES # BLD AUTO: 0.65 K/UL (ref 0–0.85)
MONOCYTES NFR BLD AUTO: 5.8 % (ref 0–13.4)
NEUTROPHILS # BLD AUTO: 8.02 K/UL (ref 1.82–7.42)
NEUTROPHILS NFR BLD: 71.8 % (ref 44–72)
NITRITE UR QL STRIP.AUTO: NEGATIVE
NRBC # BLD AUTO: 0 K/UL
NRBC BLD-RTO: 0 /100 WBC
PH UR STRIP.AUTO: 5 [PH] (ref 5–8)
PLATELET # BLD AUTO: 322 K/UL (ref 164–446)
PMV BLD AUTO: 10.2 FL (ref 9–12.9)
POTASSIUM SERPL-SCNC: 4.3 MMOL/L (ref 3.6–5.5)
PROT SERPL-MCNC: 5.4 G/DL (ref 6–8.2)
PROT UR QL STRIP: NEGATIVE MG/DL
PROTHROMBIN TIME: 15.2 SEC (ref 12–14.6)
RBC # BLD AUTO: 2.49 M/UL (ref 4.7–6.1)
RBC UR QL AUTO: NEGATIVE
RH BLD: NORMAL
SARS-COV-2 RNA RESP QL NAA+PROBE: NOTDETECTED
SODIUM SERPL-SCNC: 137 MMOL/L (ref 135–145)
SP GR UR STRIP.AUTO: 1.01
SPECIMEN SOURCE: NORMAL
TROPONIN T SERPL-MCNC: 90 NG/L (ref 6–19)
UROBILINOGEN UR STRIP.AUTO-MCNC: 0.2 MG/DL
WBC # BLD AUTO: 11.2 K/UL (ref 4.8–10.8)

## 2020-08-22 PROCEDURE — 85730 THROMBOPLASTIN TIME PARTIAL: CPT

## 2020-08-22 PROCEDURE — 700105 HCHG RX REV CODE 258: Performed by: ANESTHESIOLOGY

## 2020-08-22 PROCEDURE — 87040 BLOOD CULTURE FOR BACTERIA: CPT | Mod: 91

## 2020-08-22 PROCEDURE — 86901 BLOOD TYPING SEROLOGIC RH(D): CPT

## 2020-08-22 PROCEDURE — 85610 PROTHROMBIN TIME: CPT

## 2020-08-22 PROCEDURE — 700102 HCHG RX REV CODE 250 W/ 637 OVERRIDE(OP): Performed by: INTERNAL MEDICINE

## 2020-08-22 PROCEDURE — A9270 NON-COVERED ITEM OR SERVICE: HCPCS | Performed by: INTERNAL MEDICINE

## 2020-08-22 PROCEDURE — 87086 URINE CULTURE/COLONY COUNT: CPT

## 2020-08-22 PROCEDURE — 3E0G8GC INTRODUCTION OF OTHER THERAPEUTIC SUBSTANCE INTO UPPER GI, VIA NATURAL OR ARTIFICIAL OPENING ENDOSCOPIC: ICD-10-PCS | Performed by: INTERNAL MEDICINE

## 2020-08-22 PROCEDURE — 99292 CRITICAL CARE ADDL 30 MIN: CPT | Performed by: INTERNAL MEDICINE

## 2020-08-22 PROCEDURE — 86850 RBC ANTIBODY SCREEN: CPT

## 2020-08-22 PROCEDURE — U0003 INFECTIOUS AGENT DETECTION BY NUCLEIC ACID (DNA OR RNA); SEVERE ACUTE RESPIRATORY SYNDROME CORONAVIRUS 2 (SARS-COV-2) (CORONAVIRUS DISEASE [COVID-19]), AMPLIFIED PROBE TECHNIQUE, MAKING USE OF HIGH THROUGHPUT TECHNOLOGIES AS DESCRIBED BY CMS-2020-01-R: HCPCS

## 2020-08-22 PROCEDURE — 160048 HCHG OR STATISTICAL LEVEL 1-5: Performed by: INTERNAL MEDICINE

## 2020-08-22 PROCEDURE — 96374 THER/PROPH/DIAG INJ IV PUSH: CPT

## 2020-08-22 PROCEDURE — C9803 HOPD COVID-19 SPEC COLLECT: HCPCS | Performed by: EMERGENCY MEDICINE

## 2020-08-22 PROCEDURE — 99291 CRITICAL CARE FIRST HOUR: CPT | Performed by: INTERNAL MEDICINE

## 2020-08-22 PROCEDURE — 71045 X-RAY EXAM CHEST 1 VIEW: CPT

## 2020-08-22 PROCEDURE — 82962 GLUCOSE BLOOD TEST: CPT

## 2020-08-22 PROCEDURE — 700111 HCHG RX REV CODE 636 W/ 250 OVERRIDE (IP): Performed by: EMERGENCY MEDICINE

## 2020-08-22 PROCEDURE — 86900 BLOOD TYPING SEROLOGIC ABO: CPT

## 2020-08-22 PROCEDURE — 700105 HCHG RX REV CODE 258: Performed by: EMERGENCY MEDICINE

## 2020-08-22 PROCEDURE — C9113 INJ PANTOPRAZOLE SODIUM, VIA: HCPCS | Performed by: INTERNAL MEDICINE

## 2020-08-22 PROCEDURE — 93005 ELECTROCARDIOGRAM TRACING: CPT

## 2020-08-22 PROCEDURE — 700111 HCHG RX REV CODE 636 W/ 250 OVERRIDE (IP): Performed by: ANESTHESIOLOGY

## 2020-08-22 PROCEDURE — 160202 HCHG ENDO MINUTES - 1ST 30 MINS LEVEL 3: Performed by: INTERNAL MEDICINE

## 2020-08-22 PROCEDURE — 700105 HCHG RX REV CODE 258: Performed by: INTERNAL MEDICINE

## 2020-08-22 PROCEDURE — 770022 HCHG ROOM/CARE - ICU (200)

## 2020-08-22 PROCEDURE — 85025 COMPLETE CBC W/AUTO DIFF WBC: CPT

## 2020-08-22 PROCEDURE — 160035 HCHG PACU - 1ST 60 MINS PHASE I: Performed by: INTERNAL MEDICINE

## 2020-08-22 PROCEDURE — 160009 HCHG ANES TIME/MIN: Performed by: INTERNAL MEDICINE

## 2020-08-22 PROCEDURE — 83605 ASSAY OF LACTIC ACID: CPT | Mod: 91

## 2020-08-22 PROCEDURE — 700111 HCHG RX REV CODE 636 W/ 250 OVERRIDE (IP): Performed by: INTERNAL MEDICINE

## 2020-08-22 PROCEDURE — 99291 CRITICAL CARE FIRST HOUR: CPT

## 2020-08-22 PROCEDURE — 85018 HEMOGLOBIN: CPT | Mod: 91

## 2020-08-22 PROCEDURE — 80053 COMPREHEN METABOLIC PANEL: CPT

## 2020-08-22 PROCEDURE — 500066 HCHG BITE BLOCK, ECT: Performed by: INTERNAL MEDICINE

## 2020-08-22 PROCEDURE — 5A09357 ASSISTANCE WITH RESPIRATORY VENTILATION, LESS THAN 24 CONSECUTIVE HOURS, CONTINUOUS POSITIVE AIRWAY PRESSURE: ICD-10-PCS | Performed by: INTERNAL MEDICINE

## 2020-08-22 PROCEDURE — 81003 URINALYSIS AUTO W/O SCOPE: CPT

## 2020-08-22 PROCEDURE — 160002 HCHG RECOVERY MINUTES (STAT): Performed by: INTERNAL MEDICINE

## 2020-08-22 PROCEDURE — C9113 INJ PANTOPRAZOLE SODIUM, VIA: HCPCS | Performed by: EMERGENCY MEDICINE

## 2020-08-22 PROCEDURE — 93005 ELECTROCARDIOGRAM TRACING: CPT | Performed by: EMERGENCY MEDICINE

## 2020-08-22 PROCEDURE — 84484 ASSAY OF TROPONIN QUANT: CPT

## 2020-08-22 PROCEDURE — 0DB68ZX EXCISION OF STOMACH, VIA NATURAL OR ARTIFICIAL OPENING ENDOSCOPIC, DIAGNOSTIC: ICD-10-PCS | Performed by: INTERNAL MEDICINE

## 2020-08-22 PROCEDURE — 700101 HCHG RX REV CODE 250: Performed by: ANESTHESIOLOGY

## 2020-08-22 PROCEDURE — 503369 HCHG GOLDPROBE, 7 FR: Performed by: INTERNAL MEDICINE

## 2020-08-22 RX ORDER — BISACODYL 10 MG
10 SUPPOSITORY, RECTAL RECTAL
Status: DISCONTINUED | OUTPATIENT
Start: 2020-08-22 | End: 2020-08-24 | Stop reason: HOSPADM

## 2020-08-22 RX ORDER — EPINEPHRINE IN SOD CHLOR,ISO 1 MG/10 ML
SYRINGE (ML) INTRAVENOUS
Status: DISCONTINUED | OUTPATIENT
Start: 2020-08-22 | End: 2020-08-22 | Stop reason: HOSPADM

## 2020-08-22 RX ORDER — ONDANSETRON 2 MG/ML
4 INJECTION INTRAMUSCULAR; INTRAVENOUS
Status: DISCONTINUED | OUTPATIENT
Start: 2020-08-22 | End: 2020-08-24 | Stop reason: HOSPADM

## 2020-08-22 RX ORDER — METOPROLOL TARTRATE 1 MG/ML
1 INJECTION, SOLUTION INTRAVENOUS
Status: DISCONTINUED | OUTPATIENT
Start: 2020-08-22 | End: 2020-08-24 | Stop reason: HOSPADM

## 2020-08-22 RX ORDER — SODIUM CHLORIDE, SODIUM LACTATE, POTASSIUM CHLORIDE, CALCIUM CHLORIDE 600; 310; 30; 20 MG/100ML; MG/100ML; MG/100ML; MG/100ML
INJECTION, SOLUTION INTRAVENOUS CONTINUOUS
Status: DISCONTINUED | OUTPATIENT
Start: 2020-08-22 | End: 2020-08-23

## 2020-08-22 RX ORDER — LOVASTATIN 20 MG/1
20 TABLET ORAL EVERY EVENING
Status: DISCONTINUED | OUTPATIENT
Start: 2020-08-22 | End: 2020-08-24 | Stop reason: HOSPADM

## 2020-08-22 RX ORDER — LIDOCAINE HYDROCHLORIDE 20 MG/ML
INJECTION, SOLUTION EPIDURAL; INFILTRATION; INTRACAUDAL; PERINEURAL PRN
Status: DISCONTINUED | OUTPATIENT
Start: 2020-08-22 | End: 2020-08-22 | Stop reason: SURG

## 2020-08-22 RX ORDER — SODIUM CHLORIDE 9 MG/ML
1000 INJECTION, SOLUTION INTRAVENOUS ONCE
Status: COMPLETED | OUTPATIENT
Start: 2020-08-22 | End: 2020-08-22

## 2020-08-22 RX ORDER — HALOPERIDOL 5 MG/ML
1 INJECTION INTRAMUSCULAR
Status: DISCONTINUED | OUTPATIENT
Start: 2020-08-22 | End: 2020-08-24 | Stop reason: HOSPADM

## 2020-08-22 RX ORDER — DEXTROSE MONOHYDRATE 25 G/50ML
50 INJECTION, SOLUTION INTRAVENOUS
Status: DISCONTINUED | OUTPATIENT
Start: 2020-08-22 | End: 2020-08-24 | Stop reason: HOSPADM

## 2020-08-22 RX ORDER — SUCCINYLCHOLINE/SOD CL,ISO/PF 200MG/10ML
SYRINGE (ML) INTRAVENOUS PRN
Status: DISCONTINUED | OUTPATIENT
Start: 2020-08-22 | End: 2020-08-22 | Stop reason: SURG

## 2020-08-22 RX ORDER — GABAPENTIN 300 MG/1
300 CAPSULE ORAL 4 TIMES DAILY
Status: DISCONTINUED | OUTPATIENT
Start: 2020-08-22 | End: 2020-08-24 | Stop reason: HOSPADM

## 2020-08-22 RX ORDER — ACETAMINOPHEN 325 MG/1
650 TABLET ORAL EVERY 6 HOURS PRN
Status: DISCONTINUED | OUTPATIENT
Start: 2020-08-22 | End: 2020-08-24 | Stop reason: HOSPADM

## 2020-08-22 RX ORDER — LIDOCAINE HYDROCHLORIDE 40 MG/ML
SOLUTION TOPICAL PRN
Status: DISCONTINUED | OUTPATIENT
Start: 2020-08-22 | End: 2020-08-22 | Stop reason: SURG

## 2020-08-22 RX ORDER — ONDANSETRON 4 MG/1
4 TABLET, ORALLY DISINTEGRATING ORAL EVERY 4 HOURS PRN
Status: DISCONTINUED | OUTPATIENT
Start: 2020-08-22 | End: 2020-08-24 | Stop reason: HOSPADM

## 2020-08-22 RX ORDER — AMOXICILLIN 250 MG
2 CAPSULE ORAL 2 TIMES DAILY
Status: DISCONTINUED | OUTPATIENT
Start: 2020-08-22 | End: 2020-08-24 | Stop reason: HOSPADM

## 2020-08-22 RX ORDER — HYDRALAZINE HYDROCHLORIDE 20 MG/ML
20 INJECTION INTRAMUSCULAR; INTRAVENOUS EVERY 6 HOURS PRN
Status: DISCONTINUED | OUTPATIENT
Start: 2020-08-22 | End: 2020-08-24 | Stop reason: HOSPADM

## 2020-08-22 RX ORDER — MIDAZOLAM HYDROCHLORIDE 1 MG/ML
2 INJECTION INTRAMUSCULAR; INTRAVENOUS
Status: DISCONTINUED | OUTPATIENT
Start: 2020-08-22 | End: 2020-08-24 | Stop reason: HOSPADM

## 2020-08-22 RX ORDER — POLYETHYLENE GLYCOL 3350 17 G/17G
1 POWDER, FOR SOLUTION ORAL
Status: DISCONTINUED | OUTPATIENT
Start: 2020-08-22 | End: 2020-08-24 | Stop reason: HOSPADM

## 2020-08-22 RX ORDER — ONDANSETRON 2 MG/ML
INJECTION INTRAMUSCULAR; INTRAVENOUS PRN
Status: DISCONTINUED | OUTPATIENT
Start: 2020-08-22 | End: 2020-08-22 | Stop reason: SURG

## 2020-08-22 RX ORDER — ONDANSETRON 2 MG/ML
4 INJECTION INTRAMUSCULAR; INTRAVENOUS EVERY 4 HOURS PRN
Status: DISCONTINUED | OUTPATIENT
Start: 2020-08-22 | End: 2020-08-24 | Stop reason: HOSPADM

## 2020-08-22 RX ORDER — EPINEPHRINE 1 MG/ML(1)
AMPUL (ML) INJECTION PRN
Status: DISCONTINUED | OUTPATIENT
Start: 2020-08-22 | End: 2020-08-22 | Stop reason: SURG

## 2020-08-22 RX ORDER — DIPHENHYDRAMINE HYDROCHLORIDE 50 MG/ML
12.5 INJECTION INTRAMUSCULAR; INTRAVENOUS
Status: DISCONTINUED | OUTPATIENT
Start: 2020-08-22 | End: 2020-08-24 | Stop reason: HOSPADM

## 2020-08-22 RX ORDER — PANTOPRAZOLE SODIUM 40 MG/10ML
40 INJECTION, POWDER, LYOPHILIZED, FOR SOLUTION INTRAVENOUS 2 TIMES DAILY
Status: DISCONTINUED | OUTPATIENT
Start: 2020-08-22 | End: 2020-08-23

## 2020-08-22 RX ORDER — SODIUM CHLORIDE, SODIUM LACTATE, POTASSIUM CHLORIDE, CALCIUM CHLORIDE 600; 310; 30; 20 MG/100ML; MG/100ML; MG/100ML; MG/100ML
INJECTION, SOLUTION INTRAVENOUS
Status: DISCONTINUED | OUTPATIENT
Start: 2020-08-22 | End: 2020-08-22 | Stop reason: SURG

## 2020-08-22 RX ADMIN — PROPOFOL 20 MG: 10 INJECTION, EMULSION INTRAVENOUS at 17:00

## 2020-08-22 RX ADMIN — GABAPENTIN 300 MG: 300 CAPSULE ORAL at 19:49

## 2020-08-22 RX ADMIN — SODIUM CHLORIDE, POTASSIUM CHLORIDE, SODIUM LACTATE AND CALCIUM CHLORIDE: 600; 310; 30; 20 INJECTION, SOLUTION INTRAVENOUS at 16:59

## 2020-08-22 RX ADMIN — SODIUM CHLORIDE, POTASSIUM CHLORIDE, SODIUM LACTATE AND CALCIUM CHLORIDE: 600; 310; 30; 20 INJECTION, SOLUTION INTRAVENOUS at 20:30

## 2020-08-22 RX ADMIN — ALFENTANIL HYDROCHLORIDE 250 MCG: 500 INJECTION INTRAVENOUS at 17:13

## 2020-08-22 RX ADMIN — ONDANSETRON 4 MG: 2 INJECTION INTRAMUSCULAR; INTRAVENOUS at 17:23

## 2020-08-22 RX ADMIN — SODIUM CHLORIDE 80 MG: 9 INJECTION, SOLUTION INTRAVENOUS at 12:16

## 2020-08-22 RX ADMIN — LIDOCAINE HYDROCHLORIDE 160 MG: 40 SOLUTION TOPICAL at 17:04

## 2020-08-22 RX ADMIN — LOVASTATIN 20 MG: 20 TABLET ORAL at 19:02

## 2020-08-22 RX ADMIN — ROCURONIUM BROMIDE 5 MG: 10 INJECTION, SOLUTION INTRAVENOUS at 17:00

## 2020-08-22 RX ADMIN — SODIUM CHLORIDE 1000 ML: 9 INJECTION, SOLUTION INTRAVENOUS at 10:50

## 2020-08-22 RX ADMIN — PANTOPRAZOLE SODIUM 40 MG: 40 INJECTION, POWDER, LYOPHILIZED, FOR SOLUTION INTRAVENOUS at 19:01

## 2020-08-22 RX ADMIN — ALFENTANIL HYDROCHLORIDE 750 MCG: 500 INJECTION INTRAVENOUS at 17:00

## 2020-08-22 RX ADMIN — PROPOFOL 60 MG: 10 INJECTION, EMULSION INTRAVENOUS at 17:02

## 2020-08-22 RX ADMIN — LIDOCAINE HYDROCHLORIDE 40 MG: 20 INJECTION, SOLUTION EPIDURAL; INFILTRATION; INTRACAUDAL at 17:00

## 2020-08-22 RX ADMIN — MEROPENEM 500 MG: 500 INJECTION, POWDER, FOR SOLUTION INTRAVENOUS at 14:37

## 2020-08-22 RX ADMIN — Medication 140 MG: at 17:02

## 2020-08-22 RX ADMIN — MEROPENEM 500 MG: 500 INJECTION, POWDER, FOR SOLUTION INTRAVENOUS at 18:58

## 2020-08-22 RX ADMIN — EPINEPHRINE 5 MCG: 1 INJECTION INTRAMUSCULAR; INTRAVENOUS; SUBCUTANEOUS at 17:13

## 2020-08-22 ASSESSMENT — COGNITIVE AND FUNCTIONAL STATUS - GENERAL
DAILY ACTIVITIY SCORE: 24
SUGGESTED CMS G CODE MODIFIER DAILY ACTIVITY: CH
SUGGESTED CMS G CODE MODIFIER MOBILITY: CH
MOBILITY SCORE: 24

## 2020-08-22 ASSESSMENT — LIFESTYLE VARIABLES
EVER HAD A DRINK FIRST THING IN THE MORNING TO STEADY YOUR NERVES TO GET RID OF A HANGOVER: NO
DO YOU DRINK ALCOHOL: NO
EVER_SMOKED: NEVER
CONSUMPTION TOTAL: INCOMPLETE
TOTAL SCORE: 0
TOTAL SCORE: 0
HAVE PEOPLE ANNOYED YOU BY CRITICIZING YOUR DRINKING: NO
EVER FELT BAD OR GUILTY ABOUT YOUR DRINKING: NO
TOTAL SCORE: 0
HAVE YOU EVER FELT YOU SHOULD CUT DOWN ON YOUR DRINKING: NO

## 2020-08-22 ASSESSMENT — ENCOUNTER SYMPTOMS
COUGH: 0
BACK PAIN: 1
SHORTNESS OF BREATH: 0
VOMITING: 1
DIZZINESS: 1
FEVER: 0
DIARRHEA: 1
ABDOMINAL PAIN: 0

## 2020-08-22 ASSESSMENT — PATIENT HEALTH QUESTIONNAIRE - PHQ9
SUM OF ALL RESPONSES TO PHQ9 QUESTIONS 1 AND 2: 0
2. FEELING DOWN, DEPRESSED, IRRITABLE, OR HOPELESS: NOT AT ALL
1. LITTLE INTEREST OR PLEASURE IN DOING THINGS: NOT AT ALL

## 2020-08-22 ASSESSMENT — FIBROSIS 4 INDEX
FIB4 SCORE: 1.87
FIB4 SCORE: 0.56
FIB4 SCORE: 1.87

## 2020-08-22 ASSESSMENT — PAIN SCALES - GENERAL: PAIN_LEVEL: 0

## 2020-08-22 NOTE — ED NOTES
Pt resting. Wife at bedside, Pt remains hypotensive/ IV fluids infusing. Pt remains alert/oriented. Pt's wife at bedside . 2nd IV started.

## 2020-08-22 NOTE — ASSESSMENT & PLAN NOTE
-Most likely d/t GIB in setting of beta blockade and anti-HTN meds.  Ddx includes septic with recent ESBL infection  -meropenem until septic shock excluded as cause  -hold all anti-HTN meds  -BP normalizing with fluids.  Appears fluid responsive by IV  -will cont fluid resuscitation if BP remains low.  If MAP doesn't come above 65 with fluids, place line and start pressors

## 2020-08-22 NOTE — ED TRIAGE NOTES
Pt BIB [facility], c/o dizziness/ weakness. Upon EMS arrival pt's BP 60/palp. IV fluids infusing . Pt's Heart rate PTA 48, given 0.5mg Atropine PTA. Pt arrives alert/oriented, states here was here last week for sepsis/ UTI.  Appropriate PPE in place by pt and stafff.

## 2020-08-22 NOTE — ASSESSMENT & PLAN NOTE
Minimal elevation  EKG reassuring  No chest pain or symptoms concerning for ACS  No indication to trend trops at present

## 2020-08-22 NOTE — ASSESSMENT & PLAN NOTE
Low BP, but took all anti-HTN meds this morning  Coreg inhibiting heart rate response to blood loss  Suspect upper d/t melena    IV access: 2x 18g  Type and screen: done  Coags: TEG pending  NPO  Q4h Hgb  Transfuse <Hgb 7  Pantoprazole IV    Endoscopic therapy: GI consult pending.  Called by ER

## 2020-08-22 NOTE — CONSULTS
Critical Care Consultation/H&P  Date of consult: 8/22/2020    Referring Physician  Carolyn Kay M.D.    Reason for Consultation  GIB, hypotension    History of Presenting Illness  69 y.o. male who presented 8/22/2020 with h/o HTN, DM, HLD, back pain and recent admission for ESBL UTI who returns to the ER 5d after admission with weakness and a home BP of 75/40.  He did take his anti-HTN meds today. Pt reports he took all his prescribed macrobid, which ended yesterday.  Since discharge he has been having black tarry diarrhea twice daily.  Has had vomiting some days, no blood. He has not been having fevers, HA, CP, SOB, syncope.  Has had orthostasis. His cystitis symptoms of dysuria and fouling smelling urine have resolved. Has prescribed ibuprofen but has not been taking it recently.  His only OTC med is tums.     Code Status  DNAR, I OK    Review of Systems  Review of Systems   Constitutional: Positive for malaise/fatigue. Negative for fever.   Respiratory: Negative for cough and shortness of breath.    Cardiovascular: Positive for leg swelling (R leg feels swollen). Negative for chest pain.   Gastrointestinal: Positive for diarrhea, melena and vomiting. Negative for abdominal pain.   Genitourinary: Negative for dysuria.   Musculoskeletal: Positive for back pain (chronic).   Neurological: Positive for dizziness.   All other systems reviewed and are negative.      Past Medical History   has a past medical history of Arthritis, Back pain, Chickenpox, High cholesterol, Hypertension, Obesity, Pain, Pneumonia, Polycythemia, secondary, Sleep apnea, Snoring, and Type II or unspecified type diabetes mellitus without mention of complication, not stated as uncontrolled.    Surgical History   has a past surgical history that includes lumbar fusion posterior (1/21/2015); inguinal hernia repair (Right, 2/16/2016); lumbar laminectomy diskectomy (2/18/2018); hardware removal neuro (2/18/2018); carpal tunnel release (Right,  2014); shoulder arthroscopy (Right, 2014); lumbar decompression (2004); lumbar decompression (2003); cervical disk and fusion anterior (2005); and pr implant neurostim/ (7/23/2019).    Family History  family history includes Heart Attack in his brother and father; Sleep Apnea in his mother.    Social History   reports that he has never smoked. He quit smokeless tobacco use about 20 months ago.  His smokeless tobacco use included chew. He reports current alcohol use. He reports that he does not use drugs.  Retired     Medications  Home Medications    **Home medications have not yet been reviewed for this encounter**       Current Facility-Administered Medications   Medication Dose Route Frequency Provider Last Rate Last Dose   • meropenem (MERREM) 500 mg in  mL IVPB  500 mg Intravenous Q6HRS Carolyn Kay M.D.       • senna-docusate (PERICOLACE or SENOKOT S) 8.6-50 MG per tablet 2 Tab  2 Tab Oral BID Carolyn Kay M.D.        And   • polyethylene glycol/lytes (MIRALAX) PACKET 1 Packet  1 Packet Oral QDAY PRN Carolyn Kay M.D.        And   • magnesium hydroxide (MILK OF MAGNESIA) suspension 30 mL  30 mL Oral QDAY PRN Carolyn Kay M.D.        And   • bisacodyl (DULCOLAX) suppository 10 mg  10 mg Rectal QDAY PRN Carolyn Kay M.D.       • acetaminophen (TYLENOL) tablet 650 mg  650 mg Oral Q6HRS PRN Carolyn Kay M.D.       • ondansetron (ZOFRAN) syringe/vial injection 4 mg  4 mg Intravenous Q4HRS PRN Carolyn Kay M.D.       • ondansetron (ZOFRAN ODT) dispertab 4 mg  4 mg Oral Q4HRS PRN Carolyn Kay M.D.       • insulin regular (HumuLIN R,NovoLIN R) injection  1-6 Units Subcutaneous Q6HRS Carolyn Kay M.D.        And   • glucose 4 g chewable tablet 16 g  16 g Oral Q15 MIN PRN Carolyn Kay M.D.        And   • dextrose 50% (D50W) injection 50 mL  50 mL Intravenous Q15 MIN PRN Carolyn E Rahul, M.D.       • gabapentin (NEURONTIN) capsule 300 mg  300 mg Oral 4X/DAY Carolyn GALLAGHER  FRANKIE Kay.       • lovastatin (MEVACOR) tablet 20 mg  20 mg Oral Q EVENING Carolyn Kay M.D.       • hydrALAZINE (APRESOLINE) injection 20 mg  20 mg Intravenous Q6HRS PRN Carolyn Kay M.D.         Current Outpatient Medications   Medication Sig Dispense Refill   • dicyclomine (BENTYL) 20 MG Tab TAKE ONE TABLET BY MOUTH EVERY 6 HOURS FOR 5 DAYS 20 Tab 0   • promethazine (PHENERGAN) 25 MG Tab Take 1 Tab by mouth every 6 hours as needed for Nausea/Vomiting. 30 Tab 0   • gabapentin (NEURONTIN) 300 MG Cap Take 1 Cap by mouth 4 times a day. 90 Cap 0   • Omega-3 Fatty Acids (FISH OIL) 1000 MG Cap capsule Take 1,000 mg by mouth every morning.     • tamsulosin (FLOMAX) 0.4 MG capsule Take 0.4 mg by mouth ONE-HALF HOUR AFTER BREAKFAST.     • ASPIRIN 81 PO Take 81 mg by mouth every morning.     • ibuprofen (MOTRIN) 800 MG Tab Take 800 mg by mouth every 8 hours as needed for Mild Pain.     • cloNIDine (CATAPRESS) 0.2 MG Tab Take 1 Tab by mouth 2 times a day. For further refills please contact new cardiologist. Thank you 60 Tab 0   • lovastatin (MEVACOR) 20 MG Tab Take 1 Tab by mouth every evening. 90 Tab 1   • fosinopril (MONOPRIL) 40 MG tablet Take 1 Tab by mouth every day. For further refills please contact new cardiologist. Thank you 30 Tab 0   • amLODIPine (NORVASC) 10 MG Tab Take 1 Tab by mouth every day. For further refills please contact new cardiologist. Thank you 30 Tab 0   • carvedilol (COREG) 25 MG Tab TAKE ONE TABLET BY MOUTH TWICE A DAY WITH MEALS 180 Tab 0   • triamterene-hctz (MAXZIDE-25/DYAZIDE) 37.5-25 MG Tab Take 1 Tab by mouth every morning.     • terazosin (HYTRIN) 5 MG Cap TAKE TWO CAPSULES BY MOUTH EVERY NIGHT AT BEDTIME (GENERIC HYTRIN) 180 Cap 10   • metformin (GLUCOPHAGE) 850 MG Tab Take 850 mg by mouth 2 times a day.         Allergies  No Known Allergies    Vital Signs last 24 hours  Temp:  [36.6 °C (97.8 °F)] 36.6 °C (97.8 °F)  Pulse:  [47-62] 53  Resp:  [17-23] 20  BP: (73-98)/(4656)  98/56  SpO2:  [93 %-100 %] 100 %    Physical Exam  Physical Exam  Constitutional:       General: He is not in acute distress.     Appearance: He is ill-appearing.      Comments: Very fatigues appearing   HENT:      Mouth/Throat:      Mouth: Mucous membranes are dry.   Eyes:      Pupils: Pupils are equal, round, and reactive to light.   Cardiovascular:      Rate and Rhythm: Normal rate and regular rhythm.      Pulses: Normal pulses.      Heart sounds: Normal heart sounds.   Pulmonary:      Effort: Pulmonary effort is normal.      Breath sounds: Normal breath sounds.   Abdominal:      General: Bowel sounds are normal. There is distension.      Tenderness: There is no abdominal tenderness. There is no right CVA tenderness, left CVA tenderness, guarding or rebound.   Musculoskeletal:      Left lower leg: Edema (trace) present.   Skin:     Coloration: Skin is pale.   Neurological:      General: No focal deficit present.      Mental Status: He is alert and oriented to person, place, and time.   Psychiatric:         Mood and Affect: Mood normal.         Fluids    Intake/Output Summary (Last 24 hours) at 8/22/2020 1311  Last data filed at 8/22/2020 1243  Gross per 24 hour   Intake 1100 ml   Output --   Net 1100 ml       Laboratory  Recent Results (from the past 48 hour(s))   COVID/SARS COV-2 PCR    Collection Time: 08/20/20  1:17 PM    Specimen: Nasopharyngeal; Respirate   Result Value Ref Range    COVID Order Status Received    SARS-CoV-2, PCR (In-House)    Collection Time: 08/20/20  1:17 PM   Result Value Ref Range    SARS-CoV-2 Source Nasal Swab     SARS-CoV-2 by PCR NotDetected    POCT Influenza A/B    Collection Time: 08/20/20  1:20 PM   Result Value Ref Range    Rapid Influenza A-B negative     Internal Control Positive Valid     Internal Control Negative Valid    EKG (NOW)    Collection Time: 08/22/20  9:59 AM   Result Value Ref Range    Report       [facility] Emergency Dept.    Test Date:   2020  Pt Name:    SEAMUS ALVAREZ                   Department: ER  MRN:        [id number]                      Room:        12  Gender:     Male                         Technician: LANA  :        1950                   Requested By:ER TRIAGE PROTOCOL  Order #:    [id number]                    Reading MD: Alia Moffett    Measurements  Intervals                                Axis  Rate:       62                           P:          44  AZ:         196                          QRS:        58  QRSD:       92                           T:          37  QT:         444  QTc:        451    Interpretive Statements  SINUS RHYTHM rate 62  Normal axis  Normal intervals  ATRIAL PREMATURE COMPLEX  No ST elevation or depression  Compared to ECG 2020 15:47:22  Sinus bradycardia no longer present  Electronically Signed On 2020 12:22:31 PDT by Alia Moffett     CBC WITH DIFFERENTIAL    Collection Time: 20 10:18 AM   Result Value Ref Range    WBC 11.2 (H) 4.8 - 10.8 K/uL    RBC 2.49 (L) 4.70 - 6.10 M/uL    Hemoglobin 7.9 (L) 14.0 - 18.0 g/dL    Hematocrit 24.8 (L) 42.0 - 52.0 %    MCV 99.6 (H) 81.4 - 97.8 fL    MCH 31.7 27.0 - 33.0 pg    MCHC 31.9 (L) 33.7 - 35.3 g/dL    RDW 48.4 35.9 - 50.0 fL    Platelet Count 322 164 - 446 K/uL    MPV 10.2 9.0 - 12.9 fL    Neutrophils-Polys 71.80 44.00 - 72.00 %    Lymphocytes 17.40 (L) 22.00 - 41.00 %    Monocytes 5.80 0.00 - 13.40 %    Eosinophils 2.30 0.00 - 6.90 %    Basophils 0.90 0.00 - 1.80 %    Immature Granulocytes 1.80 (H) 0.00 - 0.90 %    Nucleated RBC 0.00 /100 WBC    Neutrophils (Absolute) 8.02 (H) 1.82 - 7.42 K/uL    Lymphs (Absolute) 1.95 1.00 - 4.80 K/uL    Monos (Absolute) 0.65 0.00 - 0.85 K/uL    Eos (Absolute) 0.26 0.00 - 0.51 K/uL    Baso (Absolute) 0.10 0.00 - 0.12 K/uL    Immature Granulocytes (abs) 0.20 (H) 0.00 - 0.11 K/uL    NRBC (Absolute) 0.00 K/uL   COMP METABOLIC PANEL    Collection Time: 20 10:18 AM   Result Value Ref Range    Sodium 137 135  - 145 mmol/L    Potassium 4.3 3.6 - 5.5 mmol/L    Chloride 106 96 - 112 mmol/L    Co2 18 (L) 20 - 33 mmol/L    Anion Gap 13.0 7.0 - 16.0    Glucose 172 (H) 65 - 99 mg/dL    Bun 67 (H) 8 - 22 mg/dL    Creatinine 1.85 (H) 0.50 - 1.40 mg/dL    Calcium 8.7 8.5 - 10.5 mg/dL    AST(SGOT) 11 (L) 12 - 45 U/L    ALT(SGPT) 18 2 - 50 U/L    Alkaline Phosphatase 54 30 - 99 U/L    Total Bilirubin 0.2 0.1 - 1.5 mg/dL    Albumin 2.9 (L) 3.2 - 4.9 g/dL    Total Protein 5.4 (L) 6.0 - 8.2 g/dL    Globulin 2.5 1.9 - 3.5 g/dL    A-G Ratio 1.2 g/dL   LACTIC ACID    Collection Time: 08/22/20 10:18 AM   Result Value Ref Range    Lactic Acid 2.3 (H) 0.5 - 2.0 mmol/L   TROPONIN    Collection Time: 08/22/20 10:18 AM   Result Value Ref Range    Troponin T 90 (H) 6 - 19 ng/L   APTT    Collection Time: 08/22/20 10:18 AM   Result Value Ref Range    APTT 22.0 (L) 24.7 - 36.0 sec   PROTHROMBIN TIME (INR)    Collection Time: 08/22/20 10:18 AM   Result Value Ref Range    PT 15.2 (H) 12.0 - 14.6 sec    INR 1.16 (H) 0.87 - 1.13   COD (ADULT)    Collection Time: 08/22/20 10:18 AM   Result Value Ref Range    ABO Grouping Only A     Rh Grouping Only POS     Antibody Screen-Cod NEG    ESTIMATED GFR    Collection Time: 08/22/20 10:18 AM   Result Value Ref Range    GFR If  44 (A) >60 mL/min/1.73 m 2    GFR If Non  36 (A) >60 mL/min/1.73 m 2   Lactic acid (lactate): Repeat if initial lactic acid result is greater than 2    Collection Time: 08/22/20 12:05 PM   Result Value Ref Range    Lactic Acid 0.5 0.5 - 2.0 mmol/L   COVID/SARS CoV-2 PCR    Collection Time: 08/22/20 12:25 PM    Specimen: Nasopharyngeal; Respirate   Result Value Ref Range    COVID Order Status Received    SARS-CoV-2, PCR (In-House)    Collection Time: 08/22/20 12:25 PM   Result Value Ref Range    SARS-CoV-2 Source NP Swab        Imaging  DX-CHEST-PORTABLE (1 VIEW)   Final Result      No acute cardiac or pulmonary abnormalities are identified.        POCUS:  Normal EF.  IVC hard to see the cavoatrial junction makes IVC look small/collapsing  Assessment/Plan  * Gastrointestinal hemorrhage with melena  Assessment & Plan  Low BP, but took all anti-HTN meds this morning  Coreg inhibiting heart rate response to blood loss  Suspect upper d/t melena    IV access: 2x 18g  Type and screen: done  Coags: TEG pending  NPO  Q4h Hgb  Transfuse <Hgb 7  Pantoprazole IV    Endoscopic therapy: GI consult pending.  Called by ER      GENI (acute kidney injury) ([facility])  Assessment & Plan  Likely d/t GIB  Trend Cr and UOP with resuscitation    Shock ([facility])  Assessment & Plan  -Most likely d/t GIB in setting of beta blockade and anti-HTN meds.  Ddx includes septic with recent ESBL infection  -meropenem until septic shock excluded as cause  -hold all anti-HTN meds  -BP normalizing with fluids.  Appears fluid responsive by IV  -will cont fluid resuscitation if BP remains low.  If MAP doesn't come above 65 with fluids, place line and start pressors    Elevated troponin- (present on admission)  Assessment & Plan  Minimal elevation  EKG reassuring  No chest pain or symptoms concerning for ACS  No indication to trend trops at present    Essential hypertension- (present on admission)  Assessment & Plan  On 5 agents plus tamsulosin and terazosin  Hold home meds while GIB/hypotensive  Warrants secondary causes work-up if not previously done    Sleep apnea- (present on admission)  Assessment & Plan  CPAP 8 with nasal pillows at home  Use home machine if available    Hyperlipidemia  Assessment & Plan  Home statin    Type 2 diabetes mellitus without complication, without long-term current use of insulin ([facility])- (present on admission)  Assessment & Plan  Hold home metformin  SSI  NPO while GIB    Spinal stenosis, lumbar region, without neurogenic claudication- (present on admission)  Assessment & Plan  Cont home gabapentin  Spinal stimulator in place    Benign prostatic hyperplasia with urinary frequency-  (present on admission)  Assessment & Plan  Hold home terazosin and tamsulosin with hypotension    Obesity (BMI 30-39.9)- (present on admission)  Assessment & Plan  BMI 35, counseling when appropriate      Discussed patient condition and risk of morbidity and/or mortality with Family, RN, Pharmacy, Patient and GI.    The patient remains critically ill with active GIB.  Critical care time = 80 minutes in directly providing and coordinating critical care and extensive data review.  No time overlap and excludes procedures.

## 2020-08-22 NOTE — CONSULTS
Gastroenterology Consult Note     Date of Consult: 8/22/20     Reason for consult: Upper GI bleeding     HPI: 69-year-old male who presented to the emergency room with complaints of weakness, dizziness and history of passing dark tarry stool for 2 days.  On initial evaluation in the emergency room, he was hypotensive which responded to fluids.  Patient is also taking multiple antihypertensive medications.  His initial hemoglobin was 7.9 which is about 4 g lower than his baseline from few months ago.  Patient also had history of emesis about 3 days before admission which contained only clear gastric contents.  He has been taking Motrin once a week or so in addition to aspirin he takes for his heart.  He denies any abdominal pain or heartburn.  He has not had prior EGD and his colonoscopy was several years ago..  There is no family history of stomach or colon cancer.     PMHX:  Past Medical History:   Diagnosis Date   • Arthritis     back, hips   • Back pain    • Chickenpox    • High cholesterol    • Hypertension    • Obesity    • Pain     back pain s/p multiple surgeries and spinal stimulator   • Pneumonia    • Polycythemia, secondary    • Sleep apnea     cpap   • Snoring    • Type II or unspecified type diabetes mellitus without mention of complication, not stated as uncontrolled     oral meds only          PSurgHx:   Past Surgical History:   Procedure Laterality Date   • PB IMPLANT NEUROSTIM/  7/23/2019    Procedure: INSERTION, NEUROSTIMULATOR, PERMANENT, SPINAL CORD;  Surgeon: Teddy Santos M.D.;  Location: SURGERY [facility];  Service: Pain Management   • LUMBAR LAMINECTOMY DISKECTOMY  2/18/2018    Procedure: LUMBAR LAMINECTOMY DISKECTOMY- LT L1-2 HEMILAMI-;  Surgeon: oTm Pittman M.D.;  Location: SURGERY [facility];  Service: Neurosurgery   • HARDWARE REMOVAL NEURO  2/18/2018    Procedure: HARDWARE REMOVAL NEURO- L2-3 PEDICLE SCREWS;  Surgeon: Tom  FRANKIE Pittman.;  Location: SURGERY [facility];  Service: Neurosurgery   • INGUINAL HERNIA REPAIR Right 2/16/2016    Procedure: INGUINAL HERNIA REPAIR;  Surgeon: Sj Baird M.D.;  Location: SURGERY [facility];  Service:    • LUMBAR FUSION POSTERIOR  1/21/2015    Performed by Ko Suarez M.D. at SURGERY [facility]   • CARPAL TUNNEL RELEASE Right 2014   • SHOULDER ARTHROSCOPY Right 2014   • CERVICAL DISK AND FUSION ANTERIOR  2005   • LUMBAR DECOMPRESSION  2004   • LUMBAR DECOMPRESSION  2003        ALLERGIES:Patient has no known allergies.    No current facility-administered medications on file prior to encounter.      Current Outpatient Medications on File Prior to Encounter   Medication Sig Dispense Refill   • dicyclomine (BENTYL) 20 MG Tab TAKE ONE TABLET BY MOUTH EVERY 6 HOURS FOR 5 DAYS 20 Tab 0   • promethazine (PHENERGAN) 25 MG Tab Take 1 Tab by mouth every 6 hours as needed for Nausea/Vomiting. 30 Tab 0   • gabapentin (NEURONTIN) 300 MG Cap Take 1 Cap by mouth 4 times a day. 90 Cap 0   • Omega-3 Fatty Acids (FISH OIL) 1000 MG Cap capsule Take 1,000 mg by mouth every morning.     • tamsulosin (FLOMAX) 0.4 MG capsule Take 0.4 mg by mouth ONE-HALF HOUR AFTER BREAKFAST.     • ASPIRIN 81 PO Take 81 mg by mouth every morning.     • ibuprofen (MOTRIN) 800 MG Tab Take 800 mg by mouth every 8 hours as needed for Mild Pain.     • cloNIDine (CATAPRESS) 0.2 MG Tab Take 1 Tab by mouth 2 times a day. For further refills please contact new cardiologist. Thank you 60 Tab 0   • lovastatin (MEVACOR) 20 MG Tab Take 1 Tab by mouth every evening. 90 Tab 1   • fosinopril (MONOPRIL) 40 MG tablet Take 1 Tab by mouth every day. For further refills please contact new cardiologist. Thank you 30 Tab 0   • amLODIPine (NORVASC) 10 MG Tab Take 1 Tab by mouth every day. For further refills please contact new cardiologist. Thank you 30 Tab 0   • carvedilol (COREG) 25 MG Tab TAKE ONE TABLET BY MOUTH TWICE A DAY WITH  MEALS 180 Tab 0   • triamterene-hctz (MAXZIDE-25/DYAZIDE) 37.5-25 MG Tab Take 1 Tab by mouth every morning.     • terazosin (HYTRIN) 5 MG Cap TAKE TWO CAPSULES BY MOUTH EVERY NIGHT AT BEDTIME (GENERIC HYTRIN) 180 Cap 10   • metformin (GLUCOPHAGE) 850 MG Tab Take 850 mg by mouth 2 times a day.          SocHx:   Social History     Socioeconomic History   • Marital status:      Spouse name: Not on file   • Number of children: Not on file   • Years of education: Not on file   • Highest education level: Not on file   Occupational History   • Not on file   Social Needs   • Financial resource strain: Not on file   • Food insecurity     Worry: Not on file     Inability: Not on file   • Transportation needs     Medical: Not on file     Non-medical: Not on file   Tobacco Use   • Smoking status: Never Smoker   • Smokeless tobacco: Former User     Types: Chew   Substance and Sexual Activity   • Alcohol use: Yes     Comment: twice a month   • Drug use: No   • Sexual activity: Not on file   Lifestyle   • Physical activity     Days per week: Not on file     Minutes per session: Not on file   • Stress: Not on file   Relationships   • Social connections     Talks on phone: Not on file     Gets together: Not on file     Attends Scientologist service: Not on file     Active member of club or organization: Not on file     Attends meetings of clubs or organizations: Not on file     Relationship status: Not on file   • Intimate partner violence     Fear of current or ex partner: Not on file     Emotionally abused: Not on file     Physically abused: Not on file     Forced sexual activity: Not on file   Other Topics Concern   • Not on file   Social History Narrative   • Not on file        FAMHx:   Family History   Problem Relation Age of Onset   • Sleep Apnea Mother    • Heart Attack Father    • Heart Attack Brother         ROS:  Constitutional: No fevers, chills, no night sweats, no weight changes  HEENT: no vision or hearing changes,  no dry mouth, no change in smell  CARDIO: no palpitations, no orthopnea, no chest pain  PULM: no cough, no shortness of breath  NEURO: no Seizures, no memory impairment, no change in sensation  GI: as above  : no dysuria, no hematuria  HEME: no anemia, no easy brusing  MUSCULOSKELETAL: no muscle aches, no back pain, no arthritis  PSYCH: no anxiety or depression  SKIN: no rashes     PE:  Vitals:    08/22/20 1300 08/22/20 1301 08/22/20 1349 08/22/20 1400   BP:  104/58  101/54   Pulse: (!) 52 (!) 50  (!) 51   Resp: 18 14  17   Temp:    36.1 °C (97 °F)   TempSrc:    Temporal   SpO2: 99% 99% 96% 99%   Weight:   118.1 kg (260 lb 5.8 oz)    Height:   1.829 m (6')      Gen: AAOx3, NAD, lying in bed  HEENT: PERRL, EOMI, nares patent, Mucous membranes moist  Neck: supple, no cervical or supraclavicular adenopathy  CVS: regular rhythm, normal rate, no MRG  Pulm: CTAB, no crackles  Abd: soft, Nd, NT, no guarding or rebound  Ext: no edema, normal sensation  NEURO: grossly normal, no weakness  Skin: warm, no rash  Psych: normal Affect, no anxiety     LABS:  Lab Results   Component Value Date/Time    SODIUM 137 08/22/2020 10:18 AM    POTASSIUM 4.3 08/22/2020 10:18 AM    CHLORIDE 106 08/22/2020 10:18 AM    CO2 18 (L) 08/22/2020 10:18 AM    GLUCOSE 172 (H) 08/22/2020 10:18 AM    BUN 67 (H) 08/22/2020 10:18 AM    CREATININE 1.85 (H) 08/22/2020 10:18 AM    BUNCREATRAT 22 07/12/2013 08:09 AM      Lab Results   Component Value Date/Time    WBC 11.2 (H) 08/22/2020 10:18 AM    RBC 2.49 (L) 08/22/2020 10:18 AM    HEMOGLOBIN 7.9 (L) 08/22/2020 10:18 AM    HEMATOCRIT 24.8 (L) 08/22/2020 10:18 AM    MCV 99.6 (H) 08/22/2020 10:18 AM    MCH 31.7 08/22/2020 10:18 AM    MCHC 31.9 (L) 08/22/2020 10:18 AM    MPV 10.2 08/22/2020 10:18 AM    NEUTSPOLYS 71.80 08/22/2020 10:18 AM    LYMPHOCYTES 17.40 (L) 08/22/2020 10:18 AM    MONOCYTES 5.80 08/22/2020 10:18 AM    EOSINOPHILS 2.30 08/22/2020 10:18 AM    BASOPHILS 0.90 08/22/2020 10:18 AM        Lab  Results   Component Value Date/Time    PROTHROMBTM 15.2 (H) 08/22/2020 10:18 AM    INR 1.16 (H) 08/22/2020 10:18 AM      Recent Labs     08/22/20  1018   ASTSGOT 11*   ALTSGPT 18   TBILIRUBIN 0.2   GLOBULIN 2.5   INR 1.16*          ASSESSMENT:   69-year-old male presenting with acute anemia and melena concerning for upper GI bleeding.  Most likely he has peptic ulcer disease secondary to NSAIDs.  Hypotension may be secondary to blood loss but mostly contributed by antihypertensive medications which are on hold.  He has evidence of stress ischemia with elevated troponin without EKG changes.      PROBLEMS:  1.  Upper GI bleeding  2.  Hypotension, multifactorial etiology  3.  Nonsignificant elevation of troponin due to stress ischemia     PLAN:   1.  IV PPI  2.  Esophagogastroduodenoscopy today for further evaluation and for therapeutic intervention if indicated.        Thank you for this consult.

## 2020-08-22 NOTE — ED PROVIDER NOTES
"ED Provider Note    Scribed for Alia Moffett M.D. by Art Rose. 8/22/2020  10:22 AM    Primary care provider: Chuck Chappell M.D.  Means of arrival: Ambulance  History obtained from: Patient  History limited by: None  CHIEF COMPLAINT  Chief Complaint   Patient presents with   • Dizziness   • Hypotension       HPI  Ramu Barber is a 69 y.o. male who presents to the ED for dizziness/lightheadedness and weakness onset 2 days ago. Patient was recently discharged from the hospital after being treated for UTI with sepsis.  He was feeling well up until 3 days ago when he started vomiting.  He vomited for 2 days, went to urgent care, received some antiemetics, and has not vomited in the last 24 to 36 hours.  No vomiting since yesterday or today. Wife adds that the patient had 2 episodes of black \"tar like\" diarrhea 2 days ago, which she attributed to patient being on antibiotics for a recent ESBL UTI infection with sepsis requiring hospitalization.  No abdominal pain.  No chest pain.  No headache.  No cough.  No shortness of breath.  Patient was here at [facility] last week admitted with UTI/sepsis and was sent home 7 days ago with Macrobid.  Patient denies any fever, syncope, abdominal pain, headache, chest pain, heaviness, or discomfort but patient's wife states that patient had a low fever 2 nights ago. Patient's son in law, who is a hospitalist in [place], states that patient has a history of diastolic dysfunction with an EF of 55 to 60, but never had pulmonary edema. Wife notes that the patient slept 12 hours yesterday and proceeded to still be tired despite sleeping for 12 hours. Patient states that he takes Aspirin, but is on no other blood thinners. Patient states that he saw a GI doctor \"many years ago\". Patient denies any history of reflux, GERD, or ulcers.  Patient arrives via EMS.  When EMS arrived on scene patient's blood pressure was 60 over palp.  Additionally, EMS found his heart " rate to be in the 40s to 50s so they gave him 1/2 mg of atropine.  Heart rate here in the ER is in the 60s.  No evidence of block.  Blood pressure is in the high 70s to low 80s systolic here in the ER but patient has good peripheral pulses.    PPE Note: I personally donned full PPE for all patient encounters during this visit, including being clean-shaven with an N95 respirator mask and eyewear.     Scribe remained outside the patient's room and did not have any contact with the patient for the duration of patient encounter.     REVIEW OF SYSTEMS  See HPI for further details. All other systems are negative.  Pertinent positives include: Dizziness, weakness, vomiting, dark tarry diarrhea.  Pertinent negatives include: Fever, syncope, abdominal pain, headache, chest pain, heaviness, or discomfort.  PAST MEDICAL HISTORY  Past Medical History:   Diagnosis Date   • Abnormal glucose    • Arthritis     back, hips   • Back pain    • Chickenpox    • High cholesterol    • HTN (hypertension)    • Obesity    • Overweight(278.02)    • Pain     back pain   • Pneumonia    • Polycythemia, secondary    • Sleep apnea     cpap   • Snoring    • Type II or unspecified type diabetes mellitus without mention of complication, not stated as uncontrolled     oral meds only       FAMILY HISTORY  Family History   Problem Relation Age of Onset   • Sleep Apnea Mother    • Heart Attack Father    • Heart Attack Brother        SOCIAL HISTORY  Social History     Socioeconomic History   • Marital status:      Spouse name: None noted   • Number of children: None noted   • Years of education: None noted   • Highest education level: None noted   Occupational History   • None noted   Social Needs   • Financial resource strain: None noted   • Food insecurity     Worry: None noted     Inability: None noted   • Transportation needs     Medical: None noted     Non-medical: None noted    Tobacco Use   • Smoking status: Never Smoker   • Smokeless  tobacco: Former User     Types: Chew   Substance and Sexual Activity   • Alcohol use: Yes     Comment: twice a month   • Drug use: No   • Sexual activity: None noted   Lifestyle   • Physical activity     Days per week: None noted     Minutes per session: None noted   • Stress: None noted   Relationships   • Social connections     Talks on phone: None noted     Gets together: None noted     Attends Yazidi service: None noted     Active member of club or organization: None noted     Attends meetings of clubs or organizations: None noted     Relationship status: None noted   • Intimate partner violence     Fear of current or ex partner: None noted     Emotionally abused: None noted     Physically abused: None noted     Forced sexual activity: None noted   Other Topics Concern   • None noted   Social History Narrative   • None noted       SURGICAL HISTORY  Past Surgical History:   Procedure Laterality Date   • PB IMPLANT NEUROSTIM/  7/23/2019    Procedure: INSERTION, NEUROSTIMULATOR, PERMANENT, SPINAL CORD;  Surgeon: Teddy Santos M.D.;  Location: [facility];  Service: Pain Management   • LUMBAR LAMINECTOMY DISKECTOMY  2/18/2018    Procedure: LUMBAR LAMINECTOMY DISKECTOMY- LT L1-2 HEMILAMI-;  Surgeon: Tom Pittman M.D.;  Location: [facility];  Service: Neurosurgery   • HARDWARE REMOVAL NEURO  2/18/2018    Procedure: HARDWARE REMOVAL NEURO- L2-3 PEDICLE SCREWS;  Surgeon: Tom Pittman M.D.;  Location: [facility];  Service: Neurosurgery   • INGUINAL HERNIA REPAIR Right 2/16/2016    Procedure: INGUINAL HERNIA REPAIR;  Surgeon: Sj Baird M.D.;  Location: [facility];  Service:    • LUMBAR FUSION POSTERIOR  1/21/2015    Performed by Ko Suarez M.D. at [facility]   • CARPAL TUNNEL RELEASE Right 2014   • SHOULDER ARTHROSCOPY Right 2014   • CERVICAL DISK AND FUSION ANTERIOR  2005   • LUMBAR DECOMPRESSION  2004   • LUMBAR DECOMPRESSION   2003       CURRENT MEDICATIONS  Current Outpatient Medications:   •  dicyclomine (BENTYL) 20 MG Tab, TAKE ONE TABLET BY MOUTH EVERY 6 HOURS FOR 5 DAYS, Disp: 20 Tab, Rfl: 0  •  promethazine (PHENERGAN) 25 MG Tab, Take 1 Tab by mouth every 6 hours as needed for Nausea/Vomiting., Disp: 30 Tab, Rfl: 0  •  gabapentin (NEURONTIN) 300 MG Cap, Take 1 Cap by mouth 4 times a day., Disp: 90 Cap, Rfl: 0  •  Omega-3 Fatty Acids (FISH OIL) 1000 MG Cap capsule, Take 1,000 mg by mouth every morning., Disp: , Rfl:   •  tamsulosin (FLOMAX) 0.4 MG capsule, Take 0.4 mg by mouth ONE-HALF HOUR AFTER BREAKFAST., Disp: , Rfl:   •  ASPIRIN 81 PO, Take 81 mg by mouth every morning., Disp: , Rfl:   •  ibuprofen (MOTRIN) 800 MG Tab, Take 800 mg by mouth every 8 hours as needed for Mild Pain., Disp: , Rfl:   •  cloNIDine (CATAPRESS) 0.2 MG Tab, Take 1 Tab by mouth 2 times a day. For further refills please contact new cardiologist. Thank you, Disp: 60 Tab, Rfl: 0  •  lovastatin (MEVACOR) 20 MG Tab, Take 1 Tab by mouth every evening., Disp: 90 Tab, Rfl: 1  •  fosinopril (MONOPRIL) 40 MG tablet, Take 1 Tab by mouth every day. For further refills please contact new cardiologist. Thank you, Disp: 30 Tab, Rfl: 0  •  amLODIPine (NORVASC) 10 MG Tab, Take 1 Tab by mouth every day. For further refills please contact new cardiologist. Thank you, Disp: 30 Tab, Rfl: 0  •  carvedilol (COREG) 25 MG Tab, TAKE ONE TABLET BY MOUTH TWICE A DAY WITH MEALS, Disp: 180 Tab, Rfl: 0  •  triamterene-hctz (MAXZIDE-25/DYAZIDE) 37.5-25 MG Tab, Take 1 Tab by mouth every morning., Disp: , Rfl:   •  terazosin (HYTRIN) 5 MG Cap, TAKE TWO CAPSULES BY MOUTH EVERY NIGHT AT BEDTIME (GENERIC HYTRIN), Disp: 180 Cap, Rfl: 10  •  metformin (GLUCOPHAGE) 850 MG Tab, Take 850 mg by mouth 2 times a day., Disp: , Rfl:     ALLERGIES  No Known Allergies    PHYSICAL EXAM  VITAL SIGNS: BP (!) 83/53   Pulse 61   Temp 36.6 °C (97.8 °F) (Temporal)   Resp 18   Ht 1.829 m (6')   Wt 117.5 kg  (259 lb)   SpO2 96%   BMI 35.13 kg/m²    Constitutional: Pale in color, obese, Well developed, well nourished; No acute distress.  HENT: Normocephalic, atraumatic; Bilateral external ears normal; Dry mucous membranes.   Eyes: PERRL, EOMI, Conjunctiva normal. No discharge.   Neck:  Supple, nontender midline; No stridor; No nuchal rigidity.   Lymphatic: No cervical lymphadenopathy noted.   Cardiovascular: Distant heart sounds, without murmurs, rubs, or gallop.   Thorax & Lungs: No respiratory distress, breath sounds clear to auscultation bilaterally without wheezing, rales or rhonchi. Nontender chest wall. No crepitus or subcutaneous air  Abdomen: Obese, large ventral hernia, nontender, bowel sounds normal. No rebound, guarding, or peritoneal signs.   GI: Melena on rectal exam.  Skin: Pale, warm and dry without rash or petechia.  Back: Nontender, No CVA tenderness.   Extremities: Bounding distal radial, dorsalis pedis, posterior tibial pulses are equal bilaterally; Nontender calves or saphenous, No cyanosis, No clubbing.   Musculoskeletal: Good range of motion in all major joints. No tenderness to palpation or major deformities noted.   Neurologic: Alert & oriented x 4, clear speech    EKG  12 lead EKG interpreted by me as shown below.  Results for orders placed or performed during the hospital encounter of 20   EKG (NOW)   Result Value Ref Range    Report       [facility] Emergency Dept.    Test Date:  2020  Pt Name:    SEAMUS ALVAREZ                   Department: ER  MRN:        [id number]                      Room:        12  Gender:     Male                         Technician: LANA  :        1950                   Requested By:ER TRIAGE PROTOCOL  Order #:    [id number]                    Reading MD: Alia Moffett    Measurements  Intervals                                Axis  Rate:       62                           P:          44  ME:         196                          QRS:         58  QRSD:       92                           T:          37  QT:         444  QTc:        451    Interpretive Statements  SINUS RHYTHM rate 62  Normal axis  Normal intervals  ATRIAL PREMATURE COMPLEX  No ST elevation or depression  Compared to ECG 08/12/2020 15:47:22  Sinus bradycardia no longer present  Electronically Signed On 8- 12:22:31 PDT by Alia Moffett         LABS/RADIOLOGY/PROCEDURES  Results for orders placed or performed during the hospital encounter of 08/22/20   CBC WITH DIFFERENTIAL   Result Value Ref Range    WBC 11.2 (H) 4.8 - 10.8 K/uL    RBC 2.49 (L) 4.70 - 6.10 M/uL    Hemoglobin 7.9 (L) 14.0 - 18.0 g/dL    Hematocrit 24.8 (L) 42.0 - 52.0 %    MCV 99.6 (H) 81.4 - 97.8 fL    MCH 31.7 27.0 - 33.0 pg    MCHC 31.9 (L) 33.7 - 35.3 g/dL    RDW 48.4 35.9 - 50.0 fL    Platelet Count 322 164 - 446 K/uL    MPV 10.2 9.0 - 12.9 fL    Neutrophils-Polys 71.80 44.00 - 72.00 %    Lymphocytes 17.40 (L) 22.00 - 41.00 %    Monocytes 5.80 0.00 - 13.40 %    Eosinophils 2.30 0.00 - 6.90 %    Basophils 0.90 0.00 - 1.80 %    Immature Granulocytes 1.80 (H) 0.00 - 0.90 %    Nucleated RBC 0.00 /100 WBC    Neutrophils (Absolute) 8.02 (H) 1.82 - 7.42 K/uL    Lymphs (Absolute) 1.95 1.00 - 4.80 K/uL    Monos (Absolute) 0.65 0.00 - 0.85 K/uL    Eos (Absolute) 0.26 0.00 - 0.51 K/uL    Baso (Absolute) 0.10 0.00 - 0.12 K/uL    Immature Granulocytes (abs) 0.20 (H) 0.00 - 0.11 K/uL    NRBC (Absolute) 0.00 K/uL   COMP METABOLIC PANEL   Result Value Ref Range    Sodium 137 135 - 145 mmol/L    Potassium 4.3 3.6 - 5.5 mmol/L    Chloride 106 96 - 112 mmol/L    Co2 18 (L) 20 - 33 mmol/L    Anion Gap 13.0 7.0 - 16.0    Glucose 172 (H) 65 - 99 mg/dL    Bun 67 (H) 8 - 22 mg/dL    Creatinine 1.85 (H) 0.50 - 1.40 mg/dL    Calcium 8.7 8.5 - 10.5 mg/dL    AST(SGOT) 11 (L) 12 - 45 U/L    ALT(SGPT) 18 2 - 50 U/L    Alkaline Phosphatase 54 30 - 99 U/L    Total Bilirubin 0.2 0.1 - 1.5 mg/dL    Albumin 2.9 (L) 3.2 - 4.9 g/dL    Total Protein  5.4 (L) 6.0 - 8.2 g/dL    Globulin 2.5 1.9 - 3.5 g/dL    A-G Ratio 1.2 g/dL   LACTIC ACID   Result Value Ref Range    Lactic Acid 2.3 (H) 0.5 - 2.0 mmol/L   TROPONIN   Result Value Ref Range    Troponin T 90 (H) 6 - 19 ng/L   APTT   Result Value Ref Range    APTT 22.0 (L) 24.7 - 36.0 sec   PROTHROMBIN TIME (INR)   Result Value Ref Range    PT 15.2 (H) 12.0 - 14.6 sec    INR 1.16 (H) 0.87 - 1.13   COD (ADULT)   Result Value Ref Range    ABO Grouping Only A     Rh Grouping Only POS     Antibody Screen-Cod NEG    ESTIMATED GFR   Result Value Ref Range    GFR If  44 (A) >60 mL/min/1.73 m 2    GFR If Non  36 (A) >60 mL/min/1.73 m 2   EKG (NOW)   Result Value Ref Range    Report       [facility] Emergency Dept.    Test Date:  2020  Pt Name:    SEAMUS ALVAREZ                   Department: ER  MRN:        [id number]                      Room:       RD 12  Gender:     Male                         Technician: LANA  :        1950                   Requested By:ER TRIAGE PROTOCOL  Order #:    [id number]                    Reading MD: Alia Moffett    Measurements  Intervals                                Axis  Rate:       62                           P:          44  WY:         196                          QRS:        58  QRSD:       92                           T:          37  QT:         444  QTc:        451    Interpretive Statements  SINUS RHYTHM rate 62  Normal axis  Normal intervals  ATRIAL PREMATURE COMPLEX  No ST elevation or depression  Compared to ECG 2020 15:47:22  Sinus bradycardia no longer present  Electronically Signed On 2020 12:22:31 PDT by Alia Moffett             DX-CHEST-PORTABLE (1 VIEW)   Final Result      No acute cardiac or pulmonary abnormalities are identified.          COURSE & MEDICAL DECISION MAKING  Pertinent Labs & Imaging studies reviewed. (See chart for details)    Reviewed patient's old medical records which showed ECHO done during  last visit ejection fraction is 60%.     10:22 AM - Patient seen and examined at bedside. Discussed plan of care, including treatment, labs, and imaging. Patient agrees to the plan of care. The patient will be resuscitated with 1L NS IV and medicated with Protonix 80 mg in  mL. Ordered for labs and imaging to evaluate his symptoms.     10:46 AM Paged GI.      11:28 PM I discussed the patient's case and the above findings with Dr. Gomez (GI) who requested the patient receive a COVID-19 test. Dr. Gomez states that he will see the patient following the test.     11:41 AM Patient was reevaluated at bedside. Discussed lab and radiology results with the patient and informed them of my consult with Dr. Gomez. Patient was informed that he needs an endoscopy as well as a COVID-19 test. Patient informed me that he hasn't been experiencing abdominal pain, but has had some nausea and vomiting. Patient denies being on a beta-blocker. '    11:55 AM Patient was reevaluated at bedside. Patient informed me that if anything were to happen he would like to be resuscitated unless circumstances are dire enough where the patient would be in a vegetative like state.     12:08 I discussed the patient's case and the above findings with Dr. Bell (Intensivist) who agreed to hospitalize the patient.    HYDRATION: Based on the patient's presentation of Hypotension the patient was given IV fluids. IV Hydration was used because oral hydration was not adequate alone. Upon recheck following hydration, the patient was improved.      Patient presents to the ER complaining of lightheadedness, near syncope, generalized weakness over the last several days.  Last week the patient was admitted here at [facility] for an ESBL UTI with sepsis.  He was sent home on Macrobid a week ago.  3 days ago he started vomiting.  He vomited for several days before going to urgent care and getting some antiemetics.  He has not vomited since  yesterday.  Patient noted a few episodes of black tarry stool a few days ago but thought it was related to the antibiotics.  No abdominal pain.  No syncope.  No chest pain.  No shortness of breath.  Patient was hypotensive upon EMS arrival.  Blood pressure was 60 over palp for EMS.  His heart rate was low so they gave him some atropine.  Patient took all of his blood pressure medications this morning.  This time I think the patient's heart rate of 50s here in the ER is likely related to the fact that he is on carvedilol.  I do not think he has any pathologic bradycardia and I do not think he needs any further doses of atropine, which was given by EMS on scene.  Patient's blood pressure is slowly trending up with IV fluids.  He is quite dry on examination.  He has very dry mucous membranes.  He is pale in color.  Rectal examination revealed melena which was guaiac positive.  He has no abdominal pain or tenderness.  No chest pain.  EKG is nonacute.  No ST elevation or depression.  Troponin is minimally elevated.  At this time no evidence for STEMI or non-STEMI.  Patient symptoms are all related to an upper GI bleed, possibly secondary to the stress of hospitalization and being on prophylactic Lovenox while here in the hospital.  He has no history of ulcers or GI bleed in the past.  I spoke with the gastroenterologist on-call.  He will kindly evaluate the patient hospitalization.  Patient was given IV fluids, and as noted, his blood pressure is slowly responding.  I do not think he needs pressors at this time.  I think that fluids will take care of his hypotension.  Patient has been taking his antibiotic as prescribed.  He is not febrile.  This time I do not think he is septic.  Since he is been hypotensive, I spoke with Dr. Thibodeaux, intensivist on-call, and he will kindly evaluate the patient for ICU admission.    CRITICAL CARE  The very real possibilty of a deterioration of this patient's condition required the  highest level of my preparedness for sudden, emergent intervention.  I provided critical care services, which included medication orders, frequent reevaluations of the patient's condition and response to treatment, ordering and reviewing test results, and discussing the case with various consultants.  The critical care time associated with the care of the patient was 35 minutes. Review chart for interventions. This time is exclusive of any other billable procedures.     DISPOSITION:  Patient will be hospitalized by Dr. Bell in guarded condition.    FINAL IMPRESSION  1. Gastrointestinal hemorrhage, unspecified gastrointestinal hemorrhage type Acute   2. Near syncope Acute   3. Hypotension, unspecified hypotension type Acute    The critical care time associated with the care of the patient was 35 minutes. Review chart for interventions. This time is exclusive of any other billable procedures.     This dictation has been created using voice recognition software. The accuracy of the dictation is limited by the abilities of the software. I expect there may be some errors of grammar and possibly content. I made every attempt to manually correct the errors within my dictation. However, errors related to voice recognition software may still exist and should be interpreted within the appropriate context.     Art BURRELL (Aga), am scribing for, and in the presence of, Alia Moffett M.D..    Electronically signed by: Art Rose (Aga), 8/22/2020    Alia BURRELL M.D. personally performed the services described in this documentation, as scribed by Art Rose in my presence, and it is both accurate and complete. C    The note accurately reflects work and decisions made by me.  Alia Moffett M.D.  8/22/2020  7:54 PM

## 2020-08-22 NOTE — PROGRESS NOTES
Patient arrived to [facility] at 1315. Pt a/ox4. Connected to monitors. Pt's SBP 100s, bradycardic HR 50 dropping as low as 43 but does not sustain. Denies any pain. GIB suspected. Hgb sample collected and sent to lab. Dr. Samson with GI at bedside and recommended taking patient for an endoscopy in the next hour. Pt and wife agreed and verbalized understanding of procedure.   Pt is comfortable. All questions answered. No other needs at this time.

## 2020-08-22 NOTE — PROGRESS NOTES
2 RN skin assessment completed with ARIAN Moon. Pt has a scab on left elbow present on admission and a skin tear on distal lateral right LE. Otherwise, pt's skin is intact. No rash present or open wounds noted.

## 2020-08-22 NOTE — ASSESSMENT & PLAN NOTE
On 5 agents plus tamsulosin and terazosin  Hold home meds while GIB/hypotensive  Warrants secondary causes work-up if not previously done

## 2020-08-22 NOTE — ANESTHESIA PREPROCEDURE EVALUATION
Relevant Problems   ANESTHESIA   (+) Sleep apnea      CARDIAC   (+) Essential hypertension         (+) GENI (acute kidney injury) (HCC)      ENDO   (+) Type 2 diabetes mellitus without complication, without long-term current use of insulin (HCC)       Physical Exam    Airway   Mallampati: III  TM distance: >3 FB  Neck ROM: full       Cardiovascular - normal exam  Rhythm: regular  Rate: normal  (-) murmur     Dental - normal exam           Pulmonary - normal exam  Breath sounds clear to auscultation     Abdominal   (+) obese     Neurological - normal exam                 Anesthesia Plan    ASA 3- EMERGENT   ASA physical status emergent criteria: acute hemorrhage and cardiac compromise requiring immediate intervention    Plan - general       Airway plan will be ETT        Induction: intravenous    Postoperative Plan: Postoperative administration of opioids is intended.    Pertinent diagnostic labs and testing reviewed    Informed Consent:    Anesthetic plan and risks discussed with patient.    Use of blood products discussed with: patient whom consented to blood products.

## 2020-08-23 LAB
ANION GAP SERPL CALC-SCNC: 15 MMOL/L (ref 7–16)
BUN SERPL-MCNC: 43 MG/DL (ref 8–22)
CALCIUM SERPL-MCNC: 9 MG/DL (ref 8.5–10.5)
CHLORIDE SERPL-SCNC: 106 MMOL/L (ref 96–112)
CO2 SERPL-SCNC: 19 MMOL/L (ref 20–33)
CREAT SERPL-MCNC: 1.33 MG/DL (ref 0.5–1.4)
ERYTHROCYTE [DISTWIDTH] IN BLOOD BY AUTOMATED COUNT: 48.7 FL (ref 35.9–50)
GLUCOSE BLD-MCNC: 112 MG/DL (ref 65–99)
GLUCOSE BLD-MCNC: 127 MG/DL (ref 65–99)
GLUCOSE BLD-MCNC: 132 MG/DL (ref 65–99)
GLUCOSE BLD-MCNC: 141 MG/DL (ref 65–99)
GLUCOSE SERPL-MCNC: 133 MG/DL (ref 65–99)
HCT VFR BLD AUTO: 27.2 % (ref 42–52)
HGB BLD-MCNC: 7.7 G/DL (ref 14–18)
HGB BLD-MCNC: 8.1 G/DL (ref 14–18)
HGB BLD-MCNC: 8.8 G/DL (ref 14–18)
MAGNESIUM SERPL-MCNC: 2 MG/DL (ref 1.5–2.5)
MCH RBC QN AUTO: 31.7 PG (ref 27–33)
MCHC RBC AUTO-ENTMCNC: 32.4 G/DL (ref 33.7–35.3)
MCV RBC AUTO: 97.8 FL (ref 81.4–97.8)
PHOSPHATE SERPL-MCNC: 3.4 MG/DL (ref 2.5–4.5)
PLATELET # BLD AUTO: 238 K/UL (ref 164–446)
PMV BLD AUTO: 10.4 FL (ref 9–12.9)
POTASSIUM SERPL-SCNC: 4.8 MMOL/L (ref 3.6–5.5)
RBC # BLD AUTO: 2.78 M/UL (ref 4.7–6.1)
SODIUM SERPL-SCNC: 140 MMOL/L (ref 135–145)
WBC # BLD AUTO: 11.6 K/UL (ref 4.8–10.8)

## 2020-08-23 PROCEDURE — 700111 HCHG RX REV CODE 636 W/ 250 OVERRIDE (IP): Performed by: INTERNAL MEDICINE

## 2020-08-23 PROCEDURE — 770020 HCHG ROOM/CARE - TELE (206)

## 2020-08-23 PROCEDURE — 84100 ASSAY OF PHOSPHORUS: CPT

## 2020-08-23 PROCEDURE — 700105 HCHG RX REV CODE 258: Performed by: INTERNAL MEDICINE

## 2020-08-23 PROCEDURE — 99221 1ST HOSP IP/OBS SF/LOW 40: CPT | Performed by: INTERNAL MEDICINE

## 2020-08-23 PROCEDURE — 83735 ASSAY OF MAGNESIUM: CPT

## 2020-08-23 PROCEDURE — 5A09357 ASSISTANCE WITH RESPIRATORY VENTILATION, LESS THAN 24 CONSECUTIVE HOURS, CONTINUOUS POSITIVE AIRWAY PRESSURE: ICD-10-PCS | Performed by: INTERNAL MEDICINE

## 2020-08-23 PROCEDURE — 700102 HCHG RX REV CODE 250 W/ 637 OVERRIDE(OP): Performed by: INTERNAL MEDICINE

## 2020-08-23 PROCEDURE — A9270 NON-COVERED ITEM OR SERVICE: HCPCS | Performed by: INTERNAL MEDICINE

## 2020-08-23 PROCEDURE — 85027 COMPLETE CBC AUTOMATED: CPT

## 2020-08-23 PROCEDURE — 82962 GLUCOSE BLOOD TEST: CPT

## 2020-08-23 PROCEDURE — 80048 BASIC METABOLIC PNL TOTAL CA: CPT

## 2020-08-23 PROCEDURE — 85018 HEMOGLOBIN: CPT | Mod: 91

## 2020-08-23 PROCEDURE — C9113 INJ PANTOPRAZOLE SODIUM, VIA: HCPCS | Performed by: INTERNAL MEDICINE

## 2020-08-23 RX ORDER — OMEPRAZOLE 20 MG/1
20 CAPSULE, DELAYED RELEASE ORAL 2 TIMES DAILY
Status: DISCONTINUED | OUTPATIENT
Start: 2020-08-23 | End: 2020-08-24 | Stop reason: HOSPADM

## 2020-08-23 RX ADMIN — GABAPENTIN 300 MG: 300 CAPSULE ORAL at 18:06

## 2020-08-23 RX ADMIN — GABAPENTIN 300 MG: 300 CAPSULE ORAL at 12:13

## 2020-08-23 RX ADMIN — OMEPRAZOLE 20 MG: 20 CAPSULE, DELAYED RELEASE ORAL at 18:07

## 2020-08-23 RX ADMIN — GABAPENTIN 300 MG: 300 CAPSULE ORAL at 09:20

## 2020-08-23 RX ADMIN — MEROPENEM 500 MG: 500 INJECTION, POWDER, FOR SOLUTION INTRAVENOUS at 00:04

## 2020-08-23 RX ADMIN — GABAPENTIN 300 MG: 300 CAPSULE ORAL at 23:25

## 2020-08-23 RX ADMIN — LOVASTATIN 20 MG: 20 TABLET ORAL at 18:07

## 2020-08-23 RX ADMIN — PANTOPRAZOLE SODIUM 40 MG: 40 INJECTION, POWDER, LYOPHILIZED, FOR SOLUTION INTRAVENOUS at 05:02

## 2020-08-23 RX ADMIN — MEROPENEM 500 MG: 500 INJECTION, POWDER, FOR SOLUTION INTRAVENOUS at 05:02

## 2020-08-23 ASSESSMENT — ENCOUNTER SYMPTOMS
CONSTITUTIONAL NEGATIVE: 1
NAUSEA: 0
VOMITING: 0
CARDIOVASCULAR NEGATIVE: 1
BACK PAIN: 1
FEVER: 0
COUGH: 0
NECK PAIN: 0
PSYCHIATRIC NEGATIVE: 1
GASTROINTESTINAL NEGATIVE: 1
ABDOMINAL PAIN: 0
DIZZINESS: 0
EYES NEGATIVE: 1
CHILLS: 0
HEARTBURN: 0
RESPIRATORY NEGATIVE: 1
HEMOPTYSIS: 0
NEUROLOGICAL NEGATIVE: 1

## 2020-08-23 ASSESSMENT — FIBROSIS 4 INDEX: FIB4 SCORE: 0.56

## 2020-08-23 NOTE — PROGRESS NOTES
Gastroenterology Progress Note     Author: Tawanda Gomez M.D.   Date & Time Created: 8/23/2020 11:21 AM    Chief Complaint:  Melena    Interval History:  Stable since endoscopy.  Denies melena or abdominal pain.    Review of Systems:  Review of Systems   Constitutional: Negative for chills and fever.   Respiratory: Negative for cough and hemoptysis.    Gastrointestinal: Negative for abdominal pain, heartburn, nausea and vomiting.   Neurological: Negative for dizziness.       Current Facility-Administered Medications:   •  senna-docusate (PERICOLACE or SENOKOT S) 8.6-50 MG per tablet 2 Tab, 2 Tab, Oral, BID, Stopped at 08/22/20 1901 **AND** polyethylene glycol/lytes (MIRALAX) PACKET 1 Packet, 1 Packet, Oral, QDAY PRN **AND** magnesium hydroxide (MILK OF MAGNESIA) suspension 30 mL, 30 mL, Oral, QDAY PRN **AND** bisacodyl (DULCOLAX) suppository 10 mg, 10 mg, Rectal, QDAY PRN, Carolyn Kay M.D.  •  acetaminophen (TYLENOL) tablet 650 mg, 650 mg, Oral, Q6HRS PRN, Carolyn Kay M.D.  •  ondansetron (ZOFRAN) syringe/vial injection 4 mg, 4 mg, Intravenous, Q4HRS PRN, Carolyn Kay M.D.  •  ondansetron (ZOFRAN ODT) dispertab 4 mg, 4 mg, Oral, Q4HRS PRN, Carolyn Kay M.D.  •  insulin regular (HumuLIN R,NovoLIN R) injection, 1-6 Units, Subcutaneous, Q6HRS, Stopped at 08/22/20 1902 **AND** POC Blood Glucose, , , Q6H **AND** NOTIFY MD and PharmD, , , Once **AND** glucose 4 g chewable tablet 16 g, 16 g, Oral, Q15 MIN PRN **AND** dextrose 50% (D50W) injection 50 mL, 50 mL, Intravenous, Q15 MIN PRN, Carolyn Kay M.D.  •  gabapentin (NEURONTIN) capsule 300 mg, 300 mg, Oral, 4X/DAY, Carolyn Kay M.D., 300 mg at 08/23/20 0920  •  lovastatin (MEVACOR) tablet 20 mg, 20 mg, Oral, Q EVENING, Carolyn Kay M.D., 20 mg at 08/22/20 1902  •  hydrALAZINE (APRESOLINE) injection 20 mg, 20 mg, Intravenous, Q6HRS PRN, Carolyn Kay M.D.  •  pantoprazole (PROTONIX) injection 40 mg, 40 mg, Intravenous, BID, Carolyn Kay  M.D., 40 mg at 08/23/20 0502  •  ondansetron (ZOFRAN) syringe/vial injection 4 mg, 4 mg, Intravenous, Once PRN, Aleksander Madrigal M.D.  •  haloperidol lactate (HALDOL) injection 1 mg, 1 mg, Intravenous, Q15 MIN PRN, Aleksander Madrigal M.D.  •  diphenhydrAMINE (BENADRYL) injection 12.5 mg, 12.5 mg, Intravenous, Q15 MIN PRN, Aleksander Madrigal M.D.  •  midazolam (VERSED) 2 MG/2ML injection 2 mg, 2 mg, Intravenous, Q5 MIN PRN, Aleksander Madrigal M.D.  •  Metoprolol Tartrate (LOPRESSOR) injection 1 mg, 1 mg, Intravenous, Q5 MIN PRN, Aleksander Madrigal M.D.     Physical Exam:  Physical Exam   Constitutional: He is oriented to person, place, and time. He appears well-nourished.   HENT:   Head: Normocephalic and atraumatic.   Eyes: Pupils are equal, round, and reactive to light. Conjunctivae are normal. No scleral icterus.   Cardiovascular: Normal rate and regular rhythm.   Pulmonary/Chest: Effort normal.   Abdominal: Soft. He exhibits no distension. There is no abdominal tenderness. There is no rebound.   Neurological: He is alert and oriented to person, place, and time.   Skin: Skin is warm and dry.       Labs:          Recent Labs     08/22/20  1018 08/23/20  0500   SODIUM 137 140   POTASSIUM 4.3 4.8   CHLORIDE 106 106   CO2 18* 19*   BUN 67* 43*   CREATININE 1.85* 1.33   MAGNESIUM  --  2.0   PHOSPHORUS  --  3.4   CALCIUM 8.7 9.0     Recent Labs     08/22/20  1018 08/23/20  0500   ALTSGPT 18  --    ASTSGOT 11*  --    ALKPHOSPHAT 54  --    TBILIRUBIN 0.2  --    GLUCOSE 172* 133*     Recent Labs     08/22/20  1018  08/22/20  1858 08/23/20  0000 08/23/20  0500   RBC 2.49*  --   --   --  2.78*   HEMOGLOBIN 7.9*   < > 7.9* 7.7* 8.8*   HEMATOCRIT 24.8*  --   --   --  27.2*   PLATELETCT 322  --   --   --  238   PROTHROMBTM 15.2*  --   --   --   --    APTT 22.0*  --   --   --   --    INR 1.16*  --   --   --   --     < > = values in this interval not displayed.     Recent Labs     08/22/20  1018 08/23/20  0500   WBC 11.2* 11.6*   NEUTSPOLYS 71.80  --     LYMPHOCYTES 17.40*  --    MONOCYTES 5.80  --    EOSINOPHILS 2.30  --    BASOPHILS 0.90  --    ASTSGOT 11*  --    ALTSGPT 18  --    ALKPHOSPHAT 54  --    TBILIRUBIN 0.2  --        Assessment:  Multiple duodenal ulcers with bleeding, status post Endo therapy.  Etiology most likely NSAIDs.  Rule out H. pylori infection by stool antigen test.  Patient is stable without any signs of rebleeding.    Plan:  1.  Advance diet as tolerated  2.  Transition to twice daily p.o. PPI  3.  Stool antigen for H. pylori infection, treat if positive  4.  Patient advised to avoid NSAIDs in the future.    GI will sign off please call with any questions.    Quality-Core Measures

## 2020-08-23 NOTE — PROGRESS NOTES
ICU triage office note    Transfer requested by Dr. Kay    I requested Dr. Alonso to evaluate this patient.    For questions please contact Dr. Alonso

## 2020-08-23 NOTE — CARE PLAN
Problem: Safety  Goal: Will remain free from injury  Outcome: PROGRESSING AS EXPECTED  Note: Stand by assist. Call light within reach. Frequent rounds. Pt educated on safety precaution and fall prevention.     Problem: Infection  Goal: Will remain free from infection  Outcome: PROGRESSING AS EXPECTED  Note: Standard precautions. Pt educated on proper hand hygiene.

## 2020-08-23 NOTE — CARE PLAN
Problem: Bowel/Gastric:  Goal: Normal bowel function is maintained or improved  Outcome: PROGRESSING AS EXPECTED  Note: Suspected GIB due to tarry bowel and decreased Hgb. GI consulted. Pt taken for an endoscopy.      Problem: Fluid Volume:  Goal: Will maintain balanced intake and output  Outcome: PROGRESSING AS EXPECTED  Note: Bolus was administered at the ER for hypotension. Pt's SBP in the 100s at [facility]. Good urine output.

## 2020-08-23 NOTE — ANESTHESIA PROCEDURE NOTES
Airway    Date/Time: 8/22/2020 5:06 PM  Performed by: Aleksander Madrigal M.D.  Authorized by: Aleksander Madrigal M.D.     Location:  OR  Urgency:  Elective  Indications for Airway Management:  Anesthesia and airway protection      Spontaneous Ventilation: absent    Sedation Level:  Deep  Preoxygenated: Yes    Patient Position:  Sniffing  Final Airway Type:  Endotracheal airway  Final Endotracheal Airway:  ETT  Cuffed: Yes    Technique Used for Successful ETT Placement:  Exchange catheter  Devices/Methods Used in Placement:  Intubating stylet    Insertion Site:  Oral  Blade Type:  Adler  Laryngoscope Blade/Videolaryngoscope Blade Size:  2  ETT Size (mm):  8.0  Measured from:  Lips  ETT to Lips (cm):  24  Placement Verified by: auscultation and capnometry    Number of Attempts at Approach:  1

## 2020-08-23 NOTE — PROCEDURES
Procedure: EGD (Esophagogstroduodenoscopy)    Date of procedure:8/22/20    Enodscopist: Tawanda Gomez M.D.    Anesthesia: GETA    Anesthesiologist: Dr Rohan ALVARADO    Pre-op diagnosis:  Melena    Post-op diagnosis:  Duodenal ulcers    Complications: None    Estimated blood loss: Less than 5 cc    Indications:  69 male presented with melena and anemia.      Description of procedure:  After discussion of indications,risks and benefits including the risks of perforation and bleeding informed consent was signed by the patient. Sedation was administered and documented by anesthesia. Time out was performed.Patient was placed in the left lateral position and Olympus Video gastroscope was placed in the oral cavity and the esophagus was intubated under direct visualization. The endoscope was then advanced through the lumen of the esophagus into the stomach and passed through to the third portion of duodenum. Upon careful withdrawal of the scope, mucosa  was visualized carefully. Gastric cardia and fundus visualized by retroflexion. Findings and maneuvers as listed below. Hemostasis was confirmed from biopsy sites. After deflating the stomach the endoscope was removed and procedure was terminated. Patient tolerated procedure well.    Findings:    Hypopharynx: Normal    Esophagus:  Normal    Stomach:  Small amount of fresh blood present in the pre-pyloric stomach. No significant pathology identified.  Normal cardia and fundus.    Duodenum:  Multiple ulcers ranging in size from 3 to 5 mm in the post bulbar duodenum. Small amount of fresh blood noted in the lumen suggestive of recent bleeding.   Non bleeding visible vessel in the larger ulcer was injected with 4 cc of 1/56672 epinephrine solution. Resolution endoclip was attempted but could not deploy due to scarring at the base of the ulcer.  The vessel was cauterized by Gold probe.    Plan:  1. IV PPI for another 48 hrs  2. Ice chips today, CLD in AM if stable  3. Check stool H  Pylori antigen and treat if positive  4. Avoid NSAIDs  5. Monitor Hb and transfuse to maintain above 7.

## 2020-08-23 NOTE — CONSULTS
[facility] Medicine Consultation    Date of Service  8/23/2020    Referring Physician  Carolyn Kay    Consulting Physician  Radha Alonso M.D.    Reason for Consultation  Hypotension and GI bleeding    History of Presenting Illness    69-year-old male with history of hypertension, diabetes, dyslipidemia with chronic back pain presented 8/22 with hypotension and generalized weakness, was found to have hypotension, the patient also states he had black tarry diarrhea, no significant abdominal pain and no urinary symptoms, no fever or chills, the patient states he is taking ibuprofen couple times a week for his chronic back pain, the patient was found to have upper GI bleeding and his hemoglobin 7.9, last week hemoglobin was 12.7, IV PPI was started, EGD was done by GI which showed Multiple ulcers ranging in size from 3 to 5 mm in the post bulbar duodenum, H. pylori antigen on stool is pending.     Review of Systems  Review of Systems   Constitutional: Negative.    HENT: Negative.    Eyes: Negative.    Respiratory: Negative.    Cardiovascular: Negative.    Gastrointestinal: Negative.    Genitourinary: Negative.    Musculoskeletal: Positive for back pain. Negative for neck pain.   Skin: Negative.    Neurological: Negative.    Endo/Heme/Allergies: Negative.    Psychiatric/Behavioral: Negative.        Past Medical History   has a past medical history of Arthritis, Back pain, Chickenpox, High cholesterol, Hypertension, Obesity, Pain, Pneumonia, Polycythemia, secondary, Sleep apnea, Snoring, and Type II or unspecified type diabetes mellitus without mention of complication, not stated as uncontrolled.    Surgical History   has a past surgical history that includes lumbar fusion posterior (1/21/2015); inguinal hernia repair (Right, 2/16/2016); lumbar laminectomy diskectomy (2/18/2018); hardware removal neuro (2/18/2018); carpal tunnel release (Right, 2014); shoulder arthroscopy (Right, 2014); lumbar decompression (2004);  lumbar decompression (2003); cervical disk and fusion anterior (2005); pr implant neurostim/ (7/23/2019); egd w/control bleeding (8/22/2020); and gastroscopy (N/A, 8/22/2020).    Family History  family history includes Heart Attack in his brother and father; Sleep Apnea in his mother.    Social History   reports that he has never smoked. He quit smokeless tobacco use about 20 months ago.  His smokeless tobacco use included chew. He reports current alcohol use. He reports that he does not use drugs.    Medications  Prior to Admission Medications   Prescriptions Last Dose Informant Patient Reported? Taking?   ASPIRIN 81 PO  Patient Yes No   Sig: Take 81 mg by mouth every morning.   Omega-3 Fatty Acids (FISH OIL) 1000 MG Cap capsule  Patient Yes No   Sig: Take 1,000 mg by mouth every morning.   amLODIPine (NORVASC) 10 MG Tab  Patient No No   Sig: Take 1 Tab by mouth every day. For further refills please contact new cardiologist. Thank you   carvedilol (COREG) 25 MG Tab  Patient No No   Sig: TAKE ONE TABLET BY MOUTH TWICE A DAY WITH MEALS   cloNIDine (CATAPRESS) 0.2 MG Tab  Patient No No   Sig: Take 1 Tab by mouth 2 times a day. For further refills please contact new cardiologist. Thank you   dicyclomine (BENTYL) 20 MG Tab   No No   Sig: TAKE ONE TABLET BY MOUTH EVERY 6 HOURS FOR 5 DAYS   fosinopril (MONOPRIL) 40 MG tablet  Patient No No   Sig: Take 1 Tab by mouth every day. For further refills please contact new cardiologist. Thank you   gabapentin (NEURONTIN) 300 MG Cap   No No   Sig: Take 1 Cap by mouth 4 times a day.   ibuprofen (MOTRIN) 800 MG Tab  Patient Yes No   Sig: Take 800 mg by mouth every 8 hours as needed for Mild Pain.   lovastatin (MEVACOR) 20 MG Tab  Patient No No   Sig: Take 1 Tab by mouth every evening.   metformin (GLUCOPHAGE) 850 MG Tab  Patient Yes No   Sig: Take 850 mg by mouth 2 times a day.   promethazine (PHENERGAN) 25 MG Tab   No No   Sig: Take 1 Tab by mouth every 6 hours as needed  for Nausea/Vomiting.   tamsulosin (FLOMAX) 0.4 MG capsule  Patient Yes No   Sig: Take 0.4 mg by mouth ONE-HALF HOUR AFTER BREAKFAST.   terazosin (HYTRIN) 5 MG Cap  Patient No No   Sig: TAKE TWO CAPSULES BY MOUTH EVERY NIGHT AT BEDTIME (GENERIC HYTRIN)   triamterene-hctz (MAXZIDE-25/DYAZIDE) 37.5-25 MG Tab  Patient Yes No   Sig: Take 1 Tab by mouth every morning.      Facility-Administered Medications: None       Allergies  No Known Allergies    Physical Exam  Temp:  [36.3 °C (97.4 °F)-36.5 °C (97.7 °F)] 36.3 °C (97.4 °F)  Pulse:  [49-95] 95  Resp:  [13-23] 21  BP: ()/() 137/68  SpO2:  [90 %-98 %] 91 %    Physical Exam  Constitutional:       General: He is not in acute distress.     Appearance: He is obese. He is not ill-appearing.   HENT:      Head: Normocephalic and atraumatic.   Eyes:      General: No scleral icterus.  Cardiovascular:      Rate and Rhythm: Normal rate and regular rhythm.      Heart sounds: No murmur.   Pulmonary:      Effort: No respiratory distress.      Breath sounds: No wheezing or rales.   Abdominal:      General: Bowel sounds are normal. There is no distension.      Palpations: Abdomen is soft.      Tenderness: There is no abdominal tenderness. There is no guarding.   Genitourinary:     Rectum: Guaiac result positive.   Musculoskeletal:         General: No swelling or deformity.      Right lower leg: No edema.      Left lower leg: No edema.   Skin:     General: Skin is warm.      Coloration: Skin is not jaundiced.      Findings: No bruising, lesion or rash.   Neurological:      General: No focal deficit present.      Mental Status: He is alert and oriented to person, place, and time. Mental status is at baseline.      Cranial Nerves: No cranial nerve deficit.      Motor: No weakness.      Gait: Gait normal.   Psychiatric:         Mood and Affect: Mood normal.         Fluids      Laboratory  Recent Labs     08/22/20  1018  08/23/20  0000 08/23/20  0500 08/23/20  1226   WBC 11.2*   --   --  11.6*  --    RBC 2.49*  --   --  2.78*  --    HEMOGLOBIN 7.9*   < > 7.7* 8.8* 8.1*   HEMATOCRIT 24.8*  --   --  27.2*  --    MCV 99.6*  --   --  97.8  --    MCH 31.7  --   --  31.7  --    MCHC 31.9*  --   --  32.4*  --    RDW 48.4  --   --  48.7  --    PLATELETCT 322  --   --  238  --    MPV 10.2  --   --  10.4  --     < > = values in this interval not displayed.     Recent Labs     08/22/20  1018 08/23/20  0500   SODIUM 137 140   POTASSIUM 4.3 4.8   CHLORIDE 106 106   CO2 18* 19*   GLUCOSE 172* 133*   BUN 67* 43*   CREATININE 1.85* 1.33   CALCIUM 8.7 9.0     Recent Labs     08/22/20  1018   APTT 22.0*   INR 1.16*                 Imaging  DX-CHEST-PORTABLE (1 VIEW)   Final Result      No acute cardiac or pulmonary abnormalities are identified.          Assessment/Plan  * Gastrointestinal hemorrhage with melena  Assessment & Plan  Likely related to NSAID  Hemoglobin was 7 last week was 12  EGD showedMultiple ulcers ranging in size from 3 to 5 mm in the post bulbar duodenum  Continue monitoring with hemoglobin twice daily  Continue PPI  Avoid NSAID  Waiting for H. pylori antigen test.     GENI (acute kidney injury) (HCC)  Assessment & Plan  Improved  Due to dehydration    Essential hypertension- (present on admission)  Assessment & Plan  Controlled   continue his home medication metoprolol    Hyperlipidemia  Assessment & Plan  Continue his home medication lovastatin    Sleep apnea- (present on admission)  Assessment & Plan  Continue CPAP      SCDs only for DVT prophylaxis

## 2020-08-23 NOTE — ANESTHESIA TIME REPORT
Anesthesia Start and Stop Event Times     Date Time Event    8/22/2020 1548 Ready for Procedure     1659 Anesthesia Start     1728 Anesthesia Stop        Responsible Staff  08/22/20    Name Role Begin End    Aleksander Madrigal M.D. Anesth 1659 1728        Preop Diagnosis (Free Text):  Pre-op Diagnosis     Upper GI Bleed        Preop Diagnosis (Codes):    Post op Diagnosis  Acute upper GI bleed      Premium Reason  E. Weekend    Comments:

## 2020-08-23 NOTE — PROGRESS NOTES
69M h/o HTN, HLD, DM, back pain here for melena and hypotension a week after discharging from an ESBL UTI.  EGD last night showed multiple ulcers.  Hgb now stable.  On IV PPI. H. Pylori stool Ag pending.  I counseled him about cessation of NSAIDs.  Ibuprofen is a prescribed home med.  I started meropenem while he was shocky, which I have now d/c'ed.  His extensive home BP regimen was inhibiting his ability to respond to a GIB (roya the beta blocker). Benign exam today.  Transferring to tele service.

## 2020-08-23 NOTE — ANESTHESIA POSTPROCEDURE EVALUATION
Patient: Ramu Barber    Procedure Summary     Date: 08/22/20 Room / Location: [facility] 08 / SURGERY [facility]    Anesthesia Start: 1659 Anesthesia Stop: 1728    Procedure: GASTROSCOPY (N/A Esophagus) Diagnosis: (Duodenal ulcer)    Surgeon: Tawanda Gomez M.D. Responsible Provider: Aleksander Madrigal M.D.    Anesthesia Type: general ASA Status: 3 - Emergent          Final Anesthesia Type: general  Last vitals  BP   Blood Pressure : 117/65    Temp   36.1 °C (97 °F)    Pulse   Pulse: 60   Resp   16    SpO2   100 %      Anesthesia Post Evaluation    Patient location during evaluation: PACU  Patient participation: complete - patient participated  Level of consciousness: awake and alert  Pain score: 0    Airway patency: patent  Anesthetic complications: no  Cardiovascular status: hemodynamically stable  Respiratory status: acceptable  Hydration status: euvolemic    PONV: none           Nurse Pain Score: 0 (NPRS)

## 2020-08-23 NOTE — ANESTHESIA QCDR
2019 [facility] Clinical Data Registry (for Quality Improvement)     Postoperative nausea/vomiting risk protocol (Adult = 18 yrs and Pediatric 3-17 yrs)- (430 and 463)  General inhalation anesthetic (NOT TIVA) with PONV risk factors: No  Provision of anti-emetic therapy with at least 2 different classes of agents: N/A  Patient DID NOT receive anti-emetic therapy and reason is documented in Medical Record: N/A    Multimodal Pain Management- (477)  Non-emergent surgery AND patient age >= 18: No  Use of Multimodal Pain Management, two or more drugs and/or interventions, NOT including systemic opioids:   Exception: Documented allergy to multiple classes of analgesics:     Smoking Abstinence (404)  Patient is current smoker (cigarette, pipe, e-cig, marijuanna): No  Elective Surgery:   Abstinence instructions provided prior to day of surgery:   Patient abstained from smoking on day of surgery:     Pre-Op Beta-Blocker in Isolated CABG (44)  Isolated CABG AND patient age >= 18: No  Beta-blocker admin within 24 hours of surgical incision:   Exception:of medical reason(s) for not administering beta blocker within 24 hours prior to surgical incision (e.g., not  indicated,other medical reason):     PACU assessment of acute postoperative pain prior to Anesthesia Care End- Applies to Patients Age = 18- (ABG7)  Initial PACU pain score is which of the following: < 7/10  Patient unable to report pain score: N/A    Post-anesthetic transfer of care checklist/protocol to PACU/ICU- (426 and 427)  Upon conclusion of case, patient transferred to which of the following locations: PACU/Non-ICU  Use of transfer checklist/protocol: Yes  Exclusion: Service Performed in Patient Hospital Room (and thus did not require transfer): N/A  Unplanned admission to ICU related to anesthesia service up through end of PACU care- (MD51)  Unplanned admission to ICU (not initially anticipated at anesthesia start time): No

## 2020-08-24 VITALS
SYSTOLIC BLOOD PRESSURE: 118 MMHG | HEART RATE: 82 BPM | WEIGHT: 260.8 LBS | TEMPERATURE: 97.9 F | HEIGHT: 72 IN | OXYGEN SATURATION: 99 % | RESPIRATION RATE: 16 BRPM | BODY MASS INDEX: 35.33 KG/M2 | DIASTOLIC BLOOD PRESSURE: 74 MMHG

## 2020-08-24 PROBLEM — R79.89 ELEVATED TROPONIN: Status: RESOLVED | Noted: 2020-08-12 | Resolved: 2020-08-24

## 2020-08-24 PROBLEM — N17.9 AKI (ACUTE KIDNEY INJURY) (HCC): Status: RESOLVED | Noted: 2020-08-22 | Resolved: 2020-08-24

## 2020-08-24 PROBLEM — K92.1 GASTROINTESTINAL HEMORRHAGE WITH MELENA: Status: RESOLVED | Noted: 2020-08-22 | Resolved: 2020-08-24

## 2020-08-24 PROBLEM — R57.9 SHOCK (HCC): Status: RESOLVED | Noted: 2020-08-22 | Resolved: 2020-08-24

## 2020-08-24 LAB
ANION GAP SERPL CALC-SCNC: 13 MMOL/L (ref 7–16)
BACTERIA UR CULT: NORMAL
BUN SERPL-MCNC: 23 MG/DL (ref 8–22)
CALCIUM SERPL-MCNC: 8.8 MG/DL (ref 8.5–10.5)
CHLORIDE SERPL-SCNC: 101 MMOL/L (ref 96–112)
CO2 SERPL-SCNC: 24 MMOL/L (ref 20–33)
CREAT SERPL-MCNC: 1.23 MG/DL (ref 0.5–1.4)
ERYTHROCYTE [DISTWIDTH] IN BLOOD BY AUTOMATED COUNT: 48 FL (ref 35.9–50)
GLUCOSE BLD-MCNC: 120 MG/DL (ref 65–99)
GLUCOSE BLD-MCNC: 142 MG/DL (ref 65–99)
GLUCOSE BLD-MCNC: 162 MG/DL (ref 65–99)
GLUCOSE SERPL-MCNC: 130 MG/DL (ref 65–99)
H PYLORI AG STL QL IA: NOT DETECTED
HCT VFR BLD AUTO: 27.9 % (ref 42–52)
HGB BLD-MCNC: 8.8 G/DL (ref 14–18)
HGB BLD-MCNC: 9 G/DL (ref 14–18)
MCH RBC QN AUTO: 31.8 PG (ref 27–33)
MCHC RBC AUTO-ENTMCNC: 32.3 G/DL (ref 33.7–35.3)
MCV RBC AUTO: 98.6 FL (ref 81.4–97.8)
PLATELET # BLD AUTO: 374 K/UL (ref 164–446)
PMV BLD AUTO: 10 FL (ref 9–12.9)
POTASSIUM SERPL-SCNC: 3.7 MMOL/L (ref 3.6–5.5)
RBC # BLD AUTO: 2.83 M/UL (ref 4.7–6.1)
SIGNIFICANT IND 70042: NORMAL
SITE SITE: NORMAL
SODIUM SERPL-SCNC: 138 MMOL/L (ref 135–145)
SOURCE SOURCE: NORMAL
WBC # BLD AUTO: 9.8 K/UL (ref 4.8–10.8)

## 2020-08-24 PROCEDURE — 700102 HCHG RX REV CODE 250 W/ 637 OVERRIDE(OP): Performed by: INTERNAL MEDICINE

## 2020-08-24 PROCEDURE — 80048 BASIC METABOLIC PNL TOTAL CA: CPT

## 2020-08-24 PROCEDURE — 85027 COMPLETE CBC AUTOMATED: CPT

## 2020-08-24 PROCEDURE — 99239 HOSP IP/OBS DSCHRG MGMT >30: CPT | Performed by: INTERNAL MEDICINE

## 2020-08-24 PROCEDURE — 87338 HPYLORI STOOL AG IA: CPT

## 2020-08-24 PROCEDURE — 82962 GLUCOSE BLOOD TEST: CPT | Mod: 91

## 2020-08-24 PROCEDURE — A9270 NON-COVERED ITEM OR SERVICE: HCPCS | Performed by: INTERNAL MEDICINE

## 2020-08-24 RX ORDER — OMEPRAZOLE 20 MG/1
20 CAPSULE, DELAYED RELEASE ORAL 2 TIMES DAILY
Qty: 60 CAP | Refills: 3 | Status: SHIPPED | OUTPATIENT
Start: 2020-08-24 | End: 2022-09-25

## 2020-08-24 RX ORDER — POLYETHYLENE GLYCOL 3350 17 G/17G
17 POWDER, FOR SOLUTION ORAL DAILY
Qty: 30 EACH | Refills: 0 | Status: ON HOLD | OUTPATIENT
Start: 2020-08-24 | End: 2023-04-16

## 2020-08-24 RX ORDER — ACETAMINOPHEN 325 MG/1
650 TABLET ORAL EVERY 6 HOURS PRN
Qty: 30 TAB | Refills: 0 | Status: SHIPPED | OUTPATIENT
Start: 2020-08-24 | End: 2022-09-25

## 2020-08-24 RX ADMIN — GABAPENTIN 300 MG: 300 CAPSULE ORAL at 10:09

## 2020-08-24 RX ADMIN — GABAPENTIN 300 MG: 300 CAPSULE ORAL at 12:08

## 2020-08-24 RX ADMIN — OMEPRAZOLE 20 MG: 20 CAPSULE, DELAYED RELEASE ORAL at 05:05

## 2020-08-24 RX ADMIN — INSULIN HUMAN 1 UNITS: 100 INJECTION, SOLUTION PARENTERAL at 00:18

## 2020-08-24 NOTE — PROGRESS NOTES
Monitor Summary    SB/SR rate: 50-80s, frequent PACs, ST in the 120s when standing to use the urinal.    .14/.08/.44

## 2020-08-24 NOTE — THERAPY
PT consult received. Per OT and RN, pt has been up self in room with no acute therapy needs at this time. Per RN, pt awaiting DC. Current PT orders will be DC'ed. Should pt experience an acute decline in function please reorder PT. Thanks    Brie Coreas, PT, DPT Phone: [phone]

## 2020-08-24 NOTE — PROGRESS NOTES
Monitor Summary   SB-SR HR high 50s-70s with some ectopy noted.   .20/.10/.40    12 hour chart review done    Visit Information Date & Time Provider Department Dept. Phone Encounter #  
 6/2/2017 10:15 AM Froilan Kamara MD [facility] [phone] [id number] Follow-up Instructions Return in about 4 weeks (around 6/30/2017), or if symptoms worsen or fail to improve. Upcoming Health Maintenance Date Due Hepatitis C Screening 1955 DTaP/Tdap/Td series (1 - Tdap) 7/19/1976 PAP AKA CERVICAL CYTOLOGY 7/19/1976 FOBT Q 1 YEAR AGE 50-75 7/19/2005 ZOSTER VACCINE AGE 60> 7/19/2015 BREAST CANCER SCRN MAMMOGRAM 10/27/2016 INFLUENZA AGE 9 TO ADULT 8/1/2017 Allergies as of 6/2/2017  Review Complete On: 6/2/2017 By: Keren Meckel Severity Noted Reaction Type Reactions Calcium Iodide  03/29/2016    Unproven on Challenge Pt. denies Pneumovax 23 [Pneumococcal 23-alisha Ps Vaccine]    Unknown (comments) Shellfish Containing Products  04/06/2012    Swelling Current Immunizations  Never Reviewed Name Date Pneumococcal Polysaccharide (PPSV-23) 3/13/2013  5:42 PM  
  
 Not reviewed this visit You Were Diagnosed With   
  
 Codes Comments Carpal tunnel syndrome of right wrist    -  Primary ICD-10-CM: G56.01 
ICD-9-CM: 354.0 Myofascial pain     ICD-10-CM: M79.1 ICD-9-CM: 729.1 Vitals BP Pulse Temp Resp Height(growth percentile) Weight(growth percentile) 136/70 82 98.5 °F (36.9 °C) (Oral) 18 5' 7\" (1.702 m) 233 lb (105.7 kg) SpO2 BMI OB Status Smoking Status 97% 36.49 kg/m2 Postmenopausal Former Smoker BMI and BSA Data Body Mass Index Body Surface Area  
 36.49 kg/m 2 2.24 m 2 Preferred Pharmacy Pharmacy Name Phone [facility] [address] [facility] [phone] Your Updated Medication List  
  
   
This list is accurate as of: 6/2/17 11:16 AM.  Always use your most recent med list.  
  
  
  
  
 aspirin 81 mg chewable tablet Take 81 mg by mouth daily. atorvastatin 40 mg tablet Commonly known as:  LIPITOR Take  by mouth daily. cholecalciferol (vitamin D3) 2,000 unit Tab Take  by mouth.  
  
 cyanocobalamin 500 mcg tablet Commonly known as:  VITAMIN B12 Take 500 mcg by mouth daily. cyclobenzaprine 10 mg tablet Commonly known as:  FLEXERIL Take 1 Tab by mouth three (3) times daily as needed for Muscle Spasm(s). esomeprazole 40 mg capsule Commonly known as:  NEXIUM  
  
 * gabapentin 300 mg capsule Commonly known as:  NEURONTIN Take 300 mg by mouth three (3) times daily. * gabapentin 300 mg capsule Commonly known as:  NEURONTIN Take 1 Cap by mouth three (3) times daily. * gabapentin 300 mg capsule Commonly known as:  NEURONTIN Take 2 Caps by mouth three (3) times daily. losartan 100 mg tablet Commonly known as:  COZAAR Take 100 mg by mouth daily. metoclopramide HCl 5 mg tablet Commonly known as:  REGLAN Take 5 mg by mouth Before breakfast, lunch, and dinner. montelukast 10 mg tablet Commonly known as:  SINGULAIR Take 10 mg by mouth daily. NASONEX 50 mcg/actuation nasal spray Generic drug:  mometasone 2 Sprays daily. omeprazole 10 mg capsule Commonly known as:  PRILOSEC  
20 mg.  
  
 oxyCODONE-acetaminophen 5-325 mg per tablet Commonly known as:  PERCOCET Take 1 tablet every 4-6 hours as needed for pain control. If you were instructed to try over the counter ibuprofen or tylenol, only take the percocet for pain not controlled with the over the counter medication. PAXIL 20 mg tablet Generic drug:  PARoxetine Take  by mouth daily. TOPAMAX 50 mg tablet Generic drug:  topiramate Take  by mouth two (2) times a day. vitamin e 1,000 unit capsule Commonly known as:  E GEMS Take 1,000 Units by mouth daily. * Notice:   This list has 3 medication(s) that are the same as other medications prescribed for you. Read the directions carefully, and ask your doctor or other care provider to review them with you. Prescriptions Sent to Pharmacy Refills  
 gabapentin (NEURONTIN) 300 mg capsule 2 Sig: Take 2 Caps by mouth three (3) times daily. Class: Normal  
 Pharmacy: [facility] [address] [place] [address] #: [phone] Route: Oral  
  
Follow-up Instructions Return in about 4 weeks (around 6/30/2017), or if symptoms worsen or fail to improve. Patient Instructions Gabapentin (By mouth) Gabapentin (nelia-a-PEN-tin) Treats seizures and pain caused by shingles. Brand Name(s): ACTIVE-PAC with Gabapentin, Convenience Shiv, Cyclo/Clair 10/300 Pack, FusePaq Fanatrex, Clair-V, Gralise, Neurontin, Hindu-Minturn There may be other brand names for this medicine. When This Medicine Should Not Be Used: This medicine is not right for everyone. Do not use it if you had an allergic reaction to gabapentin. How to Use This Medicine:  
Capsule, Liquid, Tablet · Take your medicine as directed. Your dose may need to be changed several times to find what works best for you. If you have epilepsy, do not allow more than 12 hours to pass between doses. · Capsule: Swallow the capsule whole with plenty of water. Do not open, crush, or chew it. · Gralise® tablet: Swallow the tablet whole . Do not crush, break, or chew it. · Neurontin® tablet: If you break a tablet into 2 pieces, use the second half as your next dose. If you don't use it within 28 days, throw it away. · Measure the oral liquid medicine with a marked measuring spoon, oral syringe, or medicine cup. · This medicine should come with a Medication Guide. Ask your pharmacist for a copy if you do not have one. · Missed dose: Take a dose as soon as you remember. If it is almost time for your next dose, wait until then and take a regular dose.  Do not take extra medicine to make up for a missed dose. · Store the medicine in a closed container at room temperature, away from heat, moisture, and direct light. Store the Neurontin® oral liquid in the refrigerator. Do not freeze. Drugs and Foods to Avoid: Ask your doctor or pharmacist before using any other medicine, including over-the-counter medicines, vitamins, and herbal products. · Some medicines can affect how gabapentin works. Tell your doctor if you also use any of the following: ¨ Hydrocodone ¨ Morphine · If you take an antacid, wait at least 2 hours before you take gabapentin. · Tell your doctor if you use anything else that makes you sleepy. Some examples are allergy medicine, narcotic pain medicine, and alcohol. Warnings While Using This Medicine: · Tell your doctor if you are pregnant or breastfeeding, or if you have kidney problems or are receiving dialysis. Tell your doctor if you have a history of depression or mental health problems. · This medicine may increase depression or thoughts of suicide. Tell your doctor right away if you start to feel more depressed or think about hurting yourself. · This medicine may cause a serious allergic reaction called multiorgan hypersensitivity, which can damage organs and be life-threatening. · Do not stop using this medicine suddenly. Your doctor will need to slowly decrease your dose before you stop it completely. If you take this medicine to prevent seizures, your seizures may return or occur more often if you stop this medicine suddenly. · This medicine may make you dizzy or drowsy. Do not drive or do anything else that could be dangerous until you know how this medicine affects you. · Tell any doctor or dentist who treats you that you are using this medicine. This medicine may affect certain medical test results. · Your doctor will check your progress and the effects of this medicine at regular visits. Keep all appointments. · Keep all medicine out of the reach of children. Never share your medicine with anyone. Possible Side Effects While Using This Medicine:  
Call your doctor right away if you notice any of these side effects: · Allergic reaction: Itching or hives, swelling in your face or hands, swelling or tingling in your mouth or throat, chest tightness, trouble breathing · Behavior problems, aggression, restlessness, trouble concentrating, moodiness (especially in children) · Blistering, peeling, red skin rash · Change in how much or how often you urinate, bloody or cloudy urine, 
· Chest pain, fast heartbeat, trouble breathing · Dark urine or pale stools, nausea, vomiting, loss of appetite, stomach pain, yellow skin or eyes · Fever, rash, swollen or tender glands in the neck, armpit, or groin · Problems with coordination, shakiness, unsteadiness · Rapid weight gain, swelling in your hands, ankles, or feet · Unusual moods or behaviors, thoughts of hurting yourself, feeling depressed If you notice these less serious side effects, talk with your doctor: · Dizziness, drowsiness, sleepiness, tiredness If you notice other side effects that you think are caused by this medicine, tell your doctor. Call your doctor for medical advice about side effects. You may report side effects to FDA at 0-592-FDA-5102 © 2017 [facility] Information is for End User's use only and may not be sold, redistributed or otherwise used for commercial purposes. The above information is an  only. It is not intended as medical advice for individual conditions or treatments. Talk to your doctor, nurse or pharmacist before following any medical regimen to see if it is safe and effective for you. Gabapentin (Neurontin) instructions: We will increase your current dose from 300 mg (1 pill) three times a day to 600 mg (2 pills) three times a day. Morning Afternoon Night Week 1 1 pill 1 pill 2 pills Week 2 2 pills 1 pill 2 pills Week 3 and onwards 2 pills 2 pills 2 pills Week 1: Take an additional 300 mg pill at night to your current dose so that you are taking 1 pill in the morning, 1 pill in the afternoon, and 2 pills at night. Week 2: Take an additional 300 mg pill in the morning so that you are taking 2 pills in the morning, 1 pill in the afternoon, and 2 pills at night. Week 3 and onwards: Take an additional 300 mg pill in the afternoon so that you are taking 2 pills in the morning, 2 pills in the afternoon, and 2 pills at night. Continue taking this dose each day. Introducing [facility] SERVICES! Dear Abundio Garcia: Thank you for requesting a Chef account. Our records indicate that you already have an active Chef account. You can access your account anytime at [email or web address] Did you know that you can access your hospital and ER discharge instructions at any time in Chef? You can also review all of your test results from your hospital stay or ER visit. Additional Information If you have questions, please visit the Frequently Asked Questions section of the Chef website at [email or web address] Remember, Chef is NOT to be used for urgent needs. For medical emergencies, dial 911. Now available from your iPhone and Android! Please provide this summary of care documentation to your next provider. Your primary care clinician is listed as Marta Alexis. If you have any questions after today's visit, please call [phone].

## 2020-08-24 NOTE — DISCHARGE SUMMARY
Discharge Summary    CHIEF COMPLAINT ON ADMISSION  Chief Complaint   Patient presents with   • Dizziness   • Hypotension       Reason for Admission  Upper GI bleeding    Admission Date  8/22/2020    CODE STATUS  Prior    HPI & HOSPITAL COURSE    69-year-old male with history of hypertension, diabetes, dyslipidemia with chronic back pain presented 8/22 with hypotension and generalized weakness, was found to have hypotension, the patient also states he had black tarry diarrhea, no significant abdominal pain and no urinary symptoms, no fever or chills, the patient states he is taking ibuprofen couple times a week for his chronic back pain, the patient was found to have upper GI bleeding and his hemoglobin 7.9, last week hemoglobin was 12.7, IV PPI was started, EGD was done by GI which showed Multiple ulcers ranging in size from 3 to 5 mm in the post bulbar duodenum, H. pylori antigen on stool is pending, pantoprazole IV switch her to omeprazole 20 mg twice daily orally, his hemoglobin has been stable around 9 for more than 48 hours, the patient feels stable and willing to be discharged.   The plan of care was discussed in detail with the patient, encouraged him to stop all NSAIDs medication and medication adherence with omeprazole and follow-up with primary care doctor for H. pylori test and treated if needed, the patient understood and agreed.     Therefore, he is discharged in good and stable condition to home with close outpatient follow-up.    The patient met 2-midnight criteria for an inpatient stay at the time of discharge.    Discharge Date  08/24/20      FOLLOW UP ITEMS POST DISCHARGE  Follow-up with primary care doctor for H. pylori test and treat if needed.      DISCHARGE DIAGNOSES  Principal Problem (Resolved):    Gastrointestinal hemorrhage with melena POA: Unknown  Active Problems:    Essential hypertension POA: Yes    Spinal stenosis, lumbar region, without neurogenic claudication POA: Yes    Type 2  diabetes mellitus without complication, without long-term current use of insulin (HCC) POA: Yes    Hyperlipidemia POA: Unknown    Sleep apnea (Chronic) POA: Yes    Obesity (BMI 30-39.9) POA: Yes    Benign prostatic hyperplasia with urinary frequency POA: Yes  Resolved Problems:    Shock (HCC) POA: Unknown    GENI (acute kidney injury) (HCC) POA: Unknown    Elevated troponin POA: Yes      FOLLOW UP  Chuck Chappell M.D.  [address]  [address]  [facility] [address]  [phone]    In 1 week        MEDICATIONS ON DISCHARGE     Medication List      START taking these medications      Instructions   acetaminophen 325 MG Tabs  Commonly known as: TYLENOL   Take 2 Tabs by mouth every 6 hours as needed (Mild Pain; (Pain scale 1-3); Temp greater than 100.5 F).  Dose: 650 mg     omeprazole 20 MG delayed-release capsule  Commonly known as: PRILOSEC   Take 1 Cap by mouth 2 Times a Day.  Dose: 20 mg     polyethylene glycol/lytes 17 g Pack  Commonly known as: MIRALAX   Take 1 Packet by mouth every day.  Dose: 17 g        CONTINUE taking these medications      Instructions   amLODIPine 10 MG Tabs  Commonly known as: NORVASC   Take 1 Tab by mouth every day. For further refills please contact new cardiologist. Thank you  Dose: 10 mg     ASPIRIN 81 PO   Take 81 mg by mouth every morning.  Dose: 81 mg     carvedilol 25 MG Tabs  Commonly known as: COREG   TAKE ONE TABLET BY MOUTH TWICE A DAY WITH MEALS     cloNIDine 0.2 MG Tabs  Commonly known as: CATAPRES   Take 1 Tab by mouth 2 times a day. For further refills please contact new cardiologist. Thank you  Dose: 0.2 mg     dicyclomine 20 MG Tabs  Commonly known as: BENTYL   TAKE ONE TABLET BY MOUTH EVERY 6 HOURS FOR 5 DAYS     fosinopril 40 MG tablet  Commonly known as: MONOPRIL   Take 1 Tab by mouth every day. For further refills please contact new cardiologist. Thank you  Dose: 40 mg     gabapentin 300 MG Caps  Commonly known as: NEURONTIN   Take 1 Cap by mouth 4 times a day.  Dose: 300  mg     lovastatin 20 MG Tabs  Commonly known as: MEVACOR   Take 1 Tab by mouth every evening.  Dose: 20 mg     metFORMIN 850 MG Tabs  Commonly known as: GLUCOPHAGE   Take 850 mg by mouth 2 times a day.  Dose: 850 mg     promethazine 25 MG Tabs  Commonly known as: PHENERGAN   Take 1 Tab by mouth every 6 hours as needed for Nausea/Vomiting.  Dose: 25 mg     tamsulosin 0.4 MG capsule  Commonly known as: FLOMAX   Take 0.4 mg by mouth ONE-HALF HOUR AFTER BREAKFAST.  Dose: 0.4 mg     terazosin 5 MG Caps  Commonly known as: HYTRIN   TAKE TWO CAPSULES BY MOUTH EVERY NIGHT AT BEDTIME (GENERIC HYTRIN)     triamterene-hctz 37.5-25 MG Tabs  Commonly known as: MAXZIDE-25/DYAZIDE   Take 1 Tab by mouth every morning.  Dose: 1 Tab        STOP taking these medications    fish oil 1000 MG Caps capsule     ibuprofen 800 MG Tabs  Commonly known as: MOTRIN            Allergies  No Known Allergies    DIET  No orders of the defined types were placed in this encounter.      ACTIVITY  As tolerated.  Weight bearing as tolerated    CONSULTATIONS  GI    PROCEDURES  EGD    LABORATORY  Lab Results   Component Value Date    SODIUM 138 08/24/2020    POTASSIUM 3.7 08/24/2020    CHLORIDE 101 08/24/2020    CO2 24 08/24/2020    GLUCOSE 130 (H) 08/24/2020    BUN 23 (H) 08/24/2020    CREATININE 1.23 08/24/2020        Lab Results   Component Value Date    WBC 9.8 08/24/2020    HEMOGLOBIN 9.0 (L) 08/24/2020    HEMATOCRIT 27.9 (L) 08/24/2020    PLATELETCT 374 08/24/2020        Total time of the discharge process exceeds 33 minutes.

## 2020-08-24 NOTE — ASSESSMENT & PLAN NOTE
Likely related to NSAID  Hemoglobin was 7 last week was 12  EGD showedMultiple ulcers ranging in size from 3 to 5 mm in the post bulbar duodenum  Continue monitoring with hemoglobin twice daily  Continue PPI  Avoid NSAID  Waiting for H. pylori antigen test.

## 2020-08-24 NOTE — DISCHARGE INSTRUCTIONS
Gastrointestinal Bleeding  Gastrointestinal (GI) bleeding is bleeding somewhere along the path that food travels through the body (digestive tract). This path is anywhere between the mouth and the opening of the butt (anus). You may have blood in your poop (stool) or have black poop. If you throw up (vomit), there may be blood in it.  This condition can be mild, serious, or even life-threatening. If you have a lot of bleeding, you may need to stay in the hospital.  What are the causes?  This condition may be caused by:  · Irritation and swelling of the esophagus (esophagitis). The esophagus is part of the body that moves food from your mouth to your stomach.  · Swollen veins in the butt (hemorrhoids).  · Areas of painful tearing in the opening of the butt (anal fissures). These are often caused by passing hard poop.  · Pouches that form on the colon over time (diverticulosis).  · Irritation and swelling (diverticulitis) in areas where pouches have formed on the colon.  · Growths (polyps) or cancer. Colon cancer often starts out as growths that are not cancer.  · Irritation of the stomach lining (gastritis).  · Sores (ulcers) in the stomach.  What increases the risk?  You are more likely to develop this condition if you:  · Have a certain type of infection in your stomach (Helicobacter pylori infection).  · Take certain medicines.  · Smoke.  · Drink alcohol.  What are the signs or symptoms?  Common symptoms of this condition include:  · Throwing up (vomiting) material that has bright red blood in it. It may look like coffee grounds.  · Changes in your poop. The poop may:  ? Have red blood in it.  ? Be black, look like tar, and smell stronger than normal.  ? Be red.  · Pain or cramping in the belly (abdomen).  How is this treated?  Treatment for this condition depends on the cause of the bleeding. For example:  · Sometimes, the bleeding can be stopped during a procedure that is done to find the problem (endoscopy or  colonoscopy).  · Medicines can be used to:  ? Help control irritation, swelling, or infection.  ? Reduce acid in your stomach.  · Certain problems can be treated with:  ? Creams.  ? Medicines that are put in the butt (suppositories).  ? Warm baths.  · Surgery is sometimes needed.  · If you lose a lot of blood, you may need a blood transfusion.  If bleeding is mild, you may be allowed to go home. If there is a lot of bleeding, you will need to stay in the hospital.  Follow these instructions at home:    · Take over-the-counter and prescription medicines only as told by your doctor.  · Eat foods that have a lot of fiber in them. These foods include beans, whole grains, and fresh fruits and vegetables. You can also try eating 1-3 prunes each day.  · Drink enough fluid to keep your pee (urine) pale yellow.  · Keep all follow-up visits as told by your doctor. This is important.  Contact a doctor if:  · Your symptoms do not get better.  Get help right away if:  · Your bleeding does not stop.  · You feel dizzy or you pass out (faint).  · You feel weak.  · You have very bad cramps in your back or belly.  · You pass large clumps of blood (clots) in your poop.  · Your symptoms are getting worse.  · You have chest pain or fast heartbeats.  Summary  · GI bleeding is bleeding somewhere along the path that food travels through the body (digestive tract).  · This bleeding can be caused by many things. Treatment depends on the cause of the bleeding.  · Take medicines only as told by your doctor.  · Keep all follow-up visits as told by your doctor. This is important.  This information is not intended to replace advice given to you by your health care provider. Make sure you discuss any questions you have with your health care provider.  Document Released: 09/26/2009 Document Revised: 07/31/2019 Document Reviewed: 07/31/2019  Elsevier Patient Education © 2020 [employer] Inc[employer]  Discharge Instructions    Discharged to home by car with  relative. Discharged via wheelchair, hospital escort: Refused.  Special equipment needed: Not Applicable    Be sure to schedule a follow-up appointment with your primary care doctor or any specialists as instructed.     Discharge Plan:        I understand that a diet low in cholesterol, fat, and sodium is recommended for good health. Unless I have been given specific instructions below for another diet, I accept this instruction as my diet prescription.   Other diet: GI soft, Low fiber     Special Instructions: None    · Is patient discharged on Warfarin / Coumadin?   No     Depression / Suicide Risk    As you are discharged from this [facility] facility, it is important to learn how to keep safe from harming yourself.    Recognize the warning signs:  · Abrupt changes in personality, positive or negative- including increase in energy   · Giving away possessions  · Change in eating patterns- significant weight changes-  positive or negative  · Change in sleeping patterns- unable to sleep or sleeping all the time   · Unwillingness or inability to communicate  · Depression  · Unusual sadness, discouragement and loneliness  · Talk of wanting to die  · Neglect of personal appearance   · Rebelliousness- reckless behavior  · Withdrawal from people/activities they love  · Confusion- inability to concentrate     If you or a loved one observes any of these behaviors or has concerns about self-harm, here's what you can do:  · Talk about it- your feelings and reasons for harming yourself  · Remove any means that you might use to hurt yourself (examples: pills, rope, extension cords, firearm)  · Get professional help from the community (Mental Health, Substance Abuse, psychological counseling)  · Do not be alone:Call your Safe Contact- someone whom you trust who will be there for you.  · Call your local CRISIS HOTLINE 923-2275 or 375-475-4890  · Call your local Children's Mobile Crisis Response Team [facility] (841)  925-7200 or www.TextualAds.Spotlime  · Call the toll free National Suicide Prevention Hotlines   · National Suicide Prevention Lifeline 248-545-KTYE (8656)  · National [employer] Line Network 800-SUICIDE (735-6273)

## 2020-08-24 NOTE — PROGRESS NOTES
Patient discharged via wheelchair to [facility] street entrance with wife. No complaints of pain or sings of distress at time to departure.

## 2020-08-24 NOTE — THERAPY
Missed Therapy     Patient Name: Ramu Barber  Age:  69 y.o., Sex:  male  Medical Record #: [id number]  Today's Date: 8/24/2020    Discussed missed therapy with nursing staff.       08/24/20 0933   Interdisciplinary Plan of Care Collaboration   IDT Collaboration with  Nursing   Collaboration Comments OT consult received and acknowledged. Per nursing staff, pt is up self, able to complete ADLs/transfers independently and has no acute OT needs at this time. Pt will be discharging home today. OT eval not indicated at this time, please re-consult if pt has a change in status. Thank you.

## 2020-08-31 ENCOUNTER — HOSPITAL ENCOUNTER (OUTPATIENT)
Dept: LAB | Facility: MEDICAL CENTER | Age: 70
End: 2020-08-31
Attending: NURSE PRACTITIONER
Payer: COMMERCIAL

## 2020-08-31 LAB
ALBUMIN SERPL BCP-MCNC: 3.6 G/DL (ref 3.2–4.9)
ALBUMIN/GLOB SERPL: 1.1 G/DL
ALP SERPL-CCNC: 73 U/L (ref 30–99)
ALT SERPL-CCNC: 12 U/L (ref 2–50)
ANION GAP SERPL CALC-SCNC: 14 MMOL/L (ref 7–16)
AST SERPL-CCNC: 13 U/L (ref 12–45)
BASOPHILS # BLD AUTO: 1.1 % (ref 0–1.8)
BASOPHILS # BLD: 0.1 K/UL (ref 0–0.12)
BILIRUB SERPL-MCNC: 0.2 MG/DL (ref 0.1–1.5)
BUN SERPL-MCNC: 22 MG/DL (ref 8–22)
CALCIUM SERPL-MCNC: 9.3 MG/DL (ref 8.5–10.5)
CHLORIDE SERPL-SCNC: 104 MMOL/L (ref 96–112)
CO2 SERPL-SCNC: 21 MMOL/L (ref 20–33)
CREAT SERPL-MCNC: 1.38 MG/DL (ref 0.5–1.4)
EOSINOPHIL # BLD AUTO: 0.27 K/UL (ref 0–0.51)
EOSINOPHIL NFR BLD: 3 % (ref 0–6.9)
ERYTHROCYTE [DISTWIDTH] IN BLOOD BY AUTOMATED COUNT: 58.3 FL (ref 35.9–50)
FASTING STATUS PATIENT QL REPORTED: NORMAL
GLOBULIN SER CALC-MCNC: 3.2 G/DL (ref 1.9–3.5)
GLUCOSE SERPL-MCNC: 146 MG/DL (ref 65–99)
HCT VFR BLD AUTO: 30.8 % (ref 42–52)
HGB BLD-MCNC: 9.5 G/DL (ref 14–18)
IMM GRANULOCYTES # BLD AUTO: 0.03 K/UL (ref 0–0.11)
IMM GRANULOCYTES NFR BLD AUTO: 0.3 % (ref 0–0.9)
LYMPHOCYTES # BLD AUTO: 1.26 K/UL (ref 1–4.8)
LYMPHOCYTES NFR BLD: 14.1 % (ref 22–41)
MCH RBC QN AUTO: 31.8 PG (ref 27–33)
MCHC RBC AUTO-ENTMCNC: 30.8 G/DL (ref 33.7–35.3)
MCV RBC AUTO: 103 FL (ref 81.4–97.8)
MONOCYTES # BLD AUTO: 0.74 K/UL (ref 0–0.85)
MONOCYTES NFR BLD AUTO: 8.3 % (ref 0–13.4)
NEUTROPHILS # BLD AUTO: 6.53 K/UL (ref 1.82–7.42)
NEUTROPHILS NFR BLD: 73.2 % (ref 44–72)
NRBC # BLD AUTO: 0 K/UL
NRBC BLD-RTO: 0 /100 WBC
PLATELET # BLD AUTO: 395 K/UL (ref 164–446)
PMV BLD AUTO: 10.3 FL (ref 9–12.9)
POTASSIUM SERPL-SCNC: 4.2 MMOL/L (ref 3.6–5.5)
PROT SERPL-MCNC: 6.8 G/DL (ref 6–8.2)
RBC # BLD AUTO: 2.99 M/UL (ref 4.7–6.1)
SODIUM SERPL-SCNC: 139 MMOL/L (ref 135–145)
WBC # BLD AUTO: 8.9 K/UL (ref 4.8–10.8)

## 2020-08-31 PROCEDURE — 36415 COLL VENOUS BLD VENIPUNCTURE: CPT

## 2020-08-31 PROCEDURE — 85025 COMPLETE CBC W/AUTO DIFF WBC: CPT

## 2020-08-31 PROCEDURE — 80053 COMPREHEN METABOLIC PANEL: CPT

## 2020-09-17 ENCOUNTER — PHYSICAL THERAPY (OUTPATIENT)
Dept: PHYSICAL THERAPY | Facility: REHABILITATION | Age: 70
End: 2020-09-17
Attending: FAMILY MEDICINE
Payer: COMMERCIAL

## 2020-09-17 DIAGNOSIS — R26.89 BALANCE PROBLEMS: ICD-10-CM

## 2020-09-17 PROCEDURE — 97162 PT EVAL MOD COMPLEX 30 MIN: CPT

## 2020-09-17 PROCEDURE — 97110 THERAPEUTIC EXERCISES: CPT

## 2020-09-17 NOTE — OP THERAPY EVALUATION
"  Outpatient Physical Therapy  INITIAL NEUROLOGICAL EVALUATION    [facility] Physical Therapy [address]  [address]  [place] [address]  Phone:  [phone]  Fax:  [phone]    Date of Evaluation: 09/17/2020    Patient: Ramu Barber  YOB: 1950  MRN: [id number]     Referring Provider: Chuck Chappell M.D.  [address] [facility] [address]  [address]  [place] [id number]   Referring Diagnosis Balance problems [R26.89]     Time Calculation    Start time: 1530  Stop time: 1630 Time Calculation (min): 60 minutes             Chief Complaint: Difficulty Walking and Loss Of Balance    Visit Diagnoses     ICD-10-CM   1. Balance problems  R26.89       Subjective:   History of Present Illness:     Mechanism of injury:  Pt \"William\" states he was in the hospital twice. Went in with a UTI, then went home. Then was back with an intestinal bleed about 10 days later. He went septic. He was in hospitalized for 4-5 days each time. Feeling weak and wobbly at this point. Had a couple of near misses, but no true falls. Uses a SPC to get around. Was using the walker when he first got out of the hospital, now using the cane. Yesterday, did not need the cane until the afternoon. He does get some cramping in his thighs, but feels just like he overdid it.   Social Support:     Lives in:  One-story house (no stairs)    Lives with:  Spouse  Treatments:     Treatment Comments:  Hospitalized from: 08/12/2020 - 08/15/2020 and d/c home  Hospitalized from : 08/22/2020 - 08/24/2020 and d/c home  Activities of Daily Living:     Patient reported ADL status: Increased time to perform tasks. Getting up from off his knees is really difficult.  Patient Goals:     Patient goals for therapy:  Improved balance, increased strength and independence with ADLs/IADLs    Other patient goals:  Get muscle tone back      Past Medical History:   Diagnosis Date   • Arthritis     back, hips   • Back pain    • Chickenpox    • High cholesterol    • Hypertension "    • Obesity    • Pain     back pain s/p multiple surgeries and spinal stimulator   • Pneumonia    • Polycythemia, secondary    • Sleep apnea     cpap   • Snoring    • Type II or unspecified type diabetes mellitus without mention of complication, not stated as uncontrolled     oral meds only     Past Surgical History:   Procedure Laterality Date   • EGD W/CONTROL BLEEDING  8/22/2020        • GASTROSCOPY N/A 8/22/2020    Procedure: GASTROSCOPY;  Surgeon: Tawanda Gomez M.D.;  Location: SURGERY [facility];  Service: Gastroenterology   • PB IMPLANT NEUROSTIM/  7/23/2019    Procedure: INSERTION, NEUROSTIMULATOR, PERMANENT, SPINAL CORD;  Surgeon: Teddy Santos M.D.;  Location: [facility];  Service: Pain Management   • LUMBAR LAMINECTOMY DISKECTOMY  2/18/2018    Procedure: LUMBAR LAMINECTOMY DISKECTOMY- LT L1-2 HEMILAMI-;  Surgeon: Tom Pittman M.D.;  Location: SURGERY [facility];  Service: Neurosurgery   • HARDWARE REMOVAL NEURO  2/18/2018    Procedure: HARDWARE REMOVAL NEURO- L2-3 PEDICLE SCREWS;  Surgeon: Tom Pittman M.D.;  Location: SURGERY [facility];  Service: Neurosurgery   • INGUINAL HERNIA REPAIR Right 2/16/2016    Procedure: INGUINAL HERNIA REPAIR;  Surgeon: Sj Baird M.D.;  Location: [facility];  Service:    • LUMBAR FUSION POSTERIOR  1/21/2015    Performed by Ko Suarez M.D. at [facility]   • CARPAL TUNNEL RELEASE Right 2014   • SHOULDER ARTHROSCOPY Right 2014   • CERVICAL DISK AND FUSION ANTERIOR  2005   • LUMBAR DECOMPRESSION  2004   • LUMBAR DECOMPRESSION  2003     Social History     Tobacco Use   • Smoking status: Never Smoker   • Smokeless tobacco: Former User     Types: Chew   Substance Use Topics   • Alcohol use: Yes     Comment: twice a month     Family and Occupational History     Socioeconomic History   • Marital status:      Spouse name: Not on file   • Number of children: Not on file   • Years of  education: Not on file   • Highest education level: Not on file   Occupational History   • Not on file       Objective:   Active Range of Motion:   Upper extremity (left):     All left upper extremity active range of motion: All within functional limits  Upper extremity (right):     All right upper extremity active range of motion: All within functional limits  Lower extremity (left):     All left lower extremity active range of motion: All within functional limits  Lower extremity (right):     All right lower extremity active range of motion: All within functional limits      Strength:   Upper extremity strength (left):     Shoulder flexion: 5    Shoulder abduction: 5    Shoulder external rotation:  5    Shoulder internal rotation: 5    Elbow flexion: 5    Elbow extension: 5    Fingers abduction: 5    Fingers adduction: 5  Upper extremity strength (right):    Shoulder flexion: 5    Shoulder abduction: 5    Shoulder external rotation: 5    Shoulder internal rotation: 5    Elbow flexion: 5    Elbow extension: 5    Fingers abduction: 5    Fingers adduction: 5  Lower extremity (left):     Hip flexion: 4-    Knee flexion: 4+    Knee extension: 4+    Ankle dorsiflexion: 5    Ankle plantar flexion: 5  Lower extremity (right):     Hip flexion: 4-    Knee flexion: 4-    Knee extension: 4-    Ankle dorsiflexion: 5    Ankle plantar flexion: 5    , Prehension, Pinch:  /Prehension (left):      strength: Within functional limits  /Prehension (right):      strength: Within functional limits    Balance/Gait Comments   5 TSTS: 19.15 sec (required use of HHA)  TU.13 sec (avg 3 trials) (use of SPC)  Trial 1: 23.64 sec, Trial 2: 18.75 sec, Trial 3, 18.00 sec  Pt had increased difficulty with turns (increaed number of steps, wider RADU) with fatigue, no LOB    FGA:   See media for details      Resting vitals  BP: 131/79 mmHg  HR: 65 bpm  O2: 96%      Therapeutic Exercises (CPT 44196):     1. Nu step, L3,  10min    2. Sit<>stand, 10x    3. Heel raises, 10x, HHA    4. Standing hip abduction, 10x, HHA    5. Hamstring curl, 10x, HHA    6. Marching, 10x, HHA    20. UPOC: 11/12/2020      Therapeutic Exercise Summary: HEP: above exercises      Time-based treatments/modalities:    Physical Therapy Timed Treatment Charges  Therapeutic exercise minutes (CPT 94192): 20 minutes      Assessment, Response and Plan:   Impairments: activity intolerance, impaired physical strength, lacks appropriate home exercise program, limited ADL's and limited mobility    Assessment details:  Pt is a pleasant, cooperative 70 yo male who presents with decreased BLE strength, activity tolerance, decreased balance, and is overall deconditioned following 2 hospitalizations. Pt will benefit from skilled physical therapy in order to strengthen his BLEs, improve his balance, and improve his overall activity tolerance in order to return to ADLs and recreational activities with less restriction, safely.   Other barriers to therapy:  Multiple hospitalizations  Prognosis: good    Goals:   Short Term Goals:   1. Pt is to be able to perform sit<>stand without use of HHA  2. Pt is able to ambulate 20 ft without AD, without LOB  3. Pt is to perform HEP without cueing demonstrating compliance.  Short term goal time span:  2-4 weeks      Long Term Goals:    1. Pt able to perform TUG in 13.5 sec or less demonstrating decreased fall risk  2. Pt is able to score a 23/30 on the FGA with SPC demonstrating functional improvement  3. Pt is to be able to perform 5 TSTS in 12 sec or less demonstrating decreased fall risk.  Long term goal time span:  6-8 weeks    Plan:   Therapy options:  Physical therapy treatment to continue  Planned therapy interventions:  Neuromuscular Re-education (CPT 97227), Therapeutic Exercise (CPT 58214), Therapeutic Activities (CPT 83759), Manual Therapy (CPT 06564) and Gait Training (CPT 19062)  Frequency:  2x week  Duration in weeks:   8  Discussed with:  Patient  Plan details:  1-2x/week for 8 weeks      Functional Assessment Used          Referring provider co-signature:  I have reviewed this plan of care and my co-signature certifies the need for services.    Certification Period: 09/17/2020 to  11/12/20    Physician Signature: ________________________________ Date: ______________

## 2020-09-22 ENCOUNTER — PHYSICAL THERAPY (OUTPATIENT)
Dept: PHYSICAL THERAPY | Facility: REHABILITATION | Age: 70
End: 2020-09-22
Attending: FAMILY MEDICINE
Payer: COMMERCIAL

## 2020-09-22 DIAGNOSIS — R26.89 BALANCE PROBLEMS: ICD-10-CM

## 2020-09-22 DIAGNOSIS — M16.0 BILATERAL PRIMARY OSTEOARTHRITIS OF HIP: ICD-10-CM

## 2020-09-22 PROCEDURE — 97110 THERAPEUTIC EXERCISES: CPT

## 2020-09-22 NOTE — OP THERAPY DAILY TREATMENT
Outpatient Physical Therapy  DAILY TREATMENT     [facility] Physical [address].  [address]  [place] [address]  Phone:  [phone]  Fax:  [phone]    Date: 09/22/2020    Patient: Ramu Allred May  YOB: 1950  MRN: [id number]     Time Calculation    Start time: 1025  Stop time: 1100 Time Calculation (min): 35 minutes         Chief Complaint: Difficulty Walking and Loss Of Balance    Visit #: 2    SUBJECTIVE:  Pt states he is doing okay today.    OBJECTIVE:  Current objective measures:           Therapeutic Exercises (CPT 61175):     1. Nu step, L3, 10 min, SPM: 80-90. no charge, arrived early to perform    2. Leg Press, SL-8 cords 15 reps, (too easy)    3. Toe taps, 45 seconds, box 4, (too easy)    4. Step ups, 30 seconds, box 6    5. 1/2 squats at mat edge, 20 reps    6. Mini circuit, 10 sit->stand, 30 sec tall kneel, rest between circuits. 2 times    20. UPOC: 11/12/2020      Therapeutic Exercise Summary: HEP: sit<>stand, heel raises, standing hip abduction, hamstring curl, marching      Time-based treatments/modalities:  Physical Therapy Timed Treatment Charges  Therapeutic exercise minutes (CPT 84734): 25 minutes    ASSESSMENT:   Response to treatment: Pt presents with decreased BLE strength, activity tolerance, decreased balance, and is overall deconditioned following 2 hospitalizations. Pt able to tolerate exercises in today's session, with some fatigue and SOB, but minimal rest breaks needed. Pt to do well with further progression of strengthening exercises and circuit training.    PLAN/RECOMMENDATIONS:   Plan for treatment: therapy treatment to continue next visit.  Planned interventions for next visit: continue with current treatment. Circuit training.

## 2020-09-23 ENCOUNTER — NON-PROVIDER VISIT (OUTPATIENT)
Dept: CARDIOLOGY | Facility: MEDICAL CENTER | Age: 70
End: 2020-09-23
Payer: COMMERCIAL

## 2020-09-23 DIAGNOSIS — R00.1 BRADYCARDIA: ICD-10-CM

## 2020-09-23 DIAGNOSIS — I49.1 PAC (PREMATURE ATRIAL CONTRACTION): ICD-10-CM

## 2020-09-23 PROCEDURE — 93224 XTRNL ECG REC UP TO 48 HRS: CPT | Performed by: INTERNAL MEDICINE

## 2020-09-24 ENCOUNTER — PHYSICAL THERAPY (OUTPATIENT)
Dept: PHYSICAL THERAPY | Facility: REHABILITATION | Age: 70
End: 2020-09-24
Attending: FAMILY MEDICINE
Payer: COMMERCIAL

## 2020-09-24 DIAGNOSIS — R26.89 BALANCE PROBLEMS: ICD-10-CM

## 2020-09-24 DIAGNOSIS — M16.0 BILATERAL PRIMARY OSTEOARTHRITIS OF HIP: ICD-10-CM

## 2020-09-24 PROCEDURE — 97110 THERAPEUTIC EXERCISES: CPT

## 2020-09-28 DIAGNOSIS — R00.1 BRADYCARDIA: ICD-10-CM

## 2020-09-28 LAB — EKG IMPRESSION: NORMAL

## 2020-09-29 ENCOUNTER — PHYSICAL THERAPY (OUTPATIENT)
Dept: PHYSICAL THERAPY | Facility: REHABILITATION | Age: 70
End: 2020-09-29
Attending: FAMILY MEDICINE
Payer: COMMERCIAL

## 2020-09-29 DIAGNOSIS — M16.0 BILATERAL PRIMARY OSTEOARTHRITIS OF HIP: ICD-10-CM

## 2020-09-29 DIAGNOSIS — R26.89 BALANCE PROBLEMS: ICD-10-CM

## 2020-09-29 PROCEDURE — 97110 THERAPEUTIC EXERCISES: CPT

## 2020-09-29 NOTE — OP THERAPY DAILY TREATMENT
"  Outpatient Physical Therapy  DAILY TREATMENT     [facility] Physical [address].  [address]  [place] [address]  Phone:  [phone]  Fax:  [phone]    Date: 09/29/2020    Patient: Ramu Barber  YOB: 1950  MRN: [id number]     Time Calculation    Start time: 1020  Stop time: 1100 Time Calculation (min): 40 minutes         Chief Complaint: Difficulty Walking and Loss Of Balance    Visit #: 4    SUBJECTIVE:  Pt states he was really sore after therapy the last time.    OBJECTIVE:  Current objective measures:           Therapeutic Exercises (CPT 79288):     1. Nu step, L3, 10 min, SPM: 80-90. no charge, arrived early to perform    2. Circuit, sit<>stand, 10x, about 3.5 min to perform, 1 min rest. circuit performed 3 times, bent over rows, 8lbs, 10x, side stepping, 20ft, step ups, 6\", 10x, side step back, 20ft    7. Tall kneeling, 30\" x 2, pt got up and down from floor between each set, pt unable to bring hips forward to neutral due to back pain, tightness in thighs    20. UPOC: 11/12/2020      Therapeutic Exercise Summary: HEP: sit<>stand, heel raises, standing hip abduction, hamstring curl, marching      Time-based treatments/modalities:  Physical Therapy Timed Treatment Charges  Therapeutic exercise minutes (CPT 32929): 30 minutes    ASSESSMENT:   Response to treatment: Pt presents with decreased BLE strength, activity tolerance, decreased balance, and is overall deconditioned following 2 hospitalizations. Pt had increased fatigue with the 2nd and 3rd set of circuit training, but still able to safely perform without LOB. Pt had difficulty in tall kneeling position reaching neutral due to pull in back and anterior thighs, which pt states has been the case since his multiple back surgeries.     PLAN/RECOMMENDATIONS:   Plan for treatment: therapy treatment to continue next visit.  Planned interventions for next visit: continue with current treatment. Repeat circuit training. "  Consider adding quad/hip flexor stretch.

## 2020-10-01 ENCOUNTER — PHYSICAL THERAPY (OUTPATIENT)
Dept: PHYSICAL THERAPY | Facility: REHABILITATION | Age: 70
End: 2020-10-01
Attending: FAMILY MEDICINE
Payer: COMMERCIAL

## 2020-10-01 ENCOUNTER — HOSPITAL ENCOUNTER (OUTPATIENT)
Dept: LAB | Facility: MEDICAL CENTER | Age: 70
End: 2020-10-01
Attending: NURSE PRACTITIONER
Payer: COMMERCIAL

## 2020-10-01 DIAGNOSIS — R26.89 BALANCE PROBLEMS: ICD-10-CM

## 2020-10-01 DIAGNOSIS — M16.0 BILATERAL PRIMARY OSTEOARTHRITIS OF HIP: ICD-10-CM

## 2020-10-01 PROCEDURE — 84439 ASSAY OF FREE THYROXINE: CPT

## 2020-10-01 PROCEDURE — 82746 ASSAY OF FOLIC ACID SERUM: CPT

## 2020-10-01 PROCEDURE — 97110 THERAPEUTIC EXERCISES: CPT

## 2020-10-01 PROCEDURE — 82607 VITAMIN B-12: CPT

## 2020-10-01 PROCEDURE — 84443 ASSAY THYROID STIM HORMONE: CPT

## 2020-10-01 PROCEDURE — 36415 COLL VENOUS BLD VENIPUNCTURE: CPT

## 2020-10-01 NOTE — OP THERAPY DAILY TREATMENT
"  Outpatient Physical Therapy  DAILY TREATMENT     [facility] Physical [address].  [address]  [place] [address]  Phone:  [phone]  Fax:  [phone]    Date: 10/01/2020    Patient: Ramu Allred May  YOB: 1950  MRN: [id number]     Time Calculation    Start time: 1230  Stop time: 1300 Time Calculation (min): 30 minutes         Chief Complaint: Difficulty Walking and Loss Of Balance    Visit #: 5    SUBJECTIVE:  Pt states he did okay after last time, not as tired.     OBJECTIVE:  Current objective measures:           Therapeutic Exercises (CPT 58538):     1. Nu step, L3, 8 min, SPM: 80-90. no charge, arrived early to perform    2. Circuit, sit<>stand, 10x, about 3.5 min to perform, 1.5 min rest. circuit performed 3 times, SUNITHA rows, 30lbs, 10x, get down on floor, bridge - on floor, 10x, get up off of floor    7. Standing hip flexor stretch, 20\" x 4 each    20. UPOC: 11/12/2020      Therapeutic Exercise Summary: HEP: sit<>stand, heel raises, standing hip abduction, hamstring curl, marching    Added standing hip flexor stretch      Time-based treatments/modalities:  Physical Therapy Timed Treatment Charges  Therapeutic exercise minutes (CPT 76629): 25 minutes    ASSESSMENT:   Response to treatment: Pt presents with decreased BLE strength, activity tolerance, decreased balance, and is overall deconditioned following 2 hospitalizations. Pt had more fatigue in today's circuit, but able to tolerate 3 rounds. Getting up and down from the floor is the most wearing/fatiguing for pt.     PLAN/RECOMMENDATIONS:   Plan for treatment: therapy treatment to continue next visit.  Planned interventions for next visit: continue with current treatment. Repeat circuit training.  Update HEP.       "

## 2020-10-02 LAB
FOLATE SERPL-MCNC: 12.3 NG/ML
T4 FREE SERPL-MCNC: 1.04 NG/DL (ref 0.93–1.7)
TSH SERPL DL<=0.005 MIU/L-ACNC: 1.82 UIU/ML (ref 0.38–5.33)
VIT B12 SERPL-MCNC: 349 PG/ML (ref 211–911)

## 2020-10-05 ENCOUNTER — PHYSICAL THERAPY (OUTPATIENT)
Dept: PHYSICAL THERAPY | Facility: REHABILITATION | Age: 70
End: 2020-10-05
Attending: FAMILY MEDICINE
Payer: COMMERCIAL

## 2020-10-05 DIAGNOSIS — R26.89 BALANCE PROBLEMS: ICD-10-CM

## 2020-10-05 DIAGNOSIS — M16.0 BILATERAL PRIMARY OSTEOARTHRITIS OF HIP: ICD-10-CM

## 2020-10-05 PROCEDURE — 97110 THERAPEUTIC EXERCISES: CPT

## 2020-10-05 NOTE — OP THERAPY DAILY TREATMENT
Outpatient Physical Therapy  DAILY TREATMENT     [facility] Physical [address].  [address]  [place] [address]  Phone:  [phone]  Fax:  [phone]    Date: 10/05/2020    Patient: Ramu Allred May  YOB: 1950  MRN: [id number]     Time Calculation    Start time: 0950  Stop time: 1025 Time Calculation (min): 35 minutes         Chief Complaint: Difficulty Walking and Loss Of Balance    Visit #: 6    SUBJECTIVE:  Pt states he is okay today.     OBJECTIVE:  Current objective measures:           Therapeutic Exercises (CPT 13561):     1. Nu step, L3, 10 min, SPM: 80-90. no charge, arrived early to perform    2. Circuit, sit<>stand, 10x, about 5.5 min to perform,  30 sec rest. circuit performed 2 times, side stepping, 15lbs, 10x first round. bent over rows, 10lbs, 10x - second round, lateral step ups/downs, 10x, get onto floor, bridge, get up off floor    20. UPOC: 11/12/2020      Therapeutic Exercise Summary: HEP: sit<>stand, heel raises, standing hip abduction, hamstring curl, marching    Added standing hip flexor stretch      Time-based treatments/modalities:  Physical Therapy Timed Treatment Charges  Therapeutic exercise minutes (CPT 51230): 25 minutes    ASSESSMENT:   Response to treatment: Pt presents with decreased BLE strength, activity tolerance, decreased balance, and is overall deconditioned following 2 hospitalizations. Pt able to tolerate longer circuit with shorter a shorter rest break in today's session. Pt encouraged to start implementing circuit training into his home exercise routine.    PLAN/RECOMMENDATIONS:   Plan for treatment: therapy treatment to continue next visit.  Planned interventions for next visit: continue with current treatment. Focus more on balance. Update HEP.

## 2020-10-07 ENCOUNTER — PHYSICAL THERAPY (OUTPATIENT)
Dept: PHYSICAL THERAPY | Facility: REHABILITATION | Age: 70
End: 2020-10-07
Attending: FAMILY MEDICINE
Payer: COMMERCIAL

## 2020-10-07 DIAGNOSIS — M16.0 BILATERAL PRIMARY OSTEOARTHRITIS OF HIP: ICD-10-CM

## 2020-10-07 DIAGNOSIS — R26.89 BALANCE PROBLEMS: ICD-10-CM

## 2020-10-07 PROCEDURE — 97112 NEUROMUSCULAR REEDUCATION: CPT

## 2020-10-07 NOTE — OP THERAPY DAILY TREATMENT
"  Outpatient Physical Therapy  DAILY TREATMENT     [facility] Physical [address]  [address]  [place] [address]  Phone:  [phone]  Fax:  [phone]    Date: 10/07/2020    Patient: Ramu Allred May  YOB: 1950  MRN: [id number]     Time Calculation    Start time: 1355  Stop time: 1430 Time Calculation (min): 35 minutes         Chief Complaint: Difficulty Walking and Loss Of Balance    Visit #: 7    SUBJECTIVE:  Pt states he is feeling alright. Pt feels okay, except for getting old. Legs and lower back are the most limiting.    OBJECTIVE:  Current objective measures:           Therapeutic Exercises (CPT 27465):     1. Nu step, L3, 5 min, SPM: 80-90.     20. UPOC: 11/12/2020      Therapeutic Exercise Summary: HEP: sit<>stand, heel raises, standing hip abduction, hamstring curl, marching    Added standing hip flexor stretch    Therapeutic Treatments and Modalities:     1. Neuromuscular Re-education (CPT 31613), see below, in // bars. CGA, no physical assistance needed from PT    Therapeutic Treatment and Modalities Summary: Neuro:   -tandem stance, 30\" x 1  -semi tandem stance, with head turns, 30\" x 1  -semi tandem stance with EC, 30\" x 1. Increased sway  -narrow stance on foam with head turns, 30\" x 1  -narrow stance on foam with EC, 30\" x 1  -wobble board: fwd/back and latera. 30\" x 1 with movement, 30\" x 1 static, 30\" x 1 static with EC. Difficulty maintaining COG with EC in both directions, required HHA from // bars to maintain balance. No physical assistance needed from therapist  -tandem walk. Progressed to no HHA. Pt had 2 LOB that required stepping strategy to correct without HHA, but no physical assistance needed from PT  -WS fwd, 5x  -WS fwd + stepping reaction, 5 times each side. Pt able to perform WS forward before step, but difficulty getting to pt's edge of COG due to apprehension. Good control with step.  -WS back, 5x  -WS back + stepping reaction, 5 times each " side. Pt able to perform WS backward before step, but difficulty getting to pt's edge of COG due to apprehension. Good control with step.      Time-based treatments/modalities:  Physical Therapy Timed Treatment Charges  Neuromusc re-ed, balance, coor, post minutes (CPT 00186): 25 minutes  Therapeutic exercise minutes (CPT 23610): 5 minutes    ASSESSMENT:   Response to treatment: Pt presents with decreased BLE strength, activity tolerance, decreased balance, and is overall deconditioned following 2 hospitalizations. Pt had most difficulty with reactionary stepping and balance with EC, but able to correct all LOB without physical assistance needed from therapist.    PLAN/RECOMMENDATIONS:   Plan for treatment: therapy treatment to continue next visit.  Planned interventions for next visit: continue with current treatment. Focus more on balance. Update HEP with balance exercises.

## 2020-10-14 ENCOUNTER — PHYSICAL THERAPY (OUTPATIENT)
Dept: PHYSICAL THERAPY | Facility: REHABILITATION | Age: 70
End: 2020-10-14
Attending: FAMILY MEDICINE
Payer: COMMERCIAL

## 2020-10-14 DIAGNOSIS — M16.0 BILATERAL PRIMARY OSTEOARTHRITIS OF HIP: ICD-10-CM

## 2020-10-14 DIAGNOSIS — R26.89 BALANCE PROBLEMS: ICD-10-CM

## 2020-10-14 PROCEDURE — 97112 NEUROMUSCULAR REEDUCATION: CPT

## 2020-10-14 NOTE — OP THERAPY DAILY TREATMENT
"  Outpatient Physical Therapy  DAILY TREATMENT     [facility] Physical [address]  [address]  [place] [address]  Phone:  [phone]  Fax:  [phone]    Date: 10/14/2020    Patient: Ramu Allred May  YOB: 1950  MRN: [id number]     Time Calculation    Start time: 0900  Stop time: 0930 Time Calculation (min): 30 minutes         Chief Complaint: Difficulty Walking and Loss Of Balance    Visit #: 8    SUBJECTIVE:  Pt states he is feeling alright. Pt feels okay, except for getting old. Legs and lower back are the most limiting.    OBJECTIVE:  Current objective measures:           Therapeutic Exercises (CPT 87300):     1. Nu step, L3, 10 min, SPM: 80-90. no charge, pt arrived early to perform    20. UPOC: 11/12/2020      Therapeutic Exercise Summary: HEP: sit<>stand, heel raises, standing hip abduction, hamstring curl, marching, standing hip flexor stretch    Therapeutic Treatments and Modalities:     1. Neuromuscular Re-education (CPT 37351), see below, in // bars. CGA, no physical assistance needed from PT    Therapeutic Treatment and Modalities Summary: Neuro:   In // bars:   -tandem walk - fwd/back  -walk on 1/2 foam roll, 2 lengths x 2  -side stepping on 1/2 foam, 2 lengths x 2  -wobble board: fwd/back and lateral. 30\" x 1 with movement, 30\" x 1 static, 30\" x 1 static with EC. Difficulty maintaining COG with EC in both directions, required HHA from // bars to maintain balance. No physical assistance needed from therapist    Outside // bars: CGA to min-A with gait belt  -tandem walk: 3 LOB that required stepping strategy and min-A from therapist to prevent fall.   -horizontal head turns: increased RADU and weaving, pt reported dizziness  -vertical head movement: no LOB or weaving noted    -WS fwd + stepping reaction, 10 times each side. Pt had improved ability to WS outside of COG and able to control with appropriate single step.  -WS back + stepping reaction, 5 times each " side. Pt had improved ability to WS outside of COG and able to control with appropriate single step.        Time-based treatments/modalities:  Physical Therapy Timed Treatment Charges  Neuromusc re-ed, balance, coor, post minutes (CPT 80115): 25 minutes    ASSESSMENT:   Response to treatment: Pt presents with decreased BLE strength, activity tolerance, decreased balance, and is overall deconditioned following 2 hospitalizations. Pt had 3 LOB that required stepping strategy and min-A from therapist to recover when performing activities outside of the // bars. In // bars, pt has appropriate reactions to prevent falls, but often relies on HHA. Pt to continue to benefit from focus on balance and stepping reactions to improve his dynamic balance without relying on HHA.     PLAN/RECOMMENDATIONS:   Plan for treatment: therapy treatment to continue next visit.  Planned interventions for next visit: continue with current treatment. Continue focus to dynamic HEP.

## 2020-10-16 ENCOUNTER — PHYSICAL THERAPY (OUTPATIENT)
Dept: PHYSICAL THERAPY | Facility: REHABILITATION | Age: 70
End: 2020-10-16
Attending: FAMILY MEDICINE
Payer: COMMERCIAL

## 2020-10-16 DIAGNOSIS — R26.89 BALANCE PROBLEMS: ICD-10-CM

## 2020-10-16 DIAGNOSIS — M16.0 BILATERAL PRIMARY OSTEOARTHRITIS OF HIP: ICD-10-CM

## 2020-10-16 PROCEDURE — 97112 NEUROMUSCULAR REEDUCATION: CPT

## 2020-10-16 NOTE — OP THERAPY DAILY TREATMENT
"  Outpatient Physical Therapy  DAILY TREATMENT     [facility] [address]  [address]  [place] [address]  Phone:  [phone]  Fax:  [phone]    Date: 10/16/2020    Patient: Ramu Allred May  YOB: 1950  MRN: [id number]     Time Calculation    Start time: 0900  Stop time: 0930 Time Calculation (min): 30 minutes         Chief Complaint: Difficulty Walking and Loss Of Balance    Visit #: 9    SUBJECTIVE:  Pt states today is a rougher day. He woke up with his peripheral neuropathy being worse this morning, which can happen sometimes.     OBJECTIVE:  Current objective measures:           Therapeutic Exercises (CPT 18253):     1. Nu step, L3, 10 min, SPM: 80-90. no charge, pt arrived early to perform    20. UPOC: 11/12/2020      Therapeutic Exercise Summary: HEP: sit<>stand, heel raises, standing hip abduction, hamstring curl, marching, standing hip flexor stretch    Therapeutic Treatments and Modalities:     1. Neuromuscular Re-education (CPT 17134), see below, in // bars. CGA, no physical assistance needed from PT    Therapeutic Treatment and Modalities Summary: Neuro:   In // bars: gait belt, CGA  -tandem walk - fwd/back  -side stepping, 2 lengths  -wobble board: lateral. 60\" x 1 with movement, 60\" x 1 static, 60\" x 1 movement with EC. Required HHA with EC  -wobble board: fwd/back. 60\" x 1 with movement, 60\" x 1 static, 60\" x 1 movement with EC. Unable to tap board to floor with WS backward without HHA with EO or EC.  -standing on slanted surface - df: 60\" x 1 EO, 60\" x 1 EC. Tendency to fall into forward trunk lean versus true WS, increased with EC  -standing on slanted surface - pf: 60\" x 1 EO, 60\" x 1 EC. Tendency to fall into posterior lean outside of COG in which pt required HHA to correct LOB - no physical assistance needed from thearpist    Outside // bars: CGA to min-A with gait belt  -obstacle course (2 foam ramps, 1 firm ramp, 1 foam roll (horiz), 1 wobble " board). Pt started with good stepping and foot placement, as he became more fatigued tended to rush foot placement and had increased instability with 3 LOB - 2 corrected with stepping strategy, 1 with stepping strategy and min-A from therapist to prevent fall  -tandem walking - pt attempted but unable to perform today, just felt more off balance        Time-based treatments/modalities:  Physical Therapy Timed Treatment Charges  Neuromusc re-ed, balance, coor, post minutes (CPT 98841): 25 minutes    ASSESSMENT:   Response to treatment: Pt presents with decreased BLE strength, activity tolerance, decreased balance, and is overall deconditioned following 2 hospitalizations. Pt had increased instability and LOB with fatigue, discussed importance of slowing down and not rushing when fatigued to improve balance and decreased chance of a fall.     PLAN/RECOMMENDATIONS:   Plan for treatment: therapy treatment to continue next visit.  Planned interventions for next visit: continue with current treatment. Continue focus to dynamic HEP.

## 2020-10-20 ENCOUNTER — PHYSICAL THERAPY (OUTPATIENT)
Dept: PHYSICAL THERAPY | Facility: REHABILITATION | Age: 70
End: 2020-10-20
Attending: FAMILY MEDICINE
Payer: COMMERCIAL

## 2020-10-20 DIAGNOSIS — M16.0 BILATERAL PRIMARY OSTEOARTHRITIS OF HIP: ICD-10-CM

## 2020-10-20 DIAGNOSIS — R26.89 BALANCE PROBLEMS: ICD-10-CM

## 2020-10-20 PROCEDURE — 97112 NEUROMUSCULAR REEDUCATION: CPT

## 2020-10-20 NOTE — OP THERAPY DAILY TREATMENT
"  Outpatient Physical Therapy  DAILY TREATMENT     [facility] [address].  [address]  [place] [address]  Phone:  [phone]  Fax:  [phone]    Date: 10/20/2020    Patient: Ramu Allred May  YOB: 1950  MRN: [id number]     Time Calculation    Start time: 0920  Stop time: 1000 Time Calculation (min): 40 minutes         Chief Complaint: Difficulty Walking and Loss Of Balance    Visit #: 10    SUBJECTIVE:  Pt states he is okay today. Thinking of getting back to the gym, working out when to go around 10-11a when it is slowest.     OBJECTIVE:  Current objective measures:           Therapeutic Exercises (CPT 55025):     1. Nu step, L3, 10 min, SPM: 80-90. no charge, pt arrived early to perform    20. UPOC: 11/12/2020      Therapeutic Exercise Summary: HEP: sit<>stand, heel raises, standing hip abduction, hamstring curl, marching, standing hip flexor stretch    HEP: added tandem walking, tandem stance, semi tandem stance (EC, head turns)    Therapeutic Treatments and Modalities:     1. Neuromuscular Re-education (CPT 60402), see below, in // bars. CGA, no physical assistance needed from PT    Therapeutic Treatment and Modalities Summary: Neuro:   In // bars: gait belt, CGA  -tandem walk - fwd/back, 6 lengths  -side stepping, 4 lengths  -wobble board: lateral. 60\" x 1 with movement, 60\" x 1 static, 60\" x 1 movement with EC. Required HHA with EC  -wobble board: fwd/back. 60\" x 1 with movement, 60\" x 1 static, 60\" x 1 movement with EC. Required HHA with EC  -standing on slanted surface - df: 60\" x 1 EO, 60\" x 1 EC. Tendency to fall into forward trunk lean versus true WS, increased with EC  -standing on slanted surface - pf: 60\" x 1 EO, 60\" x 1 EC. Tendency to fall into posterior lean, able to correct without HHA with EO, required HHA to correct with EC - no physical assistance needed from PT  -standing on foam slanted surface - df: 60\" x 1 EO, 60\" x 1 EC. Tendency to fall " "into forward trunk lean versus true WS, increased with EC  -standing on foam slanted surface - pf:  60\" x 1 EO, 60\" x 1 EC. Tendency to fall into posterior lean, tendency to over correct with forward WS and required use of HHA, especially with EC        Time-based treatments/modalities:  Physical Therapy Timed Treatment Charges  Neuromusc re-ed, balance, coor, post minutes (CPT 92678): 25 minutes    ASSESSMENT:   Response to treatment: Pt presents with decreased BLE strength, activity tolerance, decreased balance, and is overall deconditioned following 2 hospitalizations. Pt able to extend further outside of COG and return without LOB compared to previous sessions. Pt planning to return to gym upon d/c from PT after next session for continued improvements to strength, balance, and activity tolerance.     PLAN/RECOMMENDATIONS:   Plan for treatment: therapy treatment to continue next visit.  Planned interventions for next visit: continue with current treatment. Continue focus to dynamic HEP. Likely d/c pending progress.       "

## 2020-10-26 ENCOUNTER — HOSPITAL ENCOUNTER (OUTPATIENT)
Dept: RADIOLOGY | Facility: MEDICAL CENTER | Age: 70
End: 2020-10-26
Attending: INTERNAL MEDICINE
Payer: COMMERCIAL

## 2020-10-26 DIAGNOSIS — M79.606 PAIN OF LOWER EXTREMITY, UNSPECIFIED LATERALITY: ICD-10-CM

## 2020-10-26 PROCEDURE — 93922 UPR/L XTREMITY ART 2 LEVELS: CPT

## 2020-10-28 ENCOUNTER — PHYSICAL THERAPY (OUTPATIENT)
Dept: PHYSICAL THERAPY | Facility: REHABILITATION | Age: 70
End: 2020-10-28
Attending: FAMILY MEDICINE
Payer: COMMERCIAL

## 2020-10-28 DIAGNOSIS — R26.89 BALANCE PROBLEMS: ICD-10-CM

## 2020-10-28 DIAGNOSIS — M16.0 BILATERAL PRIMARY OSTEOARTHRITIS OF HIP: ICD-10-CM

## 2020-10-28 PROCEDURE — 97110 THERAPEUTIC EXERCISES: CPT

## 2020-10-28 NOTE — OP THERAPY DISCHARGE SUMMARY
Outpatient Physical Therapy  DISCHARGE SUMMARY NOTE      [facility] [address]  [address]  [place] [address]  Phone:  [phone]  Fax:  [phone]    Date of Visit: 10/28/2020    Patient: Ramu Barber  YOB: 1950  MRN: [id number]     Referring Provider: Chuck Chappell M.D.  [address] [facility] [address]  [address]  [place] [id number]   Referring Diagnosis Balance problems [R26.89]         Functional Assessment Used      5 TSTS (with HHA): 27.77 sec  Sit<>stand: can perform 1 without use of HHA  Gait: ambulates 20 ft, wide RADU, no LOB  TUG (w/ SPC): 19.03 sec  FGA: 16/30    Your patient is being discharged from Physical Therapy with the following comments:   · Goals partially met    Comments:  Pt presented with decreased BLE strength, activity tolerance, decreased balance, and is overall deconditioned following 2 hospitalizations. Pt was demonstrating improved gait and balance with activities and exercises in the past few sessions. However, pt was feeling more stiff and sore in today's session due to the change in weather which is reflected in his testing. Despite the lack of improvement demonstrated in his testing, pt states he does feel ready to d/c to HEP and continue to work on his mobility and balance independently. Pt also plans to join a gym to continue to work towards strengthening goals upon d/    Limitations Remaining:  Slower gait speed, decreased sit<>stand strength, requires use of AD with gait    Recommendations:  Pt to d/c to HEP for continued focus on strength, mobility, and balance independently.    Carmen Harley, PT, DPT    Date: 10/28/2020

## 2020-10-28 NOTE — OP THERAPY DAILY TREATMENT
Outpatient Physical Therapy  DAILY TREATMENT     [address]  [address]  [place] [address]  Phone:  [phone]  Fax:  [phone]    Date: 10/28/2020    Patient: Ramu Barber  YOB: 1950  MRN: [id number]     Time Calculation    Start time: 1000  Stop time: 1030 Time Calculation (min): 30 minutes         Chief Complaint: Difficulty Walking and Loss Of Balance    Visit #: 11    SUBJECTIVE:  Pt states he is having a hard time with the cold weather. Since the weather dropped on Sunday, he has had a harder time getting around. Still feels ready to d/c to HEP at this time.    OBJECTIVE:  Current objective measures:        5 TSTS (with HHA): 27.77 sec  Sit<>stand: can perform 1 without use of HHA  Gait: ambulates 20 ft, wide RADU, no LOB  TUG (w/ SPC): 19.03 sec  FGA: 16/30    Short Term Goals:   1. Pt is to be able to perform sit<>stand without use of HHA. MET  2. Pt is able to ambulate 20 ft without AD, without LOB. MET  3. Pt is to perform HEP without cueing demonstrating compliance. MET  Short term goal time span:  2-4 weeks      Long Term Goals:    1. Pt able to perform TUG in 13.5 sec or less demonstrating decreased fall risk. NOT MET  2. Pt is able to score a 23/30 on the FGA with SPC demonstrating functional improvement. NOT MET  3. Pt is to be able to perform 5 TSTS in 12 sec or less demonstrating decreased fall risk. NOT MET    Therapeutic Exercises (CPT 01319):     1. Nu step, L3, 3 min, SPM: 80-90    2. Reassessed for d/c, see objective notes for details    3. Reviewed HEP    20. UPOC: 11/12/2020      Therapeutic Exercise Summary: HEP: sit<>stand, heel raises, standing hip abduction, hamstring curl, marching, standing hip flexor stretch, tandem walking, tandem stance, semi tandem stance (EC, head turns)      Time-based treatments/modalities:  Physical Therapy Timed Treatment Charges  Therapeutic exercise minutes (CPT 35985): 25  minutes    ASSESSMENT:   Response to treatment: Pt presents with decreased BLE strength, activity tolerance, decreased balance, and is overall deconditioned following 2 hospitalizations. Pt was demonstrating improved gait and balance with activities and exercises in the past few sessions. However, pt was feeling more stiff and sore in today's session due to the change in weather which is reflected in his testing. Despite the lack of improvement demonstrated in his testing, pt states he does feel ready to d/c to HEP and continue to work on his mobility and balance independently. Pt also plans to join a gym to continue to work towards strengthening goals upon d/c.     PLAN/RECOMMENDATIONS:   Pt to d/c to HEP

## 2020-11-12 ENCOUNTER — HOSPITAL ENCOUNTER (OUTPATIENT)
Dept: LAB | Facility: MEDICAL CENTER | Age: 70
End: 2020-11-12
Attending: INTERNAL MEDICINE
Payer: COMMERCIAL

## 2020-11-12 LAB
BASOPHILS # BLD AUTO: 1.6 % (ref 0–1.8)
BASOPHILS # BLD: 0.1 K/UL (ref 0–0.12)
EOSINOPHIL # BLD AUTO: 0.23 K/UL (ref 0–0.51)
EOSINOPHIL NFR BLD: 3.6 % (ref 0–6.9)
ERYTHROCYTE [DISTWIDTH] IN BLOOD BY AUTOMATED COUNT: 49.5 FL (ref 35.9–50)
HCT VFR BLD AUTO: 43 % (ref 42–52)
HGB BLD-MCNC: 13.3 G/DL (ref 14–18)
IMM GRANULOCYTES # BLD AUTO: 0.01 K/UL (ref 0–0.11)
IMM GRANULOCYTES NFR BLD AUTO: 0.2 % (ref 0–0.9)
LYMPHOCYTES # BLD AUTO: 1.06 K/UL (ref 1–4.8)
LYMPHOCYTES NFR BLD: 16.8 % (ref 22–41)
MCH RBC QN AUTO: 29 PG (ref 27–33)
MCHC RBC AUTO-ENTMCNC: 30.9 G/DL (ref 33.7–35.3)
MCV RBC AUTO: 93.9 FL (ref 81.4–97.8)
MONOCYTES # BLD AUTO: 0.54 K/UL (ref 0–0.85)
MONOCYTES NFR BLD AUTO: 8.6 % (ref 0–13.4)
NEUTROPHILS # BLD AUTO: 4.37 K/UL (ref 1.82–7.42)
NEUTROPHILS NFR BLD: 69.2 % (ref 44–72)
NRBC # BLD AUTO: 0.02 K/UL
NRBC BLD-RTO: 0.3 /100 WBC
PLATELET # BLD AUTO: 343 K/UL (ref 164–446)
PMV BLD AUTO: 10.9 FL (ref 9–12.9)
RBC # BLD AUTO: 4.58 M/UL (ref 4.7–6.1)
WBC # BLD AUTO: 6.3 K/UL (ref 4.8–10.8)

## 2020-11-12 PROCEDURE — 36415 COLL VENOUS BLD VENIPUNCTURE: CPT

## 2020-11-12 PROCEDURE — 85025 COMPLETE CBC W/AUTO DIFF WBC: CPT

## 2020-11-23 ENCOUNTER — HOSPITAL ENCOUNTER (OUTPATIENT)
Dept: LAB | Facility: MEDICAL CENTER | Age: 70
End: 2020-11-23
Attending: INTERNAL MEDICINE
Payer: COMMERCIAL

## 2020-11-23 LAB
ALBUMIN SERPL BCP-MCNC: 3.9 G/DL (ref 3.2–4.9)
ALBUMIN/GLOB SERPL: 1.1 G/DL
ALP SERPL-CCNC: 75 U/L (ref 30–99)
ALT SERPL-CCNC: 12 U/L (ref 2–50)
ANION GAP SERPL CALC-SCNC: 9 MMOL/L (ref 7–16)
AST SERPL-CCNC: 18 U/L (ref 12–45)
BILIRUB SERPL-MCNC: 0.2 MG/DL (ref 0.1–1.5)
BUN SERPL-MCNC: 22 MG/DL (ref 8–22)
CALCIUM SERPL-MCNC: 9.1 MG/DL (ref 8.5–10.5)
CHLORIDE SERPL-SCNC: 103 MMOL/L (ref 96–112)
CO2 SERPL-SCNC: 26 MMOL/L (ref 20–33)
CREAT SERPL-MCNC: 1.2 MG/DL (ref 0.5–1.4)
GLOBULIN SER CALC-MCNC: 3.4 G/DL (ref 1.9–3.5)
GLUCOSE SERPL-MCNC: 146 MG/DL (ref 65–99)
POTASSIUM SERPL-SCNC: 4.1 MMOL/L (ref 3.6–5.5)
PROT SERPL-MCNC: 7.3 G/DL (ref 6–8.2)
SODIUM SERPL-SCNC: 138 MMOL/L (ref 135–145)

## 2020-11-23 PROCEDURE — 36415 COLL VENOUS BLD VENIPUNCTURE: CPT

## 2020-11-23 PROCEDURE — 80053 COMPREHEN METABOLIC PANEL: CPT

## 2020-11-30 ENCOUNTER — HOSPITAL ENCOUNTER (OUTPATIENT)
Facility: MEDICAL CENTER | Age: 70
End: 2020-11-30
Attending: UROLOGY
Payer: COMMERCIAL

## 2020-11-30 PROCEDURE — 80048 BASIC METABOLIC PNL TOTAL CA: CPT

## 2020-11-30 PROCEDURE — 84153 ASSAY OF PSA TOTAL: CPT

## 2020-12-01 LAB
ANION GAP SERPL CALC-SCNC: 11 MMOL/L (ref 7–16)
BUN SERPL-MCNC: 24 MG/DL (ref 8–22)
CALCIUM SERPL-MCNC: 9.4 MG/DL (ref 8.5–10.5)
CHLORIDE SERPL-SCNC: 103 MMOL/L (ref 96–112)
CO2 SERPL-SCNC: 24 MMOL/L (ref 20–33)
CREAT SERPL-MCNC: 1.42 MG/DL (ref 0.5–1.4)
GLUCOSE SERPL-MCNC: 125 MG/DL (ref 65–99)
POTASSIUM SERPL-SCNC: 4.5 MMOL/L (ref 3.6–5.5)
PSA SERPL-MCNC: 0.13 NG/ML (ref 0–4)
SODIUM SERPL-SCNC: 138 MMOL/L (ref 135–145)

## 2021-01-04 ENCOUNTER — HOSPITAL ENCOUNTER (OUTPATIENT)
Dept: RADIOLOGY | Facility: MEDICAL CENTER | Age: 71
End: 2021-01-04
Attending: ORTHOPAEDIC SURGERY
Payer: COMMERCIAL

## 2021-01-04 DIAGNOSIS — M25.531 PAIN IN RIGHT WRIST: ICD-10-CM

## 2021-01-04 PROCEDURE — 73221 MRI JOINT UPR EXTREM W/O DYE: CPT | Mod: RT

## 2021-01-15 DIAGNOSIS — Z23 NEED FOR VACCINATION: ICD-10-CM

## 2021-01-27 ENCOUNTER — APPOINTMENT (OUTPATIENT)
Dept: SLEEP MEDICINE | Facility: MEDICAL CENTER | Age: 71
End: 2021-01-27
Payer: COMMERCIAL

## 2021-02-02 ENCOUNTER — HOSPITAL ENCOUNTER (OUTPATIENT)
Dept: RADIOLOGY | Facility: MEDICAL CENTER | Age: 71
End: 2021-02-02
Attending: UROLOGY
Payer: COMMERCIAL

## 2021-02-02 DIAGNOSIS — N28.9 DISORDER OF KIDNEY: ICD-10-CM

## 2021-02-02 PROCEDURE — 76775 US EXAM ABDO BACK WALL LIM: CPT

## 2021-02-04 ENCOUNTER — OFFICE VISIT (OUTPATIENT)
Dept: SLEEP MEDICINE | Facility: MEDICAL CENTER | Age: 71
End: 2021-02-04
Payer: COMMERCIAL

## 2021-02-04 VITALS
HEART RATE: 61 BPM | BODY MASS INDEX: 35.21 KG/M2 | OXYGEN SATURATION: 96 % | WEIGHT: 260 LBS | HEIGHT: 72 IN | DIASTOLIC BLOOD PRESSURE: 78 MMHG | SYSTOLIC BLOOD PRESSURE: 136 MMHG

## 2021-02-04 DIAGNOSIS — I10 ESSENTIAL HYPERTENSION: ICD-10-CM

## 2021-02-04 DIAGNOSIS — G47.33 OBSTRUCTIVE SLEEP APNEA SYNDROME: ICD-10-CM

## 2021-02-04 PROCEDURE — 99213 OFFICE O/P EST LOW 20 MIN: CPT | Performed by: NURSE PRACTITIONER

## 2021-02-04 ASSESSMENT — FIBROSIS 4 INDEX: FIB4 SCORE: 1.06

## 2021-02-04 NOTE — OP THERAPY DAILY TREATMENT
"  Outpatient Physical Therapy  DAILY TREATMENT     [facility] Physical [address]  [address]  [place] [address]  Phone:  [phone]  Fax:  [phone]    Date: 09/24/2020    Patient: Ramu Barber  YOB: 1950  MRN: [id number]     Time Calculation    Start time: 1110  Stop time: 1200 Time Calculation (min): 50 minutes         Chief Complaint: Difficulty Walking and Loss Of Balance    Visit #: 3    SUBJECTIVE:  Pt states he is good today, was able to do 18 min on the nu-step. Pt did okay after last session. Pt is wearing a heart monitor for 48 hours.    OBJECTIVE:  Current objective measures:   Rest: 64 bpm, O2: 97%          Therapeutic Exercises (CPT 96285):     1. Nu step, L3, 18 min, SPM: 80-90. no charge, arrived early to perform    2. Sit<>stands , mini squat, 10x. butt taps, 10x    3. Monster walk, pink 5ft, 3 laps x 2    4. TrX - reverse lunges, 10x    5. TrX - side lunges, 10x    6. Step ups, 6\", 1 min    20. UPOC: 11/12/2020      Therapeutic Exercise Summary: HEP: sit<>stand, heel raises, standing hip abduction, hamstring curl, marching      Time-based treatments/modalities:  Physical Therapy Timed Treatment Charges  Therapeutic exercise minutes (CPT 73708): 25 minutes    ASSESSMENT:   Response to treatment: Pt presents with decreased BLE strength, activity tolerance, decreased balance, and is overall deconditioned following 2 hospitalizations. Pt able to tolerate all exercises with some fatigue by the end of the set, but recovered with short rest break between exercises (30 sec or less).    PLAN/RECOMMENDATIONS:   Plan for treatment: therapy treatment to continue next visit.  Planned interventions for next visit: continue with current treatment. Circuit training.        " Pt needs a refill on hydrOXYzine HCl 25 MG Oral Tab sent to his [facility] He also said Dr. Ester Pat doubled the medication for him.

## 2021-02-04 NOTE — PROGRESS NOTES
Chief Complaint   Patient presents with   • Follow-Up     LYLE-LAST SEEN 06/17/2019       HPI:      Mr. Barber is a 71 y/o male patient who is in today for annual LYLE f/u, overdue. Former [facility] patient. PMH includes hypertension, hyperlipidemia, obesity, BPH, DM II, pyoderma gangrenosum, former smoker.     Compliance report from 1/5/21 was downloaded and reviewed with the patient which showed CPAP 8 cmH2O, 100% compliance, 9 hrs 38 min use, AHI of 0.6. He is tolerating the pressure and mask well but would like to switch to a large nasal pillow mask as well as large headgear. He goes to bed between 8:30-9 pm and wakes up between 5-6 am. He denies morning headache or snoring since he has been on CPAP therapy.  Patient received his first COVID-19 vaccination and is due for his second 1 on the 27th of this month.  He has not been very active due to COVID-19 but will try to go for walks since he has a new puppy.  He uses a single-point cane for assistance with ambulation as he does have limitations due to neuropathy in his feet and prior back surgeries.  He follows up with cardiology, Dr. Castillo, for his blood pressure and also checks his blood pressure at home.  He denies any new health problems or medications.    Sleep Study History:   Pt was diagnosed with LYLE on 9/20/01 with severe LYLE, the AHI owas 72/hr and O2 syed 74%.     ROS:    Constitutional: Denies fevers, Denies weight changes  Eyes: Denies changes in vision, no eye pain  Ears/Nose/Throat/Mouth: Denies nasal congestion or sore throat   Cardiovascular: Denies chest pain or palpitations   Respiratory: Denies shortness of breath , Denies cough  Gastrointestinal/Hepatic: Denies abdominal pain, nausea, vomiting,   Skin/Breast: Denies rash,   Neurological: Denies headache, confusion,   Psychiatric: denies mood disorder   Sleep: denies snoring       Past Medical History:   Diagnosis Date   • Arthritis     back, hips   • Back pain    • Chickenpox    • High cholesterol     • Hypertension    • Obesity    • Pain     back pain s/p multiple surgeries and spinal stimulator   • Pneumonia    • Polycythemia, secondary    • Sleep apnea     cpap   • Snoring    • Type II or unspecified type diabetes mellitus without mention of complication, not stated as uncontrolled     oral meds only       Past Surgical History:   Procedure Laterality Date   • EGD W/CONTROL BLEEDING  8/22/2020        • GASTROSCOPY N/A 8/22/2020    Procedure: GASTROSCOPY;  Surgeon: Tawanda Gomez M.D.;  Location: SURGERY [facility];  Service: Gastroenterology   • PB IMPLANT NEUROSTIM/  7/23/2019    Procedure: INSERTION, NEUROSTIMULATOR, PERMANENT, SPINAL CORD;  Surgeon: Teddy Santos M.D.;  Location: [facility];  Service: Pain Management   • LUMBAR LAMINECTOMY DISKECTOMY  2/18/2018    Procedure: LUMBAR LAMINECTOMY DISKECTOMY- LT L1-2 HEMILAMI-;  Surgeon: Tom Pittman M.D.;  Location: SURGERY [facility];  Service: Neurosurgery   • HARDWARE REMOVAL NEURO  2/18/2018    Procedure: HARDWARE REMOVAL NEURO- L2-3 PEDICLE SCREWS;  Surgeon: Tom Pittman M.D.;  Location: SURGERY [facility];  Service: Neurosurgery   • INGUINAL HERNIA REPAIR Right 2/16/2016    Procedure: INGUINAL HERNIA REPAIR;  Surgeon: Sj Baird M.D.;  Location: [facility];  Service:    • LUMBAR FUSION POSTERIOR  1/21/2015    Performed by Ko Suarez M.D. at [facility]   • CARPAL TUNNEL RELEASE Right 2014   • SHOULDER ARTHROSCOPY Right 2014   • CERVICAL DISK AND FUSION ANTERIOR  2005   • LUMBAR DECOMPRESSION  2004   • LUMBAR DECOMPRESSION  2003       Family History   Problem Relation Age of Onset   • Sleep Apnea Mother    • Heart Attack Father    • Heart Attack Brother        Social History     Socioeconomic History   • Marital status:      Spouse name: Not on file   • Number of children: Not on file   • Years of education: Not on file   • Highest education level: Not on file    Occupational History   • Not on file   Social Needs   • Financial resource strain: Not on file   • Food insecurity     Worry: Not on file     Inability: Not on file   • Transportation needs     Medical: Not on file     Non-medical: Not on file   Tobacco Use   • Smoking status: Never Smoker   • Smokeless tobacco: Former User     Types: Chew   Substance and Sexual Activity   • Alcohol use: Yes     Comment: twice a month   • Drug use: No   • Sexual activity: Not on file   Lifestyle   • Physical activity     Days per week: Not on file     Minutes per session: Not on file   • Stress: Not on file   Relationships   • Social connections     Talks on phone: Not on file     Gets together: Not on file     Attends Evangelical service: Not on file     Active member of club or organization: Not on file     Attends meetings of clubs or organizations: Not on file     Relationship status: Not on file   • Intimate partner violence     Fear of current or ex partner: Not on file     Emotionally abused: Not on file     Physically abused: Not on file     Forced sexual activity: Not on file   Other Topics Concern   • Not on file   Social History Narrative   • Not on file       Allergies as of 02/04/2021   • (No Known Allergies)        Vitals:  Vitals:    02/04/21 1351   BP: 136/78   Pulse: 61   SpO2: 96%       Current medications as of today   Current Outpatient Medications   Medication Sig Dispense Refill   • acetaminophen (TYLENOL) 325 MG Tab Take 2 Tabs by mouth every 6 hours as needed (Mild Pain; (Pain scale 1-3); Temp greater than 100.5 F). 30 Tab 0   • omeprazole (PRILOSEC) 20 MG delayed-release capsule Take 1 Cap by mouth 2 Times a Day. 60 Cap 3   • polyethylene glycol/lytes (MIRALAX) 17 g Pack Take 1 Packet by mouth every day. 30 Each 0   • dicyclomine (BENTYL) 20 MG Tab TAKE ONE TABLET BY MOUTH EVERY 6 HOURS FOR 5 DAYS 20 Tab 0   • promethazine (PHENERGAN) 25 MG Tab Take 1 Tab by mouth every 6 hours as needed for Nausea/Vomiting.  30 Tab 0   • gabapentin (NEURONTIN) 300 MG Cap Take 1 Cap by mouth 4 times a day. 90 Cap 0   • tamsulosin (FLOMAX) 0.4 MG capsule Take 0.4 mg by mouth ONE-HALF HOUR AFTER BREAKFAST.     • ASPIRIN 81 PO Take 81 mg by mouth every morning.     • cloNIDine (CATAPRESS) 0.2 MG Tab Take 1 Tab by mouth 2 times a day. For further refills please contact new cardiologist. Thank you 60 Tab 0   • lovastatin (MEVACOR) 20 MG Tab Take 1 Tab by mouth every evening. 90 Tab 1   • fosinopril (MONOPRIL) 40 MG tablet Take 1 Tab by mouth every day. For further refills please contact new cardiologist. Thank you 30 Tab 0   • amLODIPine (NORVASC) 10 MG Tab Take 1 Tab by mouth every day. For further refills please contact new cardiologist. Thank you 30 Tab 0   • carvedilol (COREG) 25 MG Tab TAKE ONE TABLET BY MOUTH TWICE A DAY WITH MEALS 180 Tab 0   • triamterene-hctz (MAXZIDE-25/DYAZIDE) 37.5-25 MG Tab Take 1 Tab by mouth every morning.     • terazosin (HYTRIN) 5 MG Cap TAKE TWO CAPSULES BY MOUTH EVERY NIGHT AT BEDTIME (GENERIC HYTRIN) 180 Cap 10   • metformin (GLUCOPHAGE) 850 MG Tab Take 850 mg by mouth 2 times a day.       No current facility-administered medications for this visit.          Physical Exam: Limited by COVID-19 precautions.  Appearance: Well developed, well nourished, no acute distress  Eyes: PERRL, EOM intact, sclera white, conjunctiva moist  Ears: no lesions or deformities  Hearing: grossly intact  Nose: no lesions or deformities  Respiratory effort: no intercostal retractions or use of accessory muscles  Extremities: no cyanosis or edema  Abdomen: soft, large   Gait and Station: normal  Digits and nails: no clubbing, cyanosis, petechiae or nodes.  Cranial nerves: grossly intact  Skin: no visible rashes, lesions or ulcers noted  Orientation: Oriented to time, person and place  Mood and affect: mood and affect appropriate, normal interaction with examiner  Judgement: Intact    Assessment:  1. Obstructive sleep apnea  syndrome  DME Mask and Supplies   2. Essential hypertension     3. BMI 35.0-35.9,adult           Plan  Discussed the cardiovascular and neuropsychiatric risks of untreated LYLE; including but not limited to: HTN, DM, MI, ASCVD, CVA, CHF, traffic accidents.     1. Compliance report from 1/5/21 was downloaded and reviewed with the patient which showed CPAP 8 cmH2O, 100% compliance, 9 hrs 38 min use, AHI of 0.6. Continue CPAP. Patient is compliant and benefiting from CPAP therapy for management of LYLE. Advised patient to continue to use the CPAP every night for more than four hours for optimal health benefit and to meet the health insurance 70% compliance guideline.   2. DME order ([facility]) for mask (Large nuance pro nasal pillow mask with large headgear or MOC) and supplies was provided today. Continue to clean mask and supplies weekly with soap and water, and change supplies per insurance guidelines.   3. Encouraged a healthy diet and exercise to help with weight loss and management.    4. Follow up with the appropriate healthcare practitioners for all other medical problems and issues.  5. Sleep hygiene discussed.    6. Continue to f/u with cardiology, Dr. Castillo, for BP management, take BP medication as directed and check BP at home.   7. F/u in 1 year, sooner if needed.       CHRISSIE Christy.      This dictation was created using voice recognition software. The accuracy of the dictation is limited to the abilities of the software. I expect there may be some errors of grammar and possibly content.

## 2021-03-04 ENCOUNTER — APPOINTMENT (OUTPATIENT)
Dept: RADIOLOGY | Facility: MEDICAL CENTER | Age: 71
End: 2021-03-04
Attending: INTERNAL MEDICINE
Payer: COMMERCIAL

## 2021-03-10 ENCOUNTER — HOSPITAL ENCOUNTER (OUTPATIENT)
Dept: RADIOLOGY | Facility: MEDICAL CENTER | Age: 71
End: 2021-03-10
Attending: INTERNAL MEDICINE
Payer: COMMERCIAL

## 2021-03-10 DIAGNOSIS — R06.00 DYSPNEA, UNSPECIFIED TYPE: ICD-10-CM

## 2021-03-10 DIAGNOSIS — I15.9 SECONDARY HYPERTENSION: ICD-10-CM

## 2021-03-10 DIAGNOSIS — R53.83 OTHER FATIGUE: ICD-10-CM

## 2021-03-10 PROCEDURE — 700111 HCHG RX REV CODE 636 W/ 250 OVERRIDE (IP)

## 2021-03-10 PROCEDURE — 78452 HT MUSCLE IMAGE SPECT MULT: CPT | Mod: 26 | Performed by: INTERNAL MEDICINE

## 2021-03-10 PROCEDURE — 93018 CV STRESS TEST I&R ONLY: CPT | Performed by: INTERNAL MEDICINE

## 2021-03-10 PROCEDURE — A9502 TC99M TETROFOSMIN: HCPCS

## 2021-03-10 RX ORDER — REGADENOSON 0.08 MG/ML
INJECTION, SOLUTION INTRAVENOUS
Status: COMPLETED
Start: 2021-03-10 | End: 2021-03-10

## 2021-03-10 RX ORDER — REGADENOSON 0.08 MG/ML
0.4 INJECTION, SOLUTION INTRAVENOUS ONCE
Status: COMPLETED | OUTPATIENT
Start: 2021-03-10 | End: 2021-03-10

## 2021-03-10 RX ADMIN — REGADENOSON 0.4 MG: 0.08 INJECTION, SOLUTION INTRAVENOUS at 09:25

## 2022-06-14 ENCOUNTER — APPOINTMENT (RX ONLY)
Dept: URBAN - METROPOLITAN AREA CLINIC 4 | Facility: CLINIC | Age: 72
Setting detail: DERMATOLOGY
End: 2022-06-14

## 2022-06-14 DIAGNOSIS — L91.8 OTHER HYPERTROPHIC DISORDERS OF THE SKIN: ICD-10-CM

## 2022-06-14 DIAGNOSIS — D18.0 HEMANGIOMA: ICD-10-CM

## 2022-06-14 DIAGNOSIS — L82.1 OTHER SEBORRHEIC KERATOSIS: ICD-10-CM

## 2022-06-14 DIAGNOSIS — L81.4 OTHER MELANIN HYPERPIGMENTATION: ICD-10-CM

## 2022-06-14 PROBLEM — D18.01 HEMANGIOMA OF SKIN AND SUBCUTANEOUS TISSUE: Status: ACTIVE | Noted: 2022-06-14

## 2022-06-14 PROCEDURE — ? COUNSELING

## 2022-06-14 PROCEDURE — 99203 OFFICE O/P NEW LOW 30 MIN: CPT

## 2022-06-14 ASSESSMENT — LOCATION SIMPLE DESCRIPTION DERM
LOCATION SIMPLE: LEFT POSTERIOR THIGH
LOCATION SIMPLE: LEFT THIGH
LOCATION SIMPLE: RIGHT POSTERIOR THIGH
LOCATION SIMPLE: LEFT UPPER BACK
LOCATION SIMPLE: RIGHT LOWER BACK
LOCATION SIMPLE: LEFT AXILLARY VAULT
LOCATION SIMPLE: RIGHT ELBOW
LOCATION SIMPLE: RIGHT AXILLARY VAULT
LOCATION SIMPLE: UPPER BACK
LOCATION SIMPLE: LEFT UPPER ARM

## 2022-06-14 ASSESSMENT — LOCATION ZONE DERM
LOCATION ZONE: ARM
LOCATION ZONE: TRUNK
LOCATION ZONE: LEG
LOCATION ZONE: AXILLAE

## 2022-06-14 ASSESSMENT — LOCATION DETAILED DESCRIPTION DERM
LOCATION DETAILED: LEFT DISTAL POSTERIOR UPPER ARM
LOCATION DETAILED: RIGHT ELBOW
LOCATION DETAILED: RIGHT SUPERIOR MEDIAL MIDBACK
LOCATION DETAILED: LEFT AXILLARY VAULT
LOCATION DETAILED: RIGHT DISTAL POSTERIOR THIGH
LOCATION DETAILED: INFERIOR THORACIC SPINE
LOCATION DETAILED: LEFT DISTAL POSTERIOR THIGH
LOCATION DETAILED: LEFT ANTERIOR DISTAL THIGH
LOCATION DETAILED: LEFT MID-UPPER BACK
LOCATION DETAILED: LEFT SUPERIOR MEDIAL UPPER BACK
LOCATION DETAILED: RIGHT AXILLARY VAULT

## 2022-09-25 ENCOUNTER — OFFICE VISIT (OUTPATIENT)
Dept: URGENT CARE | Facility: PHYSICIAN GROUP | Age: 72
End: 2022-09-25
Payer: COMMERCIAL

## 2022-09-25 VITALS
HEART RATE: 71 BPM | RESPIRATION RATE: 16 BRPM | DIASTOLIC BLOOD PRESSURE: 82 MMHG | OXYGEN SATURATION: 94 % | WEIGHT: 288 LBS | SYSTOLIC BLOOD PRESSURE: 120 MMHG | BODY MASS INDEX: 39.01 KG/M2 | HEIGHT: 72 IN | TEMPERATURE: 99.5 F

## 2022-09-25 DIAGNOSIS — M10.9 ACUTE GOUT OF LEFT KNEE, UNSPECIFIED CAUSE: ICD-10-CM

## 2022-09-25 PROCEDURE — 99213 OFFICE O/P EST LOW 20 MIN: CPT | Performed by: STUDENT IN AN ORGANIZED HEALTH CARE EDUCATION/TRAINING PROGRAM

## 2022-09-25 RX ORDER — PAROXETINE HYDROCHLORIDE 20 MG/1
1 TABLET, FILM COATED ORAL
COMMUNITY

## 2022-09-25 RX ORDER — FUROSEMIDE 20 MG/1
20 TABLET ORAL DAILY
Status: ON HOLD | COMMUNITY
End: 2023-12-23

## 2022-09-25 RX ORDER — PREDNISONE 20 MG/1
30 TABLET ORAL DAILY
Qty: 8 TABLET | Refills: 0 | Status: SHIPPED | OUTPATIENT
Start: 2022-09-25 | End: 2022-09-30

## 2022-09-25 RX ORDER — POTASSIUM CHLORIDE 750 MG/1
10 CAPSULE, EXTENDED RELEASE ORAL 2 TIMES DAILY
Status: ON HOLD | COMMUNITY
End: 2023-04-16

## 2022-09-25 ASSESSMENT — PAIN SCALES - GENERAL: PAINLEVEL: 6=MODERATE PAIN

## 2022-09-25 ASSESSMENT — FIBROSIS 4 INDEX: FIB4 SCORE: 1.09

## 2022-09-25 NOTE — PROGRESS NOTES
Subjective:   Ramu Barber is a 72 y.o. male who presents for Back Pain (X 1 week, getting worse ) and Knee Pain (X 1 week, L, now the ankle as well, no injury )      HPI:  Pleasant 72-year-old male presents to clinic for 1 week of left knee pain without any known injury or trauma.  Patient does report history of gout mainly in his big toe.  He states that his symptoms came on out of nowhere and has been causing him difficulty with walking.  He reports it is very painful to walk.  He also reports pain with even lightly brushing his knee up against objects.  He is using a cane to walk but does have a slight limp.  He has not take anything for symptoms.  He does report a history of gastrointestinal bleed and type 2 diabetes.  He denies fever, chills, nausea, vomiting, abdominal pain, diarrhea, numbness, tingling, burning, radiation up or down the leg, back pain, neck pain, dizziness, headache, chest pain, palpitations, shortness of breath, cough.      Medications:    amLODIPine Tabs  carvedilol Tabs  dicyclomine Tabs  fosinopril  furosemide Tabs  gabapentin Caps  lovastatin Tabs  metFORMIN Tabs  PARoxetine Tabs  polyethylene glycol/lytes Pack  potassium chloride  predniSONE Tabs  promethazine Tabs  terazosin Caps  triamterene-hctz Tabs    Allergies: Patient has no known allergies.    Problem List: Ramu Barber does not have any pertinent problems on file.    Surgical History:  Past Surgical History:   Procedure Laterality Date    EGD W/CONTROL BLEEDING  8/22/2020         GASTROSCOPY N/A 8/22/2020    Procedure: GASTROSCOPY;  Surgeon: Tawanda Gomez M.D.;  Location: SURGERY [facility];  Service: Gastroenterology    PB IMPLANT NEUROSTIM/  7/23/2019    Procedure: INSERTION, NEUROSTIMULATOR, PERMANENT, SPINAL CORD;  Surgeon: Teddy Santos M.D.;  Location: SURGERY [facility];  Service: Pain Management    LUMBAR LAMINECTOMY DISKECTOMY  2/18/2018    Procedure: LUMBAR LAMINECTOMY DISKECTOMY-  LT L1-2 HEMILAMI-;  Surgeon: Tom Pittman M.D.;  Location: SURGERY [facility];  Service: Neurosurgery    HARDWARE REMOVAL NEURO  2/18/2018    Procedure: HARDWARE REMOVAL NEURO- L2-3 PEDICLE SCREWS;  Surgeon: Tom Pittman M.D.;  Location: SURGERY [facility];  Service: Neurosurgery    INGUINAL HERNIA REPAIR Right 2/16/2016    Procedure: INGUINAL HERNIA REPAIR;  Surgeon: Sj Baird M.D.;  Location: SURGERY [facility];  Service:     FUSION, SPINE, LUMBAR, PLIF  1/21/2015    Performed by Ko Suarez M.D. at SURGERY [facility]    CARPAL TUNNEL RELEASE Right 2014    SHOULDER ARTHROSCOPY Right 2014    CERVICAL DISK AND FUSION ANTERIOR  2005    LUMBAR DECOMPRESSION  2004    LUMBAR DECOMPRESSION  2003       Past Social Hx: Ramu Barber  reports that he has never smoked. He quit smokeless tobacco use about 3 years ago.  His smokeless tobacco use included chew. He reports current alcohol use. He reports that he does not use drugs.     Past Family Hx:  Ramu Barber family history includes Heart Attack in his brother and father; Sleep Apnea in his mother.     Problem list, medications, and allergies reviewed by myself today in Epic.     Objective:     /82 (BP Location: Right arm, Patient Position: Sitting, BP Cuff Size: Adult)   Pulse 71   Temp 37.5 °C (99.5 °F) (Temporal)   Resp 16   Ht 1.829 m (6')   Wt (!) 131 kg (288 lb)   SpO2 94%   BMI 39.06 kg/m²     Physical Exam  Vitals reviewed.   Constitutional:       General: He is not in acute distress.     Appearance: Normal appearance.   Cardiovascular:      Rate and Rhythm: Normal rate and regular rhythm.      Pulses: Normal pulses.      Heart sounds: Normal heart sounds. No murmur heard.  Pulmonary:      Effort: Pulmonary effort is normal. No respiratory distress.      Breath sounds: Normal breath sounds. No stridor. No wheezing, rhonchi or rales.   Musculoskeletal:      Lumbar back: No swelling, deformity, spasms, tenderness  or bony tenderness.      Right knee: Normal.      Left knee: No swelling, deformity, effusion, erythema, ecchymosis or crepitus. Decreased range of motion. Tenderness present. No LCL laxity, MCL laxity, ACL laxity or PCL laxity.Normal alignment. Normal pulse.      Instability Tests: Posterior drawer test negative. Anterior Lachman test negative.      Comments: Tenderness to palpation even with light palpation to the lateral aspect of the knee.  Patient reports pain is worse with weightbearing and movement.  5 out of 5 strength during knee flexion and knee extension but does have pain when doing so.  Full active and passive range of motion.  Cap refill less than 2 secs.  Sensation intact.  2+ pedal pulse.   Skin:     General: Skin is warm and dry.      Capillary Refill: Capillary refill takes less than 2 seconds.      Findings: No erythema, lesion or rash.   Neurological:      General: No focal deficit present.      Mental Status: He is alert and oriented to person, place, and time.       Assessment/Plan:     Diagnosis and associated orders:     1. Acute gout of left knee, unspecified cause  predniSONE (DELTASONE) 20 MG Tab         Comments/MDM:     Patient presents with presentation physical exam findings most consistent with acute gout flare of the left knee.  Patient does have a history of gout mainly in his big toes.  No known injury or trauma.  He does have a history of gastrointestinal bleed so we will avoid NSAID use due to this.  He does have type 2 diabete.  He will be treated with a short course of prednisone.  Patient advised to monitor his blood sugars as steroids can increase these numbers.  Patient's symptoms are also going on for approximately 1 week therefore making colchicine less effective treatment.  Given his history and length of symptoms, prednisone is the most appropriate choice at this time but does require close monitoring of his blood sugars.  No red flag signs.  He is stable this time.  He  is able to ambulate with a cane and does not want crutches.  No signs of bacterial infection at this time.  ED/return precautions were given.         Differential diagnosis, natural history, supportive care, and indications for immediate follow-up discussed.    Advised the patient to follow-up with the primary care physician for recheck, reevaluation, and consideration of further management.    Please note that this dictation was created using voice recognition software. I have made a reasonable attempt to correct obvious errors, but I expect that there are errors of grammar and possibly content that I did not discover before finalizing the note.    Electronically signed by Jose Cantor PA-C.

## 2022-10-10 ENCOUNTER — OFFICE VISIT (OUTPATIENT)
Dept: SLEEP MEDICINE | Facility: MEDICAL CENTER | Age: 72
End: 2022-10-10
Payer: COMMERCIAL

## 2022-10-10 VITALS
SYSTOLIC BLOOD PRESSURE: 142 MMHG | WEIGHT: 284.6 LBS | HEIGHT: 72 IN | HEART RATE: 64 BPM | RESPIRATION RATE: 18 BRPM | DIASTOLIC BLOOD PRESSURE: 80 MMHG | BODY MASS INDEX: 38.55 KG/M2 | OXYGEN SATURATION: 95 %

## 2022-10-10 DIAGNOSIS — G47.33 OBSTRUCTIVE SLEEP APNEA SYNDROME: ICD-10-CM

## 2022-10-10 PROCEDURE — 99213 OFFICE O/P EST LOW 20 MIN: CPT | Performed by: NURSE PRACTITIONER

## 2022-10-10 RX ORDER — METHENAMINE HIPPURATE 1000 MG/1
1 TABLET ORAL 2 TIMES DAILY
COMMUNITY

## 2022-10-10 RX ORDER — FINASTERIDE 5 MG/1
5 TABLET, FILM COATED ORAL DAILY
COMMUNITY

## 2022-10-10 ASSESSMENT — FIBROSIS 4 INDEX: FIB4 SCORE: 1.09

## 2022-10-10 NOTE — PROGRESS NOTES
Chief Complaint   Patient presents with    Apnea       HPI:  Ramu Barber is a 72 y.o. year old male here today for follow-up on LYLE.  Last seen 2/4/2021 by MEHREEN Christy.  Former [facility] patient. PMH includes hypertension, hyperlipidemia, obesity, BPH, DM II, pyoderma gangrenosum, former smoker.  He is currently on CPAP at 8 cm/H2O. he states the life of his machine is approximately 5 to 8 years old.  He is currently getting a morning on the display stating the machine has reached its life expectancy.    Is currently using nasal pillows and denies any difficulty with mask fit or pressures.  He sleeps from 9 PM to 7 AM and denies any difficulty falling or staying asleep.  He notes improved sleep quality with using CPAP and states he cannot sleep without it.  Currently, he denies any excessive daytime sleepiness, morning headaches, palpitations, concentration or memory problems.    Compliance was reviewed and does show 100% use for the last month with an average time of 9 hours and 5 minutes and a resultant AHI of 0.6 with no evidence of excessive leaks.    Sleep Study History:   Pt was diagnosed with LYLE on 9/20/01 with severe LYLE, the AHI was 72/hr and O2 syed 74%.     ROS: As per HPI and otherwise negative if not stated.    Past Medical History:   Diagnosis Date    Arthritis     back, hips    Back pain     Chickenpox     High cholesterol     Hypertension     Obesity     Pain     back pain s/p multiple surgeries and spinal stimulator    Pneumonia     Polycythemia, secondary     Sleep apnea     cpap    Snoring     Type II or unspecified type diabetes mellitus without mention of complication, not stated as uncontrolled     oral meds only       Past Surgical History:   Procedure Laterality Date    EGD W/CONTROL BLEEDING  8/22/2020         GASTROSCOPY N/A 8/22/2020    Procedure: GASTROSCOPY;  Surgeon: Tawanda Gomez M.D.;  Location: SURGERY [facility];  Service: Gastroenterology    PB IMPLANT  NEUROSTIM/  7/23/2019    Procedure: INSERTION, NEUROSTIMULATOR, PERMANENT, SPINAL CORD;  Surgeon: Teddy Santos M.D.;  Location: SURGERY [facility];  Service: Pain Management    LUMBAR LAMINECTOMY DISKECTOMY  2/18/2018    Procedure: LUMBAR LAMINECTOMY DISKECTOMY- LT L1-2 HEMILAMI-;  Surgeon: Tom Pittman M.D.;  Location: SURGERY [facility];  Service: Neurosurgery    HARDWARE REMOVAL NEURO  2/18/2018    Procedure: HARDWARE REMOVAL NEURO- L2-3 PEDICLE SCREWS;  Surgeon: Tom Pittman M.D.;  Location: SURGERY [facility];  Service: Neurosurgery    INGUINAL HERNIA REPAIR Right 2/16/2016    Procedure: INGUINAL HERNIA REPAIR;  Surgeon: Sj Baird M.D.;  Location: SURGERY [facility];  Service:     FUSION, SPINE, LUMBAR, PLIF  1/21/2015    Performed by Ko Suarez M.D. at SURGERY [facility]    CARPAL TUNNEL RELEASE Right 2014    SHOULDER ARTHROSCOPY Right 2014    CERVICAL DISK AND FUSION ANTERIOR  2005    LUMBAR DECOMPRESSION  2004    LUMBAR DECOMPRESSION  2003       Family History   Problem Relation Age of Onset    Sleep Apnea Mother     Heart Attack Father     Heart Attack Brother        Allergies as of 10/10/2022    (No Known Allergies)        Vitals:  There were no vitals taken for this visit.    Current medications as of today   Current Outpatient Medications   Medication Sig Dispense Refill    PARoxetine (PAXIL) 20 MG Tab Take 1 Tablet by mouth.      furosemide (LASIX) 20 MG Tab Take 20 mg by mouth 2 times a day.      potassium chloride (MICRO-K) 10 MEQ capsule Take 10 mEq by mouth 2 times a day.      polyethylene glycol/lytes (MIRALAX) 17 g Pack Take 1 Packet by mouth every day. 30 Each 0    dicyclomine (BENTYL) 20 MG Tab TAKE ONE TABLET BY MOUTH EVERY 6 HOURS FOR 5 DAYS 20 Tab 0    promethazine (PHENERGAN) 25 MG Tab Take 1 Tab by mouth every 6 hours as needed for Nausea/Vomiting. 30 Tab 0    gabapentin (NEURONTIN) 300 MG Cap Take 1 Cap by mouth 4 times a day. 90 Cap 0     lovastatin (MEVACOR) 20 MG Tab Take 1 Tab by mouth every evening. 90 Tab 1    fosinopril (MONOPRIL) 40 MG tablet Take 1 Tab by mouth every day. For further refills please contact new cardiologist. Thank you 30 Tab 0    amLODIPine (NORVASC) 10 MG Tab Take 1 Tab by mouth every day. For further refills please contact new cardiologist. Thank you 30 Tab 0    carvedilol (COREG) 25 MG Tab TAKE ONE TABLET BY MOUTH TWICE A DAY WITH MEALS 180 Tab 0    triamterene-hctz (MAXZIDE-25/DYAZIDE) 37.5-25 MG Tab Take 1 Tab by mouth every morning.      terazosin (HYTRIN) 5 MG Cap TAKE TWO CAPSULES BY MOUTH EVERY NIGHT AT BEDTIME (GENERIC HYTRIN) 180 Cap 10    metformin (GLUCOPHAGE) 850 MG Tab Take 850 mg by mouth 2 times a day.       No current facility-administered medications for this visit.         Physical Exam:   Gen:           Alert and oriented, No apparent distress. Mood and affect appropriate, normal interaction with examiner.  Eyes:          PERRL, EOM intact, sclere white, conjunctive moist.  Ears:          Not examined.   Hearing:     Grossly intact.  Nose:          Normal, no lesions or deformities.  Dentition:    Good dentition.  Oropharynx:   Tongue normal, posterior pharynx without erythema or exudate.  Neck:        Supple, trachea midline, no masses.  Respiratory Effort: No intercostal retractions or use of accessory muscles.   Lung Auscultation:      Clear to auscultation bilaterally; no rales, rhonchi or wheezing.  CV:            Regular rate and rhythm. No murmurs, rubs or gallops.  Abd:           Not examined.   Lymphadenopathy: Not examined.  Gait and Station: Normal.  Digits and Nails: No clubbing, cyanosis, petechiae, or nodes.   Cranial Nerves: II-XII grossly intact.  Skin:        No rashes, lesions or ulcers noted.               Ext:           No cyanosis or edema.      Assessment:  1. Obstructive sleep apnea syndrome          Plan:  Patient is using and benefiting from CPAP therapy.  Patient states his  machine has reached its life expectancy and would request a new CPAP.  Order placed for CPAP at 8 cm/H2O sent to [employer].  Patient advised he needs to follow-up between day 31 and 90 for first compliance check on new device.  Order also placed for mask and supplies.  Recommended nightly use for greater than 4 hours for optimal benefit.  Compliance shows adequate use and control of LYLE on current device.    Please note that this dictation was created using voice recognition software. I have made every reasonable attempt to correct obvious errors, but it is possible there are errors of grammar and possibly content that I did not discover before finalizing the note.

## 2022-10-10 NOTE — PATIENT INSTRUCTIONS
"Ordering new CPAP. Please follow up within the first 90 days for compliance check     DME : [facility]  Address: [address]  [address], [place] [address]  Phone: [phone]    Once you receive your new PAP machine from the [employer] company you're referred to, we must see you back for an office visit between 30-90 days of you using the machine to review compliance.  If you were ordered an ASV machine, we need to see you in office no sooner than your 60th day on therapy.     This is a very important time frame for insurance purposes. If you do not follow up with our office for compliance your insurance may not continue to pay for the machine. Also if you do not use the machine for at least 4 hours each night, you may be deemed \"Incompliant\", in which case the insurance may also not continue to pay for the machine.    If you are incompliant, you may have to surrender your machine to the [employer] company and start the process over if you wish to continue therapy after that. Meaning a new office consult and new sleep studies.    For your first visit back with our office, please bring the whole machine with the power cord. We will download the compliance off the SD card in the machine, and are able to make changes if need be in office. Some machines have a modem and we can access the data wirelessly, if this is the case please make sure the DME company grants us access to your machine.  For all follow up appointments after that, you will only need to bring the SD card to the appointment.    If you are having any issues with the mask, you have a 30 day window to exchange and try something else with your DME company.  If you are having issues with the pressure, please call our office at [phone].  These issues can cause you to not be able to use machine appropriately, there for make you incompliant.    Please call our office if you have any questions.    Thank you, [facility] " [place].  [phone]

## 2022-10-17 NOTE — DISCHARGE INSTRUCTIONS
ACTIVITY: Rest and take it easy for the first 24 hours.  A responsible adult is recommended to remain with you during that time.  It is normal to feel sleepy.  We encourage you to not do anything that requires balance, judgment or coordination.    MILD FLU-LIKE SYMPTOMS ARE NORMAL. YOU MAY EXPERIENCE GENERALIZED MUSCLE ACHES, THROAT IRRITATION, HEADACHE AND/OR SOME NAUSEA.    FOR 24 HOURS DO NOT:  Drive, operate machinery or run household appliances.  Drink beer or alcoholic beverages.   Make important decisions or sign legal documents.    SPECIAL INSTRUCTIONS: Apply ice for comfort. Follow up at Dr. Santos's office tomorrow.    DIET: To avoid nausea, slowly advance diet as tolerated, avoiding spicy or greasy foods for the first day.  Add more substantial food to your diet according to your physician's instructions.  INCREASE FLUIDS AND FIBER TO AVOID CONSTIPATION.    SURGICAL DRESSING/BATHING: Do not remove dressings. Wear abdominal binder.    FOLLOW-UP APPOINTMENT: Tomorrow    You should CALL YOUR PHYSICIAN if you develop:  Fever greater than 101 degrees F.  Pain not relieved by medication, or persistent nausea or vomiting.  Excessive bleeding (blood soaking through dressing) or unexpected drainage from the wound.  Extreme redness or swelling around the incision site, drainage of pus or foul smelling drainage.  Inability to urinate or empty your bladder within 8 hours.    You should call 911 if you develop problems with breathing or chest pain.    If you are unable to contact your doctor or surgical center, you should go to the nearest emergency room or urgent care center.      Physician's telephone #: Dr. Santso [phone]    If any questions arise, call your doctor.  If your doctor is not available, please feel free to call the Surgical Center at [phone].  The Center is open Monday through Friday from 7AM to 7PM.      You can also call the [facility] [employer] HOTLINE open 24 hours/day, 7 days/week and speak to a  nurse at [phone], or toll free at [phone].    A registered nurse may call you a few days after your surgery to see how you are doing after your procedure.    MEDICATIONS: Resume taking daily medication.  Take prescribed pain medication with food.  If no medication is prescribed, you may take non-aspirin pain medication if needed.  PAIN MEDICATION CAN BE VERY CONSTIPATING.  Take a stool softener or laxative such as senokot, pericolace, or milk of magnesia if needed.    Prescription given: None.      Last pain medication given: None.    If your physician has prescribed pain medication that includes Acetaminophen (Tylenol), do not take additional Acetaminophen (Tylenol) while taking the prescribed medication.    Depression / Suicide Risk    As you are discharged from this [facility] facility, it is important to learn how to keep safe from harming yourself.    Recognize the warning signs:  · Abrupt changes in personality, positive or negative- including increase in energy   · Giving away possessions  · Change in eating patterns- significant weight changes-  positive or negative  · Change in sleeping patterns- unable to sleep or sleeping all the time   · Unwillingness or inability to communicate  · Depression  · Unusual sadness, discouragement and loneliness  · Talk of wanting to die  · Neglect of personal appearance   · Rebelliousness- reckless behavior  · Withdrawal from people/activities they love  · Confusion- inability to concentrate     If you or a loved one observes any of these behaviors or has concerns about self-harm, here's what you can do:  · Talk about it- your feelings and reasons for harming yourself  · Remove any means that you might use to hurt yourself (examples: pills, rope, extension cords, firearm)  · Get professional help from the community (Mental Health, Substance Abuse, psychological counseling)  · Do not be alone:Call your Safe Contact- someone whom you trust who will be there  for you.  · Call your local CRISIS HOTLINE 267-2366 or 402-050-1072  · Call your local Children's Mobile Crisis Response Team [place] (377) 208-3883 or www."[employer]  · Call the toll free National Suicide Prevention Hotlines   · National Suicide Prevention Lifeline 797-888-VMZJ (0158)  [facility] Line Network 800-SUICIDE (176-0627)    Discharge Education for patients on LYLE (Obstructive Sleep Apnea) Protocol    Prior to receiving sedation or anesthesia, we screen all patients for Obstructive Sleep Apnea.  During your screening, you were identified as having Obstructive Sleep Apnea(LYLE).    Sleep apnea is a chronic (ongoing) condition that disrupts the quality and quantity of your sleep repeatedly throughout the night.  This often results in excessive daytime sleepiness or fatigue during the day.  It may also contribute to high blood pressure, heart problems, and complications following medications used for surgery and procedures.     We recommend that you should be with an adult observer for at least 24 hours after your sedation/anesthesia.  If you have a CPAP machine, you should wear it during any sleep period (day or night) for the week following your procedure.  We encourage you to sleep on your side or in a sitting position, even with napping.  Lying flat on your back increases the risk of apnea and airway obstruction during your post procedure recovery period.    It is important to prevent over-sedation that could increase your risk for apnea.  Please take all pain medication as directed by your physician.  If you are not getting pain relief, please contact your physician to discuss possible approaches to relieving pain while minimizing medications that can affect your breathing and oxygen levels.     [No] : not [2nd Degree Block] : second degree block [Pacemaker] : pacemaker [DDDR] : DDDR [Longevity: ___ months] : The estimated remaining battery life is [unfilled] months [Lead Imp:  ___ohms] : lead impedance was [unfilled] ohms [Sensing Amplitude ___mv] : sensing amplitude was [unfilled] mv [___V @] : [unfilled] V [___ ms] : [unfilled] ms [Programmed for Longevity] : output reprogrammed for improved battery longevity [de-identified] : [employer] [de-identified] : KELLEY [de-identified] : [id number] [de-identified] : 60 [de-identified] : 4-5-22 [de-identified] : AP 79.8%,  96.4%\par AT/AF burden 10.3%, pt takes Eloquis, ATP not hellping\par one VT episode lasting 13 beats\par turned off safety pace and atrial ATP

## 2022-10-19 ENCOUNTER — OFFICE VISIT (OUTPATIENT)
Dept: URGENT CARE | Facility: PHYSICIAN GROUP | Age: 72
End: 2022-10-19
Payer: COMMERCIAL

## 2022-10-19 VITALS
RESPIRATION RATE: 16 BRPM | TEMPERATURE: 98.7 F | BODY MASS INDEX: 38.47 KG/M2 | DIASTOLIC BLOOD PRESSURE: 72 MMHG | OXYGEN SATURATION: 94 % | HEART RATE: 63 BPM | SYSTOLIC BLOOD PRESSURE: 120 MMHG | WEIGHT: 284 LBS | HEIGHT: 72 IN

## 2022-10-19 DIAGNOSIS — M25.562 ACUTE PAIN OF LEFT KNEE: ICD-10-CM

## 2022-10-19 PROCEDURE — 99213 OFFICE O/P EST LOW 20 MIN: CPT

## 2022-10-19 RX ORDER — PREDNISONE 10 MG/1
20 TABLET ORAL DAILY
Qty: 10 TABLET | Refills: 0 | Status: SHIPPED | OUTPATIENT
Start: 2022-10-19 | End: 2022-10-24

## 2022-10-19 ASSESSMENT — FIBROSIS 4 INDEX: FIB4 SCORE: 1.09

## 2022-10-19 NOTE — PROGRESS NOTES
Subjective     Ramu Barber is a 72 y.o. male who presents with Gout (/Right side hip )            HPI    Patient presents with symptoms that started yesterday.  He endorses left hip pain, which she describes as achy, 8 out of 10, radiating to the left hip, aggravated by walking and weightbearing.  Pain is relieved with resting and sitting.  He reports using topical analgesic, which did not provide any relief.  He denies any mechanism of injury.  He reports occasional numbness and tingling on his lower extremities.  He reports he has neuropathy, currently taking gabapentin for this.  Patient was seen here at the urgent care on 9/25/2022 for left knee gout.  He was given prednisone oral, he reports this provided immediate relief.    Patient's current problem list, medications, and past medical/surgical history were reviewed in Epic.    PMH:  has a past medical history of Arthritis, Back pain, Chickenpox, High cholesterol, Hypertension, Obesity, Pain, Pneumonia, Polycythemia, secondary, Sleep apnea, Snoring, and Type II or unspecified type diabetes mellitus without mention of complication, not stated as uncontrolled.  MEDS:   Current Outpatient Medications:     finasteride (PROSCAR) 5 MG Tab, Take 5 mg by mouth every day., Disp: , Rfl:     methenamine hip (HIPPREX) 1 GM Tab, Take 1 g by mouth 2 times a day., Disp: , Rfl:     Apoaequorin (PREVAGEN PO), Take  by mouth., Disp: , Rfl:     PARoxetine (PAXIL) 20 MG Tab, Take 1 Tablet by mouth., Disp: , Rfl:     furosemide (LASIX) 20 MG Tab, Take 20 mg by mouth 2 times a day., Disp: , Rfl:     potassium chloride (MICRO-K) 10 MEQ capsule, Take 10 mEq by mouth 2 times a day., Disp: , Rfl:     polyethylene glycol/lytes (MIRALAX) 17 g Pack, Take 1 Packet by mouth every day., Disp: 30 Each, Rfl: 0    dicyclomine (BENTYL) 20 MG Tab, TAKE ONE TABLET BY MOUTH EVERY 6 HOURS FOR 5 DAYS, Disp: 20 Tab, Rfl: 0    promethazine (PHENERGAN) 25 MG Tab, Take 1 Tab by mouth every 6 hours  as needed for Nausea/Vomiting., Disp: 30 Tab, Rfl: 0    gabapentin (NEURONTIN) 300 MG Cap, Take 1 Cap by mouth 4 times a day., Disp: 90 Cap, Rfl: 0    lovastatin (MEVACOR) 20 MG Tab, Take 1 Tab by mouth every evening., Disp: 90 Tab, Rfl: 1    fosinopril (MONOPRIL) 40 MG tablet, Take 1 Tab by mouth every day. For further refills please contact new cardiologist. Thank you, Disp: 30 Tab, Rfl: 0    amLODIPine (NORVASC) 10 MG Tab, Take 1 Tab by mouth every day. For further refills please contact new cardiologist. Thank you, Disp: 30 Tab, Rfl: 0    carvedilol (COREG) 25 MG Tab, TAKE ONE TABLET BY MOUTH TWICE A DAY WITH MEALS, Disp: 180 Tab, Rfl: 0    triamterene-hctz (MAXZIDE-25/DYAZIDE) 37.5-25 MG Tab, Take 1 Tab by mouth every morning., Disp: , Rfl:     terazosin (HYTRIN) 5 MG Cap, TAKE TWO CAPSULES BY MOUTH EVERY NIGHT AT BEDTIME (GENERIC HYTRIN), Disp: 180 Cap, Rfl: 10    metformin (GLUCOPHAGE) 850 MG Tab, Take 850 mg by mouth 2 times a day., Disp: , Rfl:   ALLERGIES: No Known Allergies  SURGHX:   Past Surgical History:   Procedure Laterality Date    EGD W/CONTROL BLEEDING  8/22/2020         GASTROSCOPY N/A 8/22/2020    Procedure: GASTROSCOPY;  Surgeon: Tawanda Gomez M.D.;  Location: [facility];  Service: Gastroenterology    PB IMPLANT NEUROSTIM/  7/23/2019    Procedure: INSERTION, NEUROSTIMULATOR, PERMANENT, SPINAL CORD;  Surgeon: Teddy Santos M.D.;  Location: [facility];  Service: Pain Management    LUMBAR LAMINECTOMY DISKECTOMY  2/18/2018    Procedure: LUMBAR LAMINECTOMY DISKECTOMY- LT L1-2 HEMILAMI-;  Surgeon: Tom Pittman M.D.;  Location: [facility];  Service: Neurosurgery    HARDWARE REMOVAL NEURO  2/18/2018    Procedure: HARDWARE REMOVAL NEURO- L2-3 PEDICLE SCREWS;  Surgeon: Tom Pittman M.D.;  Location: [facility];  Service: Neurosurgery    INGUINAL HERNIA REPAIR Right 2/16/2016    Procedure: INGUINAL HERNIA REPAIR;  Surgeon: Sj Hernandez  FRANKIE Baird.;  Location: SURGERY [facility];  Service:     FUSION, SPINE, LUMBAR, PLIF  1/21/2015    Performed by Ko Suarez M.D. at SURGERY [facility]    CARPAL TUNNEL RELEASE Right 2014    SHOULDER ARTHROSCOPY Right 2014    CERVICAL DISK AND FUSION ANTERIOR  2005    LUMBAR DECOMPRESSION  2004    LUMBAR DECOMPRESSION  2003     SOCHX:  reports that he has never smoked. He quit smokeless tobacco use about 3 years ago.  His smokeless tobacco use included chew. He reports current alcohol use. He reports that he does not use drugs.  FH: Reviewed with patient, not pertinent to this visit.         Review of Systems   Musculoskeletal:         Left knee    All other systems reviewed and are negative.           Objective     /72 (BP Location: Right arm, Patient Position: Sitting, BP Cuff Size: Large adult)   Pulse 63   Temp 37.1 °C (98.7 °F) (Temporal)   Resp 16   Ht 1.829 m (6')   Wt (!) 129 kg (284 lb)   SpO2 94%   BMI 38.52 kg/m²      Physical Exam  Constitutional:       Appearance: Normal appearance.   HENT:      Head: Normocephalic.      Nose: Nose normal.   Eyes:      Extraocular Movements: Extraocular movements intact.   Cardiovascular:      Rate and Rhythm: Normal rate and regular rhythm.      Pulses: Normal pulses.      Heart sounds: Normal heart sounds.   Pulmonary:      Effort: Pulmonary effort is normal.      Breath sounds: Normal breath sounds.   Musculoskeletal:      Cervical back: Normal range of motion.      Left knee: No swelling. Decreased range of motion. Tenderness present.   Skin:     General: Skin is warm.   Neurological:      General: No focal deficit present.      Mental Status: He is alert.   Psychiatric:         Mood and Affect: Mood normal.         Behavior: Behavior normal.                   Assessment & Plan        1. Acute pain of left knee    - predniSONE (DELTASONE) 10 MG Tab; Take 2 Tablets by mouth every day for 5 days.  Dispense: 10 Tablet; Refill: 0    Patient  with decreased range of motion and tenderness on the left knee.  He has difficulty ambulating from the pain.  Discussed differentials, osteoarthritis versus gouty arthritis.  Patient is prescribed prednisone daily for 5 days.  He is educated on side effects, recommended taking this with food.  Patient was also advised that this can increase his blood sugar readings.  Recommended low purine diet. Recommended RICE (rest, ice, compression, elevation).  Advised to apply ice or heat to the area.  May apply topical analgesics or patches for additional pain relief. Discussed treatment plan with patient, she is agreeable and verbalized understanding.  Educated patient on signs and symptoms watch out for, when to return to clinic or go to the ER.    Electronically Signed by MEHREEN Rogers

## 2022-11-15 ENCOUNTER — OFFICE VISIT (OUTPATIENT)
Dept: URGENT CARE | Facility: PHYSICIAN GROUP | Age: 72
End: 2022-11-15
Payer: COMMERCIAL

## 2022-11-15 ENCOUNTER — HOSPITAL ENCOUNTER (OUTPATIENT)
Facility: MEDICAL CENTER | Age: 72
End: 2022-11-15
Attending: PHYSICIAN ASSISTANT
Payer: COMMERCIAL

## 2022-11-15 VITALS
DIASTOLIC BLOOD PRESSURE: 70 MMHG | HEART RATE: 55 BPM | OXYGEN SATURATION: 94 % | HEIGHT: 72 IN | TEMPERATURE: 98.6 F | WEIGHT: 284 LBS | SYSTOLIC BLOOD PRESSURE: 120 MMHG | RESPIRATION RATE: 16 BRPM | BODY MASS INDEX: 38.47 KG/M2

## 2022-11-15 DIAGNOSIS — R30.0 DYSURIA: ICD-10-CM

## 2022-11-15 LAB
APPEARANCE UR: CLEAR
BILIRUB UR STRIP-MCNC: NEGATIVE MG/DL
COLOR UR AUTO: YELLOW
GLUCOSE UR STRIP.AUTO-MCNC: NEGATIVE MG/DL
KETONES UR STRIP.AUTO-MCNC: NEGATIVE MG/DL
LEUKOCYTE ESTERASE UR QL STRIP.AUTO: NEGATIVE
NITRITE UR QL STRIP.AUTO: NEGATIVE
PH UR STRIP.AUTO: 6.5 [PH] (ref 5–8)
PROT UR QL STRIP: NEGATIVE MG/DL
RBC UR QL AUTO: NORMAL
SP GR UR STRIP.AUTO: 1.02
UROBILINOGEN UR STRIP-MCNC: 0.2 MG/DL

## 2022-11-15 PROCEDURE — 81002 URINALYSIS NONAUTO W/O SCOPE: CPT | Performed by: PHYSICIAN ASSISTANT

## 2022-11-15 PROCEDURE — 87086 URINE CULTURE/COLONY COUNT: CPT

## 2022-11-15 PROCEDURE — 99213 OFFICE O/P EST LOW 20 MIN: CPT | Performed by: PHYSICIAN ASSISTANT

## 2022-11-15 NOTE — PROGRESS NOTES
Subjective:   Ramu Barber is a 72 y.o. male who presents for UTI (Pain in urination, dark urine, odor in urine x3 days )      HPI  The patient presents to the Urgent Care with complaints of dysuria, urine odor, and dark urine onset 3 days ago.  His symptoms have improved and almost resolved but wants to make sure there is no infection.  The urine odor resolved and the urine has cleared up.  Dysuria is improving.  He has some urinary frequency but reports of increased fluid intake.  He is not sexually active. Denies any fever, chills, abdominal pain, nausea, vomiting, flank pain, penile discharge, penile lesion, penile rash.     Medications:    amLODIPine Tabs  carvedilol Tabs  dicyclomine Tabs  finasteride Tabs  fosinopril  furosemide Tabs  gabapentin Caps  lovastatin Tabs  metFORMIN Tabs  methenamine hip Tabs  PARoxetine Tabs  polyethylene glycol/lytes Pack  potassium chloride  PREVAGEN PO  promethazine Tabs  terazosin Caps  triamterene-hctz Tabs    Allergies: Patient has no known allergies.    Problem List: Ramu Barber does not have any pertinent problems on file.    Surgical History:  Past Surgical History:   Procedure Laterality Date    EGD W/CONTROL BLEEDING  8/22/2020         GASTROSCOPY N/A 8/22/2020    Procedure: GASTROSCOPY;  Surgeon: Tawanda Gomez M.D.;  Location: [facility];  Service: Gastroenterology    PB IMPLANT NEUROSTIM/  7/23/2019    Procedure: INSERTION, NEUROSTIMULATOR, PERMANENT, SPINAL CORD;  Surgeon: Teddy Santos M.D.;  Location: [facility];  Service: Pain Management    LUMBAR LAMINECTOMY DISKECTOMY  2/18/2018    Procedure: LUMBAR LAMINECTOMY DISKECTOMY- LT L1-2 HEMILAMI-;  Surgeon: Tom Pittman M.D.;  Location: [facility];  Service: Neurosurgery    HARDWARE REMOVAL NEURO  2/18/2018    Procedure: HARDWARE REMOVAL NEURO- L2-3 PEDICLE SCREWS;  Surgeon: Tom Pittman M.D.;  Location: [facility];  Service: Neurosurgery     INGUINAL HERNIA REPAIR Right 2/16/2016    Procedure: INGUINAL HERNIA REPAIR;  Surgeon: Sj Baird M.D.;  Location: SURGERY [facility];  Service:     FUSION, SPINE, LUMBAR, PLIF  1/21/2015    Performed by Ko Suarez M.D. at SURGERY [facility]    CARPAL TUNNEL RELEASE Right 2014    SHOULDER ARTHROSCOPY Right 2014    CERVICAL DISK AND FUSION ANTERIOR  2005    LUMBAR DECOMPRESSION  2004    LUMBAR DECOMPRESSION  2003       Past Social Hx: Ramu Barber  reports that he has never smoked. He quit smokeless tobacco use about 3 years ago.  His smokeless tobacco use included chew. He reports current alcohol use. He reports that he does not use drugs.     Past Family Hx:  Ramu Barber family history includes Heart Attack in his brother and father; Sleep Apnea in his mother.     Problem list, medications, and allergies reviewed by myself today in Epic.     Objective:     /70 (BP Location: Right arm, Patient Position: Sitting, BP Cuff Size: Adult)   Pulse (!) 55   Temp 37 °C (98.6 °F) (Temporal)   Resp 16   Ht 1.829 m (6')   Wt (!) 129 kg (284 lb)   SpO2 94%   BMI 38.52 kg/m²     Physical Exam  Vitals reviewed.   Constitutional:       General: He is not in acute distress.     Appearance: Normal appearance. He is not ill-appearing.   Eyes:      Conjunctiva/sclera: Conjunctivae normal.      Pupils: Pupils are equal, round, and reactive to light.   Cardiovascular:      Rate and Rhythm: Normal rate.   Pulmonary:      Effort: Pulmonary effort is normal.   Abdominal:      General: Abdomen is flat. Bowel sounds are normal.      Palpations: Abdomen is soft.      Tenderness: There is no abdominal tenderness. There is no right CVA tenderness, left CVA tenderness, guarding or rebound.   Musculoskeletal:      Cervical back: Neck supple. No rigidity.   Lymphadenopathy:      Cervical: No cervical adenopathy.   Skin:     General: Skin is warm and dry.   Neurological:      General: No focal deficit  present.      Mental Status: He is alert and oriented to person, place, and time.   Psychiatric:         Mood and Affect: Mood normal.         Behavior: Behavior normal.     UA: trace blood otherwise unremarkable.       Diagnosis and associated orders:     1. Dysuria  - POCT Urinalysis  - URINE CULTURE(NEW); Future     Comments/MDM:     UA unrevealing.   Patients symptoms resolving. Normal exam. Will culture urine and contact patient if positive.   Otherwise, continue increase fluid intake, tylenol.        I personally reviewed prior external notes and test results pertinent to today's visit. Pathogenesis of diagnosis discussed including typical length and natural progression. Supportive care, natural history, differential diagnoses, and indications for immediate follow-up discussed. Patient expresses understanding and agrees to plan. Patient denies any other questions or concerns.     Follow-up with the primary care physician for recheck, reevaluation, and consideration of further management.    Please note that this dictation was created using voice recognition software. I have made a reasonable attempt to correct obvious errors, but I expect that there are errors of grammar and possibly content that I did not discover before finalizing the note.    This note was electronically signed by Abel Nixon PA-C

## 2022-11-16 DIAGNOSIS — R30.0 DYSURIA: ICD-10-CM

## 2022-11-18 LAB
BACTERIA UR CULT: NORMAL
SIGNIFICANT IND 70042: NORMAL
SITE SITE: NORMAL
SOURCE SOURCE: NORMAL

## 2022-11-30 ENCOUNTER — OFFICE VISIT (OUTPATIENT)
Dept: SLEEP MEDICINE | Facility: MEDICAL CENTER | Age: 72
End: 2022-11-30
Payer: COMMERCIAL

## 2022-11-30 VITALS
RESPIRATION RATE: 20 BRPM | BODY MASS INDEX: 37.79 KG/M2 | HEART RATE: 96 BPM | SYSTOLIC BLOOD PRESSURE: 130 MMHG | DIASTOLIC BLOOD PRESSURE: 82 MMHG | WEIGHT: 279 LBS | HEIGHT: 72 IN | OXYGEN SATURATION: 93 %

## 2022-11-30 DIAGNOSIS — G47.33 OBSTRUCTIVE SLEEP APNEA SYNDROME: ICD-10-CM

## 2022-11-30 PROCEDURE — 99213 OFFICE O/P EST LOW 20 MIN: CPT | Performed by: NURSE PRACTITIONER

## 2022-11-30 RX ORDER — TRIAMTERENE AND HYDROCHLOROTHIAZIDE 37.5; 25 MG/1; MG/1
1 CAPSULE ORAL DAILY
Status: ON HOLD | COMMUNITY
Start: 2022-11-22 | End: 2023-04-18

## 2022-11-30 RX ORDER — TRIAMTERENE AND HYDROCHLOROTHIAZIDE 37.5; 25 MG/1; MG/1
CAPSULE ORAL
Status: ON HOLD | COMMUNITY
Start: 2022-11-22 | End: 2023-04-18

## 2022-11-30 RX ORDER — TERAZOSIN 10 MG/1
10 CAPSULE ORAL DAILY
COMMUNITY
Start: 2022-11-10

## 2022-11-30 RX ORDER — GLIPIZIDE 2.5 MG/1
2.5 TABLET, EXTENDED RELEASE ORAL 2 TIMES DAILY
COMMUNITY
Start: 2022-11-23

## 2022-11-30 RX ORDER — GABAPENTIN 600 MG/1
TABLET ORAL
COMMUNITY
Start: 2022-11-01 | End: 2023-04-21

## 2022-11-30 RX ORDER — LOVASTATIN 20 MG/1
1 TABLET ORAL DAILY
Status: ON HOLD | COMMUNITY
Start: 2022-11-22 | End: 2023-04-18

## 2022-11-30 ASSESSMENT — PATIENT HEALTH QUESTIONNAIRE - PHQ9: CLINICAL INTERPRETATION OF PHQ2 SCORE: 0

## 2022-11-30 NOTE — PROGRESS NOTES
Chief Complaint   Patient presents with    Apnea       HPI:  Ramu Barber is a 72 y.o. year old male here today for follow-up on LYLE.  Last seen 10/10/2022 by me.   Patient requested new CPAP ordered at last visit due to previous machine starting to malfunction.  He has a former [facility] patient. PMH includes hypertension, hyperlipidemia, obesity, BPH, DM II, pyoderma gangrenosum, former smoker.  He is currently on CPAP at 8 cm/H2O. he states the life of his machine is approximately 5 to 8 years old.  He is currently getting a morning on the display stating the machine has reached its life expectancy.     Is currently using nasal pillows and has been for some time now.  He states the current mask is a P 10 and it slips off of his head frequently.  Mask is changed with the issuance of the new CPAP.  He prefers his old style headgear.  He sleeps from 9 PM to 7 AM and denies any difficulty falling or staying asleep.  He notes improved sleep quality with using CPAP and states he cannot sleep without it.  Currently, he denies any excessive daytime sleepiness, morning headaches, palpitations, concentration or memory problems.    Compliance was reviewed and does show 100% use for the last 30 days with an average time of 9 hours and 34 minutes and a resultant AHI of 2.0.  There is occasional leaks occurring.    ROS: As per HPI and otherwise negative if not stated.    Past Medical History:   Diagnosis Date    Arthritis     back, hips    Back pain     Chickenpox     High cholesterol     Hypertension     Obesity     Pain     back pain s/p multiple surgeries and spinal stimulator    Pneumonia     Polycythemia, secondary     Sleep apnea     cpap    Snoring     Type II or unspecified type diabetes mellitus without mention of complication, not stated as uncontrolled     oral meds only       Past Surgical History:   Procedure Laterality Date    EGD W/CONTROL BLEEDING  8/22/2020         GASTROSCOPY N/A 8/22/2020    Procedure:  GASTROSCOPY;  Surgeon: Tawanda Gomez M.D.;  Location: SURGERY [facility];  Service: Gastroenterology    PB IMPLANT NEUROSTIM/  7/23/2019    Procedure: INSERTION, NEUROSTIMULATOR, PERMANENT, SPINAL CORD;  Surgeon: Teddy Santso M.D.;  Location: SURGERY [facility];  Service: Pain Management    LUMBAR LAMINECTOMY DISKECTOMY  2/18/2018    Procedure: LUMBAR LAMINECTOMY DISKECTOMY- LT L1-2 HEMILAMI-;  Surgeon: Tom Pittman M.D.;  Location: SURGERY [facility];  Service: Neurosurgery    HARDWARE REMOVAL NEURO  2/18/2018    Procedure: HARDWARE REMOVAL NEURO- L2-3 PEDICLE SCREWS;  Surgeon: Tom Pittman M.D.;  Location: SURGERY [facility];  Service: Neurosurgery    INGUINAL HERNIA REPAIR Right 2/16/2016    Procedure: INGUINAL HERNIA REPAIR;  Surgeon: Sj Baird M.D.;  Location: SURGERY [facility];  Service:     FUSION, SPINE, LUMBAR, PLIF  1/21/2015    Performed by Ko Suarez M.D. at [facility]    CARPAL TUNNEL RELEASE Right 2014    SHOULDER ARTHROSCOPY Right 2014    CERVICAL DISK AND FUSION ANTERIOR  2005    LUMBAR DECOMPRESSION  2004    LUMBAR DECOMPRESSION  2003       Family History   Problem Relation Age of Onset    Sleep Apnea Mother     Heart Attack Father     Heart Attack Brother        Allergies as of 11/30/2022    (No Known Allergies)        Vitals:  /82 (BP Location: Left arm, Patient Position: Sitting, BP Cuff Size: Large adult)   Pulse 96   Resp 20   Ht 1.829 m (6')   Wt (!) 127 kg (279 lb)   SpO2 93%     Current medications as of today   Current Outpatient Medications   Medication Sig Dispense Refill    gabapentin (NEURONTIN) 600 MG tablet       glipiZIDE SR (GLUCOTROL) 2.5 MG TABLET SR 24 HR       lovastatin (MEVACOR) 20 MG Tab Take 1 Tablet by mouth every day.      terazosin (HYTRIN) 10 MG capsule       triamterene/hctz (MAXZIDE-25/DYAZIDE) 37.5-25 MG Cap Take 1 Capsule by mouth every day.      triamterene/hctz (MAXZIDE-25/DYAZIDE)  37.5-25 MG Cap       finasteride (PROSCAR) 5 MG Tab Take 1 Tablet by mouth every day.      methenamine hip (HIPPREX) 1 GM Tab Take 1 Tablet by mouth 2 times a day.      Apoaequorin (PREVAGEN PO) Take  by mouth.      PARoxetine (PAXIL) 20 MG Tab Take 1 Tablet by mouth.      furosemide (LASIX) 20 MG Tab Take 1 Tablet by mouth 2 times a day.      potassium chloride (MICRO-K) 10 MEQ capsule Take 1 Capsule by mouth 2 times a day.      polyethylene glycol/lytes (MIRALAX) 17 g Pack Take 1 Packet by mouth every day. 30 Each 0    dicyclomine (BENTYL) 20 MG Tab TAKE ONE TABLET BY MOUTH EVERY 6 HOURS FOR 5 DAYS 20 Tab 0    promethazine (PHENERGAN) 25 MG Tab Take 1 Tab by mouth every 6 hours as needed for Nausea/Vomiting. 30 Tab 0    gabapentin (NEURONTIN) 300 MG Cap Take 1 Cap by mouth 4 times a day. 90 Cap 0    lovastatin (MEVACOR) 20 MG Tab Take 1 Tab by mouth every evening. 90 Tab 1    fosinopril (MONOPRIL) 40 MG tablet Take 1 Tab by mouth every day. For further refills please contact new cardiologist. Thank you 30 Tab 0    amLODIPine (NORVASC) 10 MG Tab Take 1 Tab by mouth every day. For further refills please contact new cardiologist. Thank you 30 Tab 0    carvedilol (COREG) 25 MG Tab TAKE ONE TABLET BY MOUTH TWICE A DAY WITH MEALS 180 Tab 0    triamterene-hctz (MAXZIDE-25/DYAZIDE) 37.5-25 MG Tab Take 1 Tablet by mouth every morning.      terazosin (HYTRIN) 5 MG Cap TAKE TWO CAPSULES BY MOUTH EVERY NIGHT AT BEDTIME (GENERIC HYTRIN) 180 Cap 10    metformin (GLUCOPHAGE) 850 MG Tab Take 1 Tablet by mouth 2 times a day.       No current facility-administered medications for this visit.         Physical Exam:   Gen:           Alert and oriented, No apparent distress. Mood and affect appropriate, normal interaction with examiner.  Eyes:          PERRL, EOM intact, sclere white, conjunctive moist.  Ears:          Not examined.   Hearing:     Grossly intact.  Nose:          Normal, no lesions or deformities.  Dentition:    Good  dentition.  Oropharynx:   Tongue normal, posterior pharynx without erythema or exudate.  Neck:        Supple, trachea midline, no masses.  Respiratory Effort: No intercostal retractions or use of accessory muscles.   Lung Auscultation:      Clear to auscultation bilaterally; no rales, rhonchi or wheezing.  CV:            Regular rate and rhythm. No murmurs, rubs or gallops.  Abd:           Not examined.   Lymphadenopathy: Not examined.  Gait and Station: Normal.  Digits and Nails: No clubbing, cyanosis, petechiae, or nodes.   Cranial Nerves: II-XII grossly intact.  Skin:        No rashes, lesions or ulcers noted.               Ext:           No cyanosis or edema.      Assessment:  1. Obstructive sleep apnea syndrome            Plan:  Patient comes in for first compliance on newly ordered CPAP.  He continues to use and benefit from CPAP therapy.  Compliance shows adequate use and control of LYLE, however patient complains that the new mask that he received is different and the headgear causes mask slippage.  Order placed for mask fitting.  Advised patient to follow-up in 11 months for annual LYLE, but can be seen sooner if needed.  If patient does not receive mask fitting, advised to follow-up via message or phone.    Please note that this dictation was created using voice recognition software. I have made every reasonable attempt to correct obvious errors, but it is possible there are errors of grammar and possibly content that I did not discover before finalizing the note.

## 2023-01-06 ENCOUNTER — OFFICE VISIT (OUTPATIENT)
Dept: URGENT CARE | Facility: PHYSICIAN GROUP | Age: 73
End: 2023-01-06
Payer: COMMERCIAL

## 2023-01-06 VITALS
RESPIRATION RATE: 15 BRPM | TEMPERATURE: 98.2 F | HEART RATE: 54 BPM | HEIGHT: 72 IN | OXYGEN SATURATION: 95 % | DIASTOLIC BLOOD PRESSURE: 78 MMHG | WEIGHT: 278 LBS | SYSTOLIC BLOOD PRESSURE: 110 MMHG | BODY MASS INDEX: 37.65 KG/M2

## 2023-01-06 DIAGNOSIS — M25.562 ACUTE PAIN OF LEFT KNEE: ICD-10-CM

## 2023-01-06 PROCEDURE — 99213 OFFICE O/P EST LOW 20 MIN: CPT | Performed by: NURSE PRACTITIONER

## 2023-01-06 RX ORDER — PREDNISONE 20 MG/1
40 TABLET ORAL DAILY
Qty: 10 TABLET | Refills: 0 | Status: SHIPPED | OUTPATIENT
Start: 2023-01-06 | End: 2023-01-11

## 2023-01-06 ASSESSMENT — ENCOUNTER SYMPTOMS
CHILLS: 0
FEVER: 0
FALLS: 0
TINGLING: 0
SENSORY CHANGE: 0
MYALGIAS: 1
BRUISES/BLEEDS EASILY: 0
WEAKNESS: 0

## 2023-01-06 NOTE — PROGRESS NOTES
Subjective     Ramu Barber is a 72 y.o. male who presents with Knee Pain ((L) Sx- Two Days )            Knee Pain  Associated symptoms include myalgias. Pertinent negatives include no chills, fever, rash or weakness. States has been experiencing acute left knee pain x2 days.  Denies arthritic knees, but states does have history of neuropathy.  Does take gabapentin for this.  Denies trauma, injury or fall.  Admits to cleaning snow this past week.  Has had pain in hands left knee in the past as well.  States anti-inflammatory does help with pain.  Denies swelling, redness in knee joint.  Denies weakness or sensation of buckling in the knee with ambulation.  Does walk with a cane.    PMH:  has a past medical history of Arthritis, Back pain, Chickenpox, High cholesterol, Hypertension, Obesity, Pain, Pneumonia, Polycythemia, secondary, Sleep apnea, Snoring, and Type II or unspecified type diabetes mellitus without mention of complication, not stated as uncontrolled.  MEDS:   Current Outpatient Medications:     predniSONE (DELTASONE) 20 MG Tab, Take 2 Tablets by mouth every day for 5 days., Disp: 10 Tablet, Rfl: 0    gabapentin (NEURONTIN) 600 MG tablet, , Disp: , Rfl:     glipiZIDE SR (GLUCOTROL) 2.5 MG TABLET SR 24 HR, , Disp: , Rfl:     lovastatin (MEVACOR) 20 MG Tab, Take 1 Tablet by mouth every day., Disp: , Rfl:     terazosin (HYTRIN) 10 MG capsule, , Disp: , Rfl:     triamterene/hctz (MAXZIDE-25/DYAZIDE) 37.5-25 MG Cap, Take 1 Capsule by mouth every day., Disp: , Rfl:     triamterene/hctz (MAXZIDE-25/DYAZIDE) 37.5-25 MG Cap, , Disp: , Rfl:     finasteride (PROSCAR) 5 MG Tab, Take 1 Tablet by mouth every day., Disp: , Rfl:     methenamine hip (HIPPREX) 1 GM Tab, Take 1 Tablet by mouth 2 times a day., Disp: , Rfl:     Apoaequorin (PREVAGEN PO), Take  by mouth., Disp: , Rfl:     PARoxetine (PAXIL) 20 MG Tab, Take 1 Tablet by mouth., Disp: , Rfl:     furosemide (LASIX) 20 MG Tab, Take 1 Tablet by mouth 2 times a  day., Disp: , Rfl:     potassium chloride (MICRO-K) 10 MEQ capsule, Take 1 Capsule by mouth 2 times a day., Disp: , Rfl:     polyethylene glycol/lytes (MIRALAX) 17 g Pack, Take 1 Packet by mouth every day., Disp: 30 Each, Rfl: 0    dicyclomine (BENTYL) 20 MG Tab, TAKE ONE TABLET BY MOUTH EVERY 6 HOURS FOR 5 DAYS, Disp: 20 Tab, Rfl: 0    promethazine (PHENERGAN) 25 MG Tab, Take 1 Tab by mouth every 6 hours as needed for Nausea/Vomiting., Disp: 30 Tab, Rfl: 0    gabapentin (NEURONTIN) 300 MG Cap, Take 1 Cap by mouth 4 times a day., Disp: 90 Cap, Rfl: 0    lovastatin (MEVACOR) 20 MG Tab, Take 1 Tab by mouth every evening., Disp: 90 Tab, Rfl: 1    fosinopril (MONOPRIL) 40 MG tablet, Take 1 Tab by mouth every day. For further refills please contact new cardiologist. Thank you, Disp: 30 Tab, Rfl: 0    amLODIPine (NORVASC) 10 MG Tab, Take 1 Tab by mouth every day. For further refills please contact new cardiologist. Thank you, Disp: 30 Tab, Rfl: 0    carvedilol (COREG) 25 MG Tab, TAKE ONE TABLET BY MOUTH TWICE A DAY WITH MEALS, Disp: 180 Tab, Rfl: 0    triamterene-hctz (MAXZIDE-25/DYAZIDE) 37.5-25 MG Tab, Take 1 Tablet by mouth every morning., Disp: , Rfl:     terazosin (HYTRIN) 5 MG Cap, TAKE TWO CAPSULES BY MOUTH EVERY NIGHT AT BEDTIME (GENERIC HYTRIN), Disp: 180 Cap, Rfl: 10    metformin (GLUCOPHAGE) 850 MG Tab, Take 1 Tablet by mouth 2 times a day., Disp: , Rfl:   ALLERGIES: No Known Allergies  SURGHX:   Past Surgical History:   Procedure Laterality Date    EGD W/CONTROL BLEEDING  8/22/2020         GASTROSCOPY N/A 8/22/2020    Procedure: GASTROSCOPY;  Surgeon: Tawanda Gomez M.D.;  Location: SURGERY [facility];  Service: Gastroenterology    PB IMPLANT NEUROSTIM/  7/23/2019    Procedure: INSERTION, NEUROSTIMULATOR, PERMANENT, SPINAL CORD;  Surgeon: Teddy Santos M.D.;  Location: SURGERY [facility];  Service: Pain Management    LUMBAR LAMINECTOMY DISKECTOMY  2/18/2018    Procedure: LUMBAR  LAMINECTOMY DISKECTOMY- LT L1-2 HEMILAMI-;  Surgeon: Tom Pittman M.D.;  Location: SURGERY [facility];  Service: Neurosurgery    HARDWARE REMOVAL NEURO  2/18/2018    Procedure: HARDWARE REMOVAL NEURO- L2-3 PEDICLE SCREWS;  Surgeon: Tom Pittman M.D.;  Location: SURGERY [facility];  Service: Neurosurgery    INGUINAL HERNIA REPAIR Right 2/16/2016    Procedure: INGUINAL HERNIA REPAIR;  Surgeon: Sj Baird M.D.;  Location: SURGERY [facility];  Service:     FUSION, SPINE, LUMBAR, PLIF  1/21/2015    Performed by Ko Suarez M.D. at SURGERY [facility]    CARPAL TUNNEL RELEASE Right 2014    SHOULDER ARTHROSCOPY Right 2014    CERVICAL DISK AND FUSION ANTERIOR  2005    LUMBAR DECOMPRESSION  2004    LUMBAR DECOMPRESSION  2003     SOCHX:  reports that he has never smoked. He quit smokeless tobacco use about 4 years ago.  His smokeless tobacco use included chew. He reports current alcohol use. He reports that he does not use drugs.  FH: Family history was reviewed, no pertinent findings to report      Review of Systems   Constitutional:  Negative for chills, fever and malaise/fatigue.   Musculoskeletal:  Positive for joint pain and myalgias. Negative for falls.   Skin:  Negative for itching and rash.   Neurological:  Negative for tingling, sensory change and weakness.   Endo/Heme/Allergies:  Does not bruise/bleed easily.   All other systems reviewed and are negative.           Objective     /78   Pulse (!) 54   Temp 36.8 °C (98.2 °F) (Temporal)   Resp 15   Ht 1.829 m (6')   Wt (!) 126 kg (278 lb)   SpO2 95%   BMI 37.70 kg/m²      Physical Exam  Vitals reviewed.   Constitutional:       General: He is awake. He is not in acute distress.     Appearance: Normal appearance. He is well-developed. He is not ill-appearing, toxic-appearing or diaphoretic.   Cardiovascular:      Rate and Rhythm: Normal rate.   Pulmonary:      Effort: Pulmonary effort is normal.   Musculoskeletal:      Left  knee: No swelling, deformity, effusion, erythema, ecchymosis, lacerations, bony tenderness or crepitus. Normal range of motion. Tenderness present over the lateral joint line. No LCL laxity, MCL laxity, ACL laxity or PCL laxity.Normal alignment, normal meniscus and normal patellar mobility. Normal pulse.        Legs:    Skin:     General: Skin is warm and dry.      Findings: No abrasion, bruising, ecchymosis, erythema, signs of injury, laceration, rash or wound.      Comments: Mild warmth to left knee lateral aspect without swelling or erythema.  Full range of motion with use.  Slow gait with cane use.   Neurological:      Mental Status: He is alert and oriented to person, place, and time.      Sensory: Sensation is intact.      Motor: Motor function is intact.      Coordination: Coordination is intact.      Gait: Gait is intact.   Psychiatric:         Attention and Perception: Attention normal.         Mood and Affect: Mood normal.         Speech: Speech normal.         Behavior: Behavior normal. Behavior is cooperative.                           Assessment & Plan        1. Acute pain of left knee    - predniSONE (DELTASONE) 20 MG Tab; Take 2 Tablets by mouth every day for 5 days.  Dispense: 10 Tablet; Refill: 0    Pain probable from excessive use with snow removal last week  -May use over the counter Tylenol as needed for pain/swelling, avoid other ibuprofen products with steroid use at this time  -May use cool compresses for any swelling as needed  -May utilize RICE method as needed, may try over-the-counter Ace wrap as needed for knee stability and support  -Avoid excessive weight bearing to avoid further pain  -May apply topical analgesics as needed   -Perform proper body mechanics with lifting, twisting, bending and walking  -Monitor for deformity, numbness/tingling in toes, decreased range of motion with weight bearing- need re-evaluation

## 2023-01-16 ENCOUNTER — OFFICE VISIT (OUTPATIENT)
Dept: URGENT CARE | Facility: PHYSICIAN GROUP | Age: 73
End: 2023-01-16
Payer: COMMERCIAL

## 2023-01-16 VITALS
TEMPERATURE: 99.1 F | RESPIRATION RATE: 18 BRPM | BODY MASS INDEX: 37.65 KG/M2 | HEART RATE: 57 BPM | OXYGEN SATURATION: 97 % | SYSTOLIC BLOOD PRESSURE: 122 MMHG | WEIGHT: 278 LBS | DIASTOLIC BLOOD PRESSURE: 70 MMHG | HEIGHT: 72 IN

## 2023-01-16 DIAGNOSIS — M10.9 ACUTE GOUT OF LEFT KNEE, UNSPECIFIED CAUSE: ICD-10-CM

## 2023-01-16 PROCEDURE — 99213 OFFICE O/P EST LOW 20 MIN: CPT | Performed by: STUDENT IN AN ORGANIZED HEALTH CARE EDUCATION/TRAINING PROGRAM

## 2023-01-16 RX ORDER — PREDNISONE 20 MG/1
30 TABLET ORAL DAILY
Qty: 8 TABLET | Refills: 0 | Status: SHIPPED | OUTPATIENT
Start: 2023-01-16 | End: 2023-01-21

## 2023-01-16 NOTE — PROGRESS NOTES
Subjective:   Ramu Barber is a 72 y.o. male who presents for Knee Pain (Left knee pain , started this morning )      HPI:  Pleasant 72-year-old male with history of diabetes and gout presents urgent care with left knee pain that started last night.  Denies any trauma or injury.  He states that it is like a sharp pain in his knee joint.  He states that he did have gout few weeks ago which was quickly treated with prednisone.  He states that after taking a single dose of the medication that his knee pain fully resolved.  He does not take any maintenance medication such as allopurinol for gout at this time.  He states that this feels exactly like that past episode.  Denies fever, chills, reduced range of motion, weakness, nausea, vomiting, dizziness, headache, chest pain, palpitations, reddening of the knee, swelling, sensory change.      Medications:    amLODIPine Tabs  carvedilol Tabs  dicyclomine Tabs  finasteride Tabs  fosinopril  furosemide Tabs  gabapentin  gabapentin Caps  glipiZIDE SR Tb24  lovastatin Tabs  metFORMIN Tabs  methenamine hip Tabs  PARoxetine Tabs  polyethylene glycol/lytes Pack  potassium chloride  predniSONE Tabs  PREVAGEN PO  promethazine Tabs  terazosin  terazosin Caps  triamterene-hctz Tabs  triamterene/hctz Caps    Allergies: Patient has no known allergies.    Problem List: Ramu Barber does not have any pertinent problems on file.    Surgical History:  Past Surgical History:   Procedure Laterality Date    EGD W/CONTROL BLEEDING  8/22/2020         GASTROSCOPY N/A 8/22/2020    Procedure: GASTROSCOPY;  Surgeon: Tawanda Gomez M.D.;  Location: SURGERY [facility];  Service: Gastroenterology    PB IMPLANT NEUROSTIM/  7/23/2019    Procedure: INSERTION, NEUROSTIMULATOR, PERMANENT, SPINAL CORD;  Surgeon: Teddy Santos M.D.;  Location: SURGERY [facility];  Service: Pain Management    LUMBAR LAMINECTOMY DISKECTOMY  2/18/2018    Procedure: LUMBAR LAMINECTOMY  DISKECTOMY- LT L1-2 HEMILAMI-;  Surgeon: Tom Pittman M.D.;  Location: SURGERY [facility];  Service: Neurosurgery    HARDWARE REMOVAL NEURO  2/18/2018    Procedure: HARDWARE REMOVAL NEURO- L2-3 PEDICLE SCREWS;  Surgeon: Tom Pittman M.D.;  Location: SURGERY [facility];  Service: Neurosurgery    INGUINAL HERNIA REPAIR Right 2/16/2016    Procedure: INGUINAL HERNIA REPAIR;  Surgeon: Sj Baird M.D.;  Location: SURGERY [facility];  Service:     FUSION, SPINE, LUMBAR, PLIF  1/21/2015    Performed by Ko Suarez M.D. at SURGERY [facility]    CARPAL TUNNEL RELEASE Right 2014    SHOULDER ARTHROSCOPY Right 2014    CERVICAL DISK AND FUSION ANTERIOR  2005    LUMBAR DECOMPRESSION  2004    LUMBAR DECOMPRESSION  2003       Past Social Hx: Ramu Barber  reports that he has never smoked. He quit smokeless tobacco use about 4 years ago.  His smokeless tobacco use included chew. He reports current alcohol use. He reports that he does not use drugs.     Past Family Hx:  Ramu Barber family history includes Heart Attack in his brother and father; Sleep Apnea in his mother.     Problem list, medications, and allergies reviewed by myself today in Epic.     Objective:     /70 (BP Location: Right arm, Patient Position: Sitting, BP Cuff Size: Adult)   Pulse (!) 57   Temp 37.3 °C (99.1 °F) (Temporal)   Resp 18   Ht 1.829 m (6')   Wt (!) 126 kg (278 lb)   SpO2 97%   BMI 37.70 kg/m²     Physical Exam  Vitals reviewed.   Constitutional:       General: He is not in acute distress.     Appearance: Normal appearance.   HENT:      Left Ear: External ear normal.      Nose: Nose normal.      Mouth/Throat:      Mouth: Mucous membranes are moist.   Eyes:      Pupils: Pupils are equal, round, and reactive to light.   Cardiovascular:      Rate and Rhythm: Normal rate and regular rhythm.      Pulses: Normal pulses.      Heart sounds: Normal heart sounds. No murmur heard.  Pulmonary:      Effort:  Pulmonary effort is normal. No respiratory distress.      Breath sounds: Normal breath sounds. No stridor. No wheezing, rhonchi or rales.   Musculoskeletal:      Cervical back: Normal range of motion.      Comments: Left knee: No erythema, ecchymosis, induration, fluctuance, swelling.  No Baker's cyst.  TTP present to the lateral joint line.  5-5 strength during knee flexion and knee extension.  Full active and passive range of motion.  Sensation intact.  No lower leg swelling or pain.  No signs of trauma.  2+ popliteal pulse and sensation intact.  Cap refill less than 2 seconds.   Skin:     General: Skin is warm and dry.      Capillary Refill: Capillary refill takes less than 2 seconds.      Findings: No erythema, lesion or rash.   Neurological:      General: No focal deficit present.      Mental Status: He is alert and oriented to person, place, and time.       Assessment/Plan:     Diagnosis and associated orders:     1. Acute gout of left knee, unspecified cause  predniSONE (DELTASONE) 20 MG Tab         Comments/MDM:     Patient's presentation physical exam findings are consistent with acute gout flare of the left knee.  He does have a history of gout and this seems to be consistent with today's diagnosis.  We will treat with 30 mg prednisone.  Patient was instructed on use this medication the possible side effects including elevation of his blood sugars.  He was advised to monitor his blood sugar while on this medication.  Discussed following up with his PCP for further evaluation and possible maintenance medication if appropriate.  Patient is agreeable this.  No signs of trauma at this time.  I do not suspect fracture.  He is neurovascularly intact.  ED/return precautions were given.  No signs of infection.  Physical exam not consistent with cellulitis or septic joint.  Vitals all within normal limits.         Differential diagnosis, natural history, supportive care, and indications for immediate follow-up  discussed.    Advised the patient to follow-up with the primary care physician for recheck, reevaluation, and consideration of further management.    Please note that this dictation was created using voice recognition software. I have made a reasonable attempt to correct obvious errors, but I expect that there are errors of grammar and possibly content that I did not discover before finalizing the note.    Electronically signed by Jose Cantor PA-C.

## 2023-01-27 ENCOUNTER — HOSPITAL ENCOUNTER (OUTPATIENT)
Dept: RADIOLOGY | Facility: MEDICAL CENTER | Age: 73
End: 2023-01-27
Attending: FAMILY MEDICINE
Payer: COMMERCIAL

## 2023-01-27 DIAGNOSIS — M15.9 GENERALIZED OSTEOARTHROSIS, INVOLVING MULTIPLE SITES: ICD-10-CM

## 2023-01-27 PROCEDURE — 73521 X-RAY EXAM HIPS BI 2 VIEWS: CPT

## 2023-01-27 PROCEDURE — 73562 X-RAY EXAM OF KNEE 3: CPT | Mod: LT

## 2023-01-27 PROCEDURE — 73562 X-RAY EXAM OF KNEE 3: CPT | Mod: RT

## 2023-01-29 ENCOUNTER — HOSPITAL ENCOUNTER (EMERGENCY)
Facility: MEDICAL CENTER | Age: 73
End: 2023-01-29
Attending: EMERGENCY MEDICINE
Payer: COMMERCIAL

## 2023-01-29 VITALS
DIASTOLIC BLOOD PRESSURE: 81 MMHG | SYSTOLIC BLOOD PRESSURE: 174 MMHG | HEIGHT: 72 IN | OXYGEN SATURATION: 92 % | HEART RATE: 66 BPM | RESPIRATION RATE: 15 BRPM | TEMPERATURE: 98.7 F | WEIGHT: 271 LBS | BODY MASS INDEX: 36.7 KG/M2

## 2023-01-29 DIAGNOSIS — G89.29 CHRONIC KNEE PAIN, UNSPECIFIED LATERALITY: ICD-10-CM

## 2023-01-29 DIAGNOSIS — G89.29 CHRONIC PAIN OF BOTH KNEES: ICD-10-CM

## 2023-01-29 DIAGNOSIS — M25.569 CHRONIC KNEE PAIN, UNSPECIFIED LATERALITY: ICD-10-CM

## 2023-01-29 DIAGNOSIS — M25.561 CHRONIC PAIN OF BOTH KNEES: ICD-10-CM

## 2023-01-29 DIAGNOSIS — M25.562 CHRONIC PAIN OF BOTH KNEES: ICD-10-CM

## 2023-01-29 PROCEDURE — 99284 EMERGENCY DEPT VISIT MOD MDM: CPT

## 2023-01-29 NOTE — ED TRIAGE NOTES
Pt via w/c to triage with   Chief Complaint   Patient presents with    Knee Pain     Left knee worse than right, denies trauma.  Left knee pain to lateral side.  Pt being treated for gout, got better than stopped medications and pain got worse and Seen by PCP and says something else is going on, xray completed.  Pt also reports sciatic pain on left side too.         Pt Informed regarding triage process and verbalized understanding to inform triage tech or RN for any changes in condition. Placed in lobby.

## 2023-01-29 NOTE — ED PROVIDER NOTES
ED Provider Note    CHIEF COMPLAINT  Chief Complaint   Patient presents with    Knee Pain     Left knee worse than right, denies trauma.  Left knee pain to lateral side.  Pt being treated for gout, got better than stopped medications and pain got worse and Seen by PCP and says something else is going on, xray completed.  Pt also reports sciatic pain on left side too.         EXTERNAL RECORDS REVIEWED  Inpatient Notes      HPI/ROS  LIMITATION TO HISTORY   Select: : None  OUTSIDE HISTORIAN(S):  Significant other wife    Ramu Barber is a 72 y.o. male who presents here for evaluation of bilateral knee pain, and left sciatic pain.  The patient states that he recently finished a prescription for gout for the right knee, but over the last couple of days has developed left sided low back pain that radiates down his left buttock into the left knee.  He states he has history of this in the past, and it feels just like his sciatic nerve issue.  He has not taken anything prior to arrival for pain or discomfort, he has no incontinence to bowel bladder, no urinary retention, and no saddle anesthesia.  Patient denies any fever or chills.  He has no dysuria, urgency or frequency.  He has no chest pain, shortness breath, or abdominal pain.  Patient states he has been having trouble both of his knees, and has been having pain with walking.    PAST MEDICAL HISTORY   has a past medical history of Arthritis, Back pain, Chickenpox, High cholesterol, Hypertension, Obesity, Pain, Pneumonia, Polycythemia, secondary, Sleep apnea, Snoring, and Type II or unspecified type diabetes mellitus without mention of complication, not stated as uncontrolled.    SURGICAL HISTORY   has a past surgical history that includes fusion, spine, lumbar, plif (1/21/2015); inguinal hernia repair (Right, 2/16/2016); lumbar laminectomy diskectomy (2/18/2018); hardware removal neuro (2/18/2018); carpal tunnel release (Right, 2014); shoulder arthroscopy (Right,  2014); lumbar decompression (2004); lumbar decompression (2003); cervical disk and fusion anterior (2005); implant neurostim/ (7/23/2019); egd w/control bleeding (8/22/2020); and gastroscopy (N/A, 8/22/2020).    FAMILY HISTORY  Family History   Problem Relation Age of Onset    Sleep Apnea Mother     Heart Attack Father     Heart Attack Brother        SOCIAL HISTORY  Social History     Tobacco Use    Smoking status: Never    Smokeless tobacco: Former     Types: Chew     Quit date: 12/2018   Vaping Use    Vaping Use: Never used   Substance and Sexual Activity    Alcohol use: Yes     Comment: twice a month    Drug use: No    Sexual activity: Not on file       CURRENT MEDICATIONS  Home Medications       Reviewed by Maura Yip R.N. (Registered Nurse) on 01/29/23 at 1455  Med List Status: Partial     Medication Last Dose Status   amLODIPine (NORVASC) 10 MG Tab  Active   Apoaequorin (PREVAGEN PO)  Active   carvedilol (COREG) 25 MG Tab  Active   dicyclomine (BENTYL) 20 MG Tab  Active   finasteride (PROSCAR) 5 MG Tab  Active   fosinopril (MONOPRIL) 40 MG tablet  Active   furosemide (LASIX) 20 MG Tab  Active   gabapentin (NEURONTIN) 300 MG Cap  Active   gabapentin (NEURONTIN) 600 MG tablet  Active   glipiZIDE SR (GLUCOTROL) 2.5 MG TABLET SR 24 HR  Active   lovastatin (MEVACOR) 20 MG Tab  Active   lovastatin (MEVACOR) 20 MG Tab  Active   metformin (GLUCOPHAGE) 850 MG Tab  Active   methenamine hip (HIPPREX) 1 GM Tab  Active   PARoxetine (PAXIL) 20 MG Tab  Active   polyethylene glycol/lytes (MIRALAX) 17 g Pack  Active   potassium chloride (MICRO-K) 10 MEQ capsule  Active   promethazine (PHENERGAN) 25 MG Tab  Active   terazosin (HYTRIN) 10 MG capsule  Active   terazosin (HYTRIN) 5 MG Cap  Active   triamterene-hctz (MAXZIDE-25/DYAZIDE) 37.5-25 MG Tab  Active   triamterene/hctz (MAXZIDE-25/DYAZIDE) 37.5-25 MG Cap  Active   triamterene/hctz (MAXZIDE-25/DYAZIDE) 37.5-25 MG Cap  Active                    ALLERGIES  No  "Known Allergies    PHYSICAL EXAM  VITAL SIGNS: BP (!) 146/84   Pulse 68   Temp 37.7 °C (99.8 °F) (Temporal)   Resp 14   Ht 1.829 m (6')   Wt 123 kg (271 lb)   SpO2 92%   BMI 36.75 kg/m²    Constitutional: Well developed, well nourished. No acute distress.  HEENT: Normocephalic, atraumatic. Posterior pharynx clear and moist.  Eyes:  EOMI. Normal sclera.  Neck: Supple, Full range of motion, nontender.  Chest/Pulmonary: clear to ausculation. Symmetrical expansion.   Cardio: Regular rate and rhythm with no murmur.   Abdomen: Soft, nontender. No peritoneal signs. No guarding. No palpable masses.  Back: No CVA tenderness, nontender midline, no step offs. Left lower back tenderness with radiation to the left buttock  Musculoskeletal: No deformity, no edema, neurovascular intact. Bilateral knees without erythema or edema. bilateral knees with limited range of motion, steady unassisted gait with cane at baseline, left lateral knee with tenderness to palpation.  no erythema, no edema.  No calf tenderness.   Neuro: Clear speech, appropriate, cooperative, cranial nerves II-XII grossly intact.  Psych: Normal mood and affect      DIAGNOSTIC STUDIES / PROCEDURES    RADIOLOGY  I have independently interpreted the diagnostic imaging associated with this visit and am waiting the final reading from the radiologist.   My preliminary interpretation is a follows: bilateral knee and pelvic x rays.  Radiologist interpretation: tricompartmental arthritis bilaterally       COURSE & MEDICAL DECISION MAKING    INITIAL ASSESSMENT, COURSE AND PLAN  Care Narrative: This is a 72-year-old male here for evaluation of worsening bilateral knee pain.  Patient was seen and evaluated by his primary care doctor for gout, finished the medication prescribed, but states that the \"gout\" came back.  He states that he has also had some left-sided sciatic pain, that has been worsening for him as well.  He had x rays done showing tricompartmental arthritis " bilaterally, with small joint effusions.  There is no erythema, no edema on exam, and he is ambulating with his cane at baseline. I spoke to his son, who is a resident.  We discussed the concern for septic joint or arthritis. However the pt has had this ongoing, he is able to walk, and he has pain as a result of his tricompartmental arthritis.  He follow up at the [facility] for the same.  He has already established care there.         DISPOSITION AND DISCUSSIONS  I have discussed management of the patient with the following physicians and JORDYN's:  pt son, who is a physician.    Discussion of management with other QHP or appropriate source(s): None     Escalation of care considered, and ultimately not performed:    Barriers to care at this time, including but not limited to: Patient does not have established PCP.     Decision tools and prescription drugs considered including, but not limited to: Pain Medications tylenol .    FINAL DIAGNOSIS  Bilateral knee arthritis        Electronically signed by: Guille Rogers D.O., 1/29/2023 3:39 PM

## 2023-01-30 ENCOUNTER — HOSPITAL ENCOUNTER (OUTPATIENT)
Facility: MEDICAL CENTER | Age: 73
End: 2023-01-30
Attending: PHYSICIAN ASSISTANT
Payer: COMMERCIAL

## 2023-01-30 LAB
APPEARANCE FLD: NORMAL
BODY FLD TYPE: NORMAL
COLOR FLD: YELLOW
CRYSTALS FLD MICRO: NORMAL
GRAM STN SPEC: NORMAL
LYMPHOCYTES NFR FLD: 2 %
MONONUC CELLS NFR FLD: 1 %
NEUTROPHILS NFR FLD: 97 %
RBC # FLD: <2000 CELLS/UL
SIGNIFICANT IND 70042: NORMAL
SITE SITE: NORMAL
SOURCE SOURCE: NORMAL
WBC # FLD: NORMAL CELLS/UL

## 2023-01-30 PROCEDURE — 87205 SMEAR GRAM STAIN: CPT

## 2023-01-30 PROCEDURE — 87075 CULTR BACTERIA EXCEPT BLOOD: CPT

## 2023-01-30 PROCEDURE — 89051 BODY FLUID CELL COUNT: CPT

## 2023-01-30 PROCEDURE — 89060 EXAM SYNOVIAL FLUID CRYSTALS: CPT

## 2023-01-30 PROCEDURE — 87070 CULTURE OTHR SPECIMN AEROBIC: CPT

## 2023-01-30 NOTE — ED NOTES
Pt to Y58 after ambulating to and from restroom with steady gait with cane assist.  Pt is A&Ox4 and awake in bed. Pt placed on pulse ox and automatic BP. VSS, saturating above 90% on RA, HR in the 60s. Call light within reach.

## 2023-01-30 NOTE — DISCHARGE INSTRUCTIONS
Please return immediately for any fever, vomiting, or worsening issues.    Please go to the [facility] tomorrow, and be seen either in the urgent care, or with the PA/physician you have established care with.

## 2023-02-02 LAB
BACTERIA FLD AEROBE CULT: NORMAL
GRAM STN SPEC: NORMAL
SIGNIFICANT IND 70042: NORMAL
SITE SITE: NORMAL
SOURCE SOURCE: NORMAL

## 2023-02-04 LAB
BACTERIA SPEC ANAEROBE CULT: NORMAL
SIGNIFICANT IND 70042: NORMAL
SITE SITE: NORMAL
SOURCE SOURCE: NORMAL

## 2023-03-26 ENCOUNTER — OFFICE VISIT (OUTPATIENT)
Dept: URGENT CARE | Facility: PHYSICIAN GROUP | Age: 73
End: 2023-03-26
Payer: COMMERCIAL

## 2023-03-26 VITALS
TEMPERATURE: 97.5 F | HEART RATE: 64 BPM | HEIGHT: 72 IN | WEIGHT: 267 LBS | OXYGEN SATURATION: 98 % | RESPIRATION RATE: 18 BRPM | BODY MASS INDEX: 36.16 KG/M2

## 2023-03-26 DIAGNOSIS — M10.9 ACUTE GOUT OF LEFT KNEE, UNSPECIFIED CAUSE: ICD-10-CM

## 2023-03-26 DIAGNOSIS — M17.0 OSTEOARTHRITIS OF BOTH KNEES, UNSPECIFIED OSTEOARTHRITIS TYPE: ICD-10-CM

## 2023-03-26 DIAGNOSIS — M1A.0620 CHRONIC GOUT OF LEFT KNEE, UNSPECIFIED CAUSE: ICD-10-CM

## 2023-03-26 PROCEDURE — 99214 OFFICE O/P EST MOD 30 MIN: CPT | Performed by: NURSE PRACTITIONER

## 2023-03-26 RX ORDER — SEMAGLUTIDE 1.34 MG/ML
0.25 INJECTION, SOLUTION SUBCUTANEOUS
COMMUNITY
Start: 2023-03-15

## 2023-03-26 RX ORDER — ALLOPURINOL 100 MG/1
100 TABLET ORAL DAILY
Qty: 30 TABLET | Refills: 0 | Status: ON HOLD | OUTPATIENT
Start: 2023-03-26 | End: 2023-04-16

## 2023-03-26 RX ORDER — AMLODIPINE BESYLATE 5 MG/1
1 TABLET ORAL DAILY
COMMUNITY
Start: 2022-12-05 | End: 2023-12-21

## 2023-03-26 RX ORDER — LOVASTATIN 20 MG/1
20 TABLET ORAL DAILY
COMMUNITY
Start: 2023-03-13

## 2023-03-26 RX ORDER — HYDROCODONE BITARTRATE AND ACETAMINOPHEN 5; 325 MG/1; MG/1
1 TABLET ORAL 2 TIMES DAILY
Status: ON HOLD | COMMUNITY
Start: 2023-01-24 | End: 2023-04-16

## 2023-03-26 RX ORDER — PREDNISONE 20 MG/1
40 TABLET ORAL DAILY
Qty: 10 TABLET | Refills: 0 | Status: SHIPPED | OUTPATIENT
Start: 2023-03-26 | End: 2023-03-31

## 2023-03-26 ASSESSMENT — ENCOUNTER SYMPTOMS
CONSTITUTIONAL NEGATIVE: 1
NEUROLOGICAL NEGATIVE: 1
FEVER: 0

## 2023-03-26 ASSESSMENT — VISUAL ACUITY: OU: 1

## 2023-03-26 NOTE — PROGRESS NOTES
Subjective:     Ramu Barber is a 72 y.o. male who presents for Gout (Left knee,painful,x1 day)       Knee Pain  This is a chronic problem. The problem has been gradually worsening. Pertinent negatives include no fever.     Wife present who also provides history.    Chronic recurrent problem. Now reporting left medial knee pain which started last night. Feels similar to previous gout flares. Denies acute injury. No fever.    Seen at the [facility] 3/22/2023 and received intraarticular betamethasone injection for left knee IT band syndrome.    Seen at [facility] January and diagnosed with left knee arthritis. Aspiration and intraarticular steroid injection was performed. Triamcinolone used. Culture negative. Moderate urate crystals found.    Went to the ER 1/29/2023. X-rays of both knees significant for bilateral arthritis and knee effusion.    Seen in  multiple times prior for similar symptoms.    Review of Systems   Constitutional: Negative.  Negative for fever.   Musculoskeletal:         L medial knee pain, tenderness, warmth, swelling   Neurological: Negative.    All other systems reviewed and are negative.    Refer to HPI for additional details.    During this visit, appropriate PPE was worn, hand hygiene was performed, and the patient and any visitors were masked.    PMH:  has a past medical history of Arthritis, Back pain, Chickenpox, High cholesterol, Hypertension, Obesity, Pain, Pneumonia, Polycythemia, secondary, Sleep apnea, Snoring, and Type II or unspecified type diabetes mellitus without mention of complication, not stated as uncontrolled.    MEDS:   Current Outpatient Medications:     amLODIPine (NORVASC) 5 MG Tab, Take 1 Tablet by mouth every day., Disp: , Rfl:     OZEMPIC, 0.25 OR 0.5 MG/DOSE, 2 MG/1.5ML Solution Pen-injector, INJECT 0.25MG SUBCUTANEOUSLY ONCE WEEKLY, Disp: , Rfl:     metformin (GLUCOPHAGE) 1000 MG tablet, , Disp: , Rfl:     lovastatin (MEVACOR) 20 MG Tab, Take 20 mg by mouth every day.,  Disp: , Rfl:     HYDROcodone-acetaminophen (NORCO) 5-325 MG Tab per tablet, Take 1 Tablet by mouth 2 times a day., Disp: , Rfl:     predniSONE (DELTASONE) 20 MG Tab, Take 2 Tablets by mouth every day for 5 days., Disp: 10 Tablet, Rfl: 0    allopurinol (ZYLOPRIM) 100 MG Tab, Take 1 Tablet by mouth every day., Disp: 30 Tablet, Rfl: 0    glipiZIDE SR (GLUCOTROL) 2.5 MG TABLET SR 24 HR, , Disp: , Rfl:     lovastatin (MEVACOR) 20 MG Tab, Take 1 Tablet by mouth every day., Disp: , Rfl:     terazosin (HYTRIN) 10 MG capsule, , Disp: , Rfl:     triamterene/hctz (MAXZIDE-25/DYAZIDE) 37.5-25 MG Cap, Take 1 Capsule by mouth every day., Disp: , Rfl:     triamterene/hctz (MAXZIDE-25/DYAZIDE) 37.5-25 MG Cap, , Disp: , Rfl:     finasteride (PROSCAR) 5 MG Tab, Take 1 Tablet by mouth every day., Disp: , Rfl:     methenamine hip (HIPPREX) 1 GM Tab, Take 1 Tablet by mouth 2 times a day., Disp: , Rfl:     Apoaequorin (PREVAGEN PO), Take  by mouth., Disp: , Rfl:     PARoxetine (PAXIL) 20 MG Tab, Take 1 Tablet by mouth., Disp: , Rfl:     furosemide (LASIX) 20 MG Tab, Take 1 Tablet by mouth 2 times a day., Disp: , Rfl:     potassium chloride (MICRO-K) 10 MEQ capsule, Take 1 Capsule by mouth 2 times a day., Disp: , Rfl:     polyethylene glycol/lytes (MIRALAX) 17 g Pack, Take 1 Packet by mouth every day., Disp: 30 Each, Rfl: 0    dicyclomine (BENTYL) 20 MG Tab, TAKE ONE TABLET BY MOUTH EVERY 6 HOURS FOR 5 DAYS, Disp: 20 Tab, Rfl: 0    promethazine (PHENERGAN) 25 MG Tab, Take 1 Tab by mouth every 6 hours as needed for Nausea/Vomiting., Disp: 30 Tab, Rfl: 0    gabapentin (NEURONTIN) 300 MG Cap, Take 1 Cap by mouth 4 times a day., Disp: 90 Cap, Rfl: 0    lovastatin (MEVACOR) 20 MG Tab, Take 1 Tab by mouth every evening., Disp: 90 Tab, Rfl: 1    fosinopril (MONOPRIL) 40 MG tablet, Take 1 Tab by mouth every day. For further refills please contact new cardiologist. Thank you, Disp: 30 Tab, Rfl: 0    carvedilol (COREG) 25 MG Tab, TAKE ONE TABLET BY  MOUTH TWICE A DAY WITH MEALS, Disp: 180 Tab, Rfl: 0    triamterene-hctz (MAXZIDE-25/DYAZIDE) 37.5-25 MG Tab, Take 1 Tablet by mouth every morning., Disp: , Rfl:     terazosin (HYTRIN) 5 MG Cap, TAKE TWO CAPSULES BY MOUTH EVERY NIGHT AT BEDTIME (GENERIC HYTRIN), Disp: 180 Cap, Rfl: 10    metformin (GLUCOPHAGE) 850 MG Tab, Take 1 Tablet by mouth 2 times a day., Disp: , Rfl:     gabapentin (NEURONTIN) 600 MG tablet, , Disp: , Rfl:     amLODIPine (NORVASC) 10 MG Tab, Take 1 Tab by mouth every day. For further refills please contact new cardiologist. Thank you (Patient not taking: Reported on 3/26/2023), Disp: 30 Tab, Rfl: 0    ALLERGIES: No Known Allergies  SURGHX:   Past Surgical History:   Procedure Laterality Date    EGD W/CONTROL BLEEDING  8/22/2020         GASTROSCOPY N/A 8/22/2020    Procedure: GASTROSCOPY;  Surgeon: Tawanda Gomez M.D.;  Location: [facility];  Service: Gastroenterology    PB IMPLANT NEUROSTIM/  7/23/2019    Procedure: INSERTION, NEUROSTIMULATOR, PERMANENT, SPINAL CORD;  Surgeon: Teddy Santos M.D.;  Location: [facility];  Service: Pain Management    LUMBAR LAMINECTOMY DISKECTOMY  2/18/2018    Procedure: LUMBAR LAMINECTOMY DISKECTOMY- LT L1-2 HEMILAMI-;  Surgeon: Tom Pittman M.D.;  Location: [facility];  Service: Neurosurgery    HARDWARE REMOVAL NEURO  2/18/2018    Procedure: HARDWARE REMOVAL NEURO- L2-3 PEDICLE SCREWS;  Surgeon: Tom Pittman M.D.;  Location: [facility];  Service: Neurosurgery    INGUINAL HERNIA REPAIR Right 2/16/2016    Procedure: INGUINAL HERNIA REPAIR;  Surgeon: Sj Baird M.D.;  Location: [facility];  Service:     FUSION, SPINE, LUMBAR, PLIF  1/21/2015    Performed by Ko Suarez M.D. at [facility]    CARPAL TUNNEL RELEASE Right 2014    SHOULDER ARTHROSCOPY Right 2014    CERVICAL DISK AND FUSION ANTERIOR  2005    LUMBAR DECOMPRESSION  2004    LUMBAR DECOMPRESSION  2003      SOCHX:  reports that he has never smoked. He quit smokeless tobacco use about 4 years ago.  His smokeless tobacco use included chew. He reports current alcohol use. He reports that he does not use drugs.    FH: Per HPI as applicable/pertinent.      Objective:     BP (!) 142/80 (BP Location: Right arm, Patient Position: Sitting, BP Cuff Size: Large adult)   Pulse 64   Temp 36.4 °C (97.5 °F) (Temporal)   Resp 18   Ht 1.829 m (6')   Wt 121 kg (267 lb)   SpO2 98%   BMI 36.21 kg/m²     Physical Exam  Nursing note reviewed.   Constitutional:       General: He is not in acute distress.     Appearance: He is well-developed. He is not ill-appearing or toxic-appearing.   Eyes:      General: Vision grossly intact.   Cardiovascular:      Rate and Rhythm: Normal rate.   Pulmonary:      Effort: Pulmonary effort is normal. No respiratory distress.   Musculoskeletal:         General: No deformity. Normal range of motion.      Left knee: Swelling (Mild) and erythema (Mild) present. No deformity, ecchymosis or lacerations. Normal range of motion. Tenderness (Medial) present. Normal alignment.   Skin:     General: Skin is warm and dry.      Coloration: Skin is not pale.   Neurological:      Mental Status: He is alert and oriented to person, place, and time.      Motor: No weakness.      Gait: Gait abnormal (Antalgic, uses cane).   Psychiatric:         Behavior: Behavior normal. Behavior is cooperative.       Assessment/Plan:     1. Acute gout of left knee, unspecified cause  - predniSONE (DELTASONE) 20 MG Tab; Take 2 Tablets by mouth every day for 5 days.  Dispense: 10 Tablet; Refill: 0    2. Chronic gout of left knee, unspecified cause  - allopurinol (ZYLOPRIM) 100 MG Tab; Take 1 Tablet by mouth every day.  Dispense: 30 Tablet; Refill: 0    3. Osteoarthritis of both knees, unspecified osteoarthritis type    Risks/benefits of treatment discussed. Patient received left knee steroid injection about 4 days ago. Advised that  recent injection can take several days to take effect. Colchicine considered which patient has not tried in the past. Mutually agreed on moderate dose of prednisone which patient reports has helped in the past; sent electronically. Start allopurinol; Rx sent. Rest, ice, elevate.    Advised to follow up with PCP. May need to continue allopurinol to reduce future flare ups.    Warning signs reviewed. Return precautions discussed.     Differential diagnosis, natural history, supportive care, over-the-counter symptom management per 's instructions, close monitoring, and indications for immediate follow-up discussed.     All questions answered. Patient/wife agrees with the plan of care.    Discharge summary provided via [employer].    Billing note: chronic illness with exacerbation, moderate complexity and moderate risk. Established patient. 16616. Please refer to LOS tool for details.

## 2023-04-15 ENCOUNTER — HOSPITAL ENCOUNTER (INPATIENT)
Facility: MEDICAL CENTER | Age: 73
LOS: 2 days | DRG: 175 | End: 2023-04-18
Attending: EMERGENCY MEDICINE | Admitting: STUDENT IN AN ORGANIZED HEALTH CARE EDUCATION/TRAINING PROGRAM
Payer: COMMERCIAL

## 2023-04-15 ENCOUNTER — APPOINTMENT (OUTPATIENT)
Dept: RADIOLOGY | Facility: MEDICAL CENTER | Age: 73
DRG: 175 | End: 2023-04-15
Attending: EMERGENCY MEDICINE
Payer: COMMERCIAL

## 2023-04-15 DIAGNOSIS — E11.9 TYPE 2 DIABETES MELLITUS WITHOUT COMPLICATION, WITHOUT LONG-TERM CURRENT USE OF INSULIN (HCC): ICD-10-CM

## 2023-04-15 DIAGNOSIS — I26.99 PULMONARY EMBOLISM, BILATERAL (HCC): ICD-10-CM

## 2023-04-15 DIAGNOSIS — E78.2 MIXED HYPERLIPIDEMIA: ICD-10-CM

## 2023-04-15 LAB
ALBUMIN SERPL BCP-MCNC: 3.3 G/DL (ref 3.2–4.9)
ALBUMIN/GLOB SERPL: 1.1 G/DL
ALP SERPL-CCNC: 76 U/L (ref 30–99)
ALT SERPL-CCNC: 13 U/L (ref 2–50)
ANION GAP SERPL CALC-SCNC: 12 MMOL/L (ref 7–16)
APPEARANCE UR: CLEAR
APTT PPP: 24.5 SEC (ref 24.7–36)
AST SERPL-CCNC: 17 U/L (ref 12–45)
BACTERIA #/AREA URNS HPF: NEGATIVE /HPF
BASOPHILS # BLD AUTO: 0.8 % (ref 0–1.8)
BASOPHILS # BLD: 0.07 K/UL (ref 0–0.12)
BILIRUB SERPL-MCNC: 0.4 MG/DL (ref 0.1–1.5)
BILIRUB UR QL STRIP.AUTO: NEGATIVE
BUN SERPL-MCNC: 27 MG/DL (ref 8–22)
CALCIUM ALBUM COR SERPL-MCNC: 8.9 MG/DL (ref 8.5–10.5)
CALCIUM SERPL-MCNC: 8.3 MG/DL (ref 8.5–10.5)
CHLORIDE SERPL-SCNC: 111 MMOL/L (ref 96–112)
CO2 SERPL-SCNC: 21 MMOL/L (ref 20–33)
COLOR UR: YELLOW
CREAT SERPL-MCNC: 1.2 MG/DL (ref 0.5–1.4)
D DIMER PPP IA.FEU-MCNC: 17.31 UG/ML (FEU) (ref 0–0.5)
EKG IMPRESSION: NORMAL
EOSINOPHIL # BLD AUTO: 0.24 K/UL (ref 0–0.51)
EOSINOPHIL NFR BLD: 2.7 % (ref 0–6.9)
EPI CELLS #/AREA URNS HPF: ABNORMAL /HPF
ERYTHROCYTE [DISTWIDTH] IN BLOOD BY AUTOMATED COUNT: 48.7 FL (ref 35.9–50)
FLUAV RNA SPEC QL NAA+PROBE: NEGATIVE
FLUBV RNA SPEC QL NAA+PROBE: NEGATIVE
GFR SERPLBLD CREATININE-BSD FMLA CKD-EPI: 64 ML/MIN/1.73 M 2
GLOBULIN SER CALC-MCNC: 3.1 G/DL (ref 1.9–3.5)
GLUCOSE SERPL-MCNC: 124 MG/DL (ref 65–99)
GLUCOSE UR STRIP.AUTO-MCNC: NEGATIVE MG/DL
HCT VFR BLD AUTO: 49.6 % (ref 42–52)
HGB BLD-MCNC: 16.6 G/DL (ref 14–18)
HYALINE CASTS #/AREA URNS LPF: ABNORMAL /LPF
IMM GRANULOCYTES # BLD AUTO: 0.04 K/UL (ref 0–0.11)
IMM GRANULOCYTES NFR BLD AUTO: 0.5 % (ref 0–0.9)
INR PPP: 1.14 (ref 0.87–1.13)
KETONES UR STRIP.AUTO-MCNC: NEGATIVE MG/DL
LACTATE SERPL-SCNC: 1.6 MMOL/L (ref 0.5–2)
LEUKOCYTE ESTERASE UR QL STRIP.AUTO: NEGATIVE
LYMPHOCYTES # BLD AUTO: 1.32 K/UL (ref 1–4.8)
LYMPHOCYTES NFR BLD: 15 % (ref 22–41)
MCH RBC QN AUTO: 31.6 PG (ref 27–33)
MCHC RBC AUTO-ENTMCNC: 33.5 G/DL (ref 33.7–35.3)
MCV RBC AUTO: 94.5 FL (ref 81.4–97.8)
MICRO URNS: ABNORMAL
MONOCYTES # BLD AUTO: 0.6 K/UL (ref 0–0.85)
MONOCYTES NFR BLD AUTO: 6.8 % (ref 0–13.4)
NEUTROPHILS # BLD AUTO: 6.51 K/UL (ref 1.82–7.42)
NEUTROPHILS NFR BLD: 74.2 % (ref 44–72)
NITRITE UR QL STRIP.AUTO: NEGATIVE
NRBC # BLD AUTO: 0 K/UL
NRBC BLD-RTO: 0 /100 WBC
NT-PROBNP SERPL IA-MCNC: 2142 PG/ML (ref 0–125)
PH UR STRIP.AUTO: 5 [PH] (ref 5–8)
PLATELET # BLD AUTO: 202 K/UL (ref 164–446)
PMV BLD AUTO: 10.1 FL (ref 9–12.9)
POTASSIUM SERPL-SCNC: 3.4 MMOL/L (ref 3.6–5.5)
PROCALCITONIN SERPL-MCNC: 0.06 NG/ML
PROT SERPL-MCNC: 6.4 G/DL (ref 6–8.2)
PROT UR QL STRIP: 100 MG/DL
PROTHROMBIN TIME: 14.5 SEC (ref 12–14.6)
RBC # BLD AUTO: 5.25 M/UL (ref 4.7–6.1)
RBC # URNS HPF: ABNORMAL /HPF
RBC UR QL AUTO: ABNORMAL
RENAL EPI CELLS #/AREA URNS HPF: ABNORMAL /HPF
RSV RNA SPEC QL NAA+PROBE: NEGATIVE
SARS-COV-2 RNA RESP QL NAA+PROBE: NOTDETECTED
SODIUM SERPL-SCNC: 144 MMOL/L (ref 135–145)
SP GR UR STRIP.AUTO: 1.02
SPECIMEN SOURCE: NORMAL
TROPONIN T SERPL-MCNC: 43 NG/L (ref 6–19)
UROBILINOGEN UR STRIP.AUTO-MCNC: 0.2 MG/DL
WBC # BLD AUTO: 8.8 K/UL (ref 4.8–10.8)
WBC #/AREA URNS HPF: ABNORMAL /HPF

## 2023-04-15 PROCEDURE — 83880 ASSAY OF NATRIURETIC PEPTIDE: CPT

## 2023-04-15 PROCEDURE — 93005 ELECTROCARDIOGRAM TRACING: CPT | Performed by: EMERGENCY MEDICINE

## 2023-04-15 PROCEDURE — 71045 X-RAY EXAM CHEST 1 VIEW: CPT

## 2023-04-15 PROCEDURE — 84484 ASSAY OF TROPONIN QUANT: CPT

## 2023-04-15 PROCEDURE — 700117 HCHG RX CONTRAST REV CODE 255: Performed by: EMERGENCY MEDICINE

## 2023-04-15 PROCEDURE — 0241U HCHG SARS-COV-2 COVID-19 NFCT DS RESP RNA 4 TRGT MIC: CPT

## 2023-04-15 PROCEDURE — 85025 COMPLETE CBC W/AUTO DIFF WBC: CPT

## 2023-04-15 PROCEDURE — C9803 HOPD COVID-19 SPEC COLLECT: HCPCS | Performed by: EMERGENCY MEDICINE

## 2023-04-15 PROCEDURE — 85610 PROTHROMBIN TIME: CPT

## 2023-04-15 PROCEDURE — 36415 COLL VENOUS BLD VENIPUNCTURE: CPT

## 2023-04-15 PROCEDURE — 87040 BLOOD CULTURE FOR BACTERIA: CPT

## 2023-04-15 PROCEDURE — 81001 URINALYSIS AUTO W/SCOPE: CPT

## 2023-04-15 PROCEDURE — 83605 ASSAY OF LACTIC ACID: CPT

## 2023-04-15 PROCEDURE — 85379 FIBRIN DEGRADATION QUANT: CPT

## 2023-04-15 PROCEDURE — 99285 EMERGENCY DEPT VISIT HI MDM: CPT

## 2023-04-15 PROCEDURE — 87086 URINE CULTURE/COLONY COUNT: CPT

## 2023-04-15 PROCEDURE — 84145 PROCALCITONIN (PCT): CPT

## 2023-04-15 PROCEDURE — 85730 THROMBOPLASTIN TIME PARTIAL: CPT

## 2023-04-15 PROCEDURE — 80053 COMPREHEN METABOLIC PANEL: CPT

## 2023-04-15 PROCEDURE — 71275 CT ANGIOGRAPHY CHEST: CPT

## 2023-04-15 ASSESSMENT — LIFESTYLE VARIABLES: DO YOU DRINK ALCOHOL: NO

## 2023-04-16 ENCOUNTER — APPOINTMENT (OUTPATIENT)
Dept: RADIOLOGY | Facility: MEDICAL CENTER | Age: 73
DRG: 175 | End: 2023-04-16
Attending: STUDENT IN AN ORGANIZED HEALTH CARE EDUCATION/TRAINING PROGRAM
Payer: COMMERCIAL

## 2023-04-16 ENCOUNTER — APPOINTMENT (OUTPATIENT)
Dept: CARDIOLOGY | Facility: MEDICAL CENTER | Age: 73
DRG: 175 | End: 2023-04-16
Attending: STUDENT IN AN ORGANIZED HEALTH CARE EDUCATION/TRAINING PROGRAM
Payer: COMMERCIAL

## 2023-04-16 PROBLEM — Z87.19 HISTORY OF DUODENAL ULCER: Status: ACTIVE | Noted: 2023-04-16

## 2023-04-16 PROBLEM — I26.99 PULMONARY EMBOLISM, BILATERAL (HCC): Status: ACTIVE | Noted: 2023-04-16

## 2023-04-16 PROBLEM — F32.A DEPRESSION: Status: ACTIVE | Noted: 2023-04-16

## 2023-04-16 PROBLEM — J96.01 ACUTE RESPIRATORY FAILURE WITH HYPOXIA (HCC): Status: ACTIVE | Noted: 2023-04-16

## 2023-04-16 LAB
APTT PPP: 24.8 SEC (ref 24.7–36)
EST. AVERAGE GLUCOSE BLD GHB EST-MCNC: 177 MG/DL
GLUCOSE BLD STRIP.AUTO-MCNC: 132 MG/DL (ref 65–99)
GLUCOSE BLD STRIP.AUTO-MCNC: 149 MG/DL (ref 65–99)
GLUCOSE BLD STRIP.AUTO-MCNC: 166 MG/DL (ref 65–99)
GLUCOSE BLD STRIP.AUTO-MCNC: 232 MG/DL (ref 65–99)
HBA1C MFR BLD: 7.8 % (ref 4–5.6)
INR PPP: 1.15 (ref 0.87–1.13)
LACTATE SERPL-SCNC: 0.9 MMOL/L (ref 0.5–2)
LV EJECT FRACT  99904: 55
PROTHROMBIN TIME: 14.6 SEC (ref 12–14.6)
TROPONIN T SERPL-MCNC: 41 NG/L (ref 6–19)
UFH PPP CHRO-ACNC: 0.45 IU/ML
UFH PPP CHRO-ACNC: 0.53 IU/ML
UFH PPP CHRO-ACNC: 0.76 IU/ML
UFH PPP CHRO-ACNC: <0.1 IU/ML

## 2023-04-16 PROCEDURE — 82962 GLUCOSE BLOOD TEST: CPT

## 2023-04-16 PROCEDURE — C9113 INJ PANTOPRAZOLE SODIUM, VIA: HCPCS | Performed by: STUDENT IN AN ORGANIZED HEALTH CARE EDUCATION/TRAINING PROGRAM

## 2023-04-16 PROCEDURE — 700111 HCHG RX REV CODE 636 W/ 250 OVERRIDE (IP): Performed by: EMERGENCY MEDICINE

## 2023-04-16 PROCEDURE — 99291 CRITICAL CARE FIRST HOUR: CPT | Performed by: STUDENT IN AN ORGANIZED HEALTH CARE EDUCATION/TRAINING PROGRAM

## 2023-04-16 PROCEDURE — 700111 HCHG RX REV CODE 636 W/ 250 OVERRIDE (IP): Performed by: STUDENT IN AN ORGANIZED HEALTH CARE EDUCATION/TRAINING PROGRAM

## 2023-04-16 PROCEDURE — 700102 HCHG RX REV CODE 250 W/ 637 OVERRIDE(OP): Performed by: STUDENT IN AN ORGANIZED HEALTH CARE EDUCATION/TRAINING PROGRAM

## 2023-04-16 PROCEDURE — 83605 ASSAY OF LACTIC ACID: CPT

## 2023-04-16 PROCEDURE — 93970 EXTREMITY STUDY: CPT

## 2023-04-16 PROCEDURE — 83036 HEMOGLOBIN GLYCOSYLATED A1C: CPT

## 2023-04-16 PROCEDURE — 96375 TX/PRO/DX INJ NEW DRUG ADDON: CPT

## 2023-04-16 PROCEDURE — A9270 NON-COVERED ITEM OR SERVICE: HCPCS | Performed by: STUDENT IN AN ORGANIZED HEALTH CARE EDUCATION/TRAINING PROGRAM

## 2023-04-16 PROCEDURE — 84484 ASSAY OF TROPONIN QUANT: CPT

## 2023-04-16 PROCEDURE — 93306 TTE W/DOPPLER COMPLETE: CPT

## 2023-04-16 PROCEDURE — 94760 N-INVAS EAR/PLS OXIMETRY 1: CPT

## 2023-04-16 PROCEDURE — 36415 COLL VENOUS BLD VENIPUNCTURE: CPT

## 2023-04-16 PROCEDURE — 85730 THROMBOPLASTIN TIME PARTIAL: CPT

## 2023-04-16 PROCEDURE — 96365 THER/PROPH/DIAG IV INF INIT: CPT

## 2023-04-16 PROCEDURE — 85520 HEPARIN ASSAY: CPT

## 2023-04-16 PROCEDURE — 700117 HCHG RX CONTRAST REV CODE 255: Performed by: STUDENT IN AN ORGANIZED HEALTH CARE EDUCATION/TRAINING PROGRAM

## 2023-04-16 PROCEDURE — 770020 HCHG ROOM/CARE - TELE (206)

## 2023-04-16 PROCEDURE — 85610 PROTHROMBIN TIME: CPT

## 2023-04-16 PROCEDURE — 93306 TTE W/DOPPLER COMPLETE: CPT | Mod: 26 | Performed by: INTERNAL MEDICINE

## 2023-04-16 RX ORDER — TRIAMTERENE AND HYDROCHLOROTHIAZIDE 37.5; 25 MG/1; MG/1
1 CAPSULE ORAL DAILY
Status: DISCONTINUED | OUTPATIENT
Start: 2023-04-16 | End: 2023-04-17

## 2023-04-16 RX ORDER — PANTOPRAZOLE SODIUM 40 MG/10ML
40 INJECTION, POWDER, LYOPHILIZED, FOR SOLUTION INTRAVENOUS 2 TIMES DAILY
Status: DISCONTINUED | OUTPATIENT
Start: 2023-04-16 | End: 2023-04-16

## 2023-04-16 RX ORDER — HYDRALAZINE HYDROCHLORIDE 20 MG/ML
10 INJECTION INTRAMUSCULAR; INTRAVENOUS EVERY 4 HOURS PRN
Status: DISCONTINUED | OUTPATIENT
Start: 2023-04-16 | End: 2023-04-18 | Stop reason: HOSPADM

## 2023-04-16 RX ORDER — LISINOPRIL 20 MG/1
40 TABLET ORAL DAILY
Status: DISCONTINUED | OUTPATIENT
Start: 2023-04-16 | End: 2023-04-18 | Stop reason: HOSPADM

## 2023-04-16 RX ORDER — HEPARIN SODIUM 5000 [USP'U]/100ML
0-30 INJECTION, SOLUTION INTRAVENOUS CONTINUOUS
Status: DISCONTINUED | OUTPATIENT
Start: 2023-04-16 | End: 2023-04-17

## 2023-04-16 RX ORDER — HEPARIN SODIUM 5000 [USP'U]/100ML
0-30 INJECTION, SOLUTION INTRAVENOUS CONTINUOUS
Status: DISCONTINUED | OUTPATIENT
Start: 2023-04-16 | End: 2023-04-16

## 2023-04-16 RX ORDER — FINASTERIDE 5 MG/1
5 TABLET, FILM COATED ORAL DAILY
Status: DISCONTINUED | OUTPATIENT
Start: 2023-04-16 | End: 2023-04-18 | Stop reason: HOSPADM

## 2023-04-16 RX ORDER — CARVEDILOL 12.5 MG/1
12.5 TABLET ORAL 2 TIMES DAILY WITH MEALS
Status: DISCONTINUED | OUTPATIENT
Start: 2023-04-16 | End: 2023-04-18

## 2023-04-16 RX ORDER — ACETAMINOPHEN 325 MG/1
650 TABLET ORAL EVERY 6 HOURS PRN
Status: DISCONTINUED | OUTPATIENT
Start: 2023-04-16 | End: 2023-04-18 | Stop reason: HOSPADM

## 2023-04-16 RX ORDER — LOVASTATIN 20 MG/1
20 TABLET ORAL NIGHTLY
Status: DISCONTINUED | OUTPATIENT
Start: 2023-04-16 | End: 2023-04-18 | Stop reason: HOSPADM

## 2023-04-16 RX ORDER — BISACODYL 10 MG
10 SUPPOSITORY, RECTAL RECTAL
Status: DISCONTINUED | OUTPATIENT
Start: 2023-04-16 | End: 2023-04-18 | Stop reason: HOSPADM

## 2023-04-16 RX ORDER — HEPARIN SODIUM 1000 [USP'U]/ML
40 INJECTION, SOLUTION INTRAVENOUS; SUBCUTANEOUS PRN
Status: DISCONTINUED | OUTPATIENT
Start: 2023-04-16 | End: 2023-04-16

## 2023-04-16 RX ORDER — AMOXICILLIN 250 MG
2 CAPSULE ORAL 2 TIMES DAILY
Status: DISCONTINUED | OUTPATIENT
Start: 2023-04-16 | End: 2023-04-18 | Stop reason: HOSPADM

## 2023-04-16 RX ORDER — GABAPENTIN 300 MG/1
600 CAPSULE ORAL 2 TIMES DAILY
Status: DISCONTINUED | OUTPATIENT
Start: 2023-04-16 | End: 2023-04-18 | Stop reason: HOSPADM

## 2023-04-16 RX ORDER — METHENAMINE HIPPURATE 1000 MG/1
1 TABLET ORAL 2 TIMES DAILY
Status: DISCONTINUED | OUTPATIENT
Start: 2023-04-16 | End: 2023-04-16

## 2023-04-16 RX ORDER — POLYETHYLENE GLYCOL 3350 17 G/17G
1 POWDER, FOR SOLUTION ORAL
Status: DISCONTINUED | OUTPATIENT
Start: 2023-04-16 | End: 2023-04-18 | Stop reason: HOSPADM

## 2023-04-16 RX ORDER — HEPARIN SODIUM 1000 [USP'U]/ML
80 INJECTION, SOLUTION INTRAVENOUS; SUBCUTANEOUS ONCE
Status: COMPLETED | OUTPATIENT
Start: 2023-04-16 | End: 2023-04-16

## 2023-04-16 RX ORDER — FUROSEMIDE 20 MG/1
20 TABLET ORAL 2 TIMES DAILY
Status: DISCONTINUED | OUTPATIENT
Start: 2023-04-16 | End: 2023-04-16

## 2023-04-16 RX ORDER — TERAZOSIN 5 MG/1
10 CAPSULE ORAL NIGHTLY
Status: DISCONTINUED | OUTPATIENT
Start: 2023-04-16 | End: 2023-04-18 | Stop reason: HOSPADM

## 2023-04-16 RX ORDER — POTASSIUM CHLORIDE 20 MEQ/1
40 TABLET, EXTENDED RELEASE ORAL ONCE
Status: COMPLETED | OUTPATIENT
Start: 2023-04-16 | End: 2023-04-16

## 2023-04-16 RX ORDER — HEPARIN SODIUM 1000 [USP'U]/ML
40 INJECTION, SOLUTION INTRAVENOUS; SUBCUTANEOUS PRN
Status: DISCONTINUED | OUTPATIENT
Start: 2023-04-16 | End: 2023-04-17

## 2023-04-16 RX ORDER — PAROXETINE HYDROCHLORIDE 20 MG/1
20 TABLET, FILM COATED ORAL DAILY
Status: DISCONTINUED | OUTPATIENT
Start: 2023-04-16 | End: 2023-04-18 | Stop reason: HOSPADM

## 2023-04-16 RX ORDER — AMLODIPINE BESYLATE 5 MG/1
5 TABLET ORAL DAILY
Status: DISCONTINUED | OUTPATIENT
Start: 2023-04-16 | End: 2023-04-18 | Stop reason: HOSPADM

## 2023-04-16 RX ADMIN — HEPARIN SODIUM 16 UNITS/KG/HR: 5000 INJECTION, SOLUTION INTRAVENOUS at 15:54

## 2023-04-16 RX ADMIN — IOHEXOL 60 ML: 350 INJECTION, SOLUTION INTRAVENOUS at 00:00

## 2023-04-16 RX ADMIN — FUROSEMIDE 20 MG: 20 TABLET ORAL at 09:09

## 2023-04-16 RX ADMIN — FINASTERIDE 5 MG: 5 TABLET, FILM COATED ORAL at 05:28

## 2023-04-16 RX ADMIN — CARVEDILOL 12.5 MG: 12.5 TABLET, FILM COATED ORAL at 17:15

## 2023-04-16 RX ADMIN — TRIAMTERENE AND HYDROCHLOROTHIAZIDE 1 CAPSULE: 37.5; 25 CAPSULE ORAL at 05:27

## 2023-04-16 RX ADMIN — PANTOPRAZOLE SODIUM 40 MG: 40 INJECTION, POWDER, LYOPHILIZED, FOR SOLUTION INTRAVENOUS at 05:28

## 2023-04-16 RX ADMIN — HUMAN ALBUMIN MICROSPHERES AND PERFLUTREN 3 ML: 10; .22 INJECTION, SOLUTION INTRAVENOUS at 15:00

## 2023-04-16 RX ADMIN — INSULIN HUMAN 1 UNITS: 100 INJECTION, SOLUTION PARENTERAL at 20:07

## 2023-04-16 RX ADMIN — LISINOPRIL 40 MG: 20 TABLET ORAL at 05:27

## 2023-04-16 RX ADMIN — HEPARIN SODIUM 18 UNITS/KG/HR: 5000 INJECTION, SOLUTION INTRAVENOUS at 00:40

## 2023-04-16 RX ADMIN — HEPARIN SODIUM 7500 UNITS: 1000 INJECTION, SOLUTION INTRAVENOUS; SUBCUTANEOUS at 00:41

## 2023-04-16 RX ADMIN — GABAPENTIN 600 MG: 300 CAPSULE ORAL at 05:27

## 2023-04-16 RX ADMIN — CARVEDILOL 12.5 MG: 12.5 TABLET, FILM COATED ORAL at 09:09

## 2023-04-16 RX ADMIN — TERAZOSIN HYDROCHLORIDE 10 MG: 5 CAPSULE ORAL at 20:07

## 2023-04-16 RX ADMIN — LOVASTATIN 20 MG: 20 TABLET ORAL at 09:09

## 2023-04-16 RX ADMIN — GABAPENTIN 600 MG: 300 CAPSULE ORAL at 17:15

## 2023-04-16 RX ADMIN — POTASSIUM CHLORIDE 40 MEQ: 1500 TABLET, EXTENDED RELEASE ORAL at 11:22

## 2023-04-16 RX ADMIN — AMLODIPINE BESYLATE 5 MG: 5 TABLET ORAL at 05:28

## 2023-04-16 RX ADMIN — INSULIN HUMAN 2 UNITS: 100 INJECTION, SOLUTION PARENTERAL at 12:56

## 2023-04-16 RX ADMIN — DOCUSATE SODIUM 50 MG AND SENNOSIDES 8.6 MG 2 TABLET: 8.6; 5 TABLET, FILM COATED ORAL at 05:27

## 2023-04-16 RX ADMIN — PAROXETINE HYDROCHLORIDE 20 MG: 20 TABLET, FILM COATED ORAL at 05:27

## 2023-04-16 ASSESSMENT — LIFESTYLE VARIABLES
EVER FELT BAD OR GUILTY ABOUT YOUR DRINKING: NO
TOTAL SCORE: 0
HAVE YOU EVER FELT YOU SHOULD CUT DOWN ON YOUR DRINKING: NO
SUBSTANCE_ABUSE: 0
HOW MANY TIMES IN THE PAST YEAR HAVE YOU HAD 5 OR MORE DRINKS IN A DAY: 0
HAVE PEOPLE ANNOYED YOU BY CRITICIZING YOUR DRINKING: NO
ON A TYPICAL DAY WHEN YOU DRINK ALCOHOL HOW MANY DRINKS DO YOU HAVE: 1
TOTAL SCORE: 0
ALCOHOL_USE: YES
EVER HAD A DRINK FIRST THING IN THE MORNING TO STEADY YOUR NERVES TO GET RID OF A HANGOVER: NO
CONSUMPTION TOTAL: NEGATIVE
TOTAL SCORE: 0
AVERAGE NUMBER OF DAYS PER WEEK YOU HAVE A DRINK CONTAINING ALCOHOL: 2
DOES PATIENT WANT TO STOP DRINKING: NO

## 2023-04-16 ASSESSMENT — COGNITIVE AND FUNCTIONAL STATUS - GENERAL
DAILY ACTIVITIY SCORE: 24
SUGGESTED CMS G CODE MODIFIER DAILY ACTIVITY: CH
MOBILITY SCORE: 24
SUGGESTED CMS G CODE MODIFIER MOBILITY: CH

## 2023-04-16 ASSESSMENT — ENCOUNTER SYMPTOMS
SPUTUM PRODUCTION: 0
COUGH: 0
BLURRED VISION: 0
HEMOPTYSIS: 0
NAUSEA: 0
WHEEZING: 0
DOUBLE VISION: 0
VOMITING: 0
HEADACHES: 0
MYALGIAS: 0
PALPITATIONS: 0
SHORTNESS OF BREATH: 1
FEVER: 0
WEAKNESS: 0
DIZZINESS: 0
ABDOMINAL PAIN: 0
CHILLS: 0

## 2023-04-16 ASSESSMENT — PATIENT HEALTH QUESTIONNAIRE - PHQ9
1. LITTLE INTEREST OR PLEASURE IN DOING THINGS: NOT AT ALL
2. FEELING DOWN, DEPRESSED, IRRITABLE, OR HOPELESS: NOT AT ALL
SUM OF ALL RESPONSES TO PHQ9 QUESTIONS 1 AND 2: 0

## 2023-04-16 ASSESSMENT — FIBROSIS 4 INDEX
FIB4 SCORE: 1.68
FIB4 SCORE: 1.68

## 2023-04-16 ASSESSMENT — PAIN DESCRIPTION - PAIN TYPE: TYPE: ACUTE PAIN

## 2023-04-16 NOTE — ASSESSMENT & PLAN NOTE
Reviewed notes from admission 08/2020, found to have multiple bleeding duodenal ulcers, etiology due to high-dose NSAID use  Counseled on avoiding NSAIDs  Started on PPI since he will need anticoagulation for life due to unprovoked PE  Needs very close monitoring while on heparin drip for any signs of bleeding

## 2023-04-16 NOTE — ED PROVIDER NOTES
ED Provider Note    CHIEF COMPLAINT  Chief Complaint   Patient presents with    Shortness of Breath     Onset this morning       EXTERNAL RECORDS REVIEWED  Outpatient Notes patient has a history of hypertension and gout    HPI/ROS  LIMITATION TO HISTORY   Select: : None  OUTSIDE HISTORIAN(S):  Significant other states she is noted and working hard to breathe throughout the day which is not usual for him  EMS states patient was 70% on room air    Ramu Barber is a 72 y.o. male who [facility] the [facility] to complain of 1 day of shortness of breath.  The patient states since getting up this morning he can even go from one room to the other without working really hard to breathe.  He states this is not normal for him.  He states has been resting frequently throughout the day and when he sits he feels better but when he gets up to walk he gets very short of breath.  He denies any chest pain associate with this no new or worsening leg swelling.  He has had multiple surgical's in the past on his spine but nothing recently.  He denies any fevers or chills no productive cough.  States he took a negative COVID test at home.  But he does continue to have a hard time breathing so they brought him in he was found to be 70% on room air by EMS.    PAST MEDICAL HISTORY   has a past medical history of Arthritis, Back pain, Chickenpox, High cholesterol, Hypertension, Obesity, Pain, Pneumonia, Polycythemia, secondary, Sleep apnea, Snoring, and Type II or unspecified type diabetes mellitus without mention of complication, not stated as uncontrolled.    SURGICAL HISTORY   has a past surgical history that includes fusion, spine, lumbar, plif (1/21/2015); inguinal hernia repair (Right, 2/16/2016); lumbar laminectomy diskectomy (2/18/2018); hardware removal neuro (2/18/2018); carpal tunnel release (Right, 2014); shoulder arthroscopy (Right, 2014); lumbar decompression (2004); lumbar decompression (2003); cervical disk and  fusion anterior (2005); implant neurostim/ (7/23/2019); egd w/control bleeding (8/22/2020); and gastroscopy (N/A, 8/22/2020).    FAMILY HISTORY  Family History   Problem Relation Age of Onset    Sleep Apnea Mother     Heart Attack Father     Heart Attack Brother        SOCIAL HISTORY  Social History     Tobacco Use    Smoking status: Never    Smokeless tobacco: Former     Types: Chew     Quit date: 12/2018   Vaping Use    Vaping Use: Never used   Substance and Sexual Activity    Alcohol use: Yes     Comment: twice a month    Drug use: No    Sexual activity: Not on file       CURRENT MEDICATIONS  Home Medications    **Home medications have not yet been reviewed for this encounter**         ALLERGIES  No Known Allergies    PHYSICAL EXAM  VITAL SIGNS: BP (!) 143/95   Pulse 79   Temp 36.7 °C (98 °F) (Temporal)   Resp (!) 29   Ht 1.829 m (6')   Wt 118 kg (261 lb)   SpO2 92%   BMI 35.40 kg/m²    Pulse Ox Interpretation:   Pulse Ox is within normal limits on 6 L nasal cannula  Constitutional: Alert in no apparent distress.  HENT: Normocephalic atraumatic, MMM  Eyes: PER, Conjunctiva normal, Non-icteric.   Cardiovascular: Regular rate and rhythm, no murmurs.   Thorax & Lungs: Minimally diminished at the bases but no tachypnea, No respiratory distress, No wheezing, No chest tenderness.   Abdomen: Bowel sounds normal, Soft, No tenderness, No pulsatile masses. No peritoneal signs.  Extremities/MSK: Intact equal distal pulses, trace pedal edema no tenderness, No cyanosis, no major deformities noted  Neurologic: Alert and oriented x3, No focal deficits noted.       DIAGNOSTIC STUDIES / PROCEDURES  EKG  I have independently interpreted this EKG  Results for orders placed or performed during the hospital encounter of 04/15/23   EKG   Result Value Ref Range    Report       [facility] Emergency Dept.    Test Date:  2023-04-15  Pt Name:    SEAMUS ALVAREZ                   Department: ER  MRN:         [id number]                      Room:        06  Gender:     Male                         Technician: [id number]  :        1950                   Requested By:ER TRIAGE PROTOCOL  Order #:    [id number]                    Reading MD:    Measurements  Intervals                                Axis  Rate:       74                           P:          17  WI:         188                          QRS:        32  QRSD:       96                           T:          11  QT:         435  QTc:        483    Interpretive Statements  Sinus rhythm  Atrial premature complexes  Abnormal R-wave progression, late transition  Nonspecific T abnormalities, anterior leads  Borderline prolonged QT interval  Compared to ECG 2020 09:59:49  T-wave abnormality now present  ST (T wave) deviation no longer present           LABS  Labs Reviewed   CBC WITH DIFFERENTIAL - Abnormal; Notable for the following components:       Result Value    MCHC 33.5 (*)     Neutrophils-Polys 74.20 (*)     Lymphocytes 15.00 (*)     All other components within normal limits   URINALYSIS - Abnormal; Notable for the following components:    Protein 100 (*)     Occult Blood Trace (*)     All other components within normal limits    Narrative:     Indication for culture:->Evaluation for sepsis without a  clear source of infection   D-DIMER - Abnormal; Notable for the following components:    D-Dimer 17.31 (*)     All other components within normal limits    Narrative:     Biotin intake of greater than 5 mg per day may interfere with  troponin levels, causing false low values.  Indicate which anticoagulants the patient is on:->UNKNOWN   PROTHROMBIN TIME - Abnormal; Notable for the following components:    INR 1.14 (*)     All other components within normal limits    Narrative:     Biotin intake of greater than 5 mg per day may interfere with  troponin levels, causing false low values.  Indicate which anticoagulants the patient is on:->UNKNOWN   APTT -  "Abnormal; Notable for the following components:    APTT 24.5 (*)     All other components within normal limits    Narrative:     Biotin intake of greater than 5 mg per day may interfere with  troponin levels, causing false low values.  Indicate which anticoagulants the patient is on:->UNKNOWN   COMP METABOLIC PANEL - Abnormal; Notable for the following components:    Potassium 3.4 (*)     Glucose 124 (*)     Bun 27 (*)     Calcium 8.3 (*)     All other components within normal limits   TROPONIN - Abnormal; Notable for the following components:    Troponin T 43 (*)     All other components within normal limits   PROBRAIN NATRIURETIC PEPTIDE, NT - Abnormal; Notable for the following components:    NT-proBNP 2142 (*)     All other components within normal limits   URINE MICROSCOPIC (W/UA) - Abnormal; Notable for the following components:    WBC 5-10 (*)     RBC 0-2 (*)     All other components within normal limits    Narrative:     Indication for culture:->Evaluation for sepsis without a  clear source of infection   LACTIC ACID    Narrative:     Biotin intake of greater than 5 mg per day may interfere with  troponin levels, causing false low values.  Indicate which anticoagulants the patient is on:->UNKNOWN   COV-2, FLU A/B, AND RSV BY PCR ([employer])   PROCALCITONIN   ESTIMATED GFR   CORRECTED CALCIUM   LACTIC ACID   LACTIC ACID   BLOOD CULTURE    Narrative:     1 of 2 for Blood Culture x 2 sites order. Per Hospital  Policy: Only change Specimen Src: to \"Line\" if specified by  physician order.   BLOOD CULTURE    Narrative:     2 of 2 blood culture x2  Sites order. Per Hospital Policy:  Only change Specimen Src: to \"Line\" if specified by physician  order.   URINE CULTURE(NEW)    Narrative:     Indication for culture:->Evaluation for sepsis without a  clear source of infection   APTT    Narrative:     Indicate which anticoagulants the patient is on:->HEPARIN   PROTHROMBIN TIME    Narrative:     Indicate which " anticoagulants the patient is on:->HEPARIN   HEPARIN XA (UNFRACTIONATED)    Narrative:     Indicate which anticoagulants the patient is on:->HEPARIN   APTT   PROTHROMBIN TIME   HEPARIN XA (UNFRACTIONATED)         RADIOLOGY  I have independently interpreted the diagnostic imaging associated with this visit and am waiting the final reading from the radiologist.   My preliminary interpretation is as follows:     CXR  CTA chest    Radiologist interpretation:     CT-CTA CHEST PULMONARY ARTERY W/ RECONS   Final Result         1. Extensive bilateral pulmonary emboli, right more than left with small saddle embolus.   2. RV/LV ratio is 1.07.   3. Patchy bibasilar atelectasis. Trace pericardial effusion.      CRITICAL RESULT READ BACK: Preliminary findings discussed with and critical read back performed by Dr. ARABELLA MCMAHAN in the Emergency Department via telephone on 4/15/2023 11:54 PM      DX-CHEST-PORTABLE (1 VIEW)   Final Result         1. No acute cardiopulmonary abnormalities are identified.        r    COURSE & MEDICAL DECISION MAKING    ED Observation Status? No; Patient does not meet criteria for ED Observation.     INITIAL ASSESSMENT, COURSE AND PLAN  Care Narrative: Patient was found to be 75% on room air he is not wheezing he is not tachypneic he has no productive cough as of late and no history that would indicate infection we will still rule it out including COVID or influenza but also high concern for pulmonary embolism especially in light of the acute onset of the symptoms.  He is not having chest pain so I doubt ACS we will perform a bedside ultrasound to evaluate for pericardial effusion.  CT scan was ordered laboratory and Alysis he is tolerating well on nasal cannula at this time.    12:07 AM reviewed the images from the CT pulmonary angiogram the patient has bilateral pulmonary embolisms.  At this time I have started the patient on heparin and order the drip.  I went to the bedside discussed with the  patient his wife's diagnosis.  The importance of hospitalization as well as an echocardiogram.  They understand they feel comfortable with the plan.  I spoke with the [facility] resident who is excepted the patient.  Fortunately all that the patient does have massive PEs he is only hypoxic mildly elevated troponin but not hypotensive by any means not exceedingly tachycardic.  At this time do not believe he needs EKOS        ADDITIONAL PROBLEM LIST  Bilateral pulmonary emboli  Hypoxia  Elevated troponin    DISPOSITION AND DISCUSSIONS  I have discussed management of the patient with the following physicians and JORDYN's:  [facility] team    Discussion of management with other QHP or appropriate source(s): Pharmacy for hepatin management and Radiologist for findings        FINAL DIAGNOSIS  Bilateral pulmonary emboli  Hypoxia  Elevated troponin       Electronically signed by: Gogo Roe M.D., 4/15/2023 7:22 PM

## 2023-04-16 NOTE — ASSESSMENT & PLAN NOTE
A1c 2020: 7.5  - A1c: Pending  - Hold home meds  - Hypoglycemia protocol  - Fingerstick glucose  - ISS

## 2023-04-16 NOTE — ASSESSMENT & PLAN NOTE
Secondary to bilateral extensive pulmonary emboli resulting in right heart strain.  Echocardiogram ordered  Was saturating in the 70s on room air on presentation and requiring 6 L O2. Close hemodynamic and respiratory monitoring on tele, ween off O2 as tolerated.

## 2023-04-16 NOTE — ED NOTES
Chief Complaint   Patient presents with    Shortness of Breath     Onset this morning     Pt bib ems from home c/o sob since this morning.   Ems found pt's spo2 70% on room air, does not use oxygen at home ,cpap at night.   Arrived on non rebreather, titrated down to 6L nc. Spo2 92-94%.   Noted some leg swelling but patient said no change with appearance.

## 2023-04-16 NOTE — ASSESSMENT & PLAN NOTE
CTA of chest demonstrated extensive bilateral pulmonary emboli, right more than left with small saddle embolus; patchy bibasilar atelectasis.  Patient was started on heparin in the ED. Wells score: 3.  No apparent history of DVT/PE  - Admit to telemetry  - Continuous pulse ox  - RT protocol  - Continue heparin drip  - Echo: Pending

## 2023-04-16 NOTE — PROGRESS NOTES
4 Eyes Skin Assessment Completed by ARIAN castillo and ARIAN Salmeron.    Head WDL  Ears Redness and Blanching  Nose WDL  Mouth WDL  Neck WDL  Breast/Chest WDL  Shoulder Blades WDL  Spine WDL  (R) Arm/Elbow/Hand WDL  (L) Arm/Elbow/Hand WDL  Abdomen WDL  Groin WDL  Scrotum/Coccyx/Buttocks WDL  (R) Leg Redness and Edema  (L) Leg Redness, Blanching, and Edema  (R) Heel/Foot/Toe Edema  (L) Heel/Foot/Toe Redness and Scab          Devices In Places Tele Box and Nasal Cannula      Interventions In Place NC W/Ear Foams    Possible Skin Injury Yes    Pictures Uploaded Into Epic Yes  Wound Consult Placed N/A  RN Wound Prevention Protocol Ordered No

## 2023-04-16 NOTE — H&P
[facility] Internal Medicine History & Physical Note    Date of Service  4/16/2023    [facility] Team: ELIF  Attending: Theresa Escalante M.d.  Senior Resident: Dr. Keysha Amaro  Contact Number: [phone]    Primary Care Physician  Chuck Chappell M.D.    Consultants  None    Specialist Names: None    Code Status  Full Code    Chief Complaint  Chief Complaint   Patient presents with    Shortness of Breath     Onset this morning       History of Presenting Illness (HPI):   Ramu Barber is a 72 y.o. male who presented 4/15/2023 with past medical history of hypertension (on amlodipine 5, carvedilol 12.5 twice daily, furosemide 20 daily, Dyazide daily), dyslipidemia (on lovastatin), type 2 diabetes mellitus with neuropathy (on glipizide, metformin, gabapentin), BPH (on finasteride, terazosin), depression (on Paxil) who comes to the ED due to shortness of breath that began this morning.  Patient continued to do ADLs throughout the day; per wife had to take multiple breaks.  Wife got out pulse oximeter and noticed it was in the 70s so they came to the ED.  Denies fever, chills, chest pain, shortness of breath, palpitations, nausea, vomiting, falls.    In the ED, patient AAOx4, in no acute distress.  Temperature 98 °F, pulse 63, respiratory rate 25, blood pressure 160/95, saturating 92% on 6 L nasal cannula.  CBC completely unremarkable.  CHEM panel remarkable for sodium 144, potassium 3.4, glucose 124, BUN 27, calcium 8.9, lactic acid 1.6.  Troponin 43, BNP 2142 INR 1.14.  UA remarkable for trace occult blood, protein, with some hyaline casts.  Respiratory panel negative chest x-ray demonstrated no acute cardiopulmonary process.  EKG demonstrated sinus rhythm with APCs.  CTA of chest demonstrated extensive bilateral pulmonary emboli, right more than left with small saddle embolus, patchy bibasilar atelectasis.  Patient will be admitted for bilateral pulmonary embolism.    I discussed the plan of care with patient and  family.    Review of Systems  Review of Systems   Constitutional:  Negative for chills, fever and malaise/fatigue.   Eyes:  Negative for blurred vision and double vision.   Respiratory:  Positive for shortness of breath. Negative for cough, hemoptysis, sputum production and wheezing.    Cardiovascular:  Negative for chest pain, palpitations and leg swelling.   Gastrointestinal:  Negative for abdominal pain, nausea and vomiting.   Genitourinary:  Negative for dysuria.   Musculoskeletal:  Negative for myalgias.   Neurological:  Negative for dizziness, weakness and headaches.   Psychiatric/Behavioral:  Negative for substance abuse.      Past Medical History   has a past medical history of Arthritis, Back pain, Chickenpox, High cholesterol, Hypertension, Obesity, Pain, Pneumonia, Polycythemia, secondary, Sleep apnea, Snoring, and Type II or unspecified type diabetes mellitus without mention of complication, not stated as uncontrolled.    Surgical History   has a past surgical history that includes fusion, spine, lumbar, plif (1/21/2015); inguinal hernia repair (Right, 2/16/2016); lumbar laminectomy diskectomy (2/18/2018); hardware removal neuro (2/18/2018); carpal tunnel release (Right, 2014); shoulder arthroscopy (Right, 2014); lumbar decompression (2004); lumbar decompression (2003); cervical disk and fusion anterior (2005); pr implant neurostim/ (7/23/2019); egd w/control bleeding (8/22/2020); and gastroscopy (N/A, 8/22/2020).     Family History  family history includes Heart Attack in his brother and father; Sleep Apnea in his mother.   Family history reviewed with patient.     Social History  Tobacco: Denies  Alcohol: Socially  Recreational drugs (illegal or prescription): Denies  Employment: Retired  Living Situation: With wife  Recent Travel: Denies  Primary Care Provider: Reviewed    Other (stressors, spirituality, exposures): Denies    Allergies  No Known Allergies    Medications  Prior to Admission  Medications   Prescriptions Last Dose Informant Patient Reported? Taking?   Apoaequorin (PREVAGEN PO)   Yes No   Sig: Take  by mouth.   HYDROcodone-acetaminophen (NORCO) 5-325 MG Tab per tablet   Yes No   Sig: Take 1 Tablet by mouth 2 times a day.   OZEMPIC, 0.25 OR 0.5 MG/DOSE, 2 MG/1.5ML Solution Pen-injector   Yes No   Sig: INJECT 0.25MG SUBCUTANEOUSLY ONCE WEEKLY   PARoxetine (PAXIL) 20 MG Tab   Yes No   Sig: Take 1 Tablet by mouth.   allopurinol (ZYLOPRIM) 100 MG Tab   No No   Sig: Take 1 Tablet by mouth every day.   amLODIPine (NORVASC) 10 MG Tab  Patient No No   Sig: Take 1 Tab by mouth every day. For further refills please contact new cardiologist. Thank you   Patient not taking: Reported on 3/26/2023   amLODIPine (NORVASC) 5 MG Tab   Yes No   Sig: Take 1 Tablet by mouth every day.   carvedilol (COREG) 25 MG Tab  Patient No No   Sig: TAKE ONE TABLET BY MOUTH TWICE A DAY WITH MEALS   dicyclomine (BENTYL) 20 MG Tab   No No   Sig: TAKE ONE TABLET BY MOUTH EVERY 6 HOURS FOR 5 DAYS   finasteride (PROSCAR) 5 MG Tab   Yes No   Sig: Take 1 Tablet by mouth every day.   fosinopril (MONOPRIL) 40 MG tablet  Patient No No   Sig: Take 1 Tab by mouth every day. For further refills please contact new cardiologist. Thank you   furosemide (LASIX) 20 MG Tab   Yes No   Sig: Take 1 Tablet by mouth 2 times a day.   gabapentin (NEURONTIN) 300 MG Cap   No No   Sig: Take 1 Cap by mouth 4 times a day.   gabapentin (NEURONTIN) 600 MG tablet   Yes No   glipiZIDE SR (GLUCOTROL) 2.5 MG TABLET SR 24 HR   Yes No   lovastatin (MEVACOR) 20 MG Tab  Patient No No   Sig: Take 1 Tab by mouth every evening.   lovastatin (MEVACOR) 20 MG Tab   Yes No   Sig: Take 1 Tablet by mouth every day.   lovastatin (MEVACOR) 20 MG Tab   Yes No   Sig: Take 20 mg by mouth every day.   metformin (GLUCOPHAGE) 1000 MG tablet   Yes No   metformin (GLUCOPHAGE) 850 MG Tab  Patient Yes No   Sig: Take 1 Tablet by mouth 2 times a day.   methenamine hip (HIPPREX) 1 GM Tab    Yes No   Sig: Take 1 Tablet by mouth 2 times a day.   polyethylene glycol/lytes (MIRALAX) 17 g Pack   No No   Sig: Take 1 Packet by mouth every day.   potassium chloride (MICRO-K) 10 MEQ capsule   Yes No   Sig: Take 1 Capsule by mouth 2 times a day.   promethazine (PHENERGAN) 25 MG Tab   No No   Sig: Take 1 Tab by mouth every 6 hours as needed for Nausea/Vomiting.   terazosin (HYTRIN) 10 MG capsule   Yes No   terazosin (HYTRIN) 5 MG Cap  Patient No No   Sig: TAKE TWO CAPSULES BY MOUTH EVERY NIGHT AT BEDTIME (GENERIC HYTRIN)   triamterene-hctz (MAXZIDE-25/DYAZIDE) 37.5-25 MG Tab  Patient Yes No   Sig: Take 1 Tablet by mouth every morning.   triamterene/hctz (MAXZIDE-25/DYAZIDE) 37.5-25 MG Cap   Yes No   Sig: Take 1 Capsule by mouth every day.   triamterene/hctz (MAXZIDE-25/DYAZIDE) 37.5-25 MG Cap   Yes No      Facility-Administered Medications: None       Physical Exam  Temp:  [36.5 °C (97.7 °F)-36.7 °C (98 °F)] 36.5 °C (97.7 °F)  Pulse:  [63-82] 63  Resp:  [20-29] 20  BP: (143-177)/(87-95) 166/90  SpO2:  [75 %-95 %] 95 %  Blood Pressure : (!) 177/90   Temperature: 36.7 °C (98 °F)   Pulse: 66   Respiration: 20   Pulse Oximetry: 92 %       Physical Exam  Vitals and nursing note reviewed.   Constitutional:       Appearance: Normal appearance. He is obese. He is not diaphoretic.   HENT:      Head: Normocephalic and atraumatic.      Nose: Nose normal.      Mouth/Throat:      Mouth: Mucous membranes are dry.   Eyes:      Pupils: Pupils are equal, round, and reactive to light.   Cardiovascular:      Rate and Rhythm: Normal rate and regular rhythm.      Pulses: Normal pulses.      Heart sounds: Normal heart sounds.   Pulmonary:      Effort: Pulmonary effort is normal. No respiratory distress.      Breath sounds: Normal breath sounds.   Chest:      Chest wall: No tenderness.   Abdominal:      General: Bowel sounds are normal. There is no distension.      Palpations: Abdomen is soft.   Musculoskeletal:         General:  Normal range of motion.      Right lower leg: Edema (+1) present.      Left lower leg: Edema (+1) present.   Skin:     General: Skin is warm and dry.      Capillary Refill: Capillary refill takes less than 2 seconds.   Neurological:      General: No focal deficit present.      Mental Status: He is alert and oriented to person, place, and time. Mental status is at baseline.   Psychiatric:         Mood and Affect: Mood normal.         Behavior: Behavior normal.       Laboratory:  Recent Labs     04/15/23  1906   WBC 8.8   RBC 5.25   HEMOGLOBIN 16.6   HEMATOCRIT 49.6   MCV 94.5   MCH 31.6   MCHC 33.5*   RDW 48.7   PLATELETCT 202   MPV 10.1     Recent Labs     04/15/23  2140   SODIUM 144   POTASSIUM 3.4*   CHLORIDE 111   CO2 21   GLUCOSE 124*   BUN 27*   CREATININE 1.20   CALCIUM 8.3*     Recent Labs     04/15/23  2140   ALTSGPT 13   ASTSGOT 17   ALKPHOSPHAT 76   TBILIRUBIN 0.4   GLUCOSE 124*     Recent Labs     04/15/23  1906 04/16/23  0010   APTT 24.5* 24.8   INR 1.14* 1.15*     Recent Labs     04/15/23  2140   NTPROBNP 2142*         Recent Labs     04/15/23  2140   TROPONINT 43*       Imaging:  CT-CTA CHEST PULMONARY ARTERY W/ RECONS   Final Result         1. Extensive bilateral pulmonary emboli, right more than left with small saddle embolus.   2. RV/LV ratio is 1.07.   3. Patchy bibasilar atelectasis. Trace pericardial effusion.      CRITICAL RESULT READ BACK: Preliminary findings discussed with and critical read back performed by Dr. ARABELLA MCMAHAN in the Emergency Department via telephone on 4/15/2023 11:54 PM      DX-CHEST-PORTABLE (1 VIEW)   Final Result         1. No acute cardiopulmonary abnormalities are identified.      EC-ECHOCARDIOGRAM COMPLETE W/O CONT    (Results Pending)       X-Ray:  My impression is: No acute cardiopulmonary process  EKG:  My impression is: Sinus rhythm, APCs    Assessment/Plan:  Problem Representation:   72-year-old male with a past medical history of hypertension, dyslipidemia,  type 2 diabetes mellitus admitted for bilateral pulmonary embolism.    I anticipate this patient will require at least two midnights for appropriate medical management, necessitating inpatient admission because lateral pulmonary embolism    Patient will need a Telemetry bed on MEDICAL service .  The need is secondary to bilateral pulmonary embolism.    * Pulmonary embolism, bilateral (HCC)- (present on admission)  Assessment & Plan  CTA of chest demonstrated extensive bilateral pulmonary emboli, right more than left with small saddle embolus; patchy bibasilar atelectasis.  Patient was started on heparin in the ED. Wells score: 3.  No apparent history of DVT/PE  - Admit to telemetry  - Continuous pulse ox  - RT protocol  - Continue heparin drip  - Echo: Pending    Depression  Assessment & Plan  - Continue home paxil    Hyperlipidemia- (present on admission)  Assessment & Plan  - Resume home lovastatin    Type 2 diabetes mellitus without complication, without long-term current use of insulin (HCC)- (present on admission)  Assessment & Plan  A1c 2020: 7.5  - A1c: Pending  - Hold home meds  - Hypoglycemia protocol  - Fingerstick glucose  - ISS    Benign prostatic hyperplasia with urinary frequency- (present on admission)  Assessment & Plan  - Resume home terazosin, finasteride    Obesity (BMI 30-39.9)- (present on admission)  Assessment & Plan  - Recommend outpatient management    Essential hypertension- (present on admission)  Assessment & Plan  - Resume home carvedilol, triamterene HCTZ, fosinopril, amlodipine, furosemide    Sleep apnea- (present on admission)  Assessment & Plan  Compliant with CPAP at home  - Continue CPAP use    Polycythemia, secondary- (present on admission)  Assessment & Plan  Platelet count: 202; most likely secondary to LYLE.        VTE prophylaxis: SCDs/TEDs and heparin ppx

## 2023-04-16 NOTE — ED NOTES
Report received from previous shift. Assumed patient care. Verified patient identification. Checked on bed, connected to monitor,  with unlabored respirations. Vital signs is stable. Denied any new complaints. Gurney in low position, side rail up for pt safety. Call light within reach. Will continue to monitor for any complications.     ERP at bedside.

## 2023-04-16 NOTE — PROGRESS NOTES
72 y.o. male who presented 4/15/2023 with past medical history of hypertension (on amlodipine 5, carvedilol 12.5 twice daily, furosemide 20 daily, Dyazide daily), dyslipidemia (on lovastatin), type 2 diabetes mellitus with neuropathy (on glipizide, metformin, gabapentin), BPH (on finasteride, terazosin), depression (on Paxil) was admitted on 4/16/2023 for acute hypoxic respiratory failure secondary to severe pulmonary embolism.    On 6 L nasal cannula  Continue heparin drip  Pending echocardiogram  Titrate oxygen as tolerable  Labs on a.m.

## 2023-04-17 PROBLEM — I82.411 ACUTE DEEP VEIN THROMBOSIS (DVT) OF FEMORAL VEIN OF RIGHT LOWER EXTREMITY (HCC): Status: ACTIVE | Noted: 2023-04-17

## 2023-04-17 PROBLEM — I27.20 PULMONARY HYPERTENSION (HCC): Status: ACTIVE | Noted: 2023-04-17

## 2023-04-17 LAB
ALBUMIN SERPL BCP-MCNC: 3.5 G/DL (ref 3.2–4.9)
ALBUMIN/GLOB SERPL: 1.3 G/DL
ALP SERPL-CCNC: 75 U/L (ref 30–99)
ALT SERPL-CCNC: 13 U/L (ref 2–50)
ANION GAP SERPL CALC-SCNC: 13 MMOL/L (ref 7–16)
AST SERPL-CCNC: 17 U/L (ref 12–45)
BASOPHILS # BLD AUTO: 0.8 % (ref 0–1.8)
BASOPHILS # BLD: 0.07 K/UL (ref 0–0.12)
BILIRUB SERPL-MCNC: 0.5 MG/DL (ref 0.1–1.5)
BUN SERPL-MCNC: 24 MG/DL (ref 8–22)
CALCIUM ALBUM COR SERPL-MCNC: 9.4 MG/DL (ref 8.5–10.5)
CALCIUM SERPL-MCNC: 9 MG/DL (ref 8.5–10.5)
CHLORIDE SERPL-SCNC: 106 MMOL/L (ref 96–112)
CHOLEST SERPL-MCNC: 152 MG/DL (ref 100–199)
CO2 SERPL-SCNC: 21 MMOL/L (ref 20–33)
CREAT SERPL-MCNC: 1.34 MG/DL (ref 0.5–1.4)
EOSINOPHIL # BLD AUTO: 0.36 K/UL (ref 0–0.51)
EOSINOPHIL NFR BLD: 4 % (ref 0–6.9)
ERYTHROCYTE [DISTWIDTH] IN BLOOD BY AUTOMATED COUNT: 48.7 FL (ref 35.9–50)
GFR SERPLBLD CREATININE-BSD FMLA CKD-EPI: 56 ML/MIN/1.73 M 2
GLOBULIN SER CALC-MCNC: 2.8 G/DL (ref 1.9–3.5)
GLUCOSE BLD STRIP.AUTO-MCNC: 102 MG/DL (ref 65–99)
GLUCOSE BLD STRIP.AUTO-MCNC: 158 MG/DL (ref 65–99)
GLUCOSE BLD STRIP.AUTO-MCNC: 224 MG/DL (ref 65–99)
GLUCOSE BLD STRIP.AUTO-MCNC: 242 MG/DL (ref 65–99)
GLUCOSE SERPL-MCNC: 149 MG/DL (ref 65–99)
HCT VFR BLD AUTO: 47.7 % (ref 42–52)
HDLC SERPL-MCNC: 49 MG/DL
HGB BLD-MCNC: 16 G/DL (ref 14–18)
IMM GRANULOCYTES # BLD AUTO: 0.04 K/UL (ref 0–0.11)
IMM GRANULOCYTES NFR BLD AUTO: 0.4 % (ref 0–0.9)
LDLC SERPL CALC-MCNC: 86 MG/DL
LYMPHOCYTES # BLD AUTO: 1.81 K/UL (ref 1–4.8)
LYMPHOCYTES NFR BLD: 20.4 % (ref 22–41)
MCH RBC QN AUTO: 31.5 PG (ref 27–33)
MCHC RBC AUTO-ENTMCNC: 33.5 G/DL (ref 33.7–35.3)
MCV RBC AUTO: 93.9 FL (ref 81.4–97.8)
MONOCYTES # BLD AUTO: 0.84 K/UL (ref 0–0.85)
MONOCYTES NFR BLD AUTO: 9.4 % (ref 0–13.4)
NEUTROPHILS # BLD AUTO: 5.77 K/UL (ref 1.82–7.42)
NEUTROPHILS NFR BLD: 65 % (ref 44–72)
NRBC # BLD AUTO: 0 K/UL
NRBC BLD-RTO: 0 /100 WBC
PLATELET # BLD AUTO: 198 K/UL (ref 164–446)
PMV BLD AUTO: 10.1 FL (ref 9–12.9)
POTASSIUM SERPL-SCNC: 4 MMOL/L (ref 3.6–5.5)
PROT SERPL-MCNC: 6.3 G/DL (ref 6–8.2)
RBC # BLD AUTO: 5.08 M/UL (ref 4.7–6.1)
SODIUM SERPL-SCNC: 140 MMOL/L (ref 135–145)
TRIGL SERPL-MCNC: 87 MG/DL (ref 0–149)
WBC # BLD AUTO: 8.9 K/UL (ref 4.8–10.8)

## 2023-04-17 PROCEDURE — 99233 SBSQ HOSP IP/OBS HIGH 50: CPT | Performed by: STUDENT IN AN ORGANIZED HEALTH CARE EDUCATION/TRAINING PROGRAM

## 2023-04-17 PROCEDURE — 700111 HCHG RX REV CODE 636 W/ 250 OVERRIDE (IP): Performed by: STUDENT IN AN ORGANIZED HEALTH CARE EDUCATION/TRAINING PROGRAM

## 2023-04-17 PROCEDURE — A9270 NON-COVERED ITEM OR SERVICE: HCPCS | Performed by: STUDENT IN AN ORGANIZED HEALTH CARE EDUCATION/TRAINING PROGRAM

## 2023-04-17 PROCEDURE — 770020 HCHG ROOM/CARE - TELE (206)

## 2023-04-17 PROCEDURE — 94660 CPAP INITIATION&MGMT: CPT

## 2023-04-17 PROCEDURE — 700102 HCHG RX REV CODE 250 W/ 637 OVERRIDE(OP): Performed by: STUDENT IN AN ORGANIZED HEALTH CARE EDUCATION/TRAINING PROGRAM

## 2023-04-17 PROCEDURE — 94760 N-INVAS EAR/PLS OXIMETRY 1: CPT

## 2023-04-17 PROCEDURE — 80061 LIPID PANEL: CPT

## 2023-04-17 PROCEDURE — 82962 GLUCOSE BLOOD TEST: CPT | Mod: 91

## 2023-04-17 PROCEDURE — 80053 COMPREHEN METABOLIC PANEL: CPT

## 2023-04-17 PROCEDURE — 36415 COLL VENOUS BLD VENIPUNCTURE: CPT

## 2023-04-17 PROCEDURE — 85025 COMPLETE CBC W/AUTO DIFF WBC: CPT

## 2023-04-17 RX ADMIN — FINASTERIDE 5 MG: 5 TABLET, FILM COATED ORAL at 04:36

## 2023-04-17 RX ADMIN — GABAPENTIN 600 MG: 300 CAPSULE ORAL at 04:36

## 2023-04-17 RX ADMIN — INSULIN HUMAN 2 UNITS: 100 INJECTION, SOLUTION PARENTERAL at 20:09

## 2023-04-17 RX ADMIN — LOVASTATIN 20 MG: 20 TABLET ORAL at 10:18

## 2023-04-17 RX ADMIN — CARVEDILOL 12.5 MG: 12.5 TABLET, FILM COATED ORAL at 17:27

## 2023-04-17 RX ADMIN — APIXABAN 10 MG: 5 TABLET, FILM COATED ORAL at 10:18

## 2023-04-17 RX ADMIN — GABAPENTIN 600 MG: 300 CAPSULE ORAL at 17:28

## 2023-04-17 RX ADMIN — INSULIN HUMAN 2 UNITS: 100 INJECTION, SOLUTION PARENTERAL at 11:29

## 2023-04-17 RX ADMIN — TRIAMTERENE AND HYDROCHLOROTHIAZIDE 1 CAPSULE: 37.5; 25 CAPSULE ORAL at 04:36

## 2023-04-17 RX ADMIN — CARVEDILOL 12.5 MG: 12.5 TABLET, FILM COATED ORAL at 08:14

## 2023-04-17 RX ADMIN — AMLODIPINE BESYLATE 5 MG: 5 TABLET ORAL at 04:36

## 2023-04-17 RX ADMIN — TERAZOSIN HYDROCHLORIDE 10 MG: 5 CAPSULE ORAL at 20:07

## 2023-04-17 RX ADMIN — LISINOPRIL 40 MG: 20 TABLET ORAL at 04:36

## 2023-04-17 RX ADMIN — HEPARIN SODIUM 16 UNITS/KG/HR: 5000 INJECTION, SOLUTION INTRAVENOUS at 08:46

## 2023-04-17 RX ADMIN — APIXABAN 10 MG: 5 TABLET, FILM COATED ORAL at 17:27

## 2023-04-17 RX ADMIN — INSULIN HUMAN 1 UNITS: 100 INJECTION, SOLUTION PARENTERAL at 08:14

## 2023-04-17 RX ADMIN — PAROXETINE HYDROCHLORIDE 20 MG: 20 TABLET, FILM COATED ORAL at 04:36

## 2023-04-17 ASSESSMENT — PAIN DESCRIPTION - PAIN TYPE: TYPE: ACUTE PAIN

## 2023-04-17 ASSESSMENT — ENCOUNTER SYMPTOMS
EYES NEGATIVE: 1
SHORTNESS OF BREATH: 1
PSYCHIATRIC NEGATIVE: 1
GASTROINTESTINAL NEGATIVE: 1
COUGH: 1
WEAKNESS: 1
MUSCULOSKELETAL NEGATIVE: 1
CARDIOVASCULAR NEGATIVE: 1

## 2023-04-17 ASSESSMENT — FIBROSIS 4 INDEX: FIB4 SCORE: 1.71

## 2023-04-17 NOTE — DISCHARGE PLANNING
Care Transition Team Assessment    LMSW spoke with pt and his wife Nona in room. Patient was orientex x4. Pt confirmed all information on facesheet. Pt lives with wife in one story home and has cane used daily for DME. Also has walker and wheelchair at home if needed. Pt expresses goal to get home and wife expresses goal to make sure patient is walking better upon dc.     Pt does not usually use o2. Is currently on 3L. DME choice in [place].     Information Source  Orientation Level: Oriented X4  Information Given By: Patient, Spouse  Informant's Name: Nona and pt  Who is responsible for making decisions for patient? : Patient    Elopement Risk  Legal Hold: No  Ambulatory or Self Mobile in Wheelchair: Yes  Disoriented: No  Psychiatric Symptoms: None  History of Wandering: No  Elopement this Admit: No  Vocalizing Wanting to Leave: No  Displays Behaviors, Body Language Wanting to Leave: No-Not at Risk for Elopement  Elopement Risk: Not at Risk for Elopement    Interdisciplinary Discharge Planning  Primary Care Physician: Chuck Chappell  Lives with - Patient's Self Care Capacity: Spouse  Patient or legal guardian wants to designate a caregiver: No  Support Systems: Children, Family Member(s), Spouse / Significant Other  Housing / Facility: [address]  Do You Take your Prescribed Medications Regularly: Yes  Able to Return to Previous ADL's: Future Time w/Therapy  Mobility Issues: No  Patient Prefers to be Discharged to:: home  Assistance Needed: No  Durable Medical Equipment: Other - Specify (cpap, glucometer,)    Discharge Preparedness  What is your plan after discharge?: Home with help  What are your discharge supports?: Spouse, Child  Prior Functional Level: Ambulatory, Independent with Activities of Daily Living    Functional Assesment  Prior Functional Level: Ambulatory, Independent with Activities of Daily Living    Finances  Financial Barriers to Discharge: No  Prescription Coverage: Yes    Vision / Hearing  Impairment  Vision Impairment : Yes  Right Eye Vision: Impaired, Wears Glasses  Left Eye Vision: Wears Glasses, Impaired  Hearing Impairment : Yes  Hearing Impairment: Both Ears, Hearing Device Not Available  Does Pt Need Special Equipment for the Hearing Impaired?: No    Advance Directive  Advance Directive?: Living Will    Domestic Abuse  Have you ever been the victim of abuse or violence?: No  Physical Abuse or Sexual Abuse: No  Verbal Abuse or Emotional Abuse: No  Possible Abuse/Neglect Reported to:: Not Applicable    Psychological Assessment  History of Substance Abuse: None  Newly Diagnosed Illness: No    Discharge Risks or Barriers  Discharge risks or barriers?: No  Patient risk factors: Vulnerable adult    Anticipated Discharge Information  Discharge Disposition: Discharged to home/self care (01)  Discharge Address: [address] [place] [address]   [place] [id number]  Discharge Contact Phone Number: [phone]

## 2023-04-17 NOTE — PROGRESS NOTES
Hospital Medicine Daily Progress Note    Date of Service  4/17/2023    Chief Complaint  Ramu Barber is a 72 y.o. male admitted 4/15/2023 with acute hypoxic respiratory failure    Hospital Course  72 y.o. male who presented 4/15/2023 with past medical history of hypertension (on amlodipine 5, carvedilol 12.5 twice daily, furosemide 20 daily, Dyazide daily), dyslipidemia (on lovastatin), type 2 diabetes mellitus with neuropathy (on glipizide, metformin, gabapentin), BPH (on finasteride, terazosin), depression (on Paxil) was admitted on 4/16/2023 for acute hypoxic respiratory failure secondary to severe pulmonary embolism for which he was started on a heparin drip. Bilateral lower extremity ultrasound showing right femoral to calf deep venous thrombosis and echocardiogram showing ejection fraction at 55%, moderate pulmonary hypertension as well as RVSP at 50.    Interval Problem Update  Patient was eval at bedside  Report improvement with dyspnea  Down to 3 L at rest and requiring 6 L on ambulation  Bilateral lower extremity ultrasound showing right femoral to calf deep venous thrombosis.  Switch to Eliquis for oral anticoagulation  Frequent incentive spirometer  Up to chair for all meals  Titrate oxygen as tolerable      I have discussed this patient's plan of care and discharge plan at IDT rounds today with Case Management, Nursing, Nursing leadership, and other members of the IDT team.    Consultants/Specialty  None    Code Status  Full Code    Disposition  Patient is not medically cleared for discharge.   Anticipate discharge to to home with close outpatient follow-up.  I have placed the appropriate orders for post-discharge needs.    Review of Systems  Review of Systems   Constitutional:  Positive for malaise/fatigue.   HENT: Negative.     Eyes: Negative.    Respiratory:  Positive for cough and shortness of breath.    Cardiovascular: Negative.    Gastrointestinal: Negative.    Genitourinary: Negative.     Musculoskeletal: Negative.    Skin: Negative.    Neurological:  Positive for weakness.   Endo/Heme/Allergies: Negative.    Psychiatric/Behavioral: Negative.        Physical Exam  Temp:  [36.4 °C (97.5 °F)-37.1 °C (98.8 °F)] 37.1 °C (98.8 °F)  Pulse:  [55-65] 63  Resp:  [15-22] 15  BP: ()/() 96/64  SpO2:  [91 %-94 %] 93 %    Physical Exam  Constitutional:       General: He is not in acute distress.     Appearance: Normal appearance. He is obese.   HENT:      Head: Normocephalic and atraumatic.      Nose: Nose normal. No congestion.      Mouth/Throat:      Mouth: Mucous membranes are moist.   Eyes:      Extraocular Movements: Extraocular movements intact.      Pupils: Pupils are equal, round, and reactive to light.   Cardiovascular:      Rate and Rhythm: Normal rate and regular rhythm.      Pulses: Normal pulses.      Heart sounds: Normal heart sounds.   Pulmonary:      Effort: Pulmonary effort is normal.      Breath sounds: Normal breath sounds.   Abdominal:      General: Bowel sounds are normal. There is distension.      Palpations: Abdomen is soft.      Tenderness: There is no abdominal tenderness. There is no guarding or rebound.   Musculoskeletal:         General: No swelling. Normal range of motion.      Cervical back: Normal range of motion and neck supple.      Right lower leg: Edema present.      Left lower leg: Edema present.   Skin:     General: Skin is warm.      Coloration: Skin is not jaundiced.   Neurological:      General: No focal deficit present.      Mental Status: He is alert and oriented to person, place, and time. Mental status is at baseline.      Cranial Nerves: No cranial nerve deficit.   Psychiatric:         Mood and Affect: Mood normal.         Behavior: Behavior normal.         Thought Content: Thought content normal.         Judgment: Judgment normal.       Fluids    Intake/Output Summary (Last 24 hours) at 4/17/2023 1245  Last data filed at 4/16/2023 1902  Gross per 24 hour    Intake --   Output 400 ml   Net -400 ml       Laboratory  Recent Labs     04/15/23  1906 04/17/23  0149   WBC 8.8 8.9   RBC 5.25 5.08   HEMOGLOBIN 16.6 16.0   HEMATOCRIT 49.6 47.7   MCV 94.5 93.9   MCH 31.6 31.5   MCHC 33.5* 33.5*   RDW 48.7 48.7   PLATELETCT 202 198   MPV 10.1 10.1     Recent Labs     04/15/23  2140 04/17/23  0149   SODIUM 144 140   POTASSIUM 3.4* 4.0   CHLORIDE 111 106   CO2 21 21   GLUCOSE 124* 149*   BUN 27* 24*   CREATININE 1.20 1.34   CALCIUM 8.3* 9.0     Recent Labs     04/15/23  1906 04/16/23  0010   APTT 24.5* 24.8   INR 1.14* 1.15*         Recent Labs     04/17/23  0149   TRIGLYCERIDE 87   HDL 49   LDL 86       Imaging  US-EXTREMITY VENOUS LOWER BILAT   Final Result      EC-ECHOCARDIOGRAM COMPLETE W/ CONT   Final Result      CT-CTA CHEST PULMONARY ARTERY W/ RECONS   Final Result         1. Extensive bilateral pulmonary emboli, right more than left with small saddle embolus.   2. RV/LV ratio is 1.07.   3. Patchy bibasilar atelectasis. Trace pericardial effusion.      CRITICAL RESULT READ BACK: Preliminary findings discussed with and critical read back performed by Dr. ARABELLA MCMAHAN in the Emergency Department via telephone on 4/15/2023 11:54 PM      DX-CHEST-PORTABLE (1 VIEW)   Final Result         1. No acute cardiopulmonary abnormalities are identified.           Assessment/Plan  * Pulmonary embolism, bilateral (HCC)- (present on admission)  Assessment & Plan  CTA of chest demonstrated extensive bilateral pulmonary emboli, right more than left with small saddle embolus; patchy bibasilar atelectasis.  Patient was started on heparin in the ED. Wells score: 3.  No apparent history of DVT/PE  - Admit to telemetry  - Continuous pulse ox  - RT protocol  - Continue heparin drip  - Echo: Pending    Pulmonary hypertension (HCC)  Assessment & Plan  Noted on 4/16/2022 echocardiogram  Likely secondary to pulmonary embolism  Fluid restriction  As needed Lasix  Labs on a.m.    Acute deep vein  thrombosis (DVT) of femoral vein of right lower extremity (HCC)  Assessment & Plan  Likely causing PE  Anticoagulation with Eliquis  We will need to follow-up with the anticoagulation clinic in the outpatient setting    Acute respiratory failure with hypoxia (HCC)- (present on admission)  Assessment & Plan  Secondary to bilateral extensive pulmonary emboli resulting in right heart strain.  Echocardiogram ordered  Was saturating in the 70s on room air on presentation and requiring 6 L O2. Close hemodynamic and respiratory monitoring on tele, ween off O2 as tolerated.     History of duodenal ulcer- (present on admission)  Assessment & Plan  Reviewed notes from admission 08/2020, found to have multiple bleeding duodenal ulcers, etiology due to high-dose NSAID use  Counseled on avoiding NSAIDs  Started on PPI since he will need anticoagulation for life due to unprovoked PE  Needs very close monitoring while on heparin drip for any signs of bleeding    Depression- (present on admission)  Assessment & Plan  - Continue home paxil    Hyperlipidemia- (present on admission)  Assessment & Plan  - Resume home lovastatin    Type 2 diabetes mellitus without complication, without long-term current use of insulin (HCC)- (present on admission)  Assessment & Plan  A1c 2020: 7.5  - A1c: Pending  - Hold home meds  - Hypoglycemia protocol  - Fingerstick glucose  - ISS    Benign prostatic hyperplasia with urinary frequency- (present on admission)  Assessment & Plan  - Resume home terazosin, finasteride    Obesity (BMI 30-39.9)- (present on admission)  Assessment & Plan  - Recommend outpatient management    Essential hypertension- (present on admission)  Assessment & Plan  - Resume home carvedilol, triamterene HCTZ, fosinopril, amlodipine, furosemide    Sleep apnea- (present on admission)  Assessment & Plan  Compliant with CPAP at home  - Continue CPAP use    Polycythemia, secondary- (present on admission)  Assessment & Plan  Platelet  count: 202; most likely secondary to LYLE.         VTE prophylaxis: therapeutic anticoagulation with Eliquis    I have performed a physical exam and reviewed and updated ROS and Plan today (4/17/2023). In review of yesterday's note (4/16/2023), there are no changes except as documented above.

## 2023-04-17 NOTE — ASSESSMENT & PLAN NOTE
Likely causing PE  Anticoagulation with Eliquis  We will need to follow-up with the anticoagulation clinic in the outpatient setting

## 2023-04-17 NOTE — ASSESSMENT & PLAN NOTE
Noted on 4/16/2022 echocardiogram  Likely secondary to pulmonary embolism  Fluid restriction  As needed Lasix  Labs on a.m.

## 2023-04-17 NOTE — DIETARY
Nutrition services: Day 1 of admit.  Ramu Barber is a 72 y.o. male with admitting DX of pulmonary embolism.   Consult received for unplanned wt loss of 14-23 lbs in 6 months (MST 2).       Assessment:  Height: 182.9 cm (6')  Weight: 118 kg (261 lb 0.4 oz)  Body mass index is 35.4 kg/m²., BMI classification: Obese Class II.   Diet/Intake: Cardiac with 1500 ml fluid restriction     Evaluation:   RD able to visit pt at bed side. Pt reports appetite as normal with no appetite loss prior to admission. Pt eating % so far.   Pt states UBW of 260 lbs. Current wt via stand up scale is 260 lbs. Per chart review pt wt was 278 lbs 1/16/23. Pt with a -6.47% wt loss in 4 months, wt loss is significant.   Per [facility] pt is well nourished.   Labs: Glu 149, GFR 56  Meds: SSI, Bowel regimen.   Skin: +1 edema in the RLE and LLE.   +BM 4/16    Malnutrition Risk: Pt at risk with significant wt loss however unable to meet multiple criteria at this time.     Recommendations/Plan:  Encourage intake of >50%  Document intake of all PO as % taken in ADL's to provide interdisciplinary communication across all shifts.   Monitor weight.  Nutrition rep will continue to see patient for ongoing meal and snack preferences.       RD to monitor per department policy.

## 2023-04-17 NOTE — CARE PLAN
The patient is Stable - Low risk of patient condition declining or worsening    Shift Goals  Clinical Goals: heparin therapy; wean off O2  Patient Goals: rest; complete therapy    Problem: Knowledge Deficit - Standard  Goal: Patient and family/care givers will demonstrate understanding of plan of care, disease process/condition, diagnostic tests and medications  Outcome: Progressing     Problem: Fall Risk  Goal: Patient will remain free from falls  Outcome: Progressing     Problem: Pain - Standard  Goal: Alleviation of pain or a reduction in pain to the patient’s comfort goal  Outcome: Progressing       Progress made toward(s) clinical / shift goals: patient actively engages in the discussion of diagnosis and treatment plan; fall prevention measures in place; patient denies pain    Patient is not progressing towards the following goals:

## 2023-04-18 ENCOUNTER — PHARMACY VISIT (OUTPATIENT)
Dept: PHARMACY | Facility: MEDICAL CENTER | Age: 73
End: 2023-04-18
Payer: COMMERCIAL

## 2023-04-18 VITALS
TEMPERATURE: 97.7 F | OXYGEN SATURATION: 90 % | RESPIRATION RATE: 18 BRPM | SYSTOLIC BLOOD PRESSURE: 130 MMHG | HEART RATE: 61 BPM | HEIGHT: 72 IN | BODY MASS INDEX: 35.35 KG/M2 | DIASTOLIC BLOOD PRESSURE: 71 MMHG | WEIGHT: 261.02 LBS

## 2023-04-18 PROBLEM — I50.30 DIASTOLIC HEART FAILURE (HCC): Status: ACTIVE | Noted: 2023-04-18

## 2023-04-18 LAB
ANION GAP SERPL CALC-SCNC: 12 MMOL/L (ref 7–16)
BACTERIA UR CULT: NORMAL
BUN SERPL-MCNC: 27 MG/DL (ref 8–22)
CALCIUM SERPL-MCNC: 8.7 MG/DL (ref 8.5–10.5)
CHLORIDE SERPL-SCNC: 103 MMOL/L (ref 96–112)
CO2 SERPL-SCNC: 21 MMOL/L (ref 20–33)
CREAT SERPL-MCNC: 1.44 MG/DL (ref 0.5–1.4)
GFR SERPLBLD CREATININE-BSD FMLA CKD-EPI: 51 ML/MIN/1.73 M 2
GLUCOSE BLD STRIP.AUTO-MCNC: 127 MG/DL (ref 65–99)
GLUCOSE BLD STRIP.AUTO-MCNC: 149 MG/DL (ref 65–99)
GLUCOSE SERPL-MCNC: 115 MG/DL (ref 65–99)
MAGNESIUM SERPL-MCNC: 1.9 MG/DL (ref 1.5–2.5)
PHOSPHATE SERPL-MCNC: 4 MG/DL (ref 2.5–4.5)
POTASSIUM SERPL-SCNC: 3.7 MMOL/L (ref 3.6–5.5)
SIGNIFICANT IND 70042: NORMAL
SITE SITE: NORMAL
SODIUM SERPL-SCNC: 136 MMOL/L (ref 135–145)
SOURCE SOURCE: NORMAL

## 2023-04-18 PROCEDURE — A9270 NON-COVERED ITEM OR SERVICE: HCPCS | Performed by: STUDENT IN AN ORGANIZED HEALTH CARE EDUCATION/TRAINING PROGRAM

## 2023-04-18 PROCEDURE — 82962 GLUCOSE BLOOD TEST: CPT | Mod: 91

## 2023-04-18 PROCEDURE — 84100 ASSAY OF PHOSPHORUS: CPT

## 2023-04-18 PROCEDURE — RXMED WILLOW AMBULATORY MEDICATION CHARGE: Performed by: INTERNAL MEDICINE

## 2023-04-18 PROCEDURE — 36415 COLL VENOUS BLD VENIPUNCTURE: CPT

## 2023-04-18 PROCEDURE — 700102 HCHG RX REV CODE 250 W/ 637 OVERRIDE(OP): Performed by: STUDENT IN AN ORGANIZED HEALTH CARE EDUCATION/TRAINING PROGRAM

## 2023-04-18 PROCEDURE — 83735 ASSAY OF MAGNESIUM: CPT

## 2023-04-18 PROCEDURE — 94660 CPAP INITIATION&MGMT: CPT

## 2023-04-18 PROCEDURE — 99239 HOSP IP/OBS DSCHRG MGMT >30: CPT | Performed by: INTERNAL MEDICINE

## 2023-04-18 PROCEDURE — 80048 BASIC METABOLIC PNL TOTAL CA: CPT

## 2023-04-18 RX ORDER — ROSUVASTATIN CALCIUM 20 MG/1
20 TABLET, COATED ORAL EVERY EVENING
Qty: 30 TABLET | Refills: 11 | Status: SHIPPED | OUTPATIENT
Start: 2023-04-18

## 2023-04-18 RX ADMIN — FINASTERIDE 5 MG: 5 TABLET, FILM COATED ORAL at 05:09

## 2023-04-18 RX ADMIN — AMLODIPINE BESYLATE 5 MG: 5 TABLET ORAL at 05:09

## 2023-04-18 RX ADMIN — APIXABAN 10 MG: 5 TABLET, FILM COATED ORAL at 05:09

## 2023-04-18 RX ADMIN — PAROXETINE HYDROCHLORIDE 20 MG: 20 TABLET, FILM COATED ORAL at 05:09

## 2023-04-18 RX ADMIN — GABAPENTIN 600 MG: 300 CAPSULE ORAL at 05:09

## 2023-04-18 RX ADMIN — LOVASTATIN 20 MG: 20 TABLET ORAL at 08:07

## 2023-04-18 ASSESSMENT — PAIN DESCRIPTION - PAIN TYPE: TYPE: ACUTE PAIN

## 2023-04-18 NOTE — FACE TO FACE
"Face to Face Note  -  Durable Medical Equipment    Ángel Reyez D.O. - NPI: [id number]  I certify that this patient is under my care and that they had a durable medical equipment(DME)face to face encounter by myself that meets the physician DME face-to-face encounter requirements with this patient on:    Date of encounter:   Patient:                    MRN:                       YOB: 2023  Ramu Allred May  [id number]  1950     The encounter with the patient was in whole, or in part, for the following medical condition, which is the primary reason for durable medical equipment:  Other - Pulmonary embolism leading to respiratory failure    I certify that, based on my findings, the following durable medical equipment is medically necessary:    Oxygen   HOME O2 Saturation Measurements:(Values must be present for Home Oxygen orders)  Room air sat at rest: 91  Room air sat with amb: 83  With liters of O2: 3, O2 sat at rest with O2: 93  With Liters of O2: 6, O2 sat with amb with O2 : 95  Is the patient mobile?: Yes  If patient feels more short of breath, they can go up to 6 liters per minute and contact healthcare provider.    Supporting Symptoms: The patient requires supplemental oxygen, as the following interventions have been tried with limited or no improvement: \"Ambulation with oximetry and \"Incentive spirometry.    My Clinical findings support the need for the above equipment due to:  Hypoxia  "

## 2023-04-18 NOTE — PROGRESS NOTES
Patient discharged by wheelchair with O2 concentrator. All bedside belongings went with patient and wife. Pt transported with CNA. Discharge education and medications reviewed, all questions answered. Telemetry Monitor checked in.

## 2023-04-18 NOTE — HOSPITAL COURSE
Ramu Barber is a 72 y.o. male who presented 4/15/2023 with past medical history of hypertension, dyslipidemia, type 2 diabetes mellitus with neuropathy, BPH, depression who presented with shortness of breath. Noted to have SpO2 in the 70's at home. In the ED CTA chest noted extensive bilateral pulmonary emboli, right more than left with small saddle embolus, patchy bibasilar atelectasis. Patient was started on therapeutic anticoagulation. Lower extremity ultrasound demonstrated evidence of non-occlusive DVT of left mid femoral vein. 2D echo demonstrated normal right and left ventricular function with EF 55%, grade I diastolic dysfunction, moderate pulmonary hypertension. Case management assisted in setting up home O2 for patient. Patient was determined satisfactory for discharge with appropriate follow up.

## 2023-04-18 NOTE — CARE PLAN
Problem: Knowledge Deficit - Standard  Goal: Patient and family/care givers will demonstrate understanding of plan of care, disease process/condition, diagnostic tests and medications  Outcome: Progressing     Problem: Fall Risk  Goal: Patient will remain free from falls  Outcome: Progressing     Problem: Pain - Standard  Goal: Alleviation of pain or a reduction in pain to the patient’s comfort goal  Outcome: Progressing   The patient is Stable - Low risk of patient condition declining or worsening    Shift Goals  Clinical Goals: monitor telemetry, wean o2  Patient Goals: rest; complete therapy    Progress made toward(s) clinical / shift goals:  Progressing    Patient is not progressing towards the following goals:

## 2023-04-18 NOTE — PROGRESS NOTES
Monitor Summary:   Rhythm: sb-sr  Rate: 51-64  Measurement: .21/.10/.46  Ectopy: pvc's, pac's, triplets, couplets,           HPI:  82 y/o Female PMH alzheimer's, HTN, anemia presents with flu symptoms  from home, brought in with family, who explain she's had a cough, chills, fever, congestion, body aches, headache, chills for  4 days.  Has + sick contact.  Pt. has baseline dementia and has difficulty giving history.  Pt. felt likely she was wheezing.  No other complaints or inciting event.  Pt. feels well otherwise. She denies CP, SOB, wheezing, headache at this time. (2020 01:13)      SUBJECTIVE & OBJECTIVE: Pt seen and examined at bedside.   poor historian, denies any complaints.     Vitals noted.     MEDICATIONS  (STANDING):  albuterol/ipratropium for Nebulization 3 milliLiter(s) Nebulizer every 6 hours  amLODIPine   Tablet 5 milliGRAM(s) Oral daily  heparin  Injectable 5000 Unit(s) SubCutaneous every 12 hours        PHYSICAL EXAM:    GENERAL: NAD, well-developed  HEAD:  Atraumatic, Normocephalic  EYES: EOMI, PERRLA, conjunctiva and sclera clear  ENMT: Moist mucous membranes  NECK: Supple, No JVD  NERVOUS SYSTEM:  AOx1 (self) , awake,  follows  commands, moving extremities   CHEST/LUNG: Clear to auscultation bilaterally; No rales, rhonchi, wheezing, or rubs  HEART: S1, S2  ABDOMEN: Soft, Nontender, Nondistended; Bowel sounds present  EXTREMITIES:  no cyanosis, or edema b/l     LABS:  no new labs     Urinalysis Basic - ( 2020 21:30 )    Color: Yellow / Appearance: Clear / S.005 / pH: x  Gluc: x / Ketone: Negative  / Bili: Negative / Urobili: Negative mg/dL   Blood: x / Protein: 15 mg/dL / Nitrite: Negative   Leuk Esterase: Moderate / RBC: 0-2 /HPF / WBC 3-5   Sq Epi: x / Non Sq Epi: Few / Bacteria: Few    Consultant(s) Notes Reveiwed [x ] Yes     Care Discussed with [ x] Consultants  [ ] Patient  [ ] Family  [ ] /   [ x] Other; RN

## 2023-04-18 NOTE — DISCHARGE INSTRUCTIONS
Discharge Instructions    Discharged to home by car with relative. Discharged via wheelchair, hospital escort: Yes.  Special equipment needed: Oxygen    Be sure to schedule a follow-up appointment with your primary care doctor or any specialists as instructed.     Discharge Plan:        I understand that a diet low in cholesterol, fat, and sodium is recommended for good health. Unless I have been given specific instructions below for another diet, I accept this instruction as my diet prescription.     Pulmonary Embolism    A pulmonary embolism (PE) is a sudden blockage or decrease of blood flow in one or both lungs. Most blockages come from a blood clot that forms in the vein of a lower leg, thigh, or arm (deep vein thrombosis, DVT) and travels to the lungs. A clot is blood that has thickened into a gel or solid. PE is a dangerous and life-threatening condition that needs to be treated right away.  What are the causes?  This condition is usually caused by a blood clot that forms in a vein and moves to the lungs. In rare cases, it may be caused by air, fat, part of a tumor, or other tissue that moves through the veins and into the lungs.  What increases the risk?  The following factors may make you more likely to develop this condition:  Experiencing a traumatic injury, such as breaking a hip or leg.  Having:  A spinal cord injury.  Orthopedic surgery, especially hip or knee replacement.  Any major surgery.  A stroke.  DVT.  Blood clots or blood clotting disease.  Long-term (chronic) lung or heart disease.  Cancer treated with chemotherapy.  A central venous catheter.  Taking medicines that contain estrogen. These include birth control pills and hormone replacement therapy.  Being:  Pregnant.  In the period of time after your baby is delivered (postpartum).  Older than age 60.  Overweight.  A smoker, especially if you have other risks.  What are the signs or symptoms?  Symptoms of this condition usually start  suddenly and include:  Shortness of breath during activity or at rest.  Coughing, coughing up blood, or coughing up blood-tinged mucus.  Chest pain that is often worse with deep breaths.  Rapid or irregular heartbeat.  Feeling light-headed or dizzy.  Fainting.  Feeling anxious.  Fever.  Sweating.  Pain and swelling in a leg. This is a symptom of DVT, which can lead to PE.  How is this diagnosed?  This condition may be diagnosed based on:  Your medical history.  A physical exam.  Blood tests.  CT pulmonary angiogram. This test checks blood flow in and around your lungs.  Ventilation-perfusion scan, also called a lung VQ scan. This test measures air flow and blood flow to the lungs.  An ultrasound of the legs.  How is this treated?  Treatment for this condition depends on many factors, such as the cause of your PE, your risk for bleeding or developing more clots, and other medical conditions you have. Treatment aims to remove, dissolve, or stop blood clots from forming or growing larger. Treatment may include:  Medicines, such as:  Blood thinning medicines (anticoagulants) to stop clots from forming.  Medicines that dissolve clots (thrombolytics).  Procedures, such as:  Using a flexible tube to remove a blood clot (embolectomy) or to deliver medicine to destroy it (catheter-directed thrombolysis).  Inserting a filter into a large vein that carries blood to the heart (inferior vena cava). This filter (vena cava filter) catches blood clots before they reach the lungs.  Surgery to remove the clot (surgical embolectomy). This is rare.  You may need a combination of immediate, long-term (up to 3 months after diagnosis), and extended (more than 3 months after diagnosis) treatments. Your treatment may continue for several months (maintenance therapy). You and your health care provider will work together to choose the treatment program that is best for you.  Follow these instructions at home:  Medicines  Take over-the-counter  and prescription medicines only as told by your health care provider.  If you are taking an anticoagulant medicine:  Take the medicine every day at the same time each day.  Understand what foods and drugs interact with your medicine.  Understand the side effects of this medicine, including excessive bruising or bleeding. Ask your health care provider or pharmacist about other side effects.  General instructions  Wear a medical alert bracelet or carry a medical alert card that says you have had a PE and lists what medicines you take.  Ask your health care provider when you may return to your normal activities. Avoid sitting or lying for a long time without moving.  Maintain a healthy weight. Ask your health care provider what weight is healthy for you.  Do not use any products that contain nicotine or tobacco, such as cigarettes, e-cigarettes, and chewing tobacco. If you need help quitting, ask your health care provider.  Talk with your health care provider about any travel plans. It is important to make sure that you are still able to take your medicine while on trips.  Keep all follow-up visits as told by your health care provider. This is important.  Contact a health care provider if:  You missed a dose of your blood thinner medicine.  Get help right away if:  You have:  New or increased pain, swelling, warmth, or redness in an arm or leg.  Numbness or tingling in an arm or leg.  Shortness of breath during activity or at rest.  A fever.  Chest pain.  A rapid or irregular heartbeat.  A severe headache.  Vision changes.  A serious fall or accident, or you hit your head.  Stomach (abdominal) pain.  Blood in your vomit, stool, or urine.  A cut that will not stop bleeding.  You cough up blood.  You feel light-headed or dizzy.  You cannot move your arms or legs.  You are confused or have memory loss.  These symptoms may represent a serious problem that is an emergency. Do not wait to see if the symptoms will go away.  Get medical help right away. Call your local emergency services (911 in the [place] Do not drive yourself to the hospital.  Summary  A pulmonary embolism (PE) is a sudden blockage or decrease of blood flow in one or both lungs. PE is a dangerous and life-threatening condition that needs to be treated right away.  Treatments for this condition usually include medicines to thin your blood (anticoagulants) or medicines to break apart blood clots (thrombolytics).  If you are given blood thinners, it is important to take the medicine every day at the same time each day.  Understand what foods and drugs interact with any medicines that you are taking.  If you have signs of PE or DVT, call your local emergency services (911 in the [place]  This information is not intended to replace advice given to you by your health care provider. Make sure you discuss any questions you have with your health care provider.  Document Released: 12/15/2001 Document Revised: 09/25/2019 Document Reviewed: 09/25/2019  [employer] Patient Education © 2020 [employer]    Apixaban oral tablets  What is this medicine?  APIXABAN (a PIX a ban) is an anticoagulant (blood thinner). It is used to lower the chance of stroke in people with a medical condition called atrial fibrillation. It is also used to treat or prevent blood clots in the lungs or in the veins.  This medicine may be used for other purposes; ask your health care provider or pharmacist if you have questions.  COMMON BRAND NAME(S): Eliquis  What should I tell my health care provider before I take this medicine?  They need to know if you have any of these conditions:  antiphospholipid antibody syndrome  bleeding disorders  bleeding in the brain  blood in your stools (black or tarry stools) or if you have blood in your vomit  history of blood clots  history of stomach bleeding  kidney disease  liver disease  mechanical heart valve  an unusual or allergic reaction to apixaban, other medicines,  foods, dyes, or preservatives  pregnant or trying to get pregnant  breast-feeding  How should I use this medicine?  Take this medicine by mouth with a glass of water. Follow the directions on the prescription label. You can take it with or without food. If it upsets your stomach, take it with food. Take your medicine at regular intervals. Do not take it more often than directed. Do not stop taking except on your doctor's advice. Stopping this medicine may increase your risk of a blood clot. Be sure to refill your prescription before you run out of medicine.  Talk to your pediatrician regarding the use of this medicine in children. Special care may be needed.  Overdosage: If you think you have taken too much of this medicine contact a poison control center or emergency room at once.  NOTE: This medicine is only for you. Do not share this medicine with others.  What if I miss a dose?  If you miss a dose, take it as soon as you can. If it is almost time for your next dose, take only that dose. Do not take double or extra doses.  What may interact with this medicine?  This medicine may interact with the following:  aspirin and aspirin-like medicines  certain medicines for fungal infections like ketoconazole and itraconazole  certain medicines for seizures like carbamazepine and phenytoin  certain medicines that treat or prevent blood clots like warfarin, enoxaparin, and dalteparin  clarithromycin  NSAIDs, medicines for pain and inflammation, like ibuprofen or naproxen  rifampin  ritonavir  Jc's wort  This list may not describe all possible interactions. Give your health care provider a list of all the medicines, herbs, non-prescription drugs, or dietary supplements you use. Also tell them if you smoke, drink alcohol, or use illegal drugs. Some items may interact with your medicine.  What should I watch for while using this medicine?  Visit your healthcare professional for regular checks on your progress. You may  need blood work done while you are taking this medicine. Your condition will be monitored carefully while you are receiving this medicine. It is important not to miss any appointments.  Avoid sports and activities that might cause injury while you are using this medicine. Severe falls or injuries can cause unseen bleeding. Be careful when using sharp tools or knives. Consider using an electric razor. Take special care brushing or flossing your teeth. Report any injuries, bruising, or red spots on the skin to your healthcare professional.  If you are going to need surgery or other procedure, tell your healthcare professional that you are taking this medicine.  Wear a medical ID bracelet or chain. Carry a card that describes your disease and details of your medicine and dosage times.  What side effects may I notice from receiving this medicine?  Side effects that you should report to your doctor or health care professional as soon as possible:  allergic reactions like skin rash, itching or hives, swelling of the face, lips, or tongue  signs and symptoms of bleeding such as bloody or black, tarry stools; red or dark-brown urine; spitting up blood or brown material that looks like coffee grounds; red spots on the skin; unusual bruising or bleeding from the eye, gums, or nose  signs and symptoms of a blood clot such as chest pain; shortness of breath; pain, swelling, or warmth in the leg  signs and symptoms of a stroke such as changes in vision; confusion; trouble speaking or understanding; severe headaches; sudden numbness or weakness of the face, arm or leg; trouble walking; dizziness; loss of coordination  This list may not describe all possible side effects. Call your doctor for medical advice about side effects. You may report side effects to FDA at 6-000-FDA-3712.  Where should I keep my medicine?  Keep out of the reach of children.  Store at room temperature between 20 and 25 degrees C (68 and 77 degrees F). Throw  away any unused medicine after the expiration date.  NOTE: This sheet is a summary. It may not cover all possible information. If you have questions about this medicine, talk to your doctor, pharmacist, or health care provider.  © 2020 [employer] (2019-08-28 17:39:34)

## 2023-04-18 NOTE — PROGRESS NOTES
Report received from outgoing nurse, care transferred at 0700. Patient A&Ox 4. Patient in good spirits. No pain reported at this time. Whiteboard updated with plan for the day and names of staff. No questions or concerns at this time from the patient. Call light within reach, fall precautions in place.

## 2023-04-18 NOTE — DISCHARGE SUMMARY
Discharge Summary    CHIEF COMPLAINT ON ADMISSION  Chief Complaint   Patient presents with    Shortness of Breath     Onset this morning       Reason for Admission  EMS     Admission Date  4/15/2023    CODE STATUS  Prior    HPI & HOSPITAL COURSE  Ramu Barber is a 72 y.o. male who presented 4/15/2023 with past medical history of hypertension, dyslipidemia, type 2 diabetes mellitus with neuropathy, BPH, depression who presented with shortness of breath. Noted to have SpO2 in the 70's at home. In the ED CTA chest noted extensive bilateral pulmonary emboli, right more than left with small saddle embolus, patchy bibasilar atelectasis. Patient was started on therapeutic anticoagulation. Lower extremity ultrasound demonstrated evidence of non-occlusive DVT of left mid femoral vein. 2D echo demonstrated normal right and left ventricular function with EF 55%, grade I diastolic dysfunction, moderate pulmonary hypertension. Case management assisted in setting up home O2 for patient. Patient was determined satisfactory for discharge with appropriate follow up.    Therefore, he is discharged in fair and stable condition to home with close outpatient follow-up.    The patient met 2-midnight criteria for an inpatient stay at the time of discharge.    Discharge Date  04/18/2023    FOLLOW UP ITEMS POST DISCHARGE  Please follow up with PCP in 3-5 days for post hospitalization follow up and medication reconciliation.     DISCHARGE DIAGNOSES  Principal Problem:    Pulmonary embolism, bilateral (HCC) POA: Yes  Active Problems:    Polycythemia, secondary (Chronic) POA: Yes    Sleep apnea (Chronic) POA: Yes    Essential hypertension POA: Yes    Obesity (BMI 30-39.9) POA: Yes    Benign prostatic hyperplasia with urinary frequency POA: Yes    Type 2 diabetes mellitus without complication, without long-term current use of insulin (HCC) POA: Yes    Hyperlipidemia POA: Yes    Depression POA: Yes    History of duodenal ulcer POA: Yes     Acute respiratory failure with hypoxia (HCC) POA: Yes    Acute deep vein thrombosis (DVT) of femoral vein of right lower extremity (HCC) POA: Unknown    Pulmonary hypertension (HCC) POA: Unknown    Diastolic heart failure (HCC) POA: Unknown  Resolved Problems:    * No resolved hospital problems. *      FOLLOW UP  Future Appointments   Date Time Provider Department Center   4/19/2023  3:15 PM [facility]     Chuck Chappell M.D.  [address]  [address]  [place] [place] [address]  [phone]    Follow up  please follow up with primary      MEDICATIONS ON DISCHARGE     Medication List        START taking these medications        Instructions   * Eliquis 5mg Tabs  Generic drug: apixaban   Take 2 Tablets by mouth 2 times a day for 5 days, THEN take 1 tablet 2 times a day thereafter. Indications: DVT/PE  Dose: 10 mg     * apixaban 5mg Tabs  Start taking on: April 24, 2023  Commonly known as: ELIQUIS   Take 1 Tablet by mouth 2 times a day. Indications: DVT/PE  Dose: 5 mg     rosuvastatin 20 MG Tabs  Commonly known as: CRESTOR   Take 1 Tablet by mouth every evening.  Dose: 20 mg           * This list has 2 medication(s) that are the same as other medications prescribed for you. Read the directions carefully, and ask your doctor or other care provider to review them with you.                CHANGE how you take these medications        Instructions   lovastatin 20 MG Tabs  What changed: Another medication with the same name was removed. Continue taking this medication, and follow the directions you see here.  Commonly known as: MEVACOR   Take 20 mg by mouth every day.  Dose: 20 mg     triamterene-hctz 37.5-25 MG Tabs  What changed: Another medication with the same name was removed. Continue taking this medication, and follow the directions you see here.  Commonly known as: MAXZIDE-25/DYAZIDE   Take 1 Tablet by mouth every morning.  Dose: 1 Tablet            CONTINUE taking these medications         Instructions   amLODIPine 5 MG Tabs  Commonly known as: NORVASC   Take 1 Tablet by mouth every day.  Dose: 1 Tablet     finasteride 5 MG Tabs  Commonly known as: PROSCAR   Take 1 Tablet by mouth every day.  Dose: 5 mg     furosemide 20 MG Tabs  Commonly known as: LASIX   Take 1 Tablet by mouth 2 times a day.  Dose: 20 mg     gabapentin 600 MG tablet  Commonly known as: NEURONTIN  Next Dose Due: Resume normal schedule      glipiZIDE SR 2.5 MG Tb24  Commonly known as: GLUCOTROL  Next Dose Due: Resume normal schedule      metFORMIN 850 MG Tabs  Commonly known as: GLUCOPHAGE   Take 1 Tablet by mouth 2 times a day.  Dose: 850 mg     methenamine hip 1 GM Tabs  Commonly known as: HIPPREX   Take 1 Tablet by mouth 2 times a day.  Dose: 1 g     Ozempic (0.25 or 0.5 MG/DOSE) 2 MG/1.5ML Sopn  Generic drug: Semaglutide(0.25 or 0.5MG/DOS)  Next Dose Due: Resume normal schedule   INJECT 0.25MG SUBCUTANEOUSLY ONCE WEEKLY     PARoxetine 20 MG Tabs  Commonly known as: PAXIL  Next Dose Due: Resume normal schedule   Take 1 Tablet by mouth.  Dose: 1 Tablet     terazosin 10 MG capsule  Commonly known as: HYTRIN  Next Dose Due: Resume normal schedule             STOP taking these medications      carvedilol 25 MG Tabs  Commonly known as: COREG     fosinopril 40 MG tablet  Commonly known as: MONOPRIL     PREVAGEN PO              Allergies  No Known Allergies    DIET  No orders of the defined types were placed in this encounter.      ACTIVITY  As tolerated.  Weight bearing as tolerated    CONSULTATIONS  N/A    PROCEDURES  N/A    LABORATORY  Lab Results   Component Value Date    SODIUM 136 04/18/2023    POTASSIUM 3.7 04/18/2023    CHLORIDE 103 04/18/2023    CO2 21 04/18/2023    GLUCOSE 115 (H) 04/18/2023    BUN 27 (H) 04/18/2023    CREATININE 1.44 (H) 04/18/2023        Lab Results   Component Value Date    WBC 8.9 04/17/2023    HEMOGLOBIN 16.0 04/17/2023    HEMATOCRIT 47.7 04/17/2023    PLATELETCT 198 04/17/2023        Total time of the  discharge process exceeds 37 minutes.

## 2023-04-19 NOTE — DISCHARGE PLANNING
Meds-to-Beds: Discharge prescription orders listed below delivered to patient's bedside. ARIAN Aly notified. Patient counseled.      Reviewed signs and symptoms of bleeding and when to seek medical attention. Reviewed potential drug-drug interactions.    Current Outpatient Medications   Medication Sig Dispense Refill    apixaban (ELIQUIS) 5mg Tab Take 2 Tablets by mouth 2 times a day for 5 days, THEN take 1 tablet 2 times a day thereafter. Indications: DVT/PE 74 Tablet 0    rosuvastatin (CRESTOR) 20 MG Tab Take 1 Tablet by mouth every evening. 30 Tablet 11      Marie Flynn, PharmD

## 2023-04-21 ENCOUNTER — HOSPITAL ENCOUNTER (EMERGENCY)
Facility: MEDICAL CENTER | Age: 73
End: 2023-04-21
Attending: EMERGENCY MEDICINE
Payer: COMMERCIAL

## 2023-04-21 ENCOUNTER — OFFICE VISIT (OUTPATIENT)
Dept: URGENT CARE | Facility: PHYSICIAN GROUP | Age: 73
End: 2023-04-21
Payer: COMMERCIAL

## 2023-04-21 ENCOUNTER — APPOINTMENT (OUTPATIENT)
Dept: RADIOLOGY | Facility: MEDICAL CENTER | Age: 73
End: 2023-04-21
Attending: EMERGENCY MEDICINE
Payer: COMMERCIAL

## 2023-04-21 VITALS
DIASTOLIC BLOOD PRESSURE: 82 MMHG | HEIGHT: 72 IN | BODY MASS INDEX: 35.35 KG/M2 | OXYGEN SATURATION: 91 % | TEMPERATURE: 98.1 F | RESPIRATION RATE: 14 BRPM | WEIGHT: 261 LBS | HEART RATE: 71 BPM | SYSTOLIC BLOOD PRESSURE: 128 MMHG

## 2023-04-21 VITALS
WEIGHT: 266.76 LBS | OXYGEN SATURATION: 90 % | RESPIRATION RATE: 18 BRPM | DIASTOLIC BLOOD PRESSURE: 71 MMHG | HEART RATE: 84 BPM | BODY MASS INDEX: 36.13 KG/M2 | TEMPERATURE: 97.2 F | SYSTOLIC BLOOD PRESSURE: 133 MMHG | HEIGHT: 72 IN

## 2023-04-21 DIAGNOSIS — Z79.01 CHRONIC ANTICOAGULATION: ICD-10-CM

## 2023-04-21 DIAGNOSIS — R04.0 EPISTAXIS: ICD-10-CM

## 2023-04-21 DIAGNOSIS — M10.9 ACUTE GOUT OF LEFT KNEE, UNSPECIFIED CAUSE: ICD-10-CM

## 2023-04-21 PROCEDURE — 99215 OFFICE O/P EST HI 40 MIN: CPT | Performed by: NURSE PRACTITIONER

## 2023-04-21 PROCEDURE — A9270 NON-COVERED ITEM OR SERVICE: HCPCS | Performed by: EMERGENCY MEDICINE

## 2023-04-21 PROCEDURE — 700101 HCHG RX REV CODE 250: Performed by: EMERGENCY MEDICINE

## 2023-04-21 PROCEDURE — 700102 HCHG RX REV CODE 250 W/ 637 OVERRIDE(OP): Performed by: EMERGENCY MEDICINE

## 2023-04-21 PROCEDURE — 99284 EMERGENCY DEPT VISIT MOD MDM: CPT

## 2023-04-21 PROCEDURE — 73564 X-RAY EXAM KNEE 4 OR MORE: CPT | Mod: LT

## 2023-04-21 RX ORDER — METHENAMINE MANDELATE 1000 MG/1
1 TABLET, FILM COATED ORAL
COMMUNITY
End: 2023-04-21

## 2023-04-21 RX ORDER — LIDOCAINE HYDROCHLORIDE AND EPINEPHRINE BITARTRATE 20; .01 MG/ML; MG/ML
10 INJECTION, SOLUTION SUBCUTANEOUS ONCE
Status: COMPLETED | OUTPATIENT
Start: 2023-04-21 | End: 2023-04-21

## 2023-04-21 RX ORDER — GLIPIZIDE 2.5 MG/1
2.5 TABLET, EXTENDED RELEASE ORAL DAILY
COMMUNITY
End: 2023-04-21

## 2023-04-21 RX ORDER — AMLODIPINE BESYLATE 5 MG/1
5 TABLET ORAL DAILY
COMMUNITY
Start: 2023-04-04 | End: 2023-04-21

## 2023-04-21 RX ORDER — FUROSEMIDE 20 MG/1
20 TABLET ORAL DAILY
COMMUNITY
End: 2023-04-21

## 2023-04-21 RX ORDER — CARVEDILOL 12.5 MG/1
12.5 TABLET ORAL 2 TIMES DAILY WITH MEALS
COMMUNITY
Start: 2023-04-15 | End: 2023-04-21

## 2023-04-21 RX ORDER — PREDNISONE 20 MG/1
30 TABLET ORAL DAILY
Qty: 8 TABLET | Refills: 0 | Status: SHIPPED | OUTPATIENT
Start: 2023-04-21 | End: 2023-04-26

## 2023-04-21 RX ORDER — PAROXETINE HYDROCHLORIDE 20 MG/1
20 TABLET, FILM COATED ORAL
COMMUNITY
End: 2023-04-21

## 2023-04-21 RX ORDER — CARVEDILOL 12.5 MG/1
1 TABLET ORAL 2 TIMES DAILY WITH MEALS
COMMUNITY
Start: 2022-12-12 | End: 2023-04-21

## 2023-04-21 RX ADMIN — PHENYLEPHRINE HYDROCHLORIDE 1 SPRAY: 1 SPRAY NASAL at 10:30

## 2023-04-21 RX ADMIN — LIDOCAINE HYDROCHLORIDE AND EPINEPHRINE 10 ML: 20; 10 INJECTION, SOLUTION INFILTRATION; PERINEURAL at 10:30

## 2023-04-21 RX ADMIN — SILVER NITRATE 1 APPLICATOR: 38.21; 12.74 STICK TOPICAL at 11:29

## 2023-04-21 ASSESSMENT — FIBROSIS 4 INDEX
FIB4 SCORE: 1.71
FIB4 SCORE: 1.71

## 2023-04-21 ASSESSMENT — PAIN DESCRIPTION - PAIN TYPE: TYPE: ACUTE PAIN

## 2023-04-21 NOTE — DISCHARGE INSTRUCTIONS
Apply antibiotic ointment to your nasal septum on the left side 2-3 times a day.  If you have more bleeding pinch her nose and then return to the emergency department you will require packing.  Continue your home medications.  Consider using the oxygen only on the right side since you are bleeding from the left.  For your gout take steroids as prescribed.  Return for pain redness swelling or fever.  Watch blood sugars closely.

## 2023-04-21 NOTE — PROGRESS NOTES
Ramu Barber is a 72 y.o. male who presents for Epistaxis (On going since last night, on and off, not light headed or dizzy, on 02 3L, )    Patient's son-in-law who is a physician was on the speaker phone during the examination and helped to provide some history.  HPI  This is a new problem. Ramu Barber is a 72 y.o. patient who presents to urgent care with c/o: Started bleeding last night about 1:00 in the morning.  The bleeding was not able to be controlled with pressure.  He called ambulance.  The ambulance came and applied pressure and then put a gauze packing up inside of his nose.  He was able to go back to sleep and woke up this morning and started bleeding all over again.  He has been swallowing blood.  He denies dizziness, chest pain.  He does have a history of previous nosebleeds.  They occur intermittently about every 3 months.  He has always been able to get them to stop bleeding by holding pressure.  He also complains of pain in his left knee and lower leg.  He has a history of blood clots in his legs.  He also has a history of gout.  He is not sure which is bothering his knee.  He is currently wearing a compression brace to help with the pain and using a cane.    He is on chronic anticoagulant therapy with Eliquis.  No other aggravating or alleviating factors.       ROS See HPI    Allergies:     No Known Allergies    PMSFS Hx:  Past Medical History:   Diagnosis Date    Arthritis     back, hips    Back pain     Chickenpox     High cholesterol     Hypertension     Obesity     Pain     back pain s/p multiple surgeries and spinal stimulator    Pneumonia     Polycythemia, secondary     Sleep apnea     cpap    Snoring     Type II or unspecified type diabetes mellitus without mention of complication, not stated as uncontrolled     oral meds only     Past Surgical History:   Procedure Laterality Date    EGD W/CONTROL BLEEDING  8/22/2020         GASTROSCOPY N/A 8/22/2020    Procedure: GASTROSCOPY;   Surgeon: Tawanda Gomez M.D.;  Location: SURGERY [facility];  Service: Gastroenterology    PB IMPLANT NEUROSTIM/  7/23/2019    Procedure: INSERTION, NEUROSTIMULATOR, PERMANENT, SPINAL CORD;  Surgeon: Teddy Santos M.D.;  Location: SURGERY [facility];  Service: Pain Management    LUMBAR LAMINECTOMY DISKECTOMY  2/18/2018    Procedure: LUMBAR LAMINECTOMY DISKECTOMY- LT L1-2 HEMILAMI-;  Surgeon: Tom Pittman M.D.;  Location: SURGERY [facility];  Service: Neurosurgery    HARDWARE REMOVAL NEURO  2/18/2018    Procedure: HARDWARE REMOVAL NEURO- L2-3 PEDICLE SCREWS;  Surgeon: Tom Pittman M.D.;  Location: SURGERY [facility];  Service: Neurosurgery    INGUINAL HERNIA REPAIR Right 2/16/2016    Procedure: INGUINAL HERNIA REPAIR;  Surgeon: Sj Baird M.D.;  Location: SURGERY [facility];  Service:     FUSION, SPINE, LUMBAR, PLIF  1/21/2015    Performed by Ko Suarez M.D. at [facility]    CARPAL TUNNEL RELEASE Right 2014    SHOULDER ARTHROSCOPY Right 2014    CERVICAL DISK AND FUSION ANTERIOR  2005    LUMBAR DECOMPRESSION  2004    LUMBAR DECOMPRESSION  2003     Family History   Problem Relation Age of Onset    Sleep Apnea Mother     Heart Attack Father     Heart Attack Brother      Social History     Tobacco Use    Smoking status: Never    Smokeless tobacco: Former     Types: Chew     Quit date: 12/2018   Substance Use Topics    Alcohol use: Yes     Alcohol/week: 1.2 oz     Types: 2 Shots of liquor per week     Comment: 2 a week       Problems:   Patient Active Problem List   Diagnosis    Blood glucose abnormal    Polycythemia, secondary    Sleep apnea    Essential hypertension    Overweight    Pyoderma gangrenosum    Spinal stenosis, lumbar region, without neurogenic claudication    Right inguinal hernia    Spinal stenosis, lumbar region, with neurogenic claudication    Obesity (BMI 30-39.9)    Acute cystitis without hematuria    Benign prostatic hyperplasia with  urinary frequency    Type 2 diabetes mellitus without complication, without long-term current use of insulin (HCC)    Hyperlipidemia    Pulmonary embolism, bilateral (HCC)    Depression    History of duodenal ulcer    Acute respiratory failure with hypoxia (HCC)    Acute deep vein thrombosis (DVT) of femoral vein of right lower extremity (HCC)    Pulmonary hypertension (HCC)    Diastolic heart failure (HCC)       Medications:   Current Outpatient Medications on File Prior to Visit   Medication Sig Dispense Refill    apixaban (ELIQUIS) 5mg Tab Take 2 Tablets by mouth 2 times a day for 5 days, THEN take 1 tablet 2 times a day thereafter. Indications: DVT/PE 74 Tablet 0    rosuvastatin (CRESTOR) 20 MG Tab Take 1 Tablet by mouth every evening. 30 Tablet 11    amLODIPine (NORVASC) 5 MG Tab Take 1 Tablet by mouth every day.      OZEMPIC, 0.25 OR 0.5 MG/DOSE, 2 MG/1.5ML Solution Pen-injector INJECT 0.25MG SUBCUTANEOUSLY ONCE WEEKLY      lovastatin (MEVACOR) 20 MG Tab Take 20 mg by mouth every day.      glipiZIDE SR (GLUCOTROL) 2.5 MG TABLET SR 24 HR       terazosin (HYTRIN) 10 MG capsule       finasteride (PROSCAR) 5 MG Tab Take 1 Tablet by mouth every day.      methenamine hip (HIPPREX) 1 GM Tab Take 1 Tablet by mouth 2 times a day.      PARoxetine (PAXIL) 20 MG Tab Take 1 Tablet by mouth.      furosemide (LASIX) 20 MG Tab Take 1 Tablet by mouth 2 times a day.      triamterene-hctz (MAXZIDE-25/DYAZIDE) 37.5-25 MG Tab Take 1 Tablet by mouth every morning.      metformin (GLUCOPHAGE) 850 MG Tab Take 1 Tablet by mouth 2 times a day.      PARoxetine (PAXIL) 20 MG Tab Take 20 mg by mouth. (Patient not taking: Reported on 4/21/2023)      glipiZIDE SR (GLUCOTROL) 2.5 MG TABLET SR 24 HR Take 2.5 mg by mouth every day. (Patient not taking: Reported on 4/21/2023)      furosemide (LASIX) 20 MG Tab Take 20 mg by mouth every day. (Patient not taking: Reported on 4/21/2023)      methenamine (MANDELAMINE) 1 GM tablet Take 1 g by mouth.  (Patient not taking: Reported on 4/21/2023)      carvedilol (COREG) 12.5 MG Tab Take 12.5 mg by mouth 2 times a day with meals. (Patient not taking: Reported on 4/21/2023)      carvedilol (COREG) 12.5 MG Tab Take 1 Tablet by mouth 2 times a day with meals. (Patient not taking: Reported on 4/21/2023)      amLODIPine (NORVASC) 5 MG Tab Take 5 mg by mouth every day. (Patient not taking: Reported on 4/21/2023)      [START ON 4/24/2023] apixaban (ELIQUIS) 5mg Tab Take 1 Tablet by mouth 2 times a day. Indications: DVT/PE (Patient not taking: Reported on 4/21/2023) 60 Tablet 1    gabapentin (NEURONTIN) 600 MG tablet        No current facility-administered medications on file prior to visit.          Objective:     /82 (BP Location: Right arm, Patient Position: Sitting, BP Cuff Size: Adult)   Pulse 71   Temp 36.7 °C (98.1 °F) (Temporal)   Resp 14   Ht 1.829 m (6')   Wt 118 kg (261 lb)   SpO2 91%   BMI 35.40 kg/m²     Physical Exam  Vitals and nursing note reviewed.   Constitutional:       Appearance: Normal appearance. He is normal weight.   HENT:      Nose: Mucosal edema and rhinorrhea present. Rhinorrhea is bloody.      Left Nostril: Epistaxis present. No septal hematoma or occlusion.      Comments: Blood visible in posterior pharynx.  Cardiovascular:      Rate and Rhythm: Normal rate.      Pulses: Normal pulses.   Neurological:      Mental Status: He is alert.         Assessment /Associated Orders:      1. Epistaxis        2. Chronic anticoagulation              Medical Decision Making:    Pt's clinical presentation and exam today indicate a need for higher level of care with further evaluation and/or diagnostics.   Active bloody drainage in left nostril.  Unable to clear to visualize where epistaxis is coming from.  There is active posterior pharynx bloody drainage.  No cautery or suction is available here in urgent care today.  Because it cannot visualize the bleed I am concerned about the possibility of a  posterior bleed.  [facility] was  called to arrange transfer to higher level of care in ER.  Pt is to be transported via POV with his wife.   I have reiterated to patient that although an Urgent Care to ER transfer was made this will not necessarily expedite the ER process      Please note that this dictation was created using voice recognition software. I have worked with consultants from the vendor as well as technical experts from [facility] to optimize the interface. I have made every reasonable attempt to correct obvious errors, but I expect that there are errors of grammar and possibly content that I did not discover before finalizing the note.  This note was electronically signed by provider

## 2023-04-21 NOTE — ED NOTES
Patient places in high fowlers position in bed and placed on BP cuff, 02 sat. Complaints consistent with triage note. In addition patient states he was seen in ED Sat and placed on home 02, which he thinks irritated his nares. RA 02 sat 93%. Call light within reach. Wife at bedside.

## 2023-04-21 NOTE — ED NOTES
Pt wheeled out of room, no signs of distress at time of dc, pt. With no bleeding, pt. Discharged with wife, states understanding

## 2023-04-21 NOTE — ED PROVIDER NOTES
ED Provider Note    CHIEF COMPLAINT  Chief Complaint   Patient presents with    Epistaxis     Started at midnight last night that lasted for 2 hours. Epistaxis on the L nostril started again 0630 today but has not stopped. Pt taking eliquis for hx of recent blood clots and DVT.    Knee Pain     L knee swelling and pain started yesterday. Pt concerned it's another clot.       EXTERNAL RECORDS REVIEWED  Reviewed based on labs and previous hospitalization for comparison.    HPI/ROS  LIMITATION TO HISTORY   None  OUTSIDE HISTORIAN(S):  Wife at bedside.    Ramu Barber is a 72 y.o. male who presents to the emergency department complaining of epistaxis and left knee pain.    The patient recently had bilateral pulmonary emboli and was started on anticoagulation and oxygen.  Since being at home has been having irritation with oxygen bleeding from his left naris really started last night.  He had bleeding intermittently for some time and seems to be worsening now.  They have had a hard time controlling it last night because with anticoagulation so they came in for an evaluation.    Patient also planes of swelling around the left knee.  Swelling is mostly anterior and above the left knee.  He has had similar swelling in the past which is consistent with his gout.  He denies any fevers or chills or trauma.  Numbness or tingling.  No other acute concerns or complaints.    PAST MEDICAL HISTORY   has a past medical history of Arthritis, Back pain, Chickenpox, High cholesterol, Hypertension, Obesity, Pain, Pneumonia, Polycythemia, secondary, Sleep apnea, Snoring, and Type II or unspecified type diabetes mellitus without mention of complication, not stated as uncontrolled.    SURGICAL HISTORY   has a past surgical history that includes fusion, spine, lumbar, plif (1/21/2015); inguinal hernia repair (Right, 2/16/2016); lumbar laminectomy diskectomy (2/18/2018); hardware removal neuro (2/18/2018); carpal tunnel release (Right,  2014); shoulder arthroscopy (Right, 2014); lumbar decompression (2004); lumbar decompression (2003); cervical disk and fusion anterior (2005); implant neurostim/ (7/23/2019); egd w/control bleeding (8/22/2020); and gastroscopy (N/A, 8/22/2020).    FAMILY HISTORY  Family History   Problem Relation Age of Onset    Sleep Apnea Mother     Heart Attack Father     Heart Attack Brother        SOCIAL HISTORY  Social History     Tobacco Use    Smoking status: Never    Smokeless tobacco: Former     Types: Chew     Quit date: 12/2018   Vaping Use    Vaping Use: Never used   Substance and Sexual Activity    Alcohol use: Yes     Alcohol/week: 1.2 oz     Types: 2 Shots of liquor per week     Comment: 2 a week    Drug use: No    Sexual activity: Not on file       CURRENT MEDICATIONS  Home Medications       Reviewed by Sj Decker R.N. (Registered Nurse) on 04/21/23 at 0906  Med List Status: Partial     Medication Last Dose Status   amLODIPine (NORVASC) 5 MG Tab  Active   apixaban (ELIQUIS) 5mg Tab  Active   finasteride (PROSCAR) 5 MG Tab  Active   furosemide (LASIX) 20 MG Tab  Active   glipiZIDE SR (GLUCOTROL) 2.5 MG TABLET SR 24 HR  Active   lovastatin (MEVACOR) 20 MG Tab  Active   metformin (GLUCOPHAGE) 850 MG Tab  Active   methenamine hip (HIPPREX) 1 GM Tab  Active   OZEMPIC, 0.25 OR 0.5 MG/DOSE, 2 MG/1.5ML Solution Pen-injector  Active   PARoxetine (PAXIL) 20 MG Tab  Active   rosuvastatin (CRESTOR) 20 MG Tab  Active   terazosin (HYTRIN) 10 MG capsule  Active   triamterene-hctz (MAXZIDE-25/DYAZIDE) 37.5-25 MG Tab  Active                    ALLERGIES  No Known Allergies    PHYSICAL EXAM  VITAL SIGNS: BP (!) 140/84   Pulse 70   Temp 35.8 °C (96.5 °F) (Temporal)   Resp 16   Ht 1.829 m (6')   Wt 121 kg (266 lb 12.1 oz)   SpO2 93%   BMI 36.18 kg/m²    Constitutional: Well developed, Well nourished, No acute distress, Non-toxic appearance.   HENT: Normocephalic, Atraumatic, Bilateral external ears normal, Oropharynx  moist, No oral exudates, blood in the septum anteriorly on the left naris.  No blood in the posterior oropharynx.  No active bleeding.  Lots of dried blood in the face.  Eyes: PERRL, EOMI, Conjunctiva normal, No discharge.   Neck: Normal range of motion, No tenderness, Supple, No stridor.   Cardiovascular: Normal heart rate, Normal rhythm, No murmurs,  Thorax & Lungs: Normal breath sounds, No respiratory distress  Abdomen: Bowel sounds normal, Soft, No tenderness  Musculoskeletal: Good range of motion in all major joints left lower extremity is no swelling redness around the calf.  There is tenderness and swelling and effusion above the knee.  Slightly around the left knee.  Range of motion flexion to about 90.  Is not red or hot.  Normal neurovascular exam.  No signs of DVT.  Neurologic: Alert, No focal deficits noted.         DIAGNOSTIC STUDIES / PROCEDURES  Results for orders placed or performed during the hospital encounter of 04/15/23   EC-ECHOCARDIOGRAM COMPLETE W/ CONT   Result Value Ref Range    Left Ventrical Ejection Fraction 55    CBC with Differential   Result Value Ref Range    WBC 8.8 4.8 - 10.8 K/uL    RBC 5.25 4.70 - 6.10 M/uL    Hemoglobin 16.6 14.0 - 18.0 g/dL    Hematocrit 49.6 42.0 - 52.0 %    MCV 94.5 81.4 - 97.8 fL    MCH 31.6 27.0 - 33.0 pg    MCHC 33.5 (L) 33.7 - 35.3 g/dL    RDW 48.7 35.9 - 50.0 fL    Platelet Count 202 164 - 446 K/uL    MPV 10.1 9.0 - 12.9 fL    Neutrophils-Polys 74.20 (H) 44.00 - 72.00 %    Lymphocytes 15.00 (L) 22.00 - 41.00 %    Monocytes 6.80 0.00 - 13.40 %    Eosinophils 2.70 0.00 - 6.90 %    Basophils 0.80 0.00 - 1.80 %    Immature Granulocytes 0.50 0.00 - 0.90 %    Nucleated RBC 0.00 /100 WBC    Neutrophils (Absolute) 6.51 1.82 - 7.42 K/uL    Lymphs (Absolute) 1.32 1.00 - 4.80 K/uL    Monos (Absolute) 0.60 0.00 - 0.85 K/uL    Eos (Absolute) 0.24 0.00 - 0.51 K/uL    Baso (Absolute) 0.07 0.00 - 0.12 K/uL    Immature Granulocytes (abs) 0.04 0.00 - 0.11 K/uL    NRBC  (Absolute) 0.00 K/uL   Lactic Acid   Result Value Ref Range    Lactic Acid 1.6 0.5 - 2.0 mmol/L   Lactic Acid   Result Value Ref Range    Lactic Acid 0.9 0.5 - 2.0 mmol/L   Blood Culture    Specimen: Peripheral; Blood   Result Value Ref Range    Significant Indicator NEG     Source BLD     Site PERIPHERAL     Culture Result No growth after 5 days of incubation.    Blood Culture    Specimen: Peripheral; Blood   Result Value Ref Range    Significant Indicator NEG     Source BLD     Site PERIPHERAL     Culture Result No growth after 5 days of incubation.    Urinalysis    Specimen: Urine   Result Value Ref Range    Color Yellow     Character Clear     Specific Gravity 1.020 <1.035    Ph 5.0 5.0 - 8.0    Glucose Negative Negative mg/dL    Ketones Negative Negative mg/dL    Protein 100 (A) Negative mg/dL    Bilirubin Negative Negative    Urobilinogen, Urine 0.2 Negative    Nitrite Negative Negative    Leukocyte Esterase Negative Negative    Occult Blood Trace (A) Negative    Micro Urine Req Microscopic    Urine Culture (NEW)    Specimen: Urine   Result Value Ref Range    Significant Indicator NEG     Source UR     Site -     Culture Result Usual skin nicki >100,000 cfu/mL    D-Dimer   Result Value Ref Range    D-Dimer 17.31 (H) 0.00 - 0.50 ug/mL (FEU)   Prothrombin Time   Result Value Ref Range    PT 14.5 12.0 - 14.6 sec    INR 1.14 (H) 0.87 - 1.13   APTT   Result Value Ref Range    APTT 24.5 (L) 24.7 - 36.0 sec   CoV-2, FLU A/B, and RSV by PCR (2-4 Hours CEPHEID) : Collect NP swab in VTM    Specimen: Respirate   Result Value Ref Range    Influenza virus A RNA Negative Negative    Influenza virus B, PCR Negative Negative    RSV, PCR Negative Negative    SARS-CoV-2 by PCR NotDetected     SARS-CoV-2 Source NP Swab    Comp Metabolic Panel   Result Value Ref Range    Sodium 144 135 - 145 mmol/L    Potassium 3.4 (L) 3.6 - 5.5 mmol/L    Chloride 111 96 - 112 mmol/L    Co2 21 20 - 33 mmol/L    Anion Gap 12.0 7.0 - 16.0     Glucose 124 (H) 65 - 99 mg/dL    Bun 27 (H) 8 - 22 mg/dL    Creatinine 1.20 0.50 - 1.40 mg/dL    Calcium 8.3 (L) 8.5 - 10.5 mg/dL    AST(SGOT) 17 12 - 45 U/L    ALT(SGPT) 13 2 - 50 U/L    Alkaline Phosphatase 76 30 - 99 U/L    Total Bilirubin 0.4 0.1 - 1.5 mg/dL    Albumin 3.3 3.2 - 4.9 g/dL    Total Protein 6.4 6.0 - 8.2 g/dL    Globulin 3.1 1.9 - 3.5 g/dL    A-G Ratio 1.1 g/dL   PROCALCITONIN   Result Value Ref Range    Procalcitonin 0.06 <0.25 ng/mL   TROPONIN   Result Value Ref Range    Troponin T 43 (H) 6 - 19 ng/L   proBrain Natriuretic Peptide, NT   Result Value Ref Range    NT-proBNP 2142 (H) 0 - 125 pg/mL   URINE MICROSCOPIC (W/UA)   Result Value Ref Range    WBC 5-10 (A) /hpf    RBC 0-2 (A) /hpf    Bacteria Negative None /hpf    Epithelial Cells Few /hpf    Epithelial Cells Renal Few /hpf    Hyaline Cast 0-2 /lpf   ESTIMATED GFR   Result Value Ref Range    GFR (CKD-EPI) 64 >60 mL/min/1.73 m 2   CORRECTED CALCIUM   Result Value Ref Range    Correct Calcium 8.9 8.5 - 10.5 mg/dL   aPTT   Result Value Ref Range    APTT 24.8 24.7 - 36.0 sec   Prothrombin Time   Result Value Ref Range    PT 14.6 12.0 - 14.6 sec    INR 1.15 (H) 0.87 - 1.13   Heparin Xa (Unfractionated)   Result Value Ref Range    Heparin Xa (UFH) <0.10 IU/mL   Heparin Anti-Xa   Result Value Ref Range    Heparin Xa (UFH) 0.76 IU/mL   HEMOGLOBIN A1C   Result Value Ref Range    Glycohemoglobin 7.8 (H) 4.0 - 5.6 %    Est Avg Glucose 177 mg/dL   Heparin Anti-Xa   Result Value Ref Range    Heparin Xa (UFH) 0.45 IU/mL   TROPONIN   Result Value Ref Range    Troponin T 41 (H) 6 - 19 ng/L   Heparin Anti-Xa   Result Value Ref Range    Heparin Xa (UFH) 0.53 IU/mL   CBC with Differential   Result Value Ref Range    WBC 8.9 4.8 - 10.8 K/uL    RBC 5.08 4.70 - 6.10 M/uL    Hemoglobin 16.0 14.0 - 18.0 g/dL    Hematocrit 47.7 42.0 - 52.0 %    MCV 93.9 81.4 - 97.8 fL    MCH 31.5 27.0 - 33.0 pg    MCHC 33.5 (L) 33.7 - 35.3 g/dL    RDW 48.7 35.9 - 50.0 fL    Platelet  Count 198 164 - 446 K/uL    MPV 10.1 9.0 - 12.9 fL    Neutrophils-Polys 65.00 44.00 - 72.00 %    Lymphocytes 20.40 (L) 22.00 - 41.00 %    Monocytes 9.40 0.00 - 13.40 %    Eosinophils 4.00 0.00 - 6.90 %    Basophils 0.80 0.00 - 1.80 %    Immature Granulocytes 0.40 0.00 - 0.90 %    Nucleated RBC 0.00 /100 WBC    Neutrophils (Absolute) 5.77 1.82 - 7.42 K/uL    Lymphs (Absolute) 1.81 1.00 - 4.80 K/uL    Monos (Absolute) 0.84 0.00 - 0.85 K/uL    Eos (Absolute) 0.36 0.00 - 0.51 K/uL    Baso (Absolute) 0.07 0.00 - 0.12 K/uL    Immature Granulocytes (abs) 0.04 0.00 - 0.11 K/uL    NRBC (Absolute) 0.00 K/uL   Comp Metabolic Panel (CMP)   Result Value Ref Range    Sodium 140 135 - 145 mmol/L    Potassium 4.0 3.6 - 5.5 mmol/L    Chloride 106 96 - 112 mmol/L    Co2 21 20 - 33 mmol/L    Anion Gap 13.0 7.0 - 16.0    Glucose 149 (H) 65 - 99 mg/dL    Bun 24 (H) 8 - 22 mg/dL    Creatinine 1.34 0.50 - 1.40 mg/dL    Calcium 9.0 8.5 - 10.5 mg/dL    AST(SGOT) 17 12 - 45 U/L    ALT(SGPT) 13 2 - 50 U/L    Alkaline Phosphatase 75 30 - 99 U/L    Total Bilirubin 0.5 0.1 - 1.5 mg/dL    Albumin 3.5 3.2 - 4.9 g/dL    Total Protein 6.3 6.0 - 8.2 g/dL    Globulin 2.8 1.9 - 3.5 g/dL    A-G Ratio 1.3 g/dL   Lipid Profile (Lipid Panel) Fasting   Result Value Ref Range    Cholesterol,Tot 152 100 - 199 mg/dL    Triglycerides 87 0 - 149 mg/dL    HDL 49 >=40 mg/dL    LDL 86 <100 mg/dL   CORRECTED CALCIUM   Result Value Ref Range    Correct Calcium 9.4 8.5 - 10.5 mg/dL   ESTIMATED GFR   Result Value Ref Range    GFR (CKD-EPI) 56 (A) >60 mL/min/1.73 m 2   Basic Metabolic Panel   Result Value Ref Range    Sodium 136 135 - 145 mmol/L    Potassium 3.7 3.6 - 5.5 mmol/L    Chloride 103 96 - 112 mmol/L    Co2 21 20 - 33 mmol/L    Glucose 115 (H) 65 - 99 mg/dL    Bun 27 (H) 8 - 22 mg/dL    Creatinine 1.44 (H) 0.50 - 1.40 mg/dL    Calcium 8.7 8.5 - 10.5 mg/dL    Anion Gap 12.0 7.0 - 16.0   MAGNESIUM   Result Value Ref Range    Magnesium 1.9 1.5 - 2.5 mg/dL    PHOSPHORUS   Result Value Ref Range    Phosphorus 4.0 2.5 - 4.5 mg/dL   ESTIMATED GFR   Result Value Ref Range    GFR (CKD-EPI) 51 (A) >60 mL/min/1.73 m 2   EKG   Result Value Ref Range    Report       [facility] Emergency Dept.    Test Date:  2023-04-15  Pt Name:    SEAMUS ALVAREZ                   Department: ER  MRN:        [id number]                      Room:       [facility]  Gender:     Male                         Technician: [id number]  :        1950                   Requested By:ER TRIAGE PROTOCOL  Order #:    [id number]                    Reading MD:    Measurements  Intervals                                Axis  Rate:       74                           P:          17  AZ:         188                          QRS:        32  QRSD:       96                           T:          11  QT:         435  QTc:        483    Interpretive Statements  Sinus rhythm  Atrial premature complexes  Abnormal R-wave progression, late transition  Nonspecific T abnormalities, anterior leads  Borderline prolonged QT interval  Compared to ECG 2020 09:59:49  T-wave abnormality now present  ST (T wave) deviation no longer present     POCT glucose device results   Result Value Ref Range    POC Glucose, Blood 132 (H) 65 - 99 mg/dL   POCT glucose device results   Result Value Ref Range    POC Glucose, Blood 232 (H) 65 - 99 mg/dL   POCT glucose device results   Result Value Ref Range    POC Glucose, Blood 149 (H) 65 - 99 mg/dL   POCT glucose device results   Result Value Ref Range    POC Glucose, Blood 166 (H) 65 - 99 mg/dL   POCT glucose device results   Result Value Ref Range    POC Glucose, Blood 158 (H) 65 - 99 mg/dL   POCT glucose device results   Result Value Ref Range    POC Glucose, Blood 224 (H) 65 - 99 mg/dL   POCT glucose device results   Result Value Ref Range    POC Glucose, Blood 102 (H) 65 - 99 mg/dL   POCT glucose device results   Result Value Ref Range    POC Glucose, Blood 242 (H) 65 - 99 mg/dL    POCT glucose device results   Result Value Ref Range    POC Glucose, Blood 149 (H) 65 - 99 mg/dL   POCT glucose device results   Result Value Ref Range    POC Glucose, Blood 127 (H) 65 - 99 mg/dL      RADIOLOGY  I have independently interpreted the diagnostic imaging associated with this visit and am waiting the final reading from the radiologist.   My preliminary interpretation is as follows: No obvious fracture.  Radiologist interpretation:     DX-KNEE COMPLETE 4+ LEFT   Final Result         1.  No radiographic evidence of acute injury.            COURSE & MEDICAL DECISION MAKING    ED Observation Status? Yes; I am placing the patient in to an observation status due to a diagnostic uncertainty as well as therapeutic intensity. Patient placed in observation status at 10:04 AM, 4/21/2023.     Observation plan is as follows: Patient is admitted to observation manage his epistaxis and evaluate his knee.    Upon Reevaluation, the patient's condition has: Improved; and will be discharged.    Patient discharged from ED Observation status at 115 (Time) 4/21 (Date).     INITIAL ASSESSMENT, COURSE AND PLAN  Care Narrative:     Patient presents to the emergency department with epistaxis and knee pain.    The patient appears to have acute anterior epistaxis in the left naris that is not actively bleeding.  He appears to have a bleeding vessel in Kiesselbach's plexus and I think this is the source of his bleeding.  He has a very narrow nasal passage and I think packing would be very uncomfortable for him.  Again he is not actively at the time I see him.  He does not have a large amount of clot burden in the nasal passages just bradycardia.  Everett-Synephrine impacted his left naris with lidocaine with epinephrine to help control some pain.  After that unable to see what I believed to be the bleeding vessel which is small sleep which is nonpulsatile and I was able to cauterize it with silver nitrate.  He was then observed  without any rebleeding.    Had a lengthy shared decision-making conversation about packing versus a watchful waiting approach.  I think a couple of things.  I think we have a controlled now with cautery I do not see any evidence of posterior epistaxis and I think that oxygen is providing irritation is really causing irritation of the Kiesselbach's plexus.  After about 45 minutes after cautery antibiotic ointment is applied to this area and he will be advised to apply antibiotic ointment twice a day.  Of also advised to apply try to just put the cannula in the opposite naris that is not bleeding.  Has been satting well here in the low 90s and certainly adequate without any oxygen here.    Alternatives were put a packing in.  The patient is made aware that the packing to stay in place for 5 days.  I think will not clot off during his back and will bleed follow-up and ultimately I will stop.  This would provide more definitive control and less likely to have him come back for continued bleeding however it would be uncomfortable not present for several days require antibiotic therapy.  After some time they have decided to go home after the cautery take a watchful waiting approach and understand the hospital return for packing if he bleeds again.    Advised apply antibiotic ointment to his nasal septum twice a day.    Regarding his knee this appears to be swelling and effusion mostly anteriorly and above the knee.  His calf is not tender is not red or hot I does not appear to be infected he has no clinical signs of septic arthritis.  I do not think this is bleeding or complication of his anticoagulation is no evidence of ecchymosis or hemarthrosis.  I suspect this is an exacerbation of his gout.  He has crystal proven gout with a previous arthrocentesis of that being gout crystals in January of this year.  I do not think the benefit from an arthrocentesis at this would risk infection or hemorrhage and not provide any new  or different diagnoses.  In the absence of any signs of infection I think it is okay to treat him with steroids.  I do not think his DVT is anterior not posterior and is appropriately treated.    The patient be discharged with return precautions for the and epistaxis.  Return for pain or other concerns.  Questions were answered is agreeable to plan he is discharged in good condition.        ADDITIONAL PROBLEM LIST  Pulmonary emboli  Chronic anticoagulation  Respiratory failure requiring oxygen for secondary to pulmonary emboli  Gout  Epistaxis    DISPOSITION AND DISCUSSIONS      Escalation of care considered, and ultimately not performed:blood analysis patient's had recent blood test that are adequate    Chuck Chappell M.D.  [address] [place] [address] [phone]  [address] [address]  [phone]    Schedule an appointment as soon as possible for a visit in 2 days      Nasra Yañez M.D.  [address] [facility] [address]  [phone]    Schedule an appointment as soon as possible for a visit in 2 days          FINAL DIAGNOSIS  1. Epistaxis    2. Acute gout of left knee, unspecified cause           Electronically signed by: Nicola Hartmann M.D., 4/21/2023 10:04 AM

## 2023-04-21 NOTE — ED TRIAGE NOTES
Chief Complaint   Patient presents with    Epistaxis     Started at midnight last night that lasted for 2 hours. Epistaxis on the L nostril started again 0630 today but has not stopped. Pt taking eliquis for hx of recent blood clots and DVT.    Knee Pain     L knee swelling and pain started yesterday. Pt concerned it's another clot.     Pt has hx of gout.    BP (!) 140/84   Pulse 70   Temp 35.8 °C (96.5 °F) (Temporal)   Resp 16   Ht 1.829 m (6')   Wt 121 kg (266 lb 12.1 oz)   SpO2 93%   BMI 36.18 kg/m²

## 2023-04-21 NOTE — ED NOTES
Bedside report received, assumed pt. Care at this time, pt. Resting, wife at bedside, poc updated, 3 p's addressed

## 2023-05-08 ENCOUNTER — OFFICE VISIT (OUTPATIENT)
Dept: URGENT CARE | Facility: PHYSICIAN GROUP | Age: 73
End: 2023-05-08
Payer: COMMERCIAL

## 2023-05-08 VITALS
HEIGHT: 72 IN | DIASTOLIC BLOOD PRESSURE: 82 MMHG | OXYGEN SATURATION: 97 % | TEMPERATURE: 97.9 F | SYSTOLIC BLOOD PRESSURE: 138 MMHG | WEIGHT: 266 LBS | HEART RATE: 56 BPM | BODY MASS INDEX: 36.03 KG/M2 | RESPIRATION RATE: 16 BRPM

## 2023-05-08 DIAGNOSIS — M25.462 EFFUSION OF BURSA OF LEFT KNEE: ICD-10-CM

## 2023-05-08 DIAGNOSIS — M10.9 ACUTE GOUT OF LEFT KNEE, UNSPECIFIED CAUSE: ICD-10-CM

## 2023-05-08 PROCEDURE — 20610 DRAIN/INJ JOINT/BURSA W/O US: CPT | Mod: LT | Performed by: STUDENT IN AN ORGANIZED HEALTH CARE EDUCATION/TRAINING PROGRAM

## 2023-05-08 PROCEDURE — 99214 OFFICE O/P EST MOD 30 MIN: CPT | Mod: 25 | Performed by: STUDENT IN AN ORGANIZED HEALTH CARE EDUCATION/TRAINING PROGRAM

## 2023-05-08 RX ORDER — COLCHICINE 0.6 MG/1
TABLET ORAL
Qty: 30 TABLET | Refills: 1 | Status: SHIPPED | OUTPATIENT
Start: 2023-05-08 | End: 2023-12-21

## 2023-05-08 RX ORDER — METHYLPREDNISOLONE 4 MG/1
TABLET ORAL
Qty: 21 TABLET | Refills: 0 | Status: SHIPPED | OUTPATIENT
Start: 2023-05-08 | End: 2023-12-21

## 2023-05-08 ASSESSMENT — FIBROSIS 4 INDEX: FIB4 SCORE: 1.71

## 2023-05-08 NOTE — PROGRESS NOTES
Subjective:   CHIEF COMPLAINT  Chief Complaint   Patient presents with    Gout     Right knee x2 days   Hx of gout.        HPI  Ramu Barber is a 72 y.o. male who presents with a gout flare.  Said he developed pain and swelling 2 days ago.  This has been somewhat of a recurrent issue for the patient for the last year.  Recently started allopurinol.  He cannot take NSAIDs due to being on blood thinners.  He has not had any fevers, chills or systemic symptoms.  In the past he has been treated with p.o. steroids via urgent care which is seem to help.  Also had arthrocentesis and steroid injection 2 months ago at [facility] which also helped; fluid demonstrated crystals.  He is requesting his knee get drained as this has helped in the past.  Patient is accompanied by his wife.    REVIEW OF SYSTEMS  General: no fever or chills  GI: no nausea or vomiting  See HPI for further details.    PAST MEDICAL HISTORY   has a past medical history of Arthritis, Back pain, Chickenpox, High cholesterol, Hypertension, Obesity, Pain, Pneumonia, Polycythemia, secondary, Sleep apnea, Snoring, and Type II or unspecified type diabetes mellitus without mention of complication, not stated as uncontrolled.    SURGICAL HISTORY   has a past surgical history that includes fusion, spine, lumbar, plif (1/21/2015); inguinal hernia repair (Right, 2/16/2016); lumbar laminectomy diskectomy (2/18/2018); hardware removal neuro (2/18/2018); carpal tunnel release (Right, 2014); shoulder arthroscopy (Right, 2014); lumbar decompression (2004); lumbar decompression (2003); cervical disk and fusion anterior (2005); implant neurostim/ (7/23/2019); egd w/control bleeding (8/22/2020); and gastroscopy (N/A, 8/22/2020).    ALLERGIES  No Known Allergies    CURRENT MEDICATIONS  Home Medications       Reviewed by Blanco Mccarthy'booker (Medical Assistant) on 05/08/23 at 0848  Med List Status: <None>     Medication Last Dose Status   amLODIPine  (NORVASC) 5 MG Tab Taking Active   apixaban (ELIQUIS) 5mg Tab Taking Active   finasteride (PROSCAR) 5 MG Tab Taking Active   furosemide (LASIX) 20 MG Tab Taking Active   glipiZIDE SR (GLUCOTROL) 2.5 MG TABLET SR 24 HR Taking Active   lovastatin (MEVACOR) 20 MG Tab Taking Active   metformin (GLUCOPHAGE) 850 MG Tab Taking Active   methenamine hip (HIPPREX) 1 GM Tab Taking Active   OZEMPIC, 0.25 OR 0.5 MG/DOSE, 2 MG/1.5ML Solution Pen-injector Taking Active   PARoxetine (PAXIL) 20 MG Tab Taking Active   rosuvastatin (CRESTOR) 20 MG Tab Taking Active   terazosin (HYTRIN) 10 MG capsule Taking Active   triamterene-hctz (MAXZIDE-25/DYAZIDE) 37.5-25 MG Tab Taking Active                    SOCIAL HISTORY  Social History     Tobacco Use    Smoking status: Never    Smokeless tobacco: Former     Types: Chew     Quit date: 12/2018   Vaping Use    Vaping Use: Never used   Substance and Sexual Activity    Alcohol use: Yes     Alcohol/week: 1.2 oz     Types: 2 Shots of liquor per week     Comment: 2 a week    Drug use: No    Sexual activity: Not on file       FAMILY HISTORY  Family History   Problem Relation Age of Onset    Sleep Apnea Mother     Heart Attack Father     Heart Attack Brother           Objective:   PHYSICAL EXAM  VITAL SIGNS: /82 (BP Location: Right arm, Patient Position: Sitting, BP Cuff Size: Adult)   Pulse (!) 56   Temp 36.6 °C (97.9 °F) (Temporal)   Resp 16   Ht 1.829 m (6')   Wt 121 kg (266 lb)   SpO2 97%   BMI 36.08 kg/m²     Gen: no acute distress, normal voice  Skin: dry, intact, moist mucosal membranes  Eyes: No conjunctival injection b/l  Neck: Normal range of motion. No meningeal signs.   Lungs: No increased work of breathing.  CTAB w/ symmetric expansion  CV: RRR w/o murmurs or clicks  MSK: Left knee: Antalgic gait.  Moderate to severe effusion.  Minimal to no warmth.  No redness.  Mild, global anterior tenderness palpation.  No focal joint tenderness.  Diminished ROM due to pain and  effusion.      Procedure note: Right knee arthrocentesis  Risks/benefits discussed including pain, bleeding and infection.  Verbal consent was obtained.  With the patient supine and knee fully extended the superolateral portal of the knee was cleaned with iodine.  Under sterile technique using a 27-gauge 1.5 inch needle, 4 cc of 1% lidocaine without epinephrine was injected into the superficial tissue and knee joint.  After appropriate anesthesia was achieved, an 18-gauge needle  was inserted through the same portal draining approximately 60 serosanguineous fluid. Patient tolerated procedure well. There was nominal blood loss. Site was then covered with Polysporin and a bandage.        Assessment/Plan:     1. Effusion of bursa of left knee        2. Acute gout of left knee, unspecified cause  methylPREDNISolone (MEDROL DOSEPAK) 4 MG Tablet Therapy Pack    colchicine (COLCRYS) 0.6 MG Tab      Effusion secondary to gout versus DJD.  Fluid was serosanguineous in appearance 2/2 Eliquis.  History and physical exam not consistent with underlying infection.  Patient was requesting arthrocentesis which was performed as described above.  Steroid injection was not provided given he recently had intra-articular steroid injection approximately 2 months ago.  -Ordered Medrol Dosepak.  Side effects discussed.  -Ordered colchicine  -Resume allopurinol once symptoms resolve  -Recommended follow-up with primary care for reevaluation continue management  - Return to urgent care any new/worsening symptoms or further questions or concerns.  Patient understood everything discussed.  All questions were answered.      Differential diagnosis, natural history, supportive care, and indications for immediate follow-up discussed. All questions answered. Patient agrees with the plan of care.    Follow-up as needed if symptoms worsen or fail to improve to PCP, Urgent care or Emergency Room.    38 minutes was spent caring for this patient on the  day of the encounter which included review of multiple previous encounters/labs/imaging, face-to-face time, discussing the diagnosis, medical management, need for follow-up, return  precautions and completion of the chart. This does not include time spent on separately billable procedures/tests.      Please note that this dictation was created using voice recognition software. I have made a reasonable attempt to correct obvious errors, but I expect that there are errors of grammar and possibly content that I did not discover before finalizing the note.

## 2023-05-11 PROCEDURE — RXMED WILLOW AMBULATORY MEDICATION CHARGE: Performed by: INTERNAL MEDICINE

## 2023-05-12 ENCOUNTER — PHARMACY VISIT (OUTPATIENT)
Dept: PHARMACY | Facility: MEDICAL CENTER | Age: 73
End: 2023-05-12
Payer: COMMERCIAL

## 2023-08-23 ENCOUNTER — OFFICE VISIT (OUTPATIENT)
Dept: URGENT CARE | Facility: PHYSICIAN GROUP | Age: 73
End: 2023-08-23
Payer: COMMERCIAL

## 2023-08-23 VITALS
OXYGEN SATURATION: 93 % | WEIGHT: 263 LBS | TEMPERATURE: 97.9 F | DIASTOLIC BLOOD PRESSURE: 74 MMHG | HEIGHT: 72 IN | HEART RATE: 78 BPM | SYSTOLIC BLOOD PRESSURE: 130 MMHG | RESPIRATION RATE: 16 BRPM | BODY MASS INDEX: 35.62 KG/M2

## 2023-08-23 DIAGNOSIS — M10.9 ACUTE GOUT OF LEFT ANKLE, UNSPECIFIED CAUSE: ICD-10-CM

## 2023-08-23 PROCEDURE — 3078F DIAST BP <80 MM HG: CPT | Performed by: STUDENT IN AN ORGANIZED HEALTH CARE EDUCATION/TRAINING PROGRAM

## 2023-08-23 PROCEDURE — 1170F FXNL STATUS ASSESSED: CPT | Performed by: STUDENT IN AN ORGANIZED HEALTH CARE EDUCATION/TRAINING PROGRAM

## 2023-08-23 PROCEDURE — 3075F SYST BP GE 130 - 139MM HG: CPT | Performed by: STUDENT IN AN ORGANIZED HEALTH CARE EDUCATION/TRAINING PROGRAM

## 2023-08-23 PROCEDURE — 99214 OFFICE O/P EST MOD 30 MIN: CPT | Performed by: STUDENT IN AN ORGANIZED HEALTH CARE EDUCATION/TRAINING PROGRAM

## 2023-08-23 RX ORDER — COLCHICINE 0.6 MG/1
0.6 TABLET ORAL DAILY
Qty: 30 TABLET | Refills: 1 | Status: SHIPPED | OUTPATIENT
Start: 2023-08-23 | End: 2023-12-21

## 2023-08-23 ASSESSMENT — FIBROSIS 4 INDEX: FIB4 SCORE: 1.71

## 2023-08-23 NOTE — PROGRESS NOTES
Subjective:   CHIEF COMPLAINT  Chief Complaint   Patient presents with    Foot Problem     Left foot pain, might be gout, left knee tingling        HPI  Ramu Barber is a 72 y.o. male who presents left ankle pain since last night.  He has a history of recurrent gout of his left knee and says his current symptoms are consistent with his previous gout flares of his knee.  No previous history of gout flares of his ankle.  Said he had a shrimp last night for dinner however symptoms developed before eating.  Reports eating gibbs for breakfast.  No alcohol.  He tried Tylenol which has helped.  No trauma or injury to his left ankle.  He is not experiencing any fevers or chills.      REVIEW OF SYSTEMS  General: no fever or chills  GI: no nausea or vomiting  See HPI for further details.    PAST MEDICAL HISTORY   has a past medical history of Arthritis, Back pain, Chickenpox, High cholesterol, Hypertension, Obesity, Pain, Pneumonia, Polycythemia, secondary, Sleep apnea, Snoring, and Type II or unspecified type diabetes mellitus without mention of complication, not stated as uncontrolled.    SURGICAL HISTORY   has a past surgical history that includes fusion, spine, lumbar, plif (1/21/2015); inguinal hernia repair (Right, 2/16/2016); lumbar laminectomy diskectomy (2/18/2018); hardware removal neuro (2/18/2018); carpal tunnel release (Right, 2014); shoulder arthroscopy (Right, 2014); lumbar decompression (2004); lumbar decompression (2003); cervical disk and fusion anterior (2005); implant neurostim/ (7/23/2019); egd w/control bleeding (8/22/2020); and gastroscopy (N/A, 8/22/2020).    ALLERGIES  No Known Allergies    CURRENT MEDICATIONS  Home Medications       Reviewed by Ramu Michael D.O. (Physician) on 08/23/23 at 1113  Med List Status: <None>     Medication Last Dose Status   amLODIPine (NORVASC) 5 MG Tab Taking Active   amLODIPine (NORVASC) 5 MG Tab Taking Active   colchicine (COLCRYS) 0.6 MG Tab Taking  Active   colchicine (COLCRYS) 0.6 MG Tab  Active   ELIQUIS 5 MG Tab Taking Active   finasteride (PROSCAR) 5 MG Tab Taking Active   furosemide (LASIX) 20 MG Tab Taking Active   gabapentin (NEURONTIN) 600 MG tablet Taking Active   glipiZIDE SR (GLUCOTROL) 2.5 MG TABLET SR 24 HR Taking Active   lovastatin (MEVACOR) 20 MG Tab Taking Active   metformin (GLUCOPHAGE) 1000 MG tablet Taking Active   metformin (GLUCOPHAGE) 850 MG Tab Taking Active   methenamine hip (HIPPREX) 1 GM Tab Taking Active   methylPREDNISolone (MEDROL DOSEPAK) 4 MG Tablet Therapy Pack Taking Active   OZEMPIC, 0.25 OR 0.5 MG/DOSE, 2 MG/1.5ML Solution Pen-injector Taking Active   PARoxetine (PAXIL) 20 MG Tab Taking Active   rosuvastatin (CRESTOR) 20 MG Tab Taking Active   Semaglutide,0.25 or 0.5MG/DOS, 2 MG/3ML Solution Pen-injector Taking Active   terazosin (HYTRIN) 10 MG capsule Taking Active   triamterene-hctz (MAXZIDE-25/DYAZIDE) 37.5-25 MG Tab Taking Active                    SOCIAL HISTORY  Social History     Tobacco Use    Smoking status: Never    Smokeless tobacco: Former     Types: Chew     Quit date: 12/2018   Vaping Use    Vaping Use: Never used   Substance and Sexual Activity    Alcohol use: Yes     Alcohol/week: 1.2 oz     Types: 2 Shots of liquor per week     Comment: 2 a week    Drug use: No    Sexual activity: Not on file       FAMILY HISTORY  Family History   Problem Relation Age of Onset    Sleep Apnea Mother     Heart Attack Father     Heart Attack Brother           Objective:   PHYSICAL EXAM  VITAL SIGNS: /74 (BP Location: Left arm, Patient Position: Sitting, BP Cuff Size: Adult)   Pulse 78   Temp 36.6 °C (97.9 °F) (Temporal)   Resp 16   Ht 1.829 m (6')   Wt 119 kg (263 lb)   SpO2 93%   BMI 35.67 kg/m²     Gen: no acute distress, normal voice  Skin: dry, intact, moist mucosal membranes  Eyes: No conjunctival injection b/l  Neck: Normal range of motion. No meningeal signs.   Lungs: No increased work of breathing.  CTAB w/  symmetric expansion  CV: RRR w/o murmurs or clicks  MSK: Mild edema of the left ankle along the tibiotalar joint without any significant warmth or erythema.  Decreased ROM in all planes secondary to edema without any significant discernible discomfort with passive range of motion.  Global tenderness palpation along the anterior tibiotalar joint.    Assessment/Plan:     1. Acute gout of left ankle, unspecified cause  colchicine (COLCRYS) 0.6 MG Tab      Chronic issue with acute exacerbation.  He has been on systemic steroids multiple times this year and would like to avoid additional p.o. steroids if possible.  I prescribed him colchicine several months ago which he said helped so refilled the prescription.   Return to urgent care any new/worsening symptoms or further questions or concerns.  Patient understood everything discussed.  All questions were answered.      Differential diagnosis and supportive care discussed. Follow-up as needed if symptoms worsen or fail to improve to PCP, Urgent care or Emergency Room.    Please note that this dictation was created using voice recognition software. I have made a reasonable attempt to correct obvious errors, but I expect that there are errors of grammar and possibly content that I did not discover before finalizing the note.

## 2023-10-04 ENCOUNTER — HOSPITAL ENCOUNTER (OUTPATIENT)
Facility: MEDICAL CENTER | Age: 73
End: 2023-10-04
Attending: PHYSICIAN ASSISTANT
Payer: COMMERCIAL

## 2023-10-04 ENCOUNTER — OFFICE VISIT (OUTPATIENT)
Dept: URGENT CARE | Facility: PHYSICIAN GROUP | Age: 73
End: 2023-10-04
Payer: COMMERCIAL

## 2023-10-04 VITALS
DIASTOLIC BLOOD PRESSURE: 74 MMHG | TEMPERATURE: 97.5 F | WEIGHT: 278 LBS | BODY MASS INDEX: 34.57 KG/M2 | HEIGHT: 75 IN | HEART RATE: 72 BPM | SYSTOLIC BLOOD PRESSURE: 146 MMHG | OXYGEN SATURATION: 96 %

## 2023-10-04 DIAGNOSIS — N30.01 ACUTE CYSTITIS WITH HEMATURIA: ICD-10-CM

## 2023-10-04 DIAGNOSIS — N39.0 RECURRENT UTI: ICD-10-CM

## 2023-10-04 LAB
APPEARANCE UR: NORMAL
BILIRUB UR STRIP-MCNC: NORMAL MG/DL
COLOR UR AUTO: YELLOW
GLUCOSE UR STRIP.AUTO-MCNC: NORMAL MG/DL
KETONES UR STRIP.AUTO-MCNC: NORMAL MG/DL
LEUKOCYTE ESTERASE UR QL STRIP.AUTO: NORMAL
NITRITE UR QL STRIP.AUTO: NORMAL
PH UR STRIP.AUTO: 6.5 [PH] (ref 5–8)
PROT UR QL STRIP: NORMAL MG/DL
RBC UR QL AUTO: NORMAL
SP GR UR STRIP.AUTO: 1.02
UROBILINOGEN UR STRIP-MCNC: 0.2 MG/DL

## 2023-10-04 PROCEDURE — 81002 URINALYSIS NONAUTO W/O SCOPE: CPT | Performed by: PHYSICIAN ASSISTANT

## 2023-10-04 PROCEDURE — 87186 SC STD MICRODIL/AGAR DIL: CPT

## 2023-10-04 PROCEDURE — 1126F AMNT PAIN NOTED NONE PRSNT: CPT | Performed by: PHYSICIAN ASSISTANT

## 2023-10-04 PROCEDURE — 99214 OFFICE O/P EST MOD 30 MIN: CPT | Performed by: PHYSICIAN ASSISTANT

## 2023-10-04 PROCEDURE — 87086 URINE CULTURE/COLONY COUNT: CPT

## 2023-10-04 PROCEDURE — 3078F DIAST BP <80 MM HG: CPT | Performed by: PHYSICIAN ASSISTANT

## 2023-10-04 PROCEDURE — 3077F SYST BP >= 140 MM HG: CPT | Performed by: PHYSICIAN ASSISTANT

## 2023-10-04 PROCEDURE — 87077 CULTURE AEROBIC IDENTIFY: CPT

## 2023-10-04 RX ORDER — SULFAMETHOXAZOLE AND TRIMETHOPRIM 800; 160 MG/1; MG/1
1 TABLET ORAL EVERY 12 HOURS
Qty: 14 TABLET | Refills: 0 | Status: SHIPPED | OUTPATIENT
Start: 2023-10-04 | End: 2023-10-11

## 2023-10-04 ASSESSMENT — PAIN SCALES - GENERAL: PAINLEVEL: NO PAIN

## 2023-10-04 ASSESSMENT — ENCOUNTER SYMPTOMS
VOMITING: 0
ABDOMINAL PAIN: 0
NAUSEA: 0
CHILLS: 0
DIZZINESS: 0
FEVER: 0
FLANK PAIN: 0

## 2023-10-04 ASSESSMENT — FIBROSIS 4 INDEX: FIB4 SCORE: 1.74

## 2023-10-04 NOTE — PROGRESS NOTES
Subjective     Ramu Barber is a 73 y.o. male who presents with UTI (Mild pain with urination x 2 days patient reports no other symptom 2-weeks ago got Amox-Clav 500mg )    HPI:  Ramu Barber is a 73 y.o. male who presents today for evaluation of a urinary tract infection.  Patient notes that he was treated about 2 weeks ago for urinary tract infection by his urology specialist.  He was given a 5-day course of Augmentin 500/125.  Patient reports that his symptoms resolved completely but then yesterday he started to notice malodorous urine and mild discomfort again.  He has not had any fever/chills, nausea/vomiting, flank pain, or abdominal pain.        Review of Systems   Constitutional:  Negative for chills and fever.   Gastrointestinal:  Negative for abdominal pain, nausea and vomiting.   Genitourinary:  Positive for dysuria, frequency and urgency. Negative for flank pain and hematuria.   Neurological:  Negative for dizziness.           PMH:  has a past medical history of Arthritis, Back pain, Chickenpox, High cholesterol, Hypertension, Obesity, Pain, Pneumonia, Polycythemia, secondary, Sleep apnea, Snoring, and Type II or unspecified type diabetes mellitus without mention of complication, not stated as uncontrolled.  MEDS:   Current Outpatient Medications:     sulfamethoxazole-trimethoprim (BACTRIM DS) 800-160 MG tablet, Take 1 Tablet by mouth every 12 hours for 7 days., Disp: 14 Tablet, Rfl: 0    colchicine (COLCRYS) 0.6 MG Tab, Take 1 Tablet by mouth every day. Day #1 (eg today), take 2 tabs now, followed by a 2nd tab 1 hr later. Day #2 and thereafter, take 0.6mg BID until symptoms resolve., Disp: 30 Tablet, Rfl: 1    Semaglutide,0.25 or 0.5MG/DOS, 2 MG/3ML Solution Pen-injector, Inject  under the skin., Disp: , Rfl:     ELIQUIS 5 MG Tab, Take 5 mg by mouth 2 times a day., Disp: , Rfl:     amLODIPine (NORVASC) 5 MG Tab, Take 1 Tablet by mouth every day., Disp: , Rfl:     gabapentin (NEURONTIN) 600 MG  tablet, Take 600 mg by mouth 4 times a day., Disp: , Rfl:     metformin (GLUCOPHAGE) 1000 MG tablet, Take 1,000 mg by mouth 2 times a day., Disp: , Rfl:     methylPREDNISolone (MEDROL DOSEPAK) 4 MG Tablet Therapy Pack, Follow schedule on package instructions., Disp: 21 Tablet, Rfl: 0    colchicine (COLCRYS) 0.6 MG Tab, Day #1 (eg today), take 2 tabs now, followed by a 2nd tab 1 hr later. Day #2 and thereafter, take 0.6mg BID until symptoms resolve., Disp: 30 Tablet, Rfl: 1    rosuvastatin (CRESTOR) 20 MG Tab, Take 1 Tablet by mouth every evening., Disp: 30 Tablet, Rfl: 11    amLODIPine (NORVASC) 5 MG Tab, Take 1 Tablet by mouth every day., Disp: , Rfl:     OZEMPIC, 0.25 OR 0.5 MG/DOSE, 2 MG/1.5ML Solution Pen-injector, INJECT 0.25MG SUBCUTANEOUSLY ONCE WEEKLY, Disp: , Rfl:     lovastatin (MEVACOR) 20 MG Tab, Take 20 mg by mouth every day., Disp: , Rfl:     glipiZIDE SR (GLUCOTROL) 2.5 MG TABLET SR 24 HR, , Disp: , Rfl:     terazosin (HYTRIN) 10 MG capsule, , Disp: , Rfl:     finasteride (PROSCAR) 5 MG Tab, Take 1 Tablet by mouth every day., Disp: , Rfl:     methenamine hip (HIPPREX) 1 GM Tab, Take 1 Tablet by mouth 2 times a day., Disp: , Rfl:     PARoxetine (PAXIL) 20 MG Tab, Take 1 Tablet by mouth., Disp: , Rfl:     furosemide (LASIX) 20 MG Tab, Take 1 Tablet by mouth 2 times a day., Disp: , Rfl:     triamterene-hctz (MAXZIDE-25/DYAZIDE) 37.5-25 MG Tab, Take 1 Tablet by mouth every morning., Disp: , Rfl:     metformin (GLUCOPHAGE) 850 MG Tab, Take 1 Tablet by mouth 2 times a day., Disp: , Rfl:   ALLERGIES: No Known Allergies  SURGHX:   Past Surgical History:   Procedure Laterality Date    EGD W/CONTROL BLEEDING  8/22/2020         GASTROSCOPY N/A 8/22/2020    Procedure: GASTROSCOPY;  Surgeon: Tawanda Gomez M.D.;  Location: SURGERY [facility];  Service: Gastroenterology    PB IMPLANT NEUROSTIM/  7/23/2019    Procedure: INSERTION, NEUROSTIMULATOR, PERMANENT, SPINAL CORD;  Surgeon: Teddy Santos,  "M.D.;  Location: SURGERY [facility];  Service: Pain Management    LUMBAR LAMINECTOMY DISKECTOMY  2/18/2018    Procedure: LUMBAR LAMINECTOMY DISKECTOMY- LT L1-2 HEMILAMI-;  Surgeon: Tom Pittman M.D.;  Location: SURGERY [facility];  Service: Neurosurgery    HARDWARE REMOVAL NEURO  2/18/2018    Procedure: HARDWARE REMOVAL NEURO- L2-3 PEDICLE SCREWS;  Surgeon: Tom Pittman M.D.;  Location: SURGERY [facility];  Service: Neurosurgery    INGUINAL HERNIA REPAIR Right 2/16/2016    Procedure: INGUINAL HERNIA REPAIR;  Surgeon: Sj Baird M.D.;  Location: SURGERY [facility];  Service:     FUSION, SPINE, LUMBAR, PLIF  1/21/2015    Performed by Ko Suarez M.D. at [facility]    CARPAL TUNNEL RELEASE Right 2014    SHOULDER ARTHROSCOPY Right 2014    CERVICAL DISK AND FUSION ANTERIOR  2005    LUMBAR DECOMPRESSION  2004    LUMBAR DECOMPRESSION  2003     SOCHX:  reports that he has never smoked. He quit smokeless tobacco use about 4 years ago.  His smokeless tobacco use included chew. He reports current alcohol use of about 1.2 oz of alcohol per week. He reports that he does not use drugs.  FH: Family history was reviewed, no pertinent findings to report        Objective     BP (!) 146/74 (BP Location: Left arm, Patient Position: Sitting, BP Cuff Size: Adult) Comment: patient Asymtomatic  Pulse 72   Temp 36.4 °C (97.5 °F) (Temporal)   Ht 1.905 m (6' 3\")   Wt (!) 126 kg (278 lb)   SpO2 96%   BMI 34.75 kg/m²      Physical Exam  Constitutional:       General: He is not in acute distress.     Appearance: He is not diaphoretic.   HENT:      Head: Normocephalic and atraumatic.      Right Ear: External ear normal.      Left Ear: External ear normal.   Eyes:      Conjunctiva/sclera: Conjunctivae normal.      Pupils: Pupils are equal, round, and reactive to light.   Pulmonary:      Effort: Pulmonary effort is normal. No respiratory distress.   Abdominal:      Tenderness: There is no " abdominal tenderness. There is no right CVA tenderness or left CVA tenderness.   Musculoskeletal:      Cervical back: Normal range of motion.   Skin:     Findings: No rash.   Neurological:      Mental Status: He is alert and oriented to person, place, and time.   Psychiatric:         Mood and Affect: Mood and affect normal.         Cognition and Memory: Memory normal.         Judgment: Judgment normal.           POCT Urinalysis  Lab Results   Component Value Date/Time    POCCOLOR yellow 10/04/2023 10:14 AM    POCAPPEAR cloudy 10/04/2023 10:14 AM    POCLEUKEST mod 10/04/2023 10:14 AM    POCNITRITE pos 10/04/2023 10:14 AM    POCUROBILIGE 0.2 10/04/2023 10:14 AM    POCPROTEIN 100* 10/04/2023 10:14 AM    POCURPH 6.5 10/04/2023 10:14 AM    POCBLOOD trace-intact 10/04/2023 10:14 AM    POCSPGRV 1.025 10/04/2023 10:14 AM    POCKETONES neg 10/04/2023 10:14 AM    POCBILIRUBIN neg 10/04/2023 10:14 AM    POCGLUCUA neg 10/04/2023 10:14 AM        Assessment & Plan       1. Recurrent UTI  - POCT Urinalysis  - Urine Culture; Future  - sulfamethoxazole-trimethoprim (BACTRIM DS) 800-160 MG tablet; Take 1 Tablet by mouth every 12 hours for 7 days.  Dispense: 14 Tablet; Refill: 0    2. Acute cystitis with hematuria  - POCT Urinalysis  - Urine Culture; Future  - sulfamethoxazole-trimethoprim (BACTRIM DS) 800-160 MG tablet; Take 1 Tablet by mouth every 12 hours for 7 days.  Dispense: 14 Tablet; Refill: 0    Urinalysis is consistent with urinary tract infection.  We will treat with a 7-day course of Bactrim.  Urine culture also obtained to further evaluate.  We will make changes to treatment as needed when culture results become available.          Differential Diagnosis, natural history, and supportive care discussed. Return to the Urgent Care or follow up with your PCP if symptoms fail to resolve, or for any new or worsening symptoms. Emergency room precautions discussed. Patient and/or family appears understanding of information.

## 2023-10-06 ENCOUNTER — TELEPHONE (OUTPATIENT)
Dept: URGENT CARE | Facility: CLINIC | Age: 73
End: 2023-10-06
Payer: COMMERCIAL

## 2023-10-06 ENCOUNTER — APPOINTMENT (OUTPATIENT)
Dept: RADIOLOGY | Facility: MEDICAL CENTER | Age: 73
End: 2023-10-06
Attending: EMERGENCY MEDICINE
Payer: COMMERCIAL

## 2023-10-06 ENCOUNTER — HOSPITAL ENCOUNTER (EMERGENCY)
Facility: MEDICAL CENTER | Age: 73
End: 2023-10-06
Attending: EMERGENCY MEDICINE
Payer: COMMERCIAL

## 2023-10-06 VITALS
HEART RATE: 58 BPM | WEIGHT: 265 LBS | SYSTOLIC BLOOD PRESSURE: 153 MMHG | TEMPERATURE: 98.1 F | HEIGHT: 72 IN | OXYGEN SATURATION: 93 % | RESPIRATION RATE: 16 BRPM | BODY MASS INDEX: 35.89 KG/M2 | DIASTOLIC BLOOD PRESSURE: 85 MMHG

## 2023-10-06 DIAGNOSIS — S76.311A STRAIN OF RIGHT HAMSTRING, INITIAL ENCOUNTER: ICD-10-CM

## 2023-10-06 DIAGNOSIS — N30.00 ACUTE CYSTITIS WITHOUT HEMATURIA: ICD-10-CM

## 2023-10-06 LAB
ALBUMIN SERPL BCP-MCNC: 4.1 G/DL (ref 3.2–4.9)
ALBUMIN/GLOB SERPL: 1.3 G/DL
ALP SERPL-CCNC: 85 U/L (ref 30–99)
ALT SERPL-CCNC: 16 U/L (ref 2–50)
ANION GAP SERPL CALC-SCNC: 13 MMOL/L (ref 7–16)
APPEARANCE UR: ABNORMAL
AST SERPL-CCNC: 20 U/L (ref 12–45)
BACTERIA #/AREA URNS HPF: ABNORMAL /HPF
BACTERIA UR CULT: ABNORMAL
BACTERIA UR CULT: ABNORMAL
BASOPHILS # BLD AUTO: 0.7 % (ref 0–1.8)
BASOPHILS # BLD: 0.07 K/UL (ref 0–0.12)
BILIRUB SERPL-MCNC: 0.4 MG/DL (ref 0.1–1.5)
BILIRUB UR QL STRIP.AUTO: NEGATIVE
BUN SERPL-MCNC: 25 MG/DL (ref 8–22)
CALCIUM ALBUM COR SERPL-MCNC: 9.2 MG/DL (ref 8.5–10.5)
CALCIUM SERPL-MCNC: 9.3 MG/DL (ref 8.5–10.5)
CHLORIDE SERPL-SCNC: 104 MMOL/L (ref 96–112)
CO2 SERPL-SCNC: 21 MMOL/L (ref 20–33)
COLOR UR: YELLOW
CREAT SERPL-MCNC: 1.69 MG/DL (ref 0.5–1.4)
EOSINOPHIL # BLD AUTO: 0.14 K/UL (ref 0–0.51)
EOSINOPHIL NFR BLD: 1.5 % (ref 0–6.9)
EPI CELLS #/AREA URNS HPF: NEGATIVE /HPF
ERYTHROCYTE [DISTWIDTH] IN BLOOD BY AUTOMATED COUNT: 50.5 FL (ref 35.9–50)
GFR SERPLBLD CREATININE-BSD FMLA CKD-EPI: 42 ML/MIN/1.73 M 2
GLOBULIN SER CALC-MCNC: 3.1 G/DL (ref 1.9–3.5)
GLUCOSE SERPL-MCNC: 172 MG/DL (ref 65–99)
GLUCOSE UR STRIP.AUTO-MCNC: NEGATIVE MG/DL
HCT VFR BLD AUTO: 47.1 % (ref 42–52)
HGB BLD-MCNC: 15.9 G/DL (ref 14–18)
HYALINE CASTS #/AREA URNS LPF: ABNORMAL /LPF
IMM GRANULOCYTES # BLD AUTO: 0.02 K/UL (ref 0–0.11)
IMM GRANULOCYTES NFR BLD AUTO: 0.2 % (ref 0–0.9)
KETONES UR STRIP.AUTO-MCNC: NEGATIVE MG/DL
LEUKOCYTE ESTERASE UR QL STRIP.AUTO: ABNORMAL
LIPASE SERPL-CCNC: 34 U/L (ref 11–82)
LYMPHOCYTES # BLD AUTO: 0.99 K/UL (ref 1–4.8)
LYMPHOCYTES NFR BLD: 10.6 % (ref 22–41)
MCH RBC QN AUTO: 31.1 PG (ref 27–33)
MCHC RBC AUTO-ENTMCNC: 33.8 G/DL (ref 32.3–36.5)
MCV RBC AUTO: 92.2 FL (ref 81.4–97.8)
MICRO URNS: ABNORMAL
MONOCYTES # BLD AUTO: 0.73 K/UL (ref 0–0.85)
MONOCYTES NFR BLD AUTO: 7.8 % (ref 0–13.4)
NEUTROPHILS # BLD AUTO: 7.41 K/UL (ref 1.82–7.42)
NEUTROPHILS NFR BLD: 79.2 % (ref 44–72)
NITRITE UR QL STRIP.AUTO: POSITIVE
NRBC # BLD AUTO: 0 K/UL
NRBC BLD-RTO: 0 /100 WBC (ref 0–0.2)
PH UR STRIP.AUTO: 6 [PH] (ref 5–8)
PLATELET # BLD AUTO: 237 K/UL (ref 164–446)
PMV BLD AUTO: 10.4 FL (ref 9–12.9)
POTASSIUM SERPL-SCNC: 4 MMOL/L (ref 3.6–5.5)
PROT SERPL-MCNC: 7.2 G/DL (ref 6–8.2)
PROT UR QL STRIP: NEGATIVE MG/DL
RBC # BLD AUTO: 5.11 M/UL (ref 4.7–6.1)
RBC # URNS HPF: ABNORMAL /HPF
RBC UR QL AUTO: NEGATIVE
SIGNIFICANT IND 70042: ABNORMAL
SITE SITE: ABNORMAL
SODIUM SERPL-SCNC: 138 MMOL/L (ref 135–145)
SOURCE SOURCE: ABNORMAL
SP GR UR STRIP.AUTO: 1.02
UROBILINOGEN UR STRIP.AUTO-MCNC: 0.2 MG/DL
WBC # BLD AUTO: 9.4 K/UL (ref 4.8–10.8)
WBC #/AREA URNS HPF: ABNORMAL /HPF

## 2023-10-06 PROCEDURE — 99284 EMERGENCY DEPT VISIT MOD MDM: CPT

## 2023-10-06 PROCEDURE — 85025 COMPLETE CBC W/AUTO DIFF WBC: CPT

## 2023-10-06 PROCEDURE — 83690 ASSAY OF LIPASE: CPT

## 2023-10-06 PROCEDURE — 700101 HCHG RX REV CODE 250: Performed by: EMERGENCY MEDICINE

## 2023-10-06 PROCEDURE — 36415 COLL VENOUS BLD VENIPUNCTURE: CPT

## 2023-10-06 PROCEDURE — 80053 COMPREHEN METABOLIC PANEL: CPT

## 2023-10-06 PROCEDURE — 81001 URINALYSIS AUTO W/SCOPE: CPT

## 2023-10-06 PROCEDURE — 73552 X-RAY EXAM OF FEMUR 2/>: CPT | Mod: RT

## 2023-10-06 PROCEDURE — 87040 BLOOD CULTURE FOR BACTERIA: CPT

## 2023-10-06 RX ORDER — CYCLOBENZAPRINE HCL 5 MG
5-10 TABLET ORAL 3 TIMES DAILY PRN
Qty: 20 TABLET | Refills: 0 | Status: SHIPPED | OUTPATIENT
Start: 2023-10-06 | End: 2023-12-21

## 2023-10-06 RX ORDER — GRANULES FOR ORAL 3 G/1
3 POWDER ORAL ONCE
Status: COMPLETED | OUTPATIENT
Start: 2023-10-06 | End: 2023-10-06

## 2023-10-06 RX ADMIN — GRANULES FOR ORAL SOLUTION 3 G: 3 POWDER ORAL at 17:54

## 2023-10-06 ASSESSMENT — FIBROSIS 4 INDEX: FIB4 SCORE: 1.74

## 2023-10-06 NOTE — ED PROVIDER NOTES
ER Provider Note    Scribed for Eulogio Lozoya M.D. by Александр Jones. 10/6/2023   4:45 PM    Primary Care Provider: Chuck Chappell M.D.    CHIEF COMPLAINT  Chief Complaint   Patient presents with    Sent from Urgent Care     +e.coli for urine culture     Leg Pain     Pt fell earlier and possibly pulled muscle getting up, pt on blood thinner, denies hitting head    Painful Urination     EXTERNAL RECORDS REVIEWED  Outpatient Notes indicate the patient was seen at Urgent Care where he had a positive Urine Culture on 10/4 with E. Coli ESBL. He was given Augmentin 2 weeks ago and was started on Bactrim after this visit.    HPI/ROS  LIMITATION TO HISTORY   Select: : None  OUTSIDE HISTORIAN(S):  Family wife at bedside to confirm sequence of events and collateral information as detailed below.    Ramu Barber is a 73 y.o. male with a history of urethral scar tissue obstruction and current ESBL E. Coli UTI who presents to the ED from Urgent Care for evaluation of  onset this infection. The patient states he also has mild chronic pain with urination, and worsening smell with urination, but denies any abdominal pain, nausea, vomiting, or fevers. He describes being prescribed Augmentin at Urgent care for these symptoms 2 weeks ago, which temporarily resolved his issues. After his symptoms returned, he went to Urgent Care again and was prescribed Bactrim which has not helped yet. No alleviating or exacerbating factors were noted.    Additionally, he notes that he fell down in the shower earlier today when he lost his  on a shower bar. He states that he rolled and pulled a muscle in his posterior upper right leg. He denies hitting his head but states that he does take blood thinning medications. His pain is exacerbated by fully extending and flexing his leg.    PAST MEDICAL HISTORY  Past Medical History:   Diagnosis Date    Arthritis     back, hips    Back pain     Chickenpox     High cholesterol     Hypertension      Obesity     Pain     back pain s/p multiple surgeries and spinal stimulator    Pneumonia     Polycythemia, secondary     Sleep apnea     cpap    Snoring     Type II or unspecified type diabetes mellitus without mention of complication, not stated as uncontrolled     oral meds only     SURGICAL HISTORY  Past Surgical History:   Procedure Laterality Date    EGD W/CONTROL BLEEDING  8/22/2020         GASTROSCOPY N/A 8/22/2020    Procedure: GASTROSCOPY;  Surgeon: Tawanda Gomez M.D.;  Location: SURGERY [facility];  Service: Gastroenterology    PB IMPLANT NEUROSTIM/  7/23/2019    Procedure: INSERTION, NEUROSTIMULATOR, PERMANENT, SPINAL CORD;  Surgeon: Teddy Santos M.D.;  Location: SURGERY [facility];  Service: Pain Management    LUMBAR LAMINECTOMY DISKECTOMY  2/18/2018    Procedure: LUMBAR LAMINECTOMY DISKECTOMY- LT L1-2 HEMILAMI-;  Surgeon: Tom Pittman M.D.;  Location: [facility];  Service: Neurosurgery    HARDWARE REMOVAL NEURO  2/18/2018    Procedure: HARDWARE REMOVAL NEURO- L2-3 PEDICLE SCREWS;  Surgeon: Tom Pittman M.D.;  Location: SURGERY [facility];  Service: Neurosurgery    INGUINAL HERNIA REPAIR Right 2/16/2016    Procedure: INGUINAL HERNIA REPAIR;  Surgeon: Sj Baird M.D.;  Location: SURGERY [facility];  Service:     FUSION, SPINE, LUMBAR, PLIF  1/21/2015    Performed by Ko Suarez M.D. at [facility]    CARPAL TUNNEL RELEASE Right 2014    SHOULDER ARTHROSCOPY Right 2014    CERVICAL DISK AND FUSION ANTERIOR  2005    LUMBAR DECOMPRESSION  2004    LUMBAR DECOMPRESSION  2003     FAMILY HISTORY  Family History   Problem Relation Age of Onset    Sleep Apnea Mother     Heart Attack Father     Heart Attack Brother      SOCIAL HISTORY   reports that he has never smoked. He quit smokeless tobacco use about 4 years ago.  His smokeless tobacco use included chew. He reports current alcohol use of about 1.2 oz of alcohol per week. He reports that  he does not use drugs.    CURRENT MEDICATIONS  Discharge Medication List as of 10/6/2023  6:46 PM        CONTINUE these medications which have NOT CHANGED    Details   sulfamethoxazole-trimethoprim (BACTRIM DS) 800-160 MG tablet Take 1 Tablet by mouth every 12 hours for 7 days., Disp-14 Tablet, R-0, Normal      !! colchicine (COLCRYS) 0.6 MG Tab Take 1 Tablet by mouth every day. Day #1 (eg today), take 2 tabs now, followed by a 2nd tab 1 hr later. Day #2 and thereafter, take 0.6mg BID until symptoms resolve., Disp-30 Tablet, R-1, Normal      Semaglutide,0.25 or 0.5MG/DOS, 2 MG/3ML Solution Pen-injector Inject  under the skin., Historical Med      ELIQUIS 5 MG Tab Take 5 mg by mouth 2 times a day., MAVERICK, Historical Med      !! amLODIPine (NORVASC) 5 MG Tab Take 1 Tablet by mouth every day., Historical Med      gabapentin (NEURONTIN) 600 MG tablet Take 600 mg by mouth 4 times a day., Historical Med      !! metformin (GLUCOPHAGE) 1000 MG tablet Take 1,000 mg by mouth 2 times a day., Historical Med      methylPREDNISolone (MEDROL DOSEPAK) 4 MG Tablet Therapy Pack Follow schedule on package instructions., Disp-21 Tablet, R-0, Normal      !! colchicine (COLCRYS) 0.6 MG Tab Day #1 (eg today), take 2 tabs now, followed by a 2nd tab 1 hr later. Day #2 and thereafter, take 0.6mg BID until symptoms resolve., Disp-30 Tablet, R-1, Normal      rosuvastatin (CRESTOR) 20 MG Tab Take 1 Tablet by mouth every evening., Disp-30 Tablet, R-11, Normal      !! amLODIPine (NORVASC) 5 MG Tab Take 1 Tablet by mouth every day., Historical Med      OZEMPIC, 0.25 OR 0.5 MG/DOSE, 2 MG/1.5ML Solution Pen-injector INJECT 0.25MG SUBCUTANEOUSLY ONCE WEEKLY, MAVERICK, Historical Med      lovastatin (MEVACOR) 20 MG Tab Take 20 mg by mouth every day., Historical Med      glipiZIDE SR (GLUCOTROL) 2.5 MG TABLET SR 24 HR Historical Med      terazosin (HYTRIN) 10 MG capsule Historical Med      finasteride (PROSCAR) 5 MG Tab Take 1 Tablet by mouth every day.,  Historical Med      methenamine hip (HIPPREX) 1 GM Tab Take 1 Tablet by mouth 2 times a day., Historical Med      PARoxetine (PAXIL) 20 MG Tab Take 1 Tablet by mouth., Historical Med      furosemide (LASIX) 20 MG Tab Take 1 Tablet by mouth 2 times a day., Historical Med      triamterene-hctz (MAXZIDE-25/DYAZIDE) 37.5-25 MG Tab Take 1 Tablet by mouth every morning., Historical Med      !! metformin (GLUCOPHAGE) 850 MG Tab Take 1 Tablet by mouth 2 times a day., Historical Med       !! - Potential duplicate medications found. Please discuss with provider.        ALLERGIES  No Known Allergies     PHYSICAL EXAM  /61   Pulse 77   Temp 36.2 °C (97.1 °F) (Temporal)   Resp 14   Ht 1.829 m (6')   Wt 120 kg (265 lb)   SpO2 94%   BMI 35.94 kg/m²    Constitutional: Well developed, Well nourished, Mild distress.    HENT: Normocephalic, Atraumatic   Eyes: PERRLA, EOMI, Conjunctiva normal, No discharge.   Neck: No tenderness, Supple, No stridor.   Cardiovascular: Normal heart rate, Normal rhythm.   Thorax & Lungs: Clear to auscultation bilaterally, No respiratory distress, No wheezing, No crackles.   Abdomen: Soft, No tenderness, No masses, No pulsatile masses.   Skin: Warm, Dry, No erythema, No rash.    Musculoskeletal: No major deformities noted. Tenderness on the right hamstring area. slightly decreased range of motion of the right hip. Intact distal pulses  Neurologic: Awake, alert. Moves all extremities spontaneously.  Psychiatric: Affect normal, Judgment normal, Mood normal.      DIAGNOSTIC STUDIES    Labs:   Results for orders placed or performed during the hospital encounter of 10/06/23   CBC WITH DIFFERENTIAL   Result Value Ref Range    WBC 9.4 4.8 - 10.8 K/uL    RBC 5.11 4.70 - 6.10 M/uL    Hemoglobin 15.9 14.0 - 18.0 g/dL    Hematocrit 47.1 42.0 - 52.0 %    MCV 92.2 81.4 - 97.8 fL    MCH 31.1 27.0 - 33.0 pg    MCHC 33.8 32.3 - 36.5 g/dL    RDW 50.5 (H) 35.9 - 50.0 fL    Platelet Count 237 164 - 446 K/uL     MPV 10.4 9.0 - 12.9 fL    Neutrophils-Polys 79.20 (H) 44.00 - 72.00 %    Lymphocytes 10.60 (L) 22.00 - 41.00 %    Monocytes 7.80 0.00 - 13.40 %    Eosinophils 1.50 0.00 - 6.90 %    Basophils 0.70 0.00 - 1.80 %    Immature Granulocytes 0.20 0.00 - 0.90 %    Nucleated RBC 0.00 0.00 - 0.20 /100 WBC    Neutrophils (Absolute) 7.41 1.82 - 7.42 K/uL    Lymphs (Absolute) 0.99 (L) 1.00 - 4.80 K/uL    Monos (Absolute) 0.73 0.00 - 0.85 K/uL    Eos (Absolute) 0.14 0.00 - 0.51 K/uL    Baso (Absolute) 0.07 0.00 - 0.12 K/uL    Immature Granulocytes (abs) 0.02 0.00 - 0.11 K/uL    NRBC (Absolute) 0.00 K/uL   COMP METABOLIC PANEL   Result Value Ref Range    Sodium 138 135 - 145 mmol/L    Potassium 4.0 3.6 - 5.5 mmol/L    Chloride 104 96 - 112 mmol/L    Co2 21 20 - 33 mmol/L    Anion Gap 13.0 7.0 - 16.0    Glucose 172 (H) 65 - 99 mg/dL    Bun 25 (H) 8 - 22 mg/dL    Creatinine 1.69 (H) 0.50 - 1.40 mg/dL    Calcium 9.3 8.5 - 10.5 mg/dL    Correct Calcium 9.2 8.5 - 10.5 mg/dL    AST(SGOT) 20 12 - 45 U/L    ALT(SGPT) 16 2 - 50 U/L    Alkaline Phosphatase 85 30 - 99 U/L    Total Bilirubin 0.4 0.1 - 1.5 mg/dL    Albumin 4.1 3.2 - 4.9 g/dL    Total Protein 7.2 6.0 - 8.2 g/dL    Globulin 3.1 1.9 - 3.5 g/dL    A-G Ratio 1.3 g/dL   LIPASE   Result Value Ref Range    Lipase 34 11 - 82 U/L   URINALYSIS    Specimen: Urine   Result Value Ref Range    Color Yellow     Character Cloudy (A)     Specific Gravity 1.019 <1.035    Ph 6.0 5.0 - 8.0    Glucose Negative Negative mg/dL    Ketones Negative Negative mg/dL    Protein Negative Negative mg/dL    Bilirubin Negative Negative    Urobilinogen, Urine 0.2 Negative    Nitrite Positive (A) Negative    Leukocyte Esterase Moderate (A) Negative    Occult Blood Negative Negative    Micro Urine Req Microscopic    ESTIMATED GFR   Result Value Ref Range    GFR (CKD-EPI) 42 (A) >60 mL/min/1.73 m 2   URINE MICROSCOPIC (W/UA)   Result Value Ref Range    -150 (A) /hpf    RBC 0-2 (A) /hpf    Bacteria Many (A)  None /hpf    Epithelial Cells Negative /hpf    Hyaline Cast 11-20 (A) /lpf     Radiology:   This attending emergency physician has independently interpreted the diagnostic imaging associated with this visit and is awaiting the final reading from the radiologist.   Preliminary interpretation is a follows: No fracture  Radiologist interpretation:   DX-FEMUR-2+ RIGHT   Final Result      1.  Negative for fracture      2.  osteoarthritis of the right hip and right knee joints         COURSE & MEDICAL DECISION MAKING     ED Observation Status? No; Patient does not meet criteria for ED Observation.     INITIAL ASSESSMENT, COURSE AND PLAN  Care Narrative: Is coming in with hamstring strain along with drug-resistant E. coli.  I reviewed to the culture with pharmacy we will give the patient a dose of fosfomycin, clinically I do not believe the patient has pyelonephritis he has no systemic symptoms, no leukocytosis.  We will give the patient a prescription for some muscle relaxers for the patient's hamstring pull.  Have the patient return with worsening symptoms or if the patient's symptoms are not improved after 2 to 3 days.    4:45 PM - Patient was evaluated at bedside. Ordered for DX-Femur Right, CBC w/diff, CMP, Lipase, and UA to evaluate. Patient verbalizes understanding and support with my plan of care.     5:03 PM - I discussed the patient's case and the above findings with Clinical Pharmacy who suggests administering Fosfomycin 3 g PO for his infection. Patient was reevaluated at bedside. I discussed plan for discharge and follow up as outlined below pending acceptable X-Ray results. The patient is stable for discharge at this time and will return for any new or worsening symptoms. Patient verbalizes understanding and support with my plan for discharge.       6:28 PM - Patient was reevaluated at bedside. Discussed lab and radiology results with the patient and informed them that he does not have a fracture. I discussed  plan for discharge with a muscle relaxer and follow up as outlined below. The patient is stable for discharge at this time and will return for any new or worsening symptoms. Patient verbalizes understanding and support with my plan for discharge.       DISPOSITION AND DISCUSSIONS    I have discussed management of the patient with the following physicians and JORDYN's:  None    Discussion of management with other QHP or appropriate source(s): Pharmacy regarding appropriate antibiotic administration and dosages      Barriers to care at this time, including but not limited to:  None noted .     Decision tools and prescription drugs considered including, but not limited to: Medication modification Flexeril .    The patient is referred to a primary physician for blood pressure management, diabetic screening, and for all other preventative health concerns.    DISPOSITION:  Patient will be discharged home in stable condition.    FOLLOW UP:  [facility], Emergency Dept  [address] [place] [id number] [phone]    2-3 days if not improved, return sooner with increasing pain, fevers, nausea vomiting.    OUTPATIENT MEDICATIONS:  Discharge Medication List as of 10/6/2023  6:46 PM        START taking these medications    Details   cyclobenzaprine (FLEXERIL) 5 mg tablet Take 1-2 Tablets by mouth 3 times a day as needed for Moderate Pain or Mild Pain., Disp-20 Tablet, R-0, Normal           FINAL DIAGNOSIS  1. Acute cystitis without hematuria    2. Strain of right hamstring, initial encounter       Александр BURRELL (Aga), am scribing for, and in the presence of, Eulogio Lozoya M.D..    Electronically signed by: Александр Jones (Aga), 10/6/2023    Eulogio BURRELL M.D. personally performed the services described in this documentation, as scribed by Александр Jones in my presence, and it is both accurate and complete.      The note accurately reflects work and decisions made by me.  Eulogio  CHRISTIANO Lozoya  10/6/2023  9:44 PM

## 2023-10-06 NOTE — TELEPHONE ENCOUNTER
Called and discussed with patient my recommendation to go to the ER at this time given urine culture results and he is understanding and agreeable with this plan.

## 2023-10-06 NOTE — ED NOTES
Chief Complaint   Patient presents with    Sent from Urgent Care     +e.coli for urine culture     Leg Pain     Pt fell earlier and possibly pulled muscle getting up, pt on blood thinner, denies hitting head    Painful Urination     Pt ambulated to triage sent from urgent care , pt diagnosed uti and was given oral abx. His urine culture came back +e.coli ESBL sent her for further tx.  Pt also fell earlier while in shower but denies hitting head, he is c/o right upper leg pain.

## 2023-10-07 NOTE — ED NOTES
Discharge instructions reviewed with patient and signed.They were instructed on how to  prescriptions. They verbalized understanding of follow up instructions. All belongings with patient. They ambulate with a steady gait

## 2023-10-11 LAB
BACTERIA BLD CULT: NORMAL
SIGNIFICANT IND 70042: NORMAL
SITE SITE: NORMAL
SOURCE SOURCE: NORMAL

## 2023-11-21 ENCOUNTER — HOSPITAL ENCOUNTER (OUTPATIENT)
Dept: RADIOLOGY | Facility: MEDICAL CENTER | Age: 73
End: 2023-11-21
Attending: FAMILY MEDICINE
Payer: COMMERCIAL

## 2023-11-28 ENCOUNTER — HOSPITAL ENCOUNTER (OUTPATIENT)
Dept: RADIOLOGY | Facility: MEDICAL CENTER | Age: 73
End: 2023-11-28
Attending: FAMILY MEDICINE
Payer: COMMERCIAL

## 2023-11-28 DIAGNOSIS — I82.4Y1 DEEP VEIN THROMBOSIS (DVT) OF PROXIMAL VEIN OF RIGHT LOWER EXTREMITY, UNSPECIFIED CHRONICITY (HCC): ICD-10-CM

## 2023-11-28 DIAGNOSIS — I26.90 SEPTIC PULMONARY EMBOLISM, UNSPECIFIED CHRONICITY, UNSPECIFIED WHETHER ACUTE COR PULMONALE PRESENT (HCC): ICD-10-CM

## 2023-11-28 PROCEDURE — 93970 EXTREMITY STUDY: CPT

## 2023-11-29 ENCOUNTER — HOSPITAL ENCOUNTER (OUTPATIENT)
Dept: RADIOLOGY | Facility: MEDICAL CENTER | Age: 73
End: 2023-11-29
Attending: FAMILY MEDICINE
Payer: COMMERCIAL

## 2023-11-29 DIAGNOSIS — I82.4Y1 DEEP VEIN THROMBOSIS (DVT) OF PROXIMAL VEIN OF RIGHT LOWER EXTREMITY, UNSPECIFIED CHRONICITY (HCC): ICD-10-CM

## 2023-11-29 DIAGNOSIS — I26.90 SEPTIC PULMONARY EMBOLISM, UNSPECIFIED CHRONICITY, UNSPECIFIED WHETHER ACUTE COR PULMONALE PRESENT (HCC): ICD-10-CM

## 2023-11-29 PROCEDURE — 700117 HCHG RX CONTRAST REV CODE 255: Performed by: FAMILY MEDICINE

## 2023-11-29 PROCEDURE — 71275 CT ANGIOGRAPHY CHEST: CPT

## 2023-11-29 RX ADMIN — IOHEXOL 100 ML: 350 INJECTION, SOLUTION INTRAVENOUS at 15:27

## 2023-12-20 ENCOUNTER — APPOINTMENT (OUTPATIENT)
Dept: RADIOLOGY | Facility: MEDICAL CENTER | Age: 73
DRG: 872 | End: 2023-12-20
Attending: EMERGENCY MEDICINE
Payer: COMMERCIAL

## 2023-12-20 ENCOUNTER — HOSPITAL ENCOUNTER (INPATIENT)
Facility: MEDICAL CENTER | Age: 73
LOS: 2 days | DRG: 872 | End: 2023-12-23
Attending: EMERGENCY MEDICINE | Admitting: STUDENT IN AN ORGANIZED HEALTH CARE EDUCATION/TRAINING PROGRAM
Payer: COMMERCIAL

## 2023-12-20 DIAGNOSIS — R53.1 WEAKNESS: ICD-10-CM

## 2023-12-20 DIAGNOSIS — I50.32 CHRONIC DIASTOLIC HEART FAILURE (HCC): ICD-10-CM

## 2023-12-20 DIAGNOSIS — D72.829 LEUKOCYTOSIS, UNSPECIFIED TYPE: ICD-10-CM

## 2023-12-20 DIAGNOSIS — N17.9 AKI (ACUTE KIDNEY INJURY) (HCC): ICD-10-CM

## 2023-12-20 DIAGNOSIS — R00.0 TACHYCARDIA: ICD-10-CM

## 2023-12-20 DIAGNOSIS — I21.4 NSTEMI (NON-ST ELEVATED MYOCARDIAL INFARCTION) (HCC): ICD-10-CM

## 2023-12-20 DIAGNOSIS — N39.0 ACUTE UTI: Primary | ICD-10-CM

## 2023-12-20 PROCEDURE — 36415 COLL VENOUS BLD VENIPUNCTURE: CPT

## 2023-12-20 PROCEDURE — 94760 N-INVAS EAR/PLS OXIMETRY 1: CPT

## 2023-12-20 PROCEDURE — 71045 X-RAY EXAM CHEST 1 VIEW: CPT

## 2023-12-20 PROCEDURE — 87040 BLOOD CULTURE FOR BACTERIA: CPT

## 2023-12-20 PROCEDURE — 80053 COMPREHEN METABOLIC PANEL: CPT

## 2023-12-20 PROCEDURE — 83605 ASSAY OF LACTIC ACID: CPT

## 2023-12-20 PROCEDURE — 84484 ASSAY OF TROPONIN QUANT: CPT

## 2023-12-20 PROCEDURE — 85025 COMPLETE CBC W/AUTO DIFF WBC: CPT

## 2023-12-20 PROCEDURE — 99285 EMERGENCY DEPT VISIT HI MDM: CPT

## 2023-12-20 PROCEDURE — 93005 ELECTROCARDIOGRAM TRACING: CPT | Performed by: EMERGENCY MEDICINE

## 2023-12-20 PROCEDURE — 83880 ASSAY OF NATRIURETIC PEPTIDE: CPT

## 2023-12-20 ASSESSMENT — FIBROSIS 4 INDEX: FIB4 SCORE: 1.54

## 2023-12-21 ENCOUNTER — APPOINTMENT (OUTPATIENT)
Dept: RADIOLOGY | Facility: MEDICAL CENTER | Age: 73
DRG: 872 | End: 2023-12-21
Attending: EMERGENCY MEDICINE
Payer: COMMERCIAL

## 2023-12-21 PROBLEM — A41.9 SEPSIS (HCC): Status: RESOLVED | Noted: 2023-12-21 | Resolved: 2023-12-21

## 2023-12-21 PROBLEM — R65.10 SIRS (SYSTEMIC INFLAMMATORY RESPONSE SYNDROME) (HCC): Status: ACTIVE | Noted: 2023-12-21

## 2023-12-21 PROBLEM — N18.31 STAGE 3A CHRONIC KIDNEY DISEASE: Status: ACTIVE | Noted: 2023-12-21

## 2023-12-21 PROBLEM — A41.9 SEPSIS (HCC): Status: ACTIVE | Noted: 2023-12-21

## 2023-12-21 PROBLEM — N39.0 UTI (URINARY TRACT INFECTION): Status: ACTIVE | Noted: 2023-12-21

## 2023-12-21 PROBLEM — Z71.89 ACP (ADVANCE CARE PLANNING): Status: ACTIVE | Noted: 2023-12-21

## 2023-12-21 LAB
ALBUMIN SERPL BCP-MCNC: 4.2 G/DL (ref 3.2–4.9)
ALBUMIN/GLOB SERPL: 1.2 G/DL
ALP SERPL-CCNC: 91 U/L (ref 30–99)
ALT SERPL-CCNC: 27 U/L (ref 2–50)
ANION GAP SERPL CALC-SCNC: 14 MMOL/L (ref 7–16)
APPEARANCE UR: CLEAR
AST SERPL-CCNC: 25 U/L (ref 12–45)
BACTERIA #/AREA URNS HPF: ABNORMAL /HPF
BASOPHILS # BLD AUTO: 0.3 % (ref 0–1.8)
BASOPHILS # BLD: 0.07 K/UL (ref 0–0.12)
BILIRUB SERPL-MCNC: 0.7 MG/DL (ref 0.1–1.5)
BILIRUB UR QL STRIP.AUTO: NEGATIVE
BUN SERPL-MCNC: 28 MG/DL (ref 8–22)
CALCIUM ALBUM COR SERPL-MCNC: 9.6 MG/DL (ref 8.5–10.5)
CALCIUM SERPL-MCNC: 9.8 MG/DL (ref 8.5–10.5)
CHLORIDE SERPL-SCNC: 99 MMOL/L (ref 96–112)
CO2 SERPL-SCNC: 24 MMOL/L (ref 20–33)
COLOR UR: YELLOW
CREAT SERPL-MCNC: 1.78 MG/DL (ref 0.5–1.4)
EKG IMPRESSION: NORMAL
EKG IMPRESSION: NORMAL
EOSINOPHIL # BLD AUTO: 0.02 K/UL (ref 0–0.51)
EOSINOPHIL NFR BLD: 0.1 % (ref 0–6.9)
EPI CELLS #/AREA URNS HPF: NEGATIVE /HPF
ERYTHROCYTE [DISTWIDTH] IN BLOOD BY AUTOMATED COUNT: 50.1 FL (ref 35.9–50)
FLUAV RNA SPEC QL NAA+PROBE: NEGATIVE
FLUBV RNA SPEC QL NAA+PROBE: NEGATIVE
GFR SERPLBLD CREATININE-BSD FMLA CKD-EPI: 40 ML/MIN/1.73 M 2
GLOBULIN SER CALC-MCNC: 3.4 G/DL (ref 1.9–3.5)
GLUCOSE BLD STRIP.AUTO-MCNC: 187 MG/DL (ref 65–99)
GLUCOSE BLD STRIP.AUTO-MCNC: 240 MG/DL (ref 65–99)
GLUCOSE BLD STRIP.AUTO-MCNC: 245 MG/DL (ref 65–99)
GLUCOSE BLD STRIP.AUTO-MCNC: 313 MG/DL (ref 65–99)
GLUCOSE SERPL-MCNC: 224 MG/DL (ref 65–99)
GLUCOSE UR STRIP.AUTO-MCNC: 100 MG/DL
HCT VFR BLD AUTO: 45.9 % (ref 42–52)
HGB BLD-MCNC: 15.7 G/DL (ref 14–18)
HYALINE CASTS #/AREA URNS LPF: ABNORMAL /LPF
IMM GRANULOCYTES # BLD AUTO: 0.14 K/UL (ref 0–0.11)
IMM GRANULOCYTES NFR BLD AUTO: 0.6 % (ref 0–0.9)
KETONES UR STRIP.AUTO-MCNC: NEGATIVE MG/DL
LACTATE SERPL-SCNC: 1.7 MMOL/L (ref 0.5–2)
LEUKOCYTE ESTERASE UR QL STRIP.AUTO: ABNORMAL
LYMPHOCYTES # BLD AUTO: 1.1 K/UL (ref 1–4.8)
LYMPHOCYTES NFR BLD: 4.8 % (ref 22–41)
MAGNESIUM SERPL-MCNC: 1.9 MG/DL (ref 1.5–2.5)
MCH RBC QN AUTO: 31.7 PG (ref 27–33)
MCHC RBC AUTO-ENTMCNC: 34.2 G/DL (ref 32.3–36.5)
MCV RBC AUTO: 92.7 FL (ref 81.4–97.8)
MICRO URNS: ABNORMAL
MONOCYTES # BLD AUTO: 1.63 K/UL (ref 0–0.85)
MONOCYTES NFR BLD AUTO: 7 % (ref 0–13.4)
NEUTROPHILS # BLD AUTO: 20.19 K/UL (ref 1.82–7.42)
NEUTROPHILS NFR BLD: 87.2 % (ref 44–72)
NITRITE UR QL STRIP.AUTO: NEGATIVE
NRBC # BLD AUTO: 0 K/UL
NRBC BLD-RTO: 0 /100 WBC (ref 0–0.2)
NT-PROBNP SERPL IA-MCNC: 569 PG/ML (ref 0–125)
PH UR STRIP.AUTO: 5 [PH] (ref 5–8)
PHOSPHATE SERPL-MCNC: 2.8 MG/DL (ref 2.5–4.5)
PLATELET # BLD AUTO: 227 K/UL (ref 164–446)
PMV BLD AUTO: 10.7 FL (ref 9–12.9)
POTASSIUM SERPL-SCNC: 4 MMOL/L (ref 3.6–5.5)
PROT SERPL-MCNC: 7.6 G/DL (ref 6–8.2)
PROT UR QL STRIP: 30 MG/DL
RBC # BLD AUTO: 4.95 M/UL (ref 4.7–6.1)
RBC # URNS HPF: ABNORMAL /HPF
RBC UR QL AUTO: ABNORMAL
RSV RNA SPEC QL NAA+PROBE: NEGATIVE
SARS-COV-2 RNA RESP QL NAA+PROBE: NOTDETECTED
SODIUM SERPL-SCNC: 137 MMOL/L (ref 135–145)
SP GR UR STRIP.AUTO: 1.02
T4 FREE SERPL-MCNC: 1.05 NG/DL (ref 0.93–1.7)
TROPONIN T SERPL-MCNC: 53 NG/L (ref 6–19)
TROPONIN T SERPL-MCNC: 61 NG/L (ref 6–19)
TSH SERPL DL<=0.005 MIU/L-ACNC: 2.13 UIU/ML (ref 0.38–5.33)
UROBILINOGEN UR STRIP.AUTO-MCNC: 0.2 MG/DL
WBC # BLD AUTO: 23.2 K/UL (ref 4.8–10.8)
WBC #/AREA URNS HPF: ABNORMAL /HPF

## 2023-12-21 PROCEDURE — 96375 TX/PRO/DX INJ NEW DRUG ADDON: CPT

## 2023-12-21 PROCEDURE — A9270 NON-COVERED ITEM OR SERVICE: HCPCS | Performed by: STUDENT IN AN ORGANIZED HEALTH CARE EDUCATION/TRAINING PROGRAM

## 2023-12-21 PROCEDURE — 99223 1ST HOSP IP/OBS HIGH 75: CPT | Mod: 25 | Performed by: STUDENT IN AN ORGANIZED HEALTH CARE EDUCATION/TRAINING PROGRAM

## 2023-12-21 PROCEDURE — 93005 ELECTROCARDIOGRAM TRACING: CPT

## 2023-12-21 PROCEDURE — 700111 HCHG RX REV CODE 636 W/ 250 OVERRIDE (IP): Performed by: STUDENT IN AN ORGANIZED HEALTH CARE EDUCATION/TRAINING PROGRAM

## 2023-12-21 PROCEDURE — 700102 HCHG RX REV CODE 250 W/ 637 OVERRIDE(OP): Performed by: STUDENT IN AN ORGANIZED HEALTH CARE EDUCATION/TRAINING PROGRAM

## 2023-12-21 PROCEDURE — 700111 HCHG RX REV CODE 636 W/ 250 OVERRIDE (IP): Mod: JZ | Performed by: EMERGENCY MEDICINE

## 2023-12-21 PROCEDURE — 83735 ASSAY OF MAGNESIUM: CPT

## 2023-12-21 PROCEDURE — 0241U HCHG SARS-COV-2 COVID-19 NFCT DS RESP RNA 4 TRGT ED POC: CPT

## 2023-12-21 PROCEDURE — 82962 GLUCOSE BLOOD TEST: CPT

## 2023-12-21 PROCEDURE — 81001 URINALYSIS AUTO W/SCOPE: CPT

## 2023-12-21 PROCEDURE — 99497 ADVNCD CARE PLAN 30 MIN: CPT | Performed by: STUDENT IN AN ORGANIZED HEALTH CARE EDUCATION/TRAINING PROGRAM

## 2023-12-21 PROCEDURE — 87077 CULTURE AEROBIC IDENTIFY: CPT | Mod: 91

## 2023-12-21 PROCEDURE — 70450 CT HEAD/BRAIN W/O DYE: CPT

## 2023-12-21 PROCEDURE — 84439 ASSAY OF FREE THYROXINE: CPT

## 2023-12-21 PROCEDURE — 770020 HCHG ROOM/CARE - TELE (206)

## 2023-12-21 PROCEDURE — 96365 THER/PROPH/DIAG IV INF INIT: CPT

## 2023-12-21 PROCEDURE — 87086 URINE CULTURE/COLONY COUNT: CPT

## 2023-12-21 PROCEDURE — 84443 ASSAY THYROID STIM HORMONE: CPT

## 2023-12-21 PROCEDURE — 36415 COLL VENOUS BLD VENIPUNCTURE: CPT

## 2023-12-21 PROCEDURE — 84484 ASSAY OF TROPONIN QUANT: CPT

## 2023-12-21 PROCEDURE — 700105 HCHG RX REV CODE 258: Performed by: EMERGENCY MEDICINE

## 2023-12-21 PROCEDURE — 93005 ELECTROCARDIOGRAM TRACING: CPT | Performed by: EMERGENCY MEDICINE

## 2023-12-21 PROCEDURE — C9803 HOPD COVID-19 SPEC COLLECT: HCPCS

## 2023-12-21 PROCEDURE — 700105 HCHG RX REV CODE 258: Performed by: STUDENT IN AN ORGANIZED HEALTH CARE EDUCATION/TRAINING PROGRAM

## 2023-12-21 PROCEDURE — 87186 SC STD MICRODIL/AGAR DIL: CPT | Mod: 91

## 2023-12-21 PROCEDURE — 87040 BLOOD CULTURE FOR BACTERIA: CPT

## 2023-12-21 PROCEDURE — 84100 ASSAY OF PHOSPHORUS: CPT

## 2023-12-21 RX ORDER — WHEAT DEXTRIN 3 G/3.8 G
1 POWDER (GRAM) ORAL 2 TIMES DAILY
COMMUNITY

## 2023-12-21 RX ORDER — TERAZOSIN 5 MG/1
10 CAPSULE ORAL DAILY
Status: DISCONTINUED | OUTPATIENT
Start: 2023-12-21 | End: 2023-12-23 | Stop reason: HOSPADM

## 2023-12-21 RX ORDER — FUROSEMIDE 10 MG/ML
20 INJECTION INTRAMUSCULAR; INTRAVENOUS
Status: DISCONTINUED | OUTPATIENT
Start: 2023-12-21 | End: 2023-12-21

## 2023-12-21 RX ORDER — NITROFURANTOIN MACROCRYSTALS 50 MG/1
50 CAPSULE ORAL DAILY
Status: ON HOLD | COMMUNITY
Start: 2023-11-14 | End: 2023-12-23

## 2023-12-21 RX ORDER — FOSINOPRIL SODIUM 40 MG/1
40 TABLET ORAL DAILY
Status: ON HOLD | COMMUNITY
Start: 2023-10-27 | End: 2023-12-23

## 2023-12-21 RX ORDER — AMLODIPINE BESYLATE 5 MG/1
5 TABLET ORAL
Status: DISCONTINUED | OUTPATIENT
Start: 2023-12-21 | End: 2023-12-23 | Stop reason: HOSPADM

## 2023-12-21 RX ORDER — ROSUVASTATIN CALCIUM 20 MG/1
20 TABLET, COATED ORAL EVERY EVENING
Status: DISCONTINUED | OUTPATIENT
Start: 2023-12-21 | End: 2023-12-23 | Stop reason: HOSPADM

## 2023-12-21 RX ORDER — POLYETHYLENE GLYCOL 3350 17 G/17G
1 POWDER, FOR SOLUTION ORAL
Status: DISCONTINUED | OUTPATIENT
Start: 2023-12-21 | End: 2023-12-23 | Stop reason: HOSPADM

## 2023-12-21 RX ORDER — SODIUM CHLORIDE 9 MG/ML
1000 INJECTION, SOLUTION INTRAVENOUS ONCE
Status: COMPLETED | OUTPATIENT
Start: 2023-12-21 | End: 2023-12-21

## 2023-12-21 RX ORDER — PAROXETINE HYDROCHLORIDE 20 MG/1
20 TABLET, FILM COATED ORAL DAILY
Status: DISCONTINUED | OUTPATIENT
Start: 2023-12-21 | End: 2023-12-23 | Stop reason: HOSPADM

## 2023-12-21 RX ORDER — BISACODYL 10 MG
10 SUPPOSITORY, RECTAL RECTAL
Status: DISCONTINUED | OUTPATIENT
Start: 2023-12-21 | End: 2023-12-23 | Stop reason: HOSPADM

## 2023-12-21 RX ORDER — CEFTRIAXONE 1 G/1
1000 INJECTION, POWDER, FOR SOLUTION INTRAMUSCULAR; INTRAVENOUS ONCE
Status: COMPLETED | OUTPATIENT
Start: 2023-12-21 | End: 2023-12-21

## 2023-12-21 RX ORDER — FINASTERIDE 5 MG/1
5 TABLET, FILM COATED ORAL DAILY
Status: DISCONTINUED | OUTPATIENT
Start: 2023-12-21 | End: 2023-12-23 | Stop reason: HOSPADM

## 2023-12-21 RX ORDER — AMOXICILLIN 250 MG
2 CAPSULE ORAL 2 TIMES DAILY
Status: DISCONTINUED | OUTPATIENT
Start: 2023-12-21 | End: 2023-12-23 | Stop reason: HOSPADM

## 2023-12-21 RX ORDER — TRIAMTERENE AND HYDROCHLOROTHIAZIDE 37.5; 25 MG/1; MG/1
1 TABLET ORAL EVERY MORNING
Status: DISCONTINUED | OUTPATIENT
Start: 2023-12-21 | End: 2023-12-23 | Stop reason: HOSPADM

## 2023-12-21 RX ADMIN — INSULIN HUMAN 2 UNITS: 100 INJECTION, SOLUTION PARENTERAL at 09:55

## 2023-12-21 RX ADMIN — FINASTERIDE 5 MG: 5 TABLET, FILM COATED ORAL at 05:22

## 2023-12-21 RX ADMIN — INSULIN HUMAN 2 UNITS: 100 INJECTION, SOLUTION PARENTERAL at 20:59

## 2023-12-21 RX ADMIN — TERAZOSIN 10 MG: 5 CAPSULE ORAL at 06:02

## 2023-12-21 RX ADMIN — TRIAMTERENE AND HYDROCHLOROTHIAZIDE 1 TABLET: 37.5; 25 TABLET ORAL at 06:02

## 2023-12-21 RX ADMIN — PAROXETINE HYDROCHLORIDE 20 MG: 20 TABLET, FILM COATED ORAL at 06:02

## 2023-12-21 RX ADMIN — FUROSEMIDE 20 MG: 10 INJECTION, SOLUTION INTRAVENOUS at 07:21

## 2023-12-21 RX ADMIN — SODIUM CHLORIDE 1000 ML: 9 INJECTION, SOLUTION INTRAVENOUS at 00:54

## 2023-12-21 RX ADMIN — MEROPENEM 500 MG: 500 INJECTION, POWDER, FOR SOLUTION INTRAVENOUS at 03:52

## 2023-12-21 RX ADMIN — ROSUVASTATIN CALCIUM 20 MG: 20 TABLET, FILM COATED ORAL at 19:07

## 2023-12-21 RX ADMIN — DOCUSATE SODIUM 50MG AND SENNOSIDES 8.6MG 2 TABLET: 8.6; 5 TABLET, FILM COATED ORAL at 19:07

## 2023-12-21 RX ADMIN — INSULIN HUMAN 1 UNITS: 100 INJECTION, SOLUTION PARENTERAL at 17:25

## 2023-12-21 RX ADMIN — AMLODIPINE BESYLATE 5 MG: 5 TABLET ORAL at 05:22

## 2023-12-21 RX ADMIN — CEFTRIAXONE SODIUM 1000 MG: 1 INJECTION, POWDER, FOR SOLUTION INTRAMUSCULAR; INTRAVENOUS at 01:43

## 2023-12-21 RX ADMIN — INSULIN HUMAN 4 UNITS: 100 INJECTION, SOLUTION PARENTERAL at 12:41

## 2023-12-21 RX ADMIN — APIXABAN 5 MG: 5 TABLET, FILM COATED ORAL at 19:07

## 2023-12-21 RX ADMIN — APIXABAN 5 MG: 5 TABLET, FILM COATED ORAL at 05:22

## 2023-12-21 RX ADMIN — MEROPENEM 500 MG: 500 INJECTION, POWDER, FOR SOLUTION INTRAVENOUS at 19:06

## 2023-12-21 ASSESSMENT — PAIN DESCRIPTION - PAIN TYPE
TYPE: ACUTE PAIN
TYPE: ACUTE PAIN

## 2023-12-21 ASSESSMENT — ENCOUNTER SYMPTOMS: WEAKNESS: 1

## 2023-12-21 ASSESSMENT — COGNITIVE AND FUNCTIONAL STATUS - GENERAL
CLIMB 3 TO 5 STEPS WITH RAILING: A LOT
DAILY ACTIVITIY SCORE: 20
STANDING UP FROM CHAIR USING ARMS: A LITTLE
MOVING TO AND FROM BED TO CHAIR: A LOT
WALKING IN HOSPITAL ROOM: A LITTLE
DRESSING REGULAR LOWER BODY CLOTHING: A LOT
SUGGESTED CMS G CODE MODIFIER DAILY ACTIVITY: CJ
MOBILITY SCORE: 15
DRESSING REGULAR UPPER BODY CLOTHING: A LITTLE
MOVING FROM LYING ON BACK TO SITTING ON SIDE OF FLAT BED: A LITTLE
HELP NEEDED FOR BATHING: A LITTLE
TURNING FROM BACK TO SIDE WHILE IN FLAT BAD: A LOT
SUGGESTED CMS G CODE MODIFIER MOBILITY: CK

## 2023-12-21 ASSESSMENT — FIBROSIS 4 INDEX: FIB4 SCORE: 1.55

## 2023-12-21 NOTE — ASSESSMENT & PLAN NOTE
Patient had a history of pulmonary embolism along with DVT  -- will continue Eliquis 5 twice daily

## 2023-12-21 NOTE — HOSPITAL COURSE
This 73-year-old male with a past medical history significant for CKD stage III, hypertension, obesity, LYLE, dyslipidemia, type 2 diabetes mellitus  with glyco of 7.8 presented to ER on 12/21/2023 with a complaint of increased urinary frequency along with urgency.  Patient stated that he was so weak that he slipped off of bed and could not get up.    He stated urological procedure 2 weeks ago; he has completed antibiotics.  Open patient seen in ER, he is noted to be tachycardic with a heart rate of 110, WBC 23.2 UA showing leukocyte esterase positive, few bacteria, WBC 20-50.     He did have a history of UTI/2023, at that time culture growing ESBL E. coli.  Patient was initially started on meropenem.  Blood cultures x 2 has been negative, urine culture growing Klebsiella and Pseudomonas.  ID was consulted, patient will be discharged home on ciprofloxacin 750 twice daily as per ID recommendation.    PT/OT has evaluate the patient, recommended home with which has been ordered.  I discussed the plan of care with the patient's wife and patient's son Supa was a physician at [facility].    Discussed adverse effect of being on ciprofloxacin including tendinopathy and diarrhea, they stated understanding.

## 2023-12-21 NOTE — H&P
[facility] Medicine History & Physical Note    Date of Service  12/21/2023    Primary Care Physician  Chuck Chappell M.D.    Consultants  none    Code Status  DNAR/DNI    Chief Complaint  Chief Complaint   Patient presents with    Weakness     BIB EMS from home, pt states he slid out of bed and could not get up. - head strike, -LOC, +thinners (Eliquis). Reports increased weakness x1 day, normally ambulates with a cane but was not able to this evening. Hx of neuropathy. - stroke assessment.        History of Presenting Illness  Ramu Barber is a 73 y.o. male past medical history of CKD, hypertension, obesity, LYLE, dyslipidemia, diabetes mellitus who presented on 12/21/2023 weakness and increased urinary frequency  with an urgency.  Patient reportedly slipped off bed and could not get up.  Denies any fevers or chills.  No other relieving medicine factors are noted.  Patient does report having urological procedure 2 weeks ago completed antibiotics.    In the ER, he did meet SIRS criteria with WBC count of 23, 200 and tachycardia.  Urinalysis grossly positive for urinary tract infection.  Discussed with pharmacy given history of ESBL, ulcerations patient started on meropenem.    I discussed the plan of care with patient, family, bedside RN, and ERP, Dr. Santiago .    Review of Systems  Review of Systems   Genitourinary:  Positive for frequency and urgency.   Neurological:  Positive for weakness.       Past Medical History   has a past medical history of Arthritis, Back pain, Chickenpox, High cholesterol, Hypertension, Obesity, Pain, Pneumonia, Polycythemia, secondary, Sleep apnea, Snoring, and Type II or unspecified type diabetes mellitus without mention of complication, not stated as uncontrolled.    Surgical History   has a past surgical history that includes fusion, spine, lumbar, plif (1/21/2015); inguinal hernia repair (Right, 2/16/2016); lumbar laminectomy diskectomy (2/18/2018); hardware removal neuro  (2/18/2018); carpal tunnel release (Right, 2014); shoulder arthroscopy (Right, 2014); lumbar decompression (2004); lumbar decompression (2003); cervical disk and fusion anterior (2005); pr ins/rplc spi npg/rcvr pocket (7/23/2019); egd w/control bleeding (8/22/2020); and gastroscopy (N/A, 8/22/2020).     Family History  family history includes Heart Attack in his brother and father; Sleep Apnea in his mother.   Family history reviewed with patient. There is no family history that is pertinent to the chief complaint.     Social History   reports that he has never smoked. He quit smokeless tobacco use about 5 years ago.  His smokeless tobacco use included chew. He reports current alcohol use of about 1.2 oz of alcohol per week. He reports that he does not use drugs.    Allergies  No Known Allergies    Medications  Prior to Admission Medications   Prescriptions Last Dose Informant Patient Reported? Taking?   ELIQUIS 5 MG Tab 12/20/2023 at 0900  Yes No   Sig: Take 5 mg by mouth 2 times a day.   OZEMPIC, 0.25 OR 0.5 MG/DOSE, 2 MG/1.5ML Solution Pen-injector 12/20/2023 at 0900  Yes No   Sig: INJECT 0.25MG SUBCUTANEOUSLY ONCE WEEKLY   PARoxetine (PAXIL) 20 MG Tab 12/20/2023 at 0900  Yes No   Sig: Take 1 Tablet by mouth.   amLODIPine (NORVASC) 5 MG Tab 12/20/2023 at 0900  Yes No   Sig: Take 5 mg by mouth every day.   finasteride (PROSCAR) 5 MG Tab   Yes No   Sig: Take 1 Tablet by mouth every day.   fosinopril (MONOPRIL) 40 MG tablet 12/20/2023 at 0900  Yes Yes   Sig: Take 40 mg by mouth every day.   furosemide (LASIX) 20 MG Tab 12/20/2023 at 0900  Yes No   Sig: Take 20 mg by mouth every day.   gabapentin (NEURONTIN) 600 MG tablet 12/20/2023 at 0900  Yes No   Sig: Take 600 mg by mouth 2 times a day.   glipiZIDE SR (GLUCOTROL) 2.5 MG TABLET SR 24 HR 12/20/2023 at 0900  Yes No   Sig: Take 2.5 mg by mouth 2 times a day.   lovastatin (MEVACOR) 20 MG Tab 12/19/2023 at PM  Yes No   Sig: Take 20 mg by mouth every day.   metFORMIN  (GLUCOPHAGE) 850 MG Tab 12/20/2023 at 0900  Yes No   Sig: Take 850 mg by mouth 2 times a day.   methenamine hip (HIPPREX) 1 GM Tab 12/20/2023 at 0900  Yes No   Sig: Take 1 Tablet by mouth 2 times a day.   rosuvastatin (CRESTOR) 20 MG Tab 12/19/2023 at PM  No No   Sig: Take 1 Tablet by mouth every evening.   terazosin (HYTRIN) 10 MG capsule 12/19/2023 at PM  Yes No   Sig: Take 10 mg by mouth every day.   triamterene-hctz (MAXZIDE-25/DYAZIDE) 37.5-25 MG Tab 12/20/2023 at 0900 Patient Yes No   Sig: Take 1 Tablet by mouth every morning.      Facility-Administered Medications: None       Physical Exam  Temp:  [37.2 °C (98.9 °F)-37.3 °C (99.1 °F)] 37.2 °C (98.9 °F)  Pulse:  [] 92  Resp:  [18-20] 20  BP: (144-174)/(78-95) 174/92  SpO2:  [91 %-93 %] 93 %  Blood Pressure : (!) 174/92   Temperature: 37.2 °C (98.9 °F)   Pulse: 92   Respiration: 20   Pulse Oximetry: 93 %       Physical Exam  Vitals and nursing note reviewed.   Constitutional:       Appearance: He is obese.   HENT:      Head: Normocephalic and atraumatic.      Right Ear: Tympanic membrane normal.      Left Ear: Tympanic membrane normal.      Nose: Nose normal.      Mouth/Throat:      Mouth: Mucous membranes are moist.      Pharynx: Oropharynx is clear.   Eyes:      Extraocular Movements: Extraocular movements intact.      Pupils: Pupils are equal, round, and reactive to light.   Cardiovascular:      Rate and Rhythm: Normal rate and regular rhythm.      Pulses: Normal pulses.      Heart sounds: Normal heart sounds.   Pulmonary:      Effort: Pulmonary effort is normal.      Breath sounds: Normal breath sounds.   Abdominal:      General: Bowel sounds are normal. There is no distension.      Palpations: Abdomen is soft. There is no mass.   Musculoskeletal:         General: Swelling (RLE) present. Normal range of motion.      Cervical back: Neck supple.      Right lower leg: Edema present.      Left lower leg: Edema present.   Skin:     General: Skin is warm.       Capillary Refill: Capillary refill takes less than 2 seconds.   Neurological:      General: No focal deficit present.      Mental Status: He is alert and oriented to person, place, and time. Mental status is at baseline.   Psychiatric:         Mood and Affect: Mood normal.         Behavior: Behavior normal.         Laboratory:  Recent Labs     12/20/23  2353   WBC 23.2*   RBC 4.95   HEMOGLOBIN 15.7   HEMATOCRIT 45.9   MCV 92.7   MCH 31.7   MCHC 34.2   RDW 50.1*   PLATELETCT 227   MPV 10.7     Recent Labs     12/20/23  2353   SODIUM 137   POTASSIUM 4.0   CHLORIDE 99   CO2 24   GLUCOSE 224*   BUN 28*   CREATININE 1.78*   CALCIUM 9.8     Recent Labs     12/20/23  2353   ALTSGPT 27   ASTSGOT 25   ALKPHOSPHAT 91   TBILIRUBIN 0.7   GLUCOSE 224*         Recent Labs     12/20/23  2353   NTPROBNP 569*         Recent Labs     12/20/23 2353 12/21/23  0103   TROPONINT 61* 53*       Imaging:  CT-HEAD W/O   Final Result         1.  No acute intracranial abnormality is identified, there are nonspecific white matter changes, commonly associated with small vessel ischemic disease.  Associated mild cerebral atrophy is noted.   2.  Atherosclerosis.         DX-CHEST-PORTABLE (1 VIEW)   Final Result         1.  No acute cardiopulmonary disease.   2.  Cardiomegaly          EKG:  I have personally reviewed the images and compared with prior images.    Assessment/Plan:  Justification for Admission Status  I anticipate this patient will require at least two midnights for appropriate medical management, necessitating inpatient admission because SIRS, UTI    Patient will need a Telemetry bed on MEDICAL service .  The need is secondary to see above.    * SIRS (systemic inflammatory response syndrome) (HCC)- (present on admission)  Assessment & Plan  SIRS criteria identified on my evaluation include:  Tachycardia, with heart rate greater than 90 BPM and Leukocytosis, with WBC greater than 12,000      Stage 3a chronic kidney disease  (HCC)  Assessment & Plan  Avoid nephrotoxic agents  Renally adjust all medications  Monitor Cr    UTI (urinary tract infection)  Assessment & Plan  C/w meropenem given hx of ESBL  F/u cultures     Diastolic heart failure (HCC)- (present on admission)  Assessment & Plan  Diuresis with IV lasix 20 mg BID    Pulmonary hypertension (HCC)- (present on admission)  Assessment & Plan  Diurese with IV Lasix  Monitor volume status    Acute deep vein thrombosis (DVT) of femoral vein of right lower extremity (HCC)- (present on admission)  Assessment & Plan  Continue Eliquis 5 mg twice daily    Hyperlipidemia- (present on admission)  Assessment & Plan  Continue with Crestor    Type 2 diabetes mellitus without complication, without long-term current use of insulin (HCC)- (present on admission)  Assessment & Plan  SSI    Benign prostatic hyperplasia with urinary frequency- (present on admission)  Assessment & Plan  Continue with hytrin and Proscar    Obesity (BMI 30-39.9)- (present on admission)  Assessment & Plan  BMI 36.35    Essential hypertension- (present on admission)  Assessment & Plan  Continue with amlodipine, Lasix, Maxide    ACP (advance care planning)  Assessment & Plan  I discussed advance care planning with the patient and family for at least 17 minutes, including diagnosis, prognosis, plan of care, risks and benefits of any therapies that could be offered, as well as alternatives including palliation and hospice, as appropriate.     DNR/DNI          VTE prophylaxis: SCDs/TEDs and therapeutic anticoagulation with eliquis

## 2023-12-21 NOTE — ED NOTES
Bedside report received from ARIAN Hinkle, assumed care of patient.  POC discussed with patient. Urine specimen obtained via straight cath and sent to lab.    Fall risk interventions in place: Move the patient closer to the nurse's station, Patient's personal possessions are with in their safe reach, Place socks on patient, Place fall risk sign on patient's door, and Keep floor surfaces clean and dry (all applicable per [place] Fall risk assessment)   Continuous monitoring: Cardiac Leads, Pulse Ox, or Blood Pressure  IVF/IV medications: Not Applicable   Oxygen: Room Air  Bedside sitter: Not Applicable   Isolation: Not Applicable

## 2023-12-21 NOTE — ED NOTES
Repeat troponin blood specimen obtained and sent to lab. POCT respiratory swab obtained and placed in machine. Pt medicated per MAR. Oral temperature taken, no fever noted. Pt provided with warm blankets and pillow.

## 2023-12-21 NOTE — ASSESSMENT & PLAN NOTE
I discussed advance care planning with the patient and family for at least 17 minutes, including diagnosis, prognosis, plan of care, risks and benefits of any therapies that could be offered, as well as alternatives including palliation and hospice, as appropriate.     DNR/DNI

## 2023-12-21 NOTE — PROGRESS NOTES
[facility] Medicine Daily Progress Note    Date of Service  12/21/2023    Chief Complaint  Ramu Barber is a 73 y.o. male admitted 12/20/2023 with   Chief Complaint   Patient presents with    Weakness     BIB EMS from home, pt states he slid out of bed and could not get up. - head strike, -LOC, +thinners (Eliquis). Reports increased weakness x1 day, normally ambulates with a cane but was not able to this evening. Hx of neuropathy. - stroke assessment.          Hospital Course  This 73-year-old male with a past medical history significant for CKD stage III, hypertension, obesity, LYLE, dyslipidemia, type 2 diabetes mellitus with glyco at 7.8, grade 1 diastolic dysfunction, mild to moderate pulmonary hypertension, BPH, diabetic neuropathy,pulmonary embolism on 4/2023 presented to ER on 12/21/2023 with a complaint of increased urinary frequency along with urgency.  Patient stated that he was so weak that he slipped off of bed and could not get up.    He stated urological procedure 2 weeks ago; he has completed antibiotics.  Open patient seen in ER, he is noted to be tachycardic with a heart rate of 110, WBC 23.2 UA showing leukocyte esterase positive, few bacteria, WBC 20-50.  Blood culture x 2, urine culture has been ordered.    Did have a history of UTI/2023, at that time culture growing ESBL E. coli.  Will continue meropenem; until cx come back.    Interval events:  -- No acute events overnight, patient is intermittently tachycardic, currently requiring 2 L of oxygen.  His blood pressure has been stable  --Patient also found in GENI on CKD stage III, creatinine slightly worse at 1.7  -- Patient does have mild elevated troponin, will obtain echocardiogram, patient denies any chest pain  -- Follow blood cultures x 2, urine culture, antibiotics    Patient had history of pulmonary embolism, will continue Eliquis.  Continue ISS, hypoglycemia protocol, ADA diet.      I have discussed this patient's plan of care and  discharge plan at IDT rounds today with Case Management, Nursing, Nursing leadership, and other members of the IDT team.    Consultants/Specialty  None    Code Status  DNAR/DNI    Disposition  The patient is not medically cleared for discharge to home or a post-acute facility.  Anticipate discharge to: skilled nursing facility    I have placed the appropriate orders for post-discharge needs.    Review of Systems  ROS     Physical Exam  Temp:  [37.1 °C (98.7 °F)-37.3 °C (99.1 °F)] 37.1 °C (98.7 °F)  Pulse:  [] 68  Resp:  [18-22] 18  BP: ()/() 112/66  SpO2:  [91 %-95 %] 94 %    Physical Exam    Fluids    Intake/Output Summary (Last 24 hours) at 12/21/2023 1506  Last data filed at 12/21/2023 1427  Gross per 24 hour   Intake 120 ml   Output 500 ml   Net -380 ml       Laboratory  Recent Labs     12/20/23  2353   WBC 23.2*   RBC 4.95   HEMOGLOBIN 15.7   HEMATOCRIT 45.9   MCV 92.7   MCH 31.7   MCHC 34.2   RDW 50.1*   PLATELETCT 227   MPV 10.7     Recent Labs     12/20/23  2353   SODIUM 137   POTASSIUM 4.0   CHLORIDE 99   CO2 24   GLUCOSE 224*   BUN 28*   CREATININE 1.78*   CALCIUM 9.8                   Imaging  CT-HEAD W/O   Final Result         1.  No acute intracranial abnormality is identified, there are nonspecific white matter changes, commonly associated with small vessel ischemic disease.  Associated mild cerebral atrophy is noted.   2.  Atherosclerosis.         DX-CHEST-PORTABLE (1 VIEW)   Final Result         1.  No acute cardiopulmonary disease.   2.  Cardiomegaly      EC-ECHOCARDIOGRAM COMPLETE W/ CONT    (Results Pending)        Assessment/Plan  * SIRS (systemic inflammatory response syndrome) (HCC)- (present on admission)  Assessment & Plan  SIRS criteria identified on my evaluation include:  Tachycardia, with heart rate greater than 90 BPM and Leukocytosis, with WBC greater than 12,000      UTI (urinary tract infection)  Assessment & Plan  Patient had a history of ESBL UTI in  10/2023  Presented here  with UTI  Blood cultures x 2 pending, urine culture pending, continue meropenem    Acute deep vein thrombosis (DVT) of femoral vein of right lower extremity (HCC)- (present on admission)  Assessment & Plan  Patient had a history of pulmonary embolism along with DVT  -- will continue Eliquis 5 twice daily    Stage 3a chronic kidney disease (HCC)  Assessment & Plan  Avoid nephrotoxic agents   -- avoid nephrotoxin    Diastolic heart failure (HCC)- (present on admission)  Assessment & Plan  I/os, daily weight, fluid restriction to 1.5 L, continue home dose of Lasix      Pulmonary hypertension (HCC)- (present on admission)  Assessment & Plan  Continue home  lasix  Monitor volume status    ACP (advance care planning)  Assessment & Plan  I discussed advance care planning with the patient and family for at least 17 minutes, including diagnosis, prognosis, plan of care, risks and benefits of any therapies that could be offered, as well as alternatives including palliation and hospice, as appropriate.     DNR/DNI      Hyperlipidemia- (present on admission)  Assessment & Plan  Continue with Crestor    Type 2 diabetes mellitus without complication, without long-term current use of insulin ([facility])- (present on admission)  Assessment & Plan  SSI, hypoglycemia protocol, Accu-Cheks ACHS    Benign prostatic hyperplasia with urinary frequency- (present on admission)  Assessment & Plan  Continue with hytrin and Proscar    Obesity (BMI 30-39.9)- (present on admission)  Assessment & Plan  BMI 36.35    Essential hypertension- (present on admission)  Assessment & Plan  Continue with amlodipine, Maxide         VTE prophylaxis: eliquis    I have performed a physical exam and reviewed and updated ROS and Plan today (12/21/2023). In review of yesterday's note (12/20/2023), there are no changes except as documented above.

## 2023-12-21 NOTE — ASSESSMENT & PLAN NOTE
Patient had a history of ESBL UTI in 10/2023  Presented here  with UTI  Blood cultures x 2 pending, urine culture pending, continue meropenem  Obtain Us-renal

## 2023-12-21 NOTE — ED NOTES
Pt. Had urine incontinence and linens were soaked in urine.  Bedding changed and new gown placed.  Pt. Stood up at bedside, upon standing HR went to 165, pt. Denied any CP or SOB with this.  Upon sitting back on the edge of the [facility] HR went to 144.  Tech to BS for repeat EKG, once pt. Laid back down on [facility] HR went back to 90's-120's.  Dr. Guardado to bedside for eval at this time and reviewed EKG.  Aware of above events.

## 2023-12-21 NOTE — ED NOTES
Report from ARIAN Villatoro  Pt to ze 66 from blue   Pt placed on the monitor   Call light within reach, NAD

## 2023-12-21 NOTE — ED TRIAGE NOTES
Chief Complaint   Patient presents with    Weakness     BIB EMS from home, pt states he slid out of bed and could not get up. - head strike, -LOC, +thinners (Eliquis). Reports increased weakness x1 day, normally ambulates with a cane but was not able to this evening. Hx of neuropathy. - stroke assessment.       Patient ambulatory to triage for above complaint. Patient A&Ox4, GCS 15, patient speaking in full sentences. Equal and unlabored respirations. Patient educated on triage process and encouraged to notify staff if condition worsens. Appropriate protocols ordered. Patient returned to the lobby in stable condition.

## 2023-12-21 NOTE — ED PROVIDER NOTES
ED Provider Note    Scribed for Andrea Santiago by Kd Coleman. 12/20/2023  11:35 PM    Primary care provider: Chuck Chappell M.D.  Means of arrival: EMS  History obtained from: Patient  History limited by: None    CHIEF COMPLAINT  Chief Complaint   Patient presents with    Weakness     BIB EMS from home, pt states he slid out of bed and could not get up. - head strike, -LOC, +thinners (Eliquis). Reports increased weakness x1 day, normally ambulates with a cane but was not able to this evening. Hx of neuropathy. - stroke assessment.        EXTERNAL RECORDS REVIEWED  Outpatient Notes The patient has been seen multiple times including on October 4th of this year for recurrent UTIs with cultures positive for E. Coli.    HPI/ROS  LIMITATION TO HISTORY   Select: : None  OUTSIDE HISTORIAN(S):  Significant other The patient's wife provided helpful and collateral history.    HPI  Ramu Barber is a 73 y.o. male with a history of recurrent UTIs who presents to the Emergency Department via EMS for evaluation of weakness onset yesterday. The patient reports he noticed he has been having a hard time getting around his house and was unable to walk using his cane as he normally would tonight. He notes his weakness is localized to his bilateral legs. The patient denies any dysuria, cough, nausea, vomiting, fevers, confusion, leg swelling, shortness of breath, or chest pain. The patient states he had surgery recently to treat his recurrent UTIs and notes his current symptoms are not indicative of one of these infections. He adds he was prescribed eliquis for previous pulmonary embolism.      REVIEW OF SYSTEMS  As above, all other systems reviewed and are negative.   See HPI for further details.     PAST MEDICAL HISTORY   has a past medical history of Arthritis, Back pain, Chickenpox, High cholesterol, Hypertension, Obesity, Pain, Pneumonia, Polycythemia, secondary, Sleep apnea, Snoring, and Type II or unspecified type  diabetes mellitus without mention of complication, not stated as uncontrolled.  SURGICAL HISTORY   has a past surgical history that includes fusion, spine, lumbar, plif (1/21/2015); inguinal hernia repair (Right, 2/16/2016); lumbar laminectomy diskectomy (2/18/2018); hardware removal neuro (2/18/2018); carpal tunnel release (Right, 2014); shoulder arthroscopy (Right, 2014); lumbar decompression (2004); lumbar decompression (2003); cervical disk and fusion anterior (2005); ins/rplc spi npg/rcvr pocket (7/23/2019); egd w/control bleeding (8/22/2020); and gastroscopy (N/A, 8/22/2020).  SOCIAL HISTORY  Social History     Tobacco Use    Smoking status: Never    Smokeless tobacco: Former     Types: Chew     Quit date: 12/2018   Vaping Use    Vaping Use: Never used   Substance Use Topics    Alcohol use: Yes     Alcohol/week: 1.2 oz     Types: 2 Shots of liquor per week     Comment: 2 a week    Drug use: No      Social History     Substance and Sexual Activity   Drug Use No     FAMILY HISTORY  Family History   Problem Relation Age of Onset    Sleep Apnea Mother     Heart Attack Father     Heart Attack Brother      CURRENT MEDICATIONS  Home Medications       Reviewed by Haven Hopkins R.N. (Registered Nurse) on 12/20/23 at 2052  Med List Status: Partial     Medication Last Dose Status   amLODIPine (NORVASC) 5 MG Tab  Active   amLODIPine (NORVASC) 5 MG Tab  Active   colchicine (COLCRYS) 0.6 MG Tab  Active   colchicine (COLCRYS) 0.6 MG Tab  Active   cyclobenzaprine (FLEXERIL) 5 mg tablet  Active   ELIQUIS 5 MG Tab  Active   finasteride (PROSCAR) 5 MG Tab  Active   furosemide (LASIX) 20 MG Tab  Active   gabapentin (NEURONTIN) 600 MG tablet  Active   glipiZIDE SR (GLUCOTROL) 2.5 MG TABLET SR 24 HR  Active   lovastatin (MEVACOR) 20 MG Tab  Active   metformin (GLUCOPHAGE) 1000 MG tablet  Active   metformin (GLUCOPHAGE) 850 MG Tab  Active   methenamine hip (HIPPREX) 1 GM Tab  Active   methylPREDNISolone (MEDROL DOSEPAK) 4  MG Tablet Therapy Pack  Active   OZEMPIC, 0.25 OR 0.5 MG/DOSE, 2 MG/1.5ML Solution Pen-injector  Active   PARoxetine (PAXIL) 20 MG Tab  Active   rosuvastatin (CRESTOR) 20 MG Tab  Active   Semaglutide,0.25 or 0.5MG/DOS, 2 MG/3ML Solution Pen-injector  Active   terazosin (HYTRIN) 10 MG capsule  Active   triamterene-hctz (MAXZIDE-25/DYAZIDE) 37.5-25 MG Tab  Active                  ALLERGIES  No Known Allergies    PHYSICAL EXAM    VITAL SIGNS:   Vitals:    12/20/23 2052 12/20/23 2343 12/21/23 0000 12/21/23 0110   BP: (!) 144/78 (!) 144/84 (!) 160/88 (!) 167/89   Pulse: 85 (!) 110 (!) 124 (!) 105   Resp: 20 20 20 18   Temp: 37.3 °C (99.1 °F)   37.2 °C (98.9 °F)   TempSrc: Temporal   Oral   SpO2: 93% 91% 92% 92%   Weight:       Height:           Vitals: My interpretation: hypertensive, not tachycardic, afebrile, not hypoxic    Reinterpretation of vitals: Hypertensive, tachycardic, not hypoxic    Cardiac Monitor Interpretation: The cardiac monitor revealed normal Sinus Rhythm with intermittent tachycardia as interpreted by me. The cardiac monitor was ordered secondary to the patient's history of tachycardia and to monitor for dysrhythmia and/or tachycardia.    PE:   Gen: sitting comfortably, speaking clearly, appears in no acute distress   ENT: Mucous membranes moist, posterior pharynx clear, uvula midline, nares patent bilaterally   Neck: Supple, FROM  Pulmonary: Lungs are clear to auscultation bilaterally. No tachypnea  CV:  RRR, no murmur appreciated, pulses 2+ in both upper and lower extremities  Abdomen: soft, NT/ND; no rebound/guarding  : no CVA or suprapubic tenderness   Neuro: A&Ox4 (person, place, time, situation), speech fluent, gait steady, no focal deficits appreciated  Skin: No rash or lesions.  No pallor or jaundice.  No cyanosis.  Warm and dry.     DIAGNOSTIC STUDIES / PROCEDURES    LABS  Results for orders placed or performed during the hospital encounter of 12/20/23   CBC WITH DIFFERENTIAL   Result Value  Ref Range    WBC 23.2 (H) 4.8 - 10.8 K/uL    RBC 4.95 4.70 - 6.10 M/uL    Hemoglobin 15.7 14.0 - 18.0 g/dL    Hematocrit 45.9 42.0 - 52.0 %    MCV 92.7 81.4 - 97.8 fL    MCH 31.7 27.0 - 33.0 pg    MCHC 34.2 32.3 - 36.5 g/dL    RDW 50.1 (H) 35.9 - 50.0 fL    Platelet Count 227 164 - 446 K/uL    MPV 10.7 9.0 - 12.9 fL    Neutrophils-Polys 87.20 (H) 44.00 - 72.00 %    Lymphocytes 4.80 (L) 22.00 - 41.00 %    Monocytes 7.00 0.00 - 13.40 %    Eosinophils 0.10 0.00 - 6.90 %    Basophils 0.30 0.00 - 1.80 %    Immature Granulocytes 0.60 0.00 - 0.90 %    Nucleated RBC 0.00 0.00 - 0.20 /100 WBC    Neutrophils (Absolute) 20.19 (H) 1.82 - 7.42 K/uL    Lymphs (Absolute) 1.10 1.00 - 4.80 K/uL    Monos (Absolute) 1.63 (H) 0.00 - 0.85 K/uL    Eos (Absolute) 0.02 0.00 - 0.51 K/uL    Baso (Absolute) 0.07 0.00 - 0.12 K/uL    Immature Granulocytes (abs) 0.14 (H) 0.00 - 0.11 K/uL    NRBC (Absolute) 0.00 K/uL   COMP METABOLIC PANEL   Result Value Ref Range    Sodium 137 135 - 145 mmol/L    Potassium 4.0 3.6 - 5.5 mmol/L    Chloride 99 96 - 112 mmol/L    Co2 24 20 - 33 mmol/L    Anion Gap 14.0 7.0 - 16.0    Glucose 224 (H) 65 - 99 mg/dL    Bun 28 (H) 8 - 22 mg/dL    Creatinine 1.78 (H) 0.50 - 1.40 mg/dL    Calcium 9.8 8.5 - 10.5 mg/dL    Correct Calcium 9.6 8.5 - 10.5 mg/dL    AST(SGOT) 25 12 - 45 U/L    ALT(SGPT) 27 2 - 50 U/L    Alkaline Phosphatase 91 30 - 99 U/L    Total Bilirubin 0.7 0.1 - 1.5 mg/dL    Albumin 4.2 3.2 - 4.9 g/dL    Total Protein 7.6 6.0 - 8.2 g/dL    Globulin 3.4 1.9 - 3.5 g/dL    A-G Ratio 1.2 g/dL   TROPONIN   Result Value Ref Range    Troponin T 61 (H) 6 - 19 ng/L   proBrain Natriuretic Peptide, NT   Result Value Ref Range    NT-proBNP 569 (H) 0 - 125 pg/mL   LACTIC ACID   Result Value Ref Range    Lactic Acid 1.7 0.5 - 2.0 mmol/L   URINALYSIS CULTURE, IF INDICATED    Specimen: Urine, Clean Catch   Result Value Ref Range    Color Yellow     Character Clear     Specific Gravity 1.017 <1.035    Ph 5.0 5.0 - 8.0     Glucose 100 (A) Negative mg/dL    Ketones Negative Negative mg/dL    Protein 30 (A) Negative mg/dL    Bilirubin Negative Negative    Urobilinogen, Urine 0.2 Negative    Nitrite Negative Negative    Leukocyte Esterase Small (A) Negative    Occult Blood Small (A) Negative    Micro Urine Req Microscopic    ESTIMATED GFR   Result Value Ref Range    GFR (CKD-EPI) 40 (A) >60 mL/min/1.73 m 2   URINE MICROSCOPIC (W/UA)   Result Value Ref Range    WBC 20-50 (A) /hpf    RBC 0-2 (A) /hpf    Bacteria Few (A) None /hpf    Epithelial Cells Negative /hpf    Hyaline Cast 3-5 (A) /lpf   URINE CULTURE(NEW)    Specimen: Urine   Result Value Ref Range    Significant Indicator NEG     Source UR     Site -     Culture Result -    EKG (NOW)   Result Value Ref Range    Report       [facility] Emergency Dept[facility]    Test Date:  2023  Pt Name:    SEAMUS ALVAREZ                   Department: ER  MRN:        [id number]                      Room:       [facility]  Gender:     Male                         Technician: [id number]  :        1950                   Requested By:SHARON BOLAÑOS  Order #:    [id number]                    Reading MD: Sharon Bolaños    Measurements  Intervals                                Axis  Rate:       113                          P:          34  OR:         183                          QRS:        79  QRSD:       96                           T:          42  QT:         358  QTc:        491    Interpretive Statements  Sinus tachycardia  Multiform ventricular premature complexes  Borderline low voltage, extremity leads  Borderline prolonged QT interval  Compared to ECG 04/15/2023 19:35:18  Ventricular premature complex(es) now present  Sinus rhythm no longer present  Atrial premature complex(es) no longer present  T-wave  abnormality no longer present  Electronically Signed On 2023 00:51:41 PST by Sharon Bolaños     POC CoV-2, FLU A/B, RSV by PCR   Result Value Ref Range    POC Influenza  A RNA, PCR Negative Negative    POC Influenza B RNA, PCR Negative Negative    POC RSV, by PCR Negative Negative    POC SARS-CoV-2, PCR NotDetected       All labs reviewed by me. Labs were compared to prior labs if they were available. Significant for leukocytosis of 23,000, no anemia, electrolytes normal, mild hyperglycemia, baseline but slightly worsening CKD, normal liver enzymes, normal bilirubin, troponin elevated 61, mildly elevated from baseline, BMP negative, lactic acid normal. Repeat trop is pending.  Urinalysis demonstrates infection.    RADIOLOGY  I have independently interpreted the diagnostic imaging associated with this visit and am waiting the final reading from the radiologist.   My preliminary interpretation is a follows: Cardiomegaly but no signs of full consolidation on chest x-ray my depend interpretation  Radiologist interpretation is as follows:  CT-HEAD W/O   Final Result         1.  No acute intracranial abnormality is identified, there are nonspecific white matter changes, commonly associated with small vessel ischemic disease.  Associated mild cerebral atrophy is noted.   2.  Atherosclerosis.         DX-CHEST-PORTABLE (1 VIEW)   Final Result         1.  No acute cardiopulmonary disease.   2.  Cardiomegaly        COURSE & MEDICAL DECISION MAKING  Nursing notes, VS, PMSFHx, labs, imaging, EKG reviewed in chart.    Heart Score: High    ED Observation Status? No; Patient does not meet criteria for ED Observation.     Ddx: UTI, pneumonia, PE, MI, septicemia, bacteremia    MDM: 11:35 PM Ramu Barber is a 73 y.o. male who presented with evaluation of generalized lower extremity weakness that started yesterday and worsened throughout the day.  His wife presents at bedside with him helps provide collateral formation.  States that normally walks with a cane but this evening he slipped out of his bed, did not hit his head or have any injuries, but was just unable to get back up due to generalized  weakness.  This is abnormal for him per wife at bedside.  He does have a history of recurrent urinary tract infections but does not have any symptoms such as hematuria, dysuria, increased urinary frequency or malodorous urine.  He has no complaints whatsoever such as headaches, fevers, cough, shortness of breath, chest pain, abdominal pain, nausea, vomiting, diarrhea constipation etc.  Wife denies fevers at home although patient did have some chills earlier tonight.  Upon arrival here patient is hypertensive and has intermittent tachycardia but it remains afebrile.  His physical exam is benign and he has a nonfocal neurological exam is only findings are bilateral lower extremity generalized weakness but no acute lateralizing symptoms or findings currently.  Initiated workup here with EKG, cath urine, CT scan of the head, chest x-ray, EKG and blood work.  Chest x-ray shows baseline cardiomegaly and independent Rotation but no full consolidation.  CT scan shows no obvious intracranial hemorrhage.  EKG shows sinus tachycardia but otherwise unremarkable.  Patient is on Eliquis for history of pulmonary embolus but no recent changes medication has been stable and compliant with it.  All labs reviewed by me. Labs were compared to prior labs if they were available. Significant for leukocytosis of 23,000, no anemia, electrolytes normal, mild hyperglycemia, baseline but slightly worsening CKD, normal liver enzymes, normal bilirubin, troponin elevated 61, mildly elevated from baseline, BMP negative, lactic acid normal. Repeat trop is pending.  UA shows infection.  Patient started on ceftriaxone antibiotics  Considering patient's GENI, inability to ambulate, tachycardia, leukocytosis, acute UTI, elevated troponin, he will require admission for continued monitoring and IV antibiotics.  Updated him on findings here in the ED and verbalized understanding plan for admission.  Discussed with hospitalist and they are amenable to  admission process.    ADDITIONAL PROBLEM LIST AND DISPOSITION    I have discussed management of the patient with the following physicians and JORDYN's: Hospitalist    Discussion of management with other Q or appropriate source(s): None     Barriers to care at this time, including but not limited to:  None .     Decision tools and prescription drugs considered including, but not limited to: HEART Score high .    FINAL IMPRESSION  1. Acute UTI Acute   2. Weakness Acute   3. Leukocytosis, unspecified type Acute   4. Tachycardia Acute   5. GENI (acute kidney injury) ([facility]) Acute   6. NSTEMI (non-ST elevated myocardial infarction) ([facility]) Acute      Kd BURRELL (Scribsteven), am scribing for, and in the presence of, Andrea Santiago.    Electronically signed by: Kd Coleman (Luisibsteven), 12/20/2023    IAndrea personally performed the services described in this documentation, as scribed by Kd Coleman in my presence, and it is both accurate and complete.    The note accurately reflects work and decisions made by me.  Andrea Santiago  12/21/2023  12:54 AM

## 2023-12-21 NOTE — ED NOTES
[place] with  O2 at 2 LPM. Bedside report given to Juliann DON.

## 2023-12-21 NOTE — ED NOTES
Received bedside report from ARIAN Humphreys to assume care of pt. At this time.  Pt. Resting on gurney with no noted distress, denies any pain or discomfort at this time.  Has urinal at bedside within reach.  Call light in reach.  All safety measures observed.  Pt. Denies further needs at this time.

## 2023-12-21 NOTE — ASSESSMENT & PLAN NOTE
SIRS criteria identified on my evaluation include:  Tachycardia, with heart rate greater than 90 BPM and Leukocytosis, with WBC greater than 12,000

## 2023-12-21 NOTE — ED NOTES
Pt. Being transported to floor at this time. Pt. Did ambulate to the bathroom with cane and 1 person assist for large brown formed BM.

## 2023-12-21 NOTE — ED NOTES
Med rec complete per patient  Allergies reviewed.   Anticoagulants taken in the last 14 days? Yes   Anticoagulant: Eliquis 5mg, Last dose: 12/20/23 at 0900.     Patients preferred [facility]: [facility]

## 2023-12-21 NOTE — ED NOTES
BREAK RN:   Pt had an episode of incontinence of urine. Pt linens changed, new brief applied, warm blankets given. Pt adjusted self to comfort in [facility]. Call light within reach. Given urinal, wife at bedside.

## 2023-12-22 ENCOUNTER — APPOINTMENT (OUTPATIENT)
Dept: CARDIOLOGY | Facility: MEDICAL CENTER | Age: 73
DRG: 872 | End: 2023-12-22
Attending: HOSPITALIST
Payer: COMMERCIAL

## 2023-12-22 LAB
ALBUMIN SERPL BCP-MCNC: 3.3 G/DL (ref 3.2–4.9)
ALBUMIN/GLOB SERPL: 0.9 G/DL
ALP SERPL-CCNC: 81 U/L (ref 30–99)
ALT SERPL-CCNC: 22 U/L (ref 2–50)
ANION GAP SERPL CALC-SCNC: 14 MMOL/L (ref 7–16)
ANION GAP SERPL CALC-SCNC: 14 MMOL/L (ref 7–16)
AST SERPL-CCNC: 20 U/L (ref 12–45)
BILIRUB SERPL-MCNC: 0.6 MG/DL (ref 0.1–1.5)
BUN SERPL-MCNC: 26 MG/DL (ref 8–22)
BUN SERPL-MCNC: 26 MG/DL (ref 8–22)
CALCIUM ALBUM COR SERPL-MCNC: 9.5 MG/DL (ref 8.5–10.5)
CALCIUM SERPL-MCNC: 8.9 MG/DL (ref 8.5–10.5)
CALCIUM SERPL-MCNC: 9.1 MG/DL (ref 8.5–10.5)
CHLORIDE SERPL-SCNC: 101 MMOL/L (ref 96–112)
CHLORIDE SERPL-SCNC: 99 MMOL/L (ref 96–112)
CO2 SERPL-SCNC: 21 MMOL/L (ref 20–33)
CO2 SERPL-SCNC: 23 MMOL/L (ref 20–33)
CREAT SERPL-MCNC: 1.33 MG/DL (ref 0.5–1.4)
CREAT SERPL-MCNC: 1.43 MG/DL (ref 0.5–1.4)
ERYTHROCYTE [DISTWIDTH] IN BLOOD BY AUTOMATED COUNT: 49 FL (ref 35.9–50)
GFR SERPLBLD CREATININE-BSD FMLA CKD-EPI: 52 ML/MIN/1.73 M 2
GFR SERPLBLD CREATININE-BSD FMLA CKD-EPI: 56 ML/MIN/1.73 M 2
GLOBULIN SER CALC-MCNC: 3.5 G/DL (ref 1.9–3.5)
GLUCOSE BLD STRIP.AUTO-MCNC: 179 MG/DL (ref 65–99)
GLUCOSE BLD STRIP.AUTO-MCNC: 221 MG/DL (ref 65–99)
GLUCOSE BLD STRIP.AUTO-MCNC: 222 MG/DL (ref 65–99)
GLUCOSE BLD STRIP.AUTO-MCNC: 224 MG/DL (ref 65–99)
GLUCOSE SERPL-MCNC: 172 MG/DL (ref 65–99)
GLUCOSE SERPL-MCNC: 258 MG/DL (ref 65–99)
HCT VFR BLD AUTO: 43.8 % (ref 42–52)
HGB BLD-MCNC: 15 G/DL (ref 14–18)
LV EJECT FRACT  99904: 65
MCH RBC QN AUTO: 31.4 PG (ref 27–33)
MCHC RBC AUTO-ENTMCNC: 34.2 G/DL (ref 32.3–36.5)
MCV RBC AUTO: 91.6 FL (ref 81.4–97.8)
PLATELET # BLD AUTO: 206 K/UL (ref 164–446)
PMV BLD AUTO: 10.6 FL (ref 9–12.9)
POTASSIUM SERPL-SCNC: 3.6 MMOL/L (ref 3.6–5.5)
POTASSIUM SERPL-SCNC: 3.8 MMOL/L (ref 3.6–5.5)
PROT SERPL-MCNC: 6.8 G/DL (ref 6–8.2)
RBC # BLD AUTO: 4.78 M/UL (ref 4.7–6.1)
SODIUM SERPL-SCNC: 134 MMOL/L (ref 135–145)
SODIUM SERPL-SCNC: 138 MMOL/L (ref 135–145)
WBC # BLD AUTO: 15.6 K/UL (ref 4.8–10.8)

## 2023-12-22 PROCEDURE — 94760 N-INVAS EAR/PLS OXIMETRY 1: CPT

## 2023-12-22 PROCEDURE — 700102 HCHG RX REV CODE 250 W/ 637 OVERRIDE(OP): Performed by: HOSPITALIST

## 2023-12-22 PROCEDURE — 700105 HCHG RX REV CODE 258: Performed by: HOSPITALIST

## 2023-12-22 PROCEDURE — 700102 HCHG RX REV CODE 250 W/ 637 OVERRIDE(OP): Performed by: STUDENT IN AN ORGANIZED HEALTH CARE EDUCATION/TRAINING PROGRAM

## 2023-12-22 PROCEDURE — 82962 GLUCOSE BLOOD TEST: CPT

## 2023-12-22 PROCEDURE — 700117 HCHG RX CONTRAST REV CODE 255: Performed by: HOSPITALIST

## 2023-12-22 PROCEDURE — A9270 NON-COVERED ITEM OR SERVICE: HCPCS | Performed by: STUDENT IN AN ORGANIZED HEALTH CARE EDUCATION/TRAINING PROGRAM

## 2023-12-22 PROCEDURE — 93306 TTE W/DOPPLER COMPLETE: CPT | Mod: 26 | Performed by: INTERNAL MEDICINE

## 2023-12-22 PROCEDURE — 80053 COMPREHEN METABOLIC PANEL: CPT

## 2023-12-22 PROCEDURE — 93306 TTE W/DOPPLER COMPLETE: CPT

## 2023-12-22 PROCEDURE — 700111 HCHG RX REV CODE 636 W/ 250 OVERRIDE (IP): Performed by: HOSPITALIST

## 2023-12-22 PROCEDURE — 85027 COMPLETE CBC AUTOMATED: CPT

## 2023-12-22 PROCEDURE — 700111 HCHG RX REV CODE 636 W/ 250 OVERRIDE (IP): Performed by: STUDENT IN AN ORGANIZED HEALTH CARE EDUCATION/TRAINING PROGRAM

## 2023-12-22 PROCEDURE — A9270 NON-COVERED ITEM OR SERVICE: HCPCS | Performed by: HOSPITALIST

## 2023-12-22 PROCEDURE — 700105 HCHG RX REV CODE 258: Performed by: STUDENT IN AN ORGANIZED HEALTH CARE EDUCATION/TRAINING PROGRAM

## 2023-12-22 PROCEDURE — 80048 BASIC METABOLIC PNL TOTAL CA: CPT

## 2023-12-22 PROCEDURE — 99233 SBSQ HOSP IP/OBS HIGH 50: CPT | Performed by: HOSPITALIST

## 2023-12-22 PROCEDURE — 97162 PT EVAL MOD COMPLEX 30 MIN: CPT

## 2023-12-22 PROCEDURE — 770020 HCHG ROOM/CARE - TELE (206)

## 2023-12-22 RX ORDER — GABAPENTIN 300 MG/1
300 CAPSULE ORAL 2 TIMES DAILY
Status: DISCONTINUED | OUTPATIENT
Start: 2023-12-22 | End: 2023-12-23 | Stop reason: HOSPADM

## 2023-12-22 RX ORDER — DEXTROSE MONOHYDRATE 25 G/50ML
25 INJECTION, SOLUTION INTRAVENOUS
Status: DISCONTINUED | OUTPATIENT
Start: 2023-12-22 | End: 2023-12-23 | Stop reason: HOSPADM

## 2023-12-22 RX ADMIN — APIXABAN 5 MG: 5 TABLET, FILM COATED ORAL at 05:52

## 2023-12-22 RX ADMIN — APIXABAN 5 MG: 5 TABLET, FILM COATED ORAL at 17:35

## 2023-12-22 RX ADMIN — INSULIN HUMAN 2 UNITS: 100 INJECTION, SOLUTION PARENTERAL at 08:37

## 2023-12-22 RX ADMIN — MEROPENEM 500 MG: 500 INJECTION, POWDER, FOR SOLUTION INTRAVENOUS at 15:34

## 2023-12-22 RX ADMIN — MEROPENEM 500 MG: 500 INJECTION, POWDER, FOR SOLUTION INTRAVENOUS at 05:51

## 2023-12-22 RX ADMIN — FINASTERIDE 5 MG: 5 TABLET, FILM COATED ORAL at 05:52

## 2023-12-22 RX ADMIN — TRIAMTERENE AND HYDROCHLOROTHIAZIDE 1 TABLET: 37.5; 25 TABLET ORAL at 05:52

## 2023-12-22 RX ADMIN — MEROPENEM 500 MG: 500 INJECTION, POWDER, FOR SOLUTION INTRAVENOUS at 22:13

## 2023-12-22 RX ADMIN — AMLODIPINE BESYLATE 5 MG: 5 TABLET ORAL at 05:52

## 2023-12-22 RX ADMIN — INSULIN HUMAN 2 UNITS: 100 INJECTION, SOLUTION PARENTERAL at 12:30

## 2023-12-22 RX ADMIN — TERAZOSIN 10 MG: 5 CAPSULE ORAL at 05:51

## 2023-12-22 RX ADMIN — HUMAN ALBUMIN MICROSPHERES AND PERFLUTREN 3 ML: 10; .22 INJECTION, SOLUTION INTRAVENOUS at 11:15

## 2023-12-22 RX ADMIN — ROSUVASTATIN CALCIUM 20 MG: 20 TABLET, FILM COATED ORAL at 17:35

## 2023-12-22 RX ADMIN — GABAPENTIN 300 MG: 300 CAPSULE ORAL at 17:35

## 2023-12-22 RX ADMIN — PAROXETINE HYDROCHLORIDE 20 MG: 20 TABLET, FILM COATED ORAL at 05:52

## 2023-12-22 ASSESSMENT — COGNITIVE AND FUNCTIONAL STATUS - GENERAL
MOVING FROM LYING ON BACK TO SITTING ON SIDE OF FLAT BED: A LITTLE
TURNING FROM BACK TO SIDE WHILE IN FLAT BAD: A LITTLE
MOBILITY SCORE: 18
STANDING UP FROM CHAIR USING ARMS: A LITTLE
MOVING TO AND FROM BED TO CHAIR: A LITTLE
WALKING IN HOSPITAL ROOM: A LITTLE
CLIMB 3 TO 5 STEPS WITH RAILING: A LITTLE
SUGGESTED CMS G CODE MODIFIER MOBILITY: CK

## 2023-12-22 ASSESSMENT — GAIT ASSESSMENTS
ASSISTIVE DEVICE: SINGLE POINT CANE
GAIT LEVEL OF ASSIST: STANDBY ASSIST
DISTANCE (FEET): 100

## 2023-12-22 ASSESSMENT — PAIN DESCRIPTION - PAIN TYPE
TYPE: ACUTE PAIN

## 2023-12-22 ASSESSMENT — ENCOUNTER SYMPTOMS
SHORTNESS OF BREATH: 1
VOMITING: 0
MYALGIAS: 1
HEARTBURN: 0
NERVOUS/ANXIOUS: 1

## 2023-12-22 ASSESSMENT — FIBROSIS 4 INDEX: FIB4 SCORE: 1.51

## 2023-12-22 NOTE — THERAPY
Physical Therapy   Initial Evaluation     Patient Name: Ramu Barber  Age:  73 y.o., Sex:  male  Medical Record #: [id number]  Today's Date: 12/22/2023     Precautions  Precautions: Fall Risk    Assessment  Patient is 73 y.o. male that presented to acute after sliding out of bed and being unable to get up. PMHx significant for CKD3, HTN, obesity, LYLE, DLD, DM2, pHTN, BPH, DN, PE. He presented to PT with impaired balance but appeared to be at or near baseline functional mobility. He mobilized as detailed below. He reported no concerns regarding mobility or returning home. Patient will not be actively followed for physical therapy services at this time, however may be seen if requested by physician for 1 more visit within 30 days to address any discharge or equipment needs.     Plan    Physical Therapy Initial Treatment Plan   Duration: Evaluation only    DC Equipment Recommendations: None  Discharge Recommendations: Recommend home health for continued physical therapy services       Subjective    RN cleared patient for therapy, received in recliner, agreeable to activity     Objective       12/22/23 0855   Charge Group   PT Evaluation PT Evaluation Mod   Total Time Spent   PT Total Time Yes   PT Evaluation Time Spent (Mins) 15   PT Total Time Spent (Calculated) 15   Initial Contact Note    Initial Contact Note Order Received and Verified, Evaluation Only - Patient Does Not Require Further Acute Physical Therapy at this Time.  However, May Benefit from Post Acute Therapy for Higher Level Functional Deficits.   Precautions   Precautions Fall Risk   Vitals   O2 (LPM) 0   O2 Delivery Device None - Room Air   Pain 0 - 10 Group   Therapist Pain Assessment   (no pain complaint during session)   Prior Living Situation   Prior Services None   Housing / Facility 1 Story House   Steps Into Home 0   Steps In Home 0   Equipment Owned Single Point Cane;4-Wheel Walker   Lives with - Patient's Self Care Capacity Spouse  (grandson)    Comments Patient reported he lives with wife and grandson who are both very supportive but work.   Prior Level of Functional Mobility   Bed Mobility Independent   Transfer Status Independent   Ambulation Independent   Ambulation Distance community   Assistive Devices Used Single Point Cane   Stairs Independent   Comments Patient reported he is a retired    History of Falls   History of Falls Yes  (reason for admit)   Cognition    Cognition / Consciousness WDL   Level of Consciousness Alert   Comments very pleasant, cooperative   Active ROM Upper Body   Comments patient moved BUE functionally during session   Active ROM Lower Body    Comments WFL for mobility   Strength Lower Body   Comments WFL for mobility   Balance Assessment   Sitting Balance (Static) Good   Sitting Balance (Dynamic) Good   Standing Balance (Static) Fair   Standing Balance (Dynamic) Fair   Weight Shift Sitting Good   Weight Shift Standing Fair   Comments used SPC during standing activity, no overt LOB   Bed Mobility    Comments NT, in recliner pre and post, reported no concerns   Gait Analysis   Gait Level Of Assist Standby Assist   Assistive Device Single Point Cane   Distance (Feet) 100   # of Times Distance was Traveled 1   Deviation   (decreased reyes, clearance)   # of Stairs Climbed 0   Weight Bearing Status no restrictions   Vision Deficits Impacting Mobility NT   Functional Mobility   Sit to Stand Standby Assist   Bed, Chair, Wheelchair Transfer Standby Assist   Transfer Method Stand Step   How much difficulty does the patient currently have...   Turning over in bed (including adjusting bedclothes, sheets and blankets)? 3   Sitting down on and standing up from a chair with arms (e.g., wheelchair, bedside commode, etc.) 3   Moving from lying on back to sitting on the side of the bed? 3   How much help from another person does the patient currently need...   Moving to and from a bed to a chair (including a wheelchair)? 3    Need to walk in a hospital room? 3   Climbing 3-5 steps with a railing? 3   6 clicks Mobility Score 18   Education Group   Education Provided Role of Physical Therapist   Role of Physical Therapist Patient Response Patient;Acceptance;Explanation;Verbal Demonstration   Physical Therapy Initial Treatment Plan    Duration Evaluation only   Anticipated Discharge Equipment and Recommendations   DC Equipment Recommendations None   Discharge Recommendations Recommend home health for continued physical therapy services   Interdisciplinary Plan of Care Collaboration   IDT Collaboration with  Nursing   Patient Position at End of Therapy Seated;Chair Alarm On;Call Light within Reach;Tray Table within Reach;Phone within Reach   Collaboration Comments RN aware of visit, response, DC recs   Session Information   Date / Session Number  12/22 - eval only

## 2023-12-22 NOTE — CARE PLAN
The patient is Stable - Low risk of patient condition declining or worsening    Shift Goals  Clinical Goals: Monitor labs, hemodynamic stability  Patient Goals: Sleep  Family Goals: GABRIELA      Problem: Knowledge Deficit - Standard  Goal: Patient and family/care givers will demonstrate understanding of plan of care, disease process/condition, diagnostic tests and medications  Outcome: Progressing     Problem: Hemodynamics  Goal: Patient's hemodynamics, fluid balance and neurologic status will be stable or improve  Outcome: Progressing     Problem: Fluid Volume  Goal: Fluid volume balance will be maintained  Outcome: Progressing     Problem: Urinary - Renal Perfusion  Goal: Ability to achieve and maintain adequate renal perfusion and functioning will improve  Outcome: Progressing     Problem: Respiratory  Goal: Patient will achieve/maintain optimum respiratory ventilation and gas exchange  Outcome: Progressing     Problem: Physical Regulation  Goal: Diagnostic test results will improve  Outcome: Progressing  Goal: Signs and symptoms of infection will decrease  Outcome: Progressing     Problem: Fall Risk  Goal: Patient will remain free from falls  Outcome: Progressing

## 2023-12-22 NOTE — CARE PLAN
The patient is Stable - Low risk of patient condition declining or worsening    Shift Goals  Clinical Goals: Safety, Monitor labs,  Patient Goals: Go home  Family Goals: GABRIELA    Progress made toward(s) clinical / shift goals:      Problem: Knowledge Deficit - Standard  Goal: Patient and family/care givers will demonstrate understanding of plan of care, disease process/condition, diagnostic tests and medications  Outcome: Progressing     Problem: Hemodynamics  Goal: Patient's hemodynamics, fluid balance and neurologic status will be stable or improve  Outcome: Progressing     Problem: Respiratory  Goal: Patient will achieve/maintain optimum respiratory ventilation and gas exchange  Outcome: Progressing     Problem: Fall Risk  Goal: Patient will remain free from falls  Outcome: Progressing       Patient is not progressing towards the following goals:

## 2023-12-22 NOTE — DOCUMENTATION QUERY
[facility]                                                                       Query Response Note      PATIENT:               SEAMUS ALVAREZ  ACCT #:                  [id number]  MRN:                     [id number]  :                      1950  ADMIT DATE:       2023 10:31 PM  DISCH DATE:          RESPONDING  PROVIDER #:        [id number]           QUERY TEXT:    Can the acuity of Diastolic heart failure be further specified based on the clinical indicators and required treatments?    The patient's Clinical Indicators include:  H&P:  Diastolic heart failure .  Right and left lower leg: edema present   NT-proBNP 569  Risk Factors: pulmonary htn, htn  Treatment: IV lasix BID, triamterene-hctz    Thank you,  Yesy Cano RN, BSN, CCDS  Clinical   Connect via [employer]  Options provided:   -- Exacerbation or decompensation   -- Chronic   -- Acute on Chronic   -- Other explanation, (please specify other explanation)   -- Unable to determine      Query created by: Yesy Cano on 2023 12:37 PM    RESPONSE TEXT:    Chronic          Electronically signed by:  CHARLOTTE GLOVER MD 2023 10:24 PM

## 2023-12-22 NOTE — PROGRESS NOTES
Report received from ARIAN Faith. ACT Chloe transported patient to room 722. Patient A&O x4. Oriented to room. Denied pain. Tele monitoring in place. Educated on fall risk, all fall precautions in place. Call light within reach, bed locked and in lowest position, denied other needs at this time.

## 2023-12-22 NOTE — FACE TO FACE
Face to Face Supporting Documentation - Home Health    The encounter with this patient was in whole or in part the primary reason for home health admission.    Date of encounter:   Patient:                    MRN:                       YOB: 2023  Ramu Allred May  [id number]  1950     Home health to see patient for:  Skilled Nursing care for assessment, interventions & education, Physical Therapy evaluation and treatment, and Occupational therapy evaluation and treatment    Skilled need for:  Medication Management med management     Skilled nursing interventions to include:  Comment: med management     Homebound status evidenced by:  Needs the assistance of another person in order to leave the home. Leaving home requires a considerable and taxing effort. There is a normal inability to leave the home.    Community Physician to provide follow up care: Chuck Chappell M.D.     Optional Interventions? No      I certify the face to face encounter for this home health care referral meets the CMS requirements and the encounter/clinical assessment with the patient was, in whole, or in part, for the medical condition(s) listed above, which is the primary reason for home health care. Based on my clinical findings: the service(s) are medically necessary, support the need for home health care, and the homebound criteria are met.  I certify that this patient has had a face to face encounter by myself.  Ross Guardado M.D. - NPI: [id number]

## 2023-12-22 NOTE — PROGRESS NOTES
Bedside report received and patient care assumed. Pt is resting in bed, A&O4, with no complaints of pain, and is on room air. Tele box on. All fall precautions are in place, belongings at bedside table.  Pt was updated on POC, no questions or concerns. Pt educated on use of call light for assistance.

## 2023-12-22 NOTE — PROGRESS NOTES
4 Eyes Skin Assessment Completed by ARIAN Leon and ARIAN Peterson.    Head WDL  Ears Redness and Blanching  Nose WDL  Mouth WDL  Neck WDL  Breast/Chest WDL  Shoulder Blades WDL  Spine WDL  (R) Arm/Elbow/Hand Scab  (L) Arm/Elbow/Hand Scab  Abdomen WDL  Groin Redness, Excoriation, and Bruising  Scrotum/Coccyx/Buttocks Redness and Blanching, bruise to R hip and L buttock  (R) Leg Scab and Bruising  (L) Leg Scab and Bruising  (R) Heel/Foot/Toe Redness and Blanching  (L) Heel/Foot/Toe Redness and Blanching          Devices In Places Tele Box, Blood Pressure Cuff, and Pulse Ox      Interventions In Place Sacral Mepilex, Pillows, and Pressure Redistribution Mattress    Possible Skin Injury No    Pictures Uploaded Into Epic N/A  Wound Consult Placed N/A  RN Wound Prevention Protocol Ordered Yes

## 2023-12-22 NOTE — PROGRESS NOTES
[facility] Medicine Daily Progress Note    Date of Service  12/22/2023    Chief Complaint  Ramu Barber is a 73 y.o. male admitted 12/20/2023 with   Chief Complaint   Patient presents with    Weakness     BIB EMS from home, pt states he slid out of bed and could not get up. - head strike, -LOC, +thinners (Eliquis). Reports increased weakness x1 day, normally ambulates with a cane but was not able to this evening. Hx of neuropathy. - stroke assessment.          Hospital Course  This 73-year-old male with a past medical history significant for CKD stage III, hypertension, obesity, LYLE, dyslipidemia, type 2 diabetes mellitus with glyco at 7.8, grade 1 diastolic dysfunction, mild to moderate pulmonary hypertension, BPH, diabetic neuropathy,pulmonary embolism on 4/2023 presented to ER on 12/21/2023 with a complaint of increased urinary frequency along with urgency.  Patient stated that he was so weak that he slipped off of bed and could not get up.    He stated urological procedure 2 weeks ago; he has completed antibiotics.  Open patient seen in ER, he is noted to be tachycardic with a heart rate of 110, WBC 23.2 UA showing leukocyte esterase positive, few bacteria, WBC 20-50.  Blood culture x 2, urine culture has been ordered.    Did have a history of UTI/2023, at that time culture growing ESBL E. coli.  Will continue meropenem; until cx come back.    Interval events:  -- No acute events overnight, patient is intermittently tachycardic, currently requiring 2 L of oxygen.  His blood pressure has been stable  --Patient also found in GENI on CKD stage III, creatinine slightly worse at 1.7  -- Patient does have mild elevated troponin, will obtain echocardiogram, patient denies any chest pain  -- Follow blood cultures x 2, urine culture, antibiotics    Patient had history of pulmonary embolism, will continue Eliquis.  Continue ISS, hypoglycemia protocol, ADA diet.    12/22:  -- No acute events overnight vital sign has been  stable.,  Patient becomes very tachycardic when he stands up or sits up  --Will continue to monitor entirely  --Patient blood glucose is noted to be elevated, adjusting insulin  --WBC count trending down, today at 15.6, continue meropenem, follow blood culture, urine culture  --Will obtain renal ultrasound  -Stress test was obtained on 8/22/2023 at [facility], no reversible ischemia noted; but cannot exclude complete infract     PT/OT has evaluate the patient, recommended home health, will order      I have discussed this patient's plan of care and discharge plan at IDT rounds today with Case Management, Nursing, Nursing leadership, and other members of the IDT team.    Consultants/Specialty  None    Code Status  DNAR/DNI    Disposition  The patient is not medically cleared for discharge to home or a post-acute facility.  Anticipate discharge to: home with organized home healthcare and close outpatient follow-up    I have placed the appropriate orders for post-discharge needs.    Review of Systems  Review of Systems   Constitutional:  Positive for malaise/fatigue.   HENT:  Negative for congestion.    Respiratory:  Positive for shortness of breath.    Cardiovascular:  Negative for chest pain.   Gastrointestinal:  Negative for heartburn and vomiting.   Genitourinary:  Positive for dysuria, frequency and urgency.   Musculoskeletal:  Positive for myalgias.   Psychiatric/Behavioral:  The patient is nervous/anxious.         Physical Exam  Temp:  [36.5 °C (97.7 °F)-36.9 °C (98.4 °F)] 36.5 °C (97.7 °F)  Pulse:  [] 67  Resp:  [16-22] 16  BP: (111-160)/() 127/79  SpO2:  [85 %-94 %] 93 %    Physical Exam  Vitals and nursing note reviewed.   Constitutional:       Appearance: Normal appearance.   HENT:      Head: Normocephalic and atraumatic.   Eyes:      Extraocular Movements: Extraocular movements intact.      Pupils: Pupils are equal, round, and reactive to light.   Cardiovascular:      Rate and Rhythm:  Tachycardia present.   Pulmonary:      Comments: Upper airway gurgling sound  Otherwise clear on examination  Abdominal:      General: Bowel sounds are normal. There is no distension.      Palpations: Abdomen is soft.   Musculoskeletal:      Cervical back: Neck supple.      Right lower leg: Edema present.      Left lower leg: Edema present.   Skin:     General: Skin is warm.   Neurological:      Mental Status: He is alert and oriented to person, place, and time.      Cranial Nerves: No cranial nerve deficit.   Psychiatric:         Mood and Affect: Mood normal.         Fluids    Intake/Output Summary (Last 24 hours) at 12/22/2023 1030  Last data filed at 12/22/2023 0905  Gross per 24 hour   Intake 660 ml   Output 0 ml   Net 660 ml         Laboratory  Recent Labs     12/20/23  2353 12/22/23  0944   WBC 23.2* 15.6*   RBC 4.95 4.78   HEMOGLOBIN 15.7 15.0   HEMATOCRIT 45.9 43.8   MCV 92.7 91.6   MCH 31.7 31.4   MCHC 34.2 34.2   RDW 50.1* 49.0   PLATELETCT 227 206   MPV 10.7 10.6       Recent Labs     12/20/23  2353 12/22/23  0040 12/22/23  0944   SODIUM 137 138 134*   POTASSIUM 4.0 3.6 3.8   CHLORIDE 99 101 99   CO2 24 23 21   GLUCOSE 224* 172* 258*   BUN 28* 26* 26*   CREATININE 1.78* 1.33 1.43*   CALCIUM 9.8 9.1 8.9                     Imaging  CT-HEAD W/O   Final Result         1.  No acute intracranial abnormality is identified, there are nonspecific white matter changes, commonly associated with small vessel ischemic disease.  Associated mild cerebral atrophy is noted.   2.  Atherosclerosis.         DX-CHEST-PORTABLE (1 VIEW)   Final Result         1.  No acute cardiopulmonary disease.   2.  Cardiomegaly      EC-ECHOCARDIOGRAM COMPLETE W/ CONT    (Results Pending)        Assessment/Plan  * SIRS (systemic inflammatory response syndrome) (HCC)- (present on admission)  Assessment & Plan  SIRS criteria identified on my evaluation include:  Tachycardia, with heart rate greater than 90 BPM and Leukocytosis, with WBC  greater than 12,000      UTI (urinary tract infection)  Assessment & Plan  Patient had a history of ESBL UTI in 10/2023  Presented here  with UTI  Blood cultures x 2 pending, urine culture pending, continue meropenem  Obtain Us-renal    Acute deep vein thrombosis (DVT) of femoral vein of right lower extremity (HCC)- (present on admission)  Assessment & Plan  Patient had a history of pulmonary embolism along with DVT  -- will continue Eliquis 5 twice daily    Stage 3a chronic kidney disease (HCC)  Assessment & Plan  Avoid nephrotoxic agents   -- avoid nephrotoxin    Diastolic heart failure (HCC)- (present on admission)  Assessment & Plan  I/os, daily weight, fluid restriction to 1.5 L, continue home dose of Lasix      Pulmonary hypertension (HCC)- (present on admission)  Assessment & Plan  Continue home  lasix  Monitor volume status    ACP (advance care planning)  Assessment & Plan  I discussed advance care planning with the patient and family for at least 17 minutes, including diagnosis, prognosis, plan of care, risks and benefits of any therapies that could be offered, as well as alternatives including palliation and hospice, as appropriate.     DNR/DNI      Hyperlipidemia- (present on admission)  Assessment & Plan  Continue with Crestor    Type 2 diabetes mellitus without complication, without long-term current use of insulin (HCC)- (present on admission)  Assessment & Plan  SSI, hypoglycemia protocol, Accu-Cheks ACHS    Benign prostatic hyperplasia with urinary frequency- (present on admission)  Assessment & Plan  Continue with hytrin and Proscar    Obesity (BMI 30-39.9)- (present on admission)  Assessment & Plan  BMI 36.35    Essential hypertension- (present on admission)  Assessment & Plan  Continue with amlodipine, Maxide         VTE prophylaxis: eliquis    I have performed a physical exam and reviewed and updated ROS and Plan today (12/22/2023). In review of yesterday's note (12/21/2023), there are no changes  except as documented above.

## 2023-12-23 ENCOUNTER — PHARMACY VISIT (OUTPATIENT)
Dept: PHARMACY | Facility: MEDICAL CENTER | Age: 73
End: 2023-12-23
Payer: COMMERCIAL

## 2023-12-23 VITALS
DIASTOLIC BLOOD PRESSURE: 83 MMHG | HEART RATE: 62 BPM | SYSTOLIC BLOOD PRESSURE: 126 MMHG | RESPIRATION RATE: 16 BRPM | WEIGHT: 263.01 LBS | TEMPERATURE: 98.1 F | HEIGHT: 72 IN | OXYGEN SATURATION: 93 % | BODY MASS INDEX: 35.62 KG/M2

## 2023-12-23 LAB
ALBUMIN SERPL BCP-MCNC: 3.3 G/DL (ref 3.2–4.9)
BACTERIA UR CULT: ABNORMAL
BUN SERPL-MCNC: 27 MG/DL (ref 8–22)
CALCIUM ALBUM COR SERPL-MCNC: 9.5 MG/DL (ref 8.5–10.5)
CALCIUM SERPL-MCNC: 8.9 MG/DL (ref 8.5–10.5)
CHLORIDE SERPL-SCNC: 99 MMOL/L (ref 96–112)
CO2 SERPL-SCNC: 22 MMOL/L (ref 20–33)
CREAT SERPL-MCNC: 1.38 MG/DL (ref 0.5–1.4)
ERYTHROCYTE [DISTWIDTH] IN BLOOD BY AUTOMATED COUNT: 49.4 FL (ref 35.9–50)
GFR SERPLBLD CREATININE-BSD FMLA CKD-EPI: 54 ML/MIN/1.73 M 2
GLUCOSE BLD STRIP.AUTO-MCNC: 236 MG/DL (ref 65–99)
GLUCOSE BLD STRIP.AUTO-MCNC: 297 MG/DL (ref 65–99)
GLUCOSE SERPL-MCNC: 311 MG/DL (ref 65–99)
HCT VFR BLD AUTO: 44.9 % (ref 42–52)
HGB BLD-MCNC: 15.3 G/DL (ref 14–18)
MCH RBC QN AUTO: 31.5 PG (ref 27–33)
MCHC RBC AUTO-ENTMCNC: 34.1 G/DL (ref 32.3–36.5)
MCV RBC AUTO: 92.4 FL (ref 81.4–97.8)
PHOSPHATE SERPL-MCNC: 3.4 MG/DL (ref 2.5–4.5)
PLATELET # BLD AUTO: 223 K/UL (ref 164–446)
PMV BLD AUTO: 10.8 FL (ref 9–12.9)
POTASSIUM SERPL-SCNC: 3.9 MMOL/L (ref 3.6–5.5)
RBC # BLD AUTO: 4.86 M/UL (ref 4.7–6.1)
SIGNIFICANT IND 70042: ABNORMAL
SITE SITE: ABNORMAL
SODIUM SERPL-SCNC: 134 MMOL/L (ref 135–145)
SOURCE SOURCE: ABNORMAL
WBC # BLD AUTO: 9.9 K/UL (ref 4.8–10.8)

## 2023-12-23 PROCEDURE — 700105 HCHG RX REV CODE 258: Performed by: HOSPITALIST

## 2023-12-23 PROCEDURE — A9270 NON-COVERED ITEM OR SERVICE: HCPCS | Performed by: HOSPITALIST

## 2023-12-23 PROCEDURE — 99239 HOSP IP/OBS DSCHRG MGMT >30: CPT | Performed by: HOSPITALIST

## 2023-12-23 PROCEDURE — 700111 HCHG RX REV CODE 636 W/ 250 OVERRIDE (IP): Performed by: HOSPITALIST

## 2023-12-23 PROCEDURE — 82962 GLUCOSE BLOOD TEST: CPT | Mod: 91

## 2023-12-23 PROCEDURE — RXMED WILLOW AMBULATORY MEDICATION CHARGE: Performed by: HOSPITALIST

## 2023-12-23 PROCEDURE — A9270 NON-COVERED ITEM OR SERVICE: HCPCS | Performed by: STUDENT IN AN ORGANIZED HEALTH CARE EDUCATION/TRAINING PROGRAM

## 2023-12-23 PROCEDURE — 700102 HCHG RX REV CODE 250 W/ 637 OVERRIDE(OP): Performed by: STUDENT IN AN ORGANIZED HEALTH CARE EDUCATION/TRAINING PROGRAM

## 2023-12-23 PROCEDURE — 85027 COMPLETE CBC AUTOMATED: CPT

## 2023-12-23 PROCEDURE — 80069 RENAL FUNCTION PANEL: CPT

## 2023-12-23 PROCEDURE — 99222 1ST HOSP IP/OBS MODERATE 55: CPT | Performed by: INTERNAL MEDICINE

## 2023-12-23 PROCEDURE — 700102 HCHG RX REV CODE 250 W/ 637 OVERRIDE(OP): Performed by: HOSPITALIST

## 2023-12-23 RX ORDER — FUROSEMIDE 20 MG/1
20 TABLET ORAL
Qty: 15 TABLET | Refills: 0 | Status: SHIPPED | OUTPATIENT
Start: 2023-12-23 | End: 2024-01-22

## 2023-12-23 RX ORDER — POLYETHYLENE GLYCOL 3350 17 G/17G
17 POWDER, FOR SOLUTION ORAL
Qty: 15 EACH | Refills: 0 | Status: SHIPPED | OUTPATIENT
Start: 2023-12-23

## 2023-12-23 RX ORDER — CIPROFLOXACIN 750 MG/1
750 TABLET, FILM COATED ORAL 2 TIMES DAILY
Qty: 10 TABLET | Refills: 0 | Status: ACTIVE | OUTPATIENT
Start: 2023-12-23 | End: 2023-12-23

## 2023-12-23 RX ORDER — CIPROFLOXACIN 750 MG/1
750 TABLET, FILM COATED ORAL 2 TIMES DAILY
Qty: 14 TABLET | Refills: 0 | Status: SHIPPED | OUTPATIENT
Start: 2023-12-23 | End: 2023-12-23

## 2023-12-23 RX ORDER — CIPROFLOXACIN 750 MG/1
750 TABLET, FILM COATED ORAL 2 TIMES DAILY
Qty: 10 TABLET | Refills: 0 | Status: ACTIVE | OUTPATIENT
Start: 2023-12-23 | End: 2023-12-28

## 2023-12-23 RX ORDER — AMOXICILLIN 250 MG
2 CAPSULE ORAL 2 TIMES DAILY
Qty: 30 TABLET | Refills: 0 | Status: SHIPPED | OUTPATIENT
Start: 2023-12-23

## 2023-12-23 RX ADMIN — MEROPENEM 500 MG: 500 INJECTION, POWDER, FOR SOLUTION INTRAVENOUS at 05:17

## 2023-12-23 RX ADMIN — TERAZOSIN 10 MG: 5 CAPSULE ORAL at 05:09

## 2023-12-23 RX ADMIN — APIXABAN 5 MG: 5 TABLET, FILM COATED ORAL at 05:09

## 2023-12-23 RX ADMIN — FINASTERIDE 5 MG: 5 TABLET, FILM COATED ORAL at 05:10

## 2023-12-23 RX ADMIN — GABAPENTIN 300 MG: 300 CAPSULE ORAL at 05:10

## 2023-12-23 RX ADMIN — AMLODIPINE BESYLATE 5 MG: 5 TABLET ORAL at 05:10

## 2023-12-23 RX ADMIN — PAROXETINE HYDROCHLORIDE 20 MG: 20 TABLET, FILM COATED ORAL at 05:10

## 2023-12-23 ASSESSMENT — FIBROSIS 4 INDEX: FIB4 SCORE: 1.51

## 2023-12-23 ASSESSMENT — PAIN DESCRIPTION - PAIN TYPE: TYPE: ACUTE PAIN

## 2023-12-23 NOTE — CARE PLAN
The patient is Stable - Low risk of patient condition declining or worsening    Shift Goals  Clinical Goals: Monitor labs, hemodynamic stability  Patient Goals: Rest\  Family Goals: GABRIELA    Progress made toward(s) clinical / shift goals:      Problem: Knowledge Deficit - Standard  Goal: Patient and family/care givers will demonstrate understanding of plan of care, disease process/condition, diagnostic tests and medications  Outcome: Progressing     Problem: Hemodynamics  Goal: Patient's hemodynamics, fluid balance and neurologic status will be stable or improve  Outcome: Progressing     Problem: Respiratory  Goal: Patient will achieve/maintain optimum respiratory ventilation and gas exchange  Outcome: Progressing     Problem: Fall Risk  Goal: Patient will remain free from falls  Outcome: Progressing       Patient is not progressing towards the following goals:

## 2023-12-23 NOTE — DISCHARGE PLANNING
Care Transition Team Assessment    LMSW met with pt at bedside to complete assessment. Pt A&Ox4 and able to verify the information on the face sheet. Pt lives with his wife in a single-story house that has no steps to enter. Prior to this hospitalization pt was independent at home with ADLs and IADLs. Pt uses a cane at baseline. Pt reported that his wife and daughter are good support. Pt denies any SA or MH concerns.      SW met with pt at bedside to discuss HH recommendation. Pt is agreeable. SW obtained choice for [facility]. SW faxed choice form to [facility]. Per pt, if HH declines, he is comfortable to go home without services.    Addendum @ 1015: Radha with [facility] contacted this SW. Per Radha, pt has been accepted.    Addendum @ 3429: KATE met with pt at bedside to present IMM. Pt signed. KATE faxed completed IMM to [facility].    Information Source  Orientation Level: Oriented X4  Information Given By: Patient  Who is responsible for making decisions for patient? : Patient    Readmission Evaluation  Is this a readmission?: No    Elopement Risk  Legal Hold: No  Ambulatory or Self Mobile in Wheelchair: Yes  Disoriented: No  Psychiatric Symptoms: None  History of Wandering: No  Elopement this Admit: No  Vocalizing Wanting to Leave: No  Displays Behaviors, Body Language Wanting to Leave: No-Not at Risk for Elopement  Elopement Risk: Not at Risk for Elopement    Interdisciplinary Discharge Planning  Primary Care Physician: Chuck Chappell  Lives with - Patient's Self Care Capacity: Spouse (grandson)  Support Systems: Spouse / Significant Other, Children  Housing / Facility: 1 Story House  Prior Services: None    Discharge Preparedness  What is your plan after discharge?: Home with help, Home health care    Vision / Hearing Impairment  Right Eye Vision: Impaired, Wears Glasses  Left Eye Vision: Impaired, Wears Glasses  Hearing Impairment: Both Ears, Hearing Device Not Available    Domestic Abuse  Have you ever been the  victim of abuse or violence?: No    Psychological Assessment  History of Substance Abuse: None  History of Psychiatric Problems: No    Anticipated Discharge Information  Discharge Disposition: D/T to home under [facility] care in anticipation of covered skilled care (06)

## 2023-12-23 NOTE — DISCHARGE SUMMARY
Discharge Summary    CHIEF COMPLAINT ON ADMISSION  Chief Complaint   Patient presents with    Weakness     BIB EMS from home, pt states he slid out of bed and could not get up. - head strike, -LOC, +thinners (Eliquis). Reports increased weakness x1 day, normally ambulates with a cane but was not able to this evening. Hx of neuropathy. - stroke assessment.        Reason for Admission  ems     Admission Date  12/20/2023    CODE STATUS  DNAR/DNI    HPI & HOSPITAL COURSE  This 73-year-old male with a past medical history significant for CKD stage III, hypertension, obesity, LYLE, dyslipidemia, type 2 diabetes mellitus  with glyco of 7.8 presented to ER on 12/21/2023 with a complaint of increased urinary frequency along with urgency.  Patient stated that he was so weak that he slipped off of bed and could not get up.    He stated urological procedure 2 weeks ago; he has completed antibiotics.  Open patient seen in ER, he is noted to be tachycardic with a heart rate of 110, WBC 23.2 UA showing leukocyte esterase positive, few bacteria, WBC 20-50.     He did have a history of UTI/2023, at that time culture growing ESBL E. coli.  Patient was initially started on meropenem.  Blood cultures x 2 has been negative, urine culture growing Klebsiella and Pseudomonas.  ID was consulted, patient will be discharged home on ciprofloxacin 750 twice daily as per ID recommendation.    PT/OT has evaluate the patient, recommended home with which has been ordered.  I discussed the plan of care with the patient's wife and patient's son Supa was a physician at [facility].    Discussed adverse effect of being on ciprofloxacin including tendinopathy and diarrhea, they stated understanding.    Therefore, he is discharged in fair and stable condition to home with organized home healthcare and close outpatient follow-up.    The patient met 2-midnight criteria for an inpatient stay at the time of discharge.    Discharge Date  12/23/2023    FOLLOW  UP ITEMS POST DISCHARGE  Chuck Chappell M.D.      DISCHARGE DIAGNOSES  Principal Problem:    SIRS (systemic inflammatory response syndrome) (HCC) (POA: Yes)  Active Problems:    Acute deep vein thrombosis (DVT) of femoral vein of right lower extremity (HCC) (POA: Yes)    UTI (urinary tract infection) (POA: Unknown)    Pulmonary hypertension (HCC) (POA: Yes)    Diastolic heart failure (HCC) (POA: Yes)    Stage 3a chronic kidney disease (HCC) (POA: Unknown)    ACP (advance care planning) (POA: Unknown)    Essential hypertension (POA: Yes)    Obesity (BMI 30-39.9) (POA: Yes)    Benign prostatic hyperplasia with urinary frequency (POA: Yes)    Type 2 diabetes mellitus without complication, without long-term current use of insulin (HCC) (POA: Yes)    Hyperlipidemia (POA: Yes)  Resolved Problems:    Sepsis (HCC) (POA: Unknown)      FOLLOW UP  No future appointments.  Urology    Schedule an appointment as soon as possible for a visit in 1 week(s)      Chuck Chappell M.D.  [address]  [address]  [place] [address]  [phone]    Schedule an appointment as soon as possible for a visit in 1 week(s)        MEDICATIONS ON DISCHARGE     Medication List        START taking these medications        Instructions   ciprofloxacin 750 MG Tabs  Commonly known as: Cipro   Take 1 Tablet by mouth 2 times a day for 5 days.  Dose: 750 mg     polyethylene glycol/lytes Pack  Commonly known as: Miralax   Take 1 Packet by mouth 1 time a day as needed (if sennosides and docusate ineffective after 24 hours).  Dose: 17 g     senna-docusate 8.6-50 MG Tabs  Commonly known as: Pericolace Or Senokot S   Take 2 Tablets by mouth 2 times a day.  Dose: 2 Tablet            CHANGE how you take these medications        Instructions   furosemide 20 MG Tabs  What changed: when to take this  Commonly known as: Lasix   Take 1 Tablet by mouth in the PM on Mon, Wed, Fri, and Sat for 30 days.  Dose: 20 mg            CONTINUE taking these medications         Instructions   amLODIPine 5 MG Tabs  Commonly known as: Norvasc   Take 5 mg by mouth every day.  Dose: 5 mg     Benefiber Powd   Take 1 Dose by mouth 2 times a day.  Dose: 1 Dose     Eliquis 5mg Tabs  Generic drug: apixaban   Take 5 mg by mouth 2 times a day.  Dose: 5 mg     finasteride 5 MG Tabs  Commonly known as: Proscar   Take 1 Tablet by mouth every day.  Dose: 5 mg     gabapentin 600 MG tablet  Commonly known as: Neurontin   Take 600 mg by mouth 2 times a day.  Dose: 600 mg     glipiZIDE ER 2.5 MG Tb24  Commonly known as: Glucotrol XL   Take 2.5 mg by mouth 2 times a day.  Dose: 2.5 mg     lovastatin 20 MG Tabs  Commonly known as: Mevacor   Take 20 mg by mouth every day.  Dose: 20 mg     metFORMIN 850 MG Tabs  Commonly known as: Glucophage   Take 850 mg by mouth 2 times a day.  Dose: 850 mg     methenamine hip 1 GM Tabs  Commonly known as: Hipprex   Take 1 Tablet by mouth 2 times a day.  Dose: 1 g     Ozempic (0.25 or 0.5 MG/DOSE) 2 MG/1.5ML Sopn  Generic drug: Semaglutide(0.25 or 0.5MG/DOS)   Take 0.25 mg by mouth every 7 days.  Dose: 0.25 mg     PARoxetine 20 MG Tabs  Commonly known as: Paxil   Take 1 Tablet by mouth.  Dose: 1 Tablet     rosuvastatin 20 MG Tabs  Commonly known as: Crestor   Take 1 Tablet by mouth every evening.  Dose: 20 mg     terazosin 10 MG capsule  Commonly known as: Hytrin   Take 10 mg by mouth every day.  Dose: 10 mg     triamterene-hctz 37.5-25 MG Tabs  Commonly known as: Maxzide-25/Dyazide   Take 1 Tablet by mouth every morning.  Dose: 1 Tablet            STOP taking these medications      fosinopril 40 MG tablet  Commonly known as: Monopril     nitrofurantoin 50 MG Caps  Commonly known as: Macrodantin              Allergies  No Known Allergies    DIET  Orders Placed This Encounter   Procedures    Diet Order Diet: Consistent CHO (Diabetic)     Standing Status:   Standing     Number of Occurrences:   1     Order Specific Question:   Diet:     Answer:   Consistent CHO (Diabetic) [4]        ACTIVITY  As tolerated.  Weight bearing as tolerated    CONSULTATIONS  ID    PROCEDURES  NONE    LABORATORY  Lab Results   Component Value Date    SODIUM 134 (L) 12/23/2023    POTASSIUM 3.9 12/23/2023    CHLORIDE 99 12/23/2023    CO2 22 12/23/2023    GLUCOSE 311 (H) 12/23/2023    BUN 27 (H) 12/23/2023    CREATININE 1.38 12/23/2023        Lab Results   Component Value Date    WBC 9.9 12/23/2023    HEMOGLOBIN 15.3 12/23/2023    HEMATOCRIT 44.9 12/23/2023    PLATELETCT 223 12/23/2023        Total time of the discharge process exceeds 39 minutes.

## 2023-12-23 NOTE — CARE PLAN
The patient is Stable - Low risk of patient condition declining or worsening    Shift Goals  Clinical Goals: Monitor labs, hemodynamic stability  Patient Goals: Go home, sleep  Family Goals: GABRIELA      Problem: Knowledge Deficit - Standard  Goal: Patient and family/care givers will demonstrate understanding of plan of care, disease process/condition, diagnostic tests and medications  Outcome: Progressing     Problem: Hemodynamics  Goal: Patient's hemodynamics, fluid balance and neurologic status will be stable or improve  Outcome: Progressing     Problem: Fluid Volume  Goal: Fluid volume balance will be maintained  Outcome: Progressing     Problem: Urinary - Renal Perfusion  Goal: Ability to achieve and maintain adequate renal perfusion and functioning will improve  Outcome: Progressing     Problem: Respiratory  Goal: Patient will achieve/maintain optimum respiratory ventilation and gas exchange  Outcome: Progressing     Problem: Physical Regulation  Goal: Diagnostic test results will improve  Outcome: Progressing  Goal: Signs and symptoms of infection will decrease  Outcome: Progressing     Problem: Fall Risk  Goal: Patient will remain free from falls  Outcome: Progressing

## 2023-12-23 NOTE — DISCHARGE PLANNING
Received Choice form at 8532  Agency/Facility Name: [facility]  Referral sent per Choice form @ 9449

## 2024-01-02 NOTE — DOCUMENTATION QUERY
[facility]                                                                       Query Response Note      PATIENT:               SEAMUS ALVAREZ  ACCT #:                  [id number]  MRN:                     [id number]  :                      1950  ADMIT DATE:       2023 10:31 PM  DISCH DATE:        2023 2:58 PM  RESPONDING  PROVIDER #:        [id number]           QUERY TEXT:    Sepsis is documented as a resolved problem in the DC Summary, but is not mentioned elsewhere in the Medical Record.   Please clarify status of sepsis and sepsis-related organ dysfunction:    Examples of organ dysfunction:  - acute renal failure  - acute respiratory failure  - critical illness myopathy  - critical illness polymyopathy  - disseminated intravascular coagulopathy (DIC)  - encephalopathy  - hepatic failure  - lactic acidosis  - septic shock      The patient's Clinical Indicators include:  DC Summary: Resolved Problems: Sepsis  H&P: SIRS POA: tachycardia, and leukocytosis   Progress Notes: GENI on CKD III   WBC 23.2, immature granulocytes 0.14   Creatinine 1.78,  Creatinine 1.33  Admit vitals: 144/78, , 20, 99.1F, 93% RA  Risk Factors: UTI  Treatment: Rocephin, Merrem, IVF    Thank you,  Yesy Cano RN, BSN, CCDS  Clinical   Connect via [employer]  Options provided:   -- Sepsis was present on admission and associated with Acute Kidney Injury   -- Sepsis was POA and associated with organ dysfunction of _____(please specify), please specify sepsis-related organ dysfunction   -- Sepsis is ruled out   -- Other explanation, please specify      Query created by: Yesy Cano on 2023 7:09 AM    RESPONSE TEXT:    Sepsis was present on admission and associated with Acute Kidney Injury          Electronically signed by:  ALFREDO ELIAS MD 2024 9:16 PM

## 2024-02-28 ENCOUNTER — HOSPITAL ENCOUNTER (OUTPATIENT)
Dept: RADIOLOGY | Facility: MEDICAL CENTER | Age: 74
End: 2024-02-28
Attending: INTERNAL MEDICINE
Payer: COMMERCIAL

## 2024-02-28 DIAGNOSIS — N13.9 OBSTRUCTION, UROPATHY: ICD-10-CM

## 2024-02-28 DIAGNOSIS — M10.9 GOUT, UNSPECIFIED CAUSE, UNSPECIFIED CHRONICITY, UNSPECIFIED SITE: ICD-10-CM

## 2024-02-28 DIAGNOSIS — N40.0 BENIGN PROSTATIC HYPERPLASIA, UNSPECIFIED WHETHER LOWER URINARY TRACT SYMPTOMS PRESENT: ICD-10-CM

## 2024-02-28 DIAGNOSIS — E11.22 TYPE 2 DIABETES MELLITUS WITH DIABETIC CHRONIC KIDNEY DISEASE, UNSPECIFIED CKD STAGE, UNSPECIFIED WHETHER LONG TERM INSULIN USE (HCC): ICD-10-CM

## 2024-02-28 DIAGNOSIS — E78.5 HYPERLIPIDEMIA, UNSPECIFIED HYPERLIPIDEMIA TYPE: ICD-10-CM

## 2024-02-28 DIAGNOSIS — Z87.448 PERSONAL HISTORY OF URINARY DISORDER: ICD-10-CM

## 2024-02-28 DIAGNOSIS — N18.31 CHRONIC KIDNEY DISEASE (CKD) STAGE G3A/A1, MODERATELY DECREASED GLOMERULAR FILTRATION RATE (GFR) BETWEEN 45-59 ML/MIN/1.73 SQUARE METER AND ALBUMINURIA CREATININE RATIO LESS THAN 30 MG/G: ICD-10-CM

## 2024-02-28 DIAGNOSIS — I27.20 PULMONARY HYPERTENSION (HCC): ICD-10-CM

## 2024-02-28 DIAGNOSIS — M54.59 MECHANICAL LOW BACK PAIN: ICD-10-CM

## 2024-02-28 DIAGNOSIS — N18.1 HYPERTENSIVE KIDNEY DISEASE WITH CHRONIC KIDNEY DISEASE STAGE I: ICD-10-CM

## 2024-02-28 DIAGNOSIS — I82.90 THROMBOTIC INFARCTION: ICD-10-CM

## 2024-02-28 DIAGNOSIS — I26.99 PULMONARY EMBOLISM, UNSPECIFIED CHRONICITY, UNSPECIFIED PULMONARY EMBOLISM TYPE, UNSPECIFIED WHETHER ACUTE COR PULMONALE PRESENT (HCC): ICD-10-CM

## 2024-02-28 DIAGNOSIS — I12.9 HYPERTENSIVE KIDNEY DISEASE WITH CHRONIC KIDNEY DISEASE STAGE I: ICD-10-CM

## 2024-02-28 DIAGNOSIS — G47.33 OBSTRUCTIVE SLEEP APNEA (ADULT) (PEDIATRIC): ICD-10-CM

## 2024-02-28 PROCEDURE — 76775 US EXAM ABDO BACK WALL LIM: CPT

## 2024-07-10 ENCOUNTER — HOSPITAL ENCOUNTER (OUTPATIENT)
Dept: CARDIOLOGY | Facility: MEDICAL CENTER | Age: 74
End: 2024-07-10
Attending: INTERNAL MEDICINE
Payer: COMMERCIAL

## 2024-07-10 DIAGNOSIS — I48.0 AF (PAROXYSMAL ATRIAL FIBRILLATION) (HCC): ICD-10-CM

## 2024-07-10 PROCEDURE — 93306 TTE W/DOPPLER COMPLETE: CPT

## 2024-07-12 LAB
LV EJECT FRACT MOD 2C 99903: 57.03
LV EJECT FRACT MOD 4C 99902: 38.83
LV EJECT FRACT MOD BP 99901: 44.88

## 2024-10-12 ENCOUNTER — OFFICE VISIT (OUTPATIENT)
Dept: URGENT CARE | Facility: PHYSICIAN GROUP | Age: 74
End: 2024-10-12
Payer: COMMERCIAL

## 2024-10-12 ENCOUNTER — HOSPITAL ENCOUNTER (OUTPATIENT)
Facility: MEDICAL CENTER | Age: 74
End: 2024-10-12
Attending: STUDENT IN AN ORGANIZED HEALTH CARE EDUCATION/TRAINING PROGRAM
Payer: COMMERCIAL

## 2024-10-12 VITALS
TEMPERATURE: 98.4 F | HEIGHT: 72 IN | DIASTOLIC BLOOD PRESSURE: 60 MMHG | HEART RATE: 78 BPM | BODY MASS INDEX: 36.16 KG/M2 | SYSTOLIC BLOOD PRESSURE: 100 MMHG | WEIGHT: 267 LBS | RESPIRATION RATE: 16 BRPM | OXYGEN SATURATION: 93 %

## 2024-10-12 DIAGNOSIS — N30.01 ACUTE CYSTITIS WITH HEMATURIA: ICD-10-CM

## 2024-10-12 LAB
APPEARANCE UR: NORMAL
BILIRUB UR STRIP-MCNC: NORMAL MG/DL
COLOR UR AUTO: YELLOW
GLUCOSE UR STRIP.AUTO-MCNC: NORMAL MG/DL
KETONES UR STRIP.AUTO-MCNC: NORMAL MG/DL
LEUKOCYTE ESTERASE UR QL STRIP.AUTO: NORMAL
NITRITE UR QL STRIP.AUTO: POSITIVE
PH UR STRIP.AUTO: 5.5 [PH] (ref 5–8)
PROT UR QL STRIP: 300 MG/DL
RBC UR QL AUTO: NORMAL
SP GR UR STRIP.AUTO: 1.03
UROBILINOGEN UR STRIP-MCNC: 0.2 MG/DL

## 2024-10-12 PROCEDURE — 81002 URINALYSIS NONAUTO W/O SCOPE: CPT | Performed by: STUDENT IN AN ORGANIZED HEALTH CARE EDUCATION/TRAINING PROGRAM

## 2024-10-12 PROCEDURE — 87077 CULTURE AEROBIC IDENTIFY: CPT

## 2024-10-12 PROCEDURE — 3078F DIAST BP <80 MM HG: CPT | Performed by: STUDENT IN AN ORGANIZED HEALTH CARE EDUCATION/TRAINING PROGRAM

## 2024-10-12 PROCEDURE — 99214 OFFICE O/P EST MOD 30 MIN: CPT | Performed by: STUDENT IN AN ORGANIZED HEALTH CARE EDUCATION/TRAINING PROGRAM

## 2024-10-12 PROCEDURE — 87086 URINE CULTURE/COLONY COUNT: CPT

## 2024-10-12 PROCEDURE — 87186 SC STD MICRODIL/AGAR DIL: CPT

## 2024-10-12 PROCEDURE — 3074F SYST BP LT 130 MM HG: CPT | Performed by: STUDENT IN AN ORGANIZED HEALTH CARE EDUCATION/TRAINING PROGRAM

## 2024-10-12 RX ORDER — LISINOPRIL 10 MG/1
10 TABLET ORAL
COMMUNITY
Start: 2024-09-27

## 2024-10-12 RX ORDER — CEFDINIR 300 MG/1
300 CAPSULE ORAL 2 TIMES DAILY
Qty: 20 CAPSULE | Refills: 0 | Status: SHIPPED | OUTPATIENT
Start: 2024-10-12 | End: 2024-10-22

## 2024-10-12 RX ORDER — ASPIRIN 81 MG/1
TABLET ORAL
COMMUNITY

## 2024-10-12 RX ORDER — FOSINOPRIL SODIUM 40 MG/1
TABLET ORAL
COMMUNITY

## 2024-10-12 RX ORDER — CLONIDINE HYDROCHLORIDE 0.2 MG/1
TABLET ORAL
COMMUNITY

## 2024-10-12 RX ORDER — SODIUM PHOSPHATE,MONO-DIBASIC 19G-7G/118
1000 ENEMA (ML) RECTAL DAILY
COMMUNITY

## 2024-10-12 RX ORDER — CARVEDILOL 25 MG/1
TABLET ORAL
COMMUNITY

## 2024-10-12 RX ORDER — ALLOPURINOL 100 MG/1
200 TABLET ORAL DAILY
COMMUNITY
Start: 2024-09-20

## 2024-10-12 RX ORDER — IBUPROFEN 800 MG/1
TABLET, FILM COATED ORAL
COMMUNITY

## 2024-10-12 RX ORDER — MIRABEGRON 50 MG/1
TABLET, FILM COATED, EXTENDED RELEASE ORAL DAILY
COMMUNITY

## 2024-10-12 ASSESSMENT — FIBROSIS 4 INDEX: FIB4 SCORE: 1.41

## 2024-11-01 ENCOUNTER — APPOINTMENT (OUTPATIENT)
Dept: RADIOLOGY | Facility: MEDICAL CENTER | Age: 74
DRG: 659 | End: 2024-11-01
Attending: EMERGENCY MEDICINE
Payer: COMMERCIAL

## 2024-11-01 ENCOUNTER — ANESTHESIA EVENT (OUTPATIENT)
Dept: SURGERY | Facility: MEDICAL CENTER | Age: 74
DRG: 659 | End: 2024-11-01
Payer: COMMERCIAL

## 2024-11-01 ENCOUNTER — APPOINTMENT (OUTPATIENT)
Dept: RADIOLOGY | Facility: MEDICAL CENTER | Age: 74
DRG: 659 | End: 2024-11-01
Attending: UROLOGY
Payer: COMMERCIAL

## 2024-11-01 ENCOUNTER — HOSPITAL ENCOUNTER (INPATIENT)
Facility: MEDICAL CENTER | Age: 74
LOS: 1 days | DRG: 659 | End: 2024-11-02
Attending: EMERGENCY MEDICINE | Admitting: INTERNAL MEDICINE
Payer: COMMERCIAL

## 2024-11-01 ENCOUNTER — ANESTHESIA (OUTPATIENT)
Dept: SURGERY | Facility: MEDICAL CENTER | Age: 74
DRG: 659 | End: 2024-11-01
Payer: COMMERCIAL

## 2024-11-01 DIAGNOSIS — N39.0 URINARY TRACT INFECTION WITHOUT HEMATURIA, SITE UNSPECIFIED: ICD-10-CM

## 2024-11-01 DIAGNOSIS — N20.1 URETEROLITHIASIS: ICD-10-CM

## 2024-11-01 DIAGNOSIS — N39.0 ACUTE UTI: ICD-10-CM

## 2024-11-01 PROBLEM — N18.31 ACUTE KIDNEY INJURY SUPERIMPOSED ON STAGE 3A CHRONIC KIDNEY DISEASE (HCC): Status: ACTIVE | Noted: 2024-11-01

## 2024-11-01 PROBLEM — D72.829 LEUKOCYTOSIS: Status: ACTIVE | Noted: 2024-11-01

## 2024-11-01 PROBLEM — R53.1 WEAKNESS: Status: ACTIVE | Noted: 2024-11-01

## 2024-11-01 PROBLEM — N17.9 ACUTE KIDNEY INJURY (HCC): Status: ACTIVE | Noted: 2024-11-01

## 2024-11-01 LAB
ALBUMIN SERPL BCP-MCNC: 3.8 G/DL (ref 3.2–4.9)
ALBUMIN/GLOB SERPL: 1.1 G/DL
ALP SERPL-CCNC: 81 U/L (ref 30–99)
ALT SERPL-CCNC: 12 U/L (ref 2–50)
ANION GAP SERPL CALC-SCNC: 13 MMOL/L (ref 7–16)
APPEARANCE UR: ABNORMAL
AST SERPL-CCNC: 20 U/L (ref 12–45)
BACTERIA #/AREA URNS HPF: ABNORMAL /HPF
BASOPHILS # BLD AUTO: 0.5 % (ref 0–1.8)
BASOPHILS # BLD: 0.07 K/UL (ref 0–0.12)
BILIRUB SERPL-MCNC: 0.6 MG/DL (ref 0.1–1.5)
BILIRUB UR QL STRIP.AUTO: NEGATIVE
BUN SERPL-MCNC: 40 MG/DL (ref 8–22)
CALCIUM ALBUM COR SERPL-MCNC: 9.4 MG/DL (ref 8.5–10.5)
CALCIUM SERPL-MCNC: 9.2 MG/DL (ref 8.5–10.5)
CASTS URNS QL MICRO: ABNORMAL /LPF (ref 0–2)
CHLORIDE SERPL-SCNC: 100 MMOL/L (ref 96–112)
CHLORIDE UR-SCNC: 74 MMOL/L
CO2 SERPL-SCNC: 20 MMOL/L (ref 20–33)
COLOR UR: YELLOW
CREAT SERPL-MCNC: 2.27 MG/DL (ref 0.5–1.4)
CREAT UR-MCNC: 138 MG/DL
EKG IMPRESSION: NORMAL
EOSINOPHIL # BLD AUTO: 0.04 K/UL (ref 0–0.51)
EOSINOPHIL NFR BLD: 0.3 % (ref 0–6.9)
EPITHELIAL CELLS 1715: ABNORMAL /HPF (ref 0–5)
ERYTHROCYTE [DISTWIDTH] IN BLOOD BY AUTOMATED COUNT: 48.1 FL (ref 35.9–50)
GFR SERPLBLD CREATININE-BSD FMLA CKD-EPI: 30 ML/MIN/1.73 M 2
GLOBULIN SER CALC-MCNC: 3.4 G/DL (ref 1.9–3.5)
GLUCOSE BLD STRIP.AUTO-MCNC: 159 MG/DL (ref 65–99)
GLUCOSE BLD STRIP.AUTO-MCNC: 200 MG/DL (ref 65–99)
GLUCOSE BLD STRIP.AUTO-MCNC: 264 MG/DL (ref 65–99)
GLUCOSE BLD STRIP.AUTO-MCNC: 279 MG/DL (ref 65–99)
GLUCOSE SERPL-MCNC: 240 MG/DL (ref 65–99)
GLUCOSE UR STRIP.AUTO-MCNC: NEGATIVE MG/DL
HCT VFR BLD AUTO: 42.7 % (ref 42–52)
HGB BLD-MCNC: 14.3 G/DL (ref 14–18)
IMM GRANULOCYTES # BLD AUTO: 0.08 K/UL (ref 0–0.11)
IMM GRANULOCYTES NFR BLD AUTO: 0.5 % (ref 0–0.9)
KETONES UR STRIP.AUTO-MCNC: NEGATIVE MG/DL
LEUKOCYTE ESTERASE UR QL STRIP.AUTO: ABNORMAL
LYMPHOCYTES # BLD AUTO: 0.74 K/UL (ref 1–4.8)
LYMPHOCYTES NFR BLD: 4.8 % (ref 22–41)
MCH RBC QN AUTO: 31.5 PG (ref 27–33)
MCHC RBC AUTO-ENTMCNC: 33.5 G/DL (ref 32.3–36.5)
MCV RBC AUTO: 94.1 FL (ref 81.4–97.8)
MICRO URNS: ABNORMAL
MONOCYTES # BLD AUTO: 1.38 K/UL (ref 0–0.85)
MONOCYTES NFR BLD AUTO: 8.9 % (ref 0–13.4)
NEUTROPHILS # BLD AUTO: 13.19 K/UL (ref 1.82–7.42)
NEUTROPHILS NFR BLD: 85 % (ref 44–72)
NITRITE UR QL STRIP.AUTO: POSITIVE
NRBC # BLD AUTO: 0 K/UL
NRBC BLD-RTO: 0 /100 WBC (ref 0–0.2)
PH UR STRIP.AUTO: 6 [PH] (ref 5–8)
PLATELET # BLD AUTO: 217 K/UL (ref 164–446)
PMV BLD AUTO: 10.7 FL (ref 9–12.9)
POTASSIUM SERPL-SCNC: 4.3 MMOL/L (ref 3.6–5.5)
POTASSIUM UR-SCNC: 42.9 MMOL/L
PROT SERPL-MCNC: 7.2 G/DL (ref 6–8.2)
PROT UR QL STRIP: 30 MG/DL
RBC # BLD AUTO: 4.54 M/UL (ref 4.7–6.1)
RBC # URNS HPF: ABNORMAL /HPF (ref 0–2)
RBC UR QL AUTO: ABNORMAL
SODIUM SERPL-SCNC: 133 MMOL/L (ref 135–145)
SODIUM UR-SCNC: 70 MMOL/L
SP GR UR STRIP.AUTO: 1.02
UROBILINOGEN UR STRIP.AUTO-MCNC: 0.2 EU/DL
WBC # BLD AUTO: 15.5 K/UL (ref 4.8–10.8)
WBC #/AREA URNS HPF: ABNORMAL /HPF

## 2024-11-01 PROCEDURE — 160035 HCHG PACU - 1ST 60 MINS PHASE I: Performed by: UROLOGY

## 2024-11-01 PROCEDURE — 700102 HCHG RX REV CODE 250 W/ 637 OVERRIDE(OP): Performed by: STUDENT IN AN ORGANIZED HEALTH CARE EDUCATION/TRAINING PROGRAM

## 2024-11-01 PROCEDURE — 160048 HCHG OR STATISTICAL LEVEL 1-5: Performed by: UROLOGY

## 2024-11-01 PROCEDURE — 36415 COLL VENOUS BLD VENIPUNCTURE: CPT

## 2024-11-01 PROCEDURE — 160036 HCHG PACU - EA ADDL 30 MINS PHASE I: Performed by: UROLOGY

## 2024-11-01 PROCEDURE — 160002 HCHG RECOVERY MINUTES (STAT): Performed by: UROLOGY

## 2024-11-01 PROCEDURE — 81003 URINALYSIS AUTO W/O SCOPE: CPT

## 2024-11-01 PROCEDURE — 87186 SC STD MICRODIL/AGAR DIL: CPT

## 2024-11-01 PROCEDURE — 700102 HCHG RX REV CODE 250 W/ 637 OVERRIDE(OP)

## 2024-11-01 PROCEDURE — 87086 URINE CULTURE/COLONY COUNT: CPT

## 2024-11-01 PROCEDURE — 700105 HCHG RX REV CODE 258: Performed by: EMERGENCY MEDICINE

## 2024-11-01 PROCEDURE — 700111 HCHG RX REV CODE 636 W/ 250 OVERRIDE (IP): Performed by: STUDENT IN AN ORGANIZED HEALTH CARE EDUCATION/TRAINING PROGRAM

## 2024-11-01 PROCEDURE — 0T778DZ DILATION OF LEFT URETER WITH INTRALUMINAL DEVICE, VIA NATURAL OR ARTIFICIAL OPENING ENDOSCOPIC: ICD-10-PCS | Performed by: UROLOGY

## 2024-11-01 PROCEDURE — 770001 HCHG ROOM/CARE - MED/SURG/GYN PRIV*

## 2024-11-01 PROCEDURE — 700105 HCHG RX REV CODE 258: Performed by: INTERNAL MEDICINE

## 2024-11-01 PROCEDURE — 74018 RADEX ABDOMEN 1 VIEW: CPT

## 2024-11-01 PROCEDURE — 84300 ASSAY OF URINE SODIUM: CPT

## 2024-11-01 PROCEDURE — 74176 CT ABD & PELVIS W/O CONTRAST: CPT

## 2024-11-01 PROCEDURE — 82570 ASSAY OF URINE CREATININE: CPT

## 2024-11-01 PROCEDURE — 96365 THER/PROPH/DIAG IV INF INIT: CPT

## 2024-11-01 PROCEDURE — 87077 CULTURE AEROBIC IDENTIFY: CPT

## 2024-11-01 PROCEDURE — 160009 HCHG ANES TIME/MIN: Performed by: UROLOGY

## 2024-11-01 PROCEDURE — 82436 ASSAY OF URINE CHLORIDE: CPT

## 2024-11-01 PROCEDURE — 160028 HCHG SURGERY MINUTES - 1ST 30 MINS LEVEL 3: Performed by: UROLOGY

## 2024-11-01 PROCEDURE — 80053 COMPREHEN METABOLIC PANEL: CPT

## 2024-11-01 PROCEDURE — 700117 HCHG RX CONTRAST REV CODE 255: Performed by: UROLOGY

## 2024-11-01 PROCEDURE — C1758 CATHETER, URETERAL: HCPCS | Performed by: UROLOGY

## 2024-11-01 PROCEDURE — 99223 1ST HOSP IP/OBS HIGH 75: CPT | Performed by: INTERNAL MEDICINE

## 2024-11-01 PROCEDURE — A9270 NON-COVERED ITEM OR SERVICE: HCPCS | Performed by: INTERNAL MEDICINE

## 2024-11-01 PROCEDURE — 99291 CRITICAL CARE FIRST HOUR: CPT

## 2024-11-01 PROCEDURE — 84133 ASSAY OF URINE POTASSIUM: CPT

## 2024-11-01 PROCEDURE — 93005 ELECTROCARDIOGRAM TRACING: CPT | Performed by: EMERGENCY MEDICINE

## 2024-11-01 PROCEDURE — 81015 MICROSCOPIC EXAM OF URINE: CPT

## 2024-11-01 PROCEDURE — 700101 HCHG RX REV CODE 250: Performed by: STUDENT IN AN ORGANIZED HEALTH CARE EDUCATION/TRAINING PROGRAM

## 2024-11-01 PROCEDURE — 160039 HCHG SURGERY MINUTES - EA ADDL 1 MIN LEVEL 3: Performed by: UROLOGY

## 2024-11-01 PROCEDURE — C1769 GUIDE WIRE: HCPCS | Performed by: UROLOGY

## 2024-11-01 PROCEDURE — 700111 HCHG RX REV CODE 636 W/ 250 OVERRIDE (IP): Mod: JZ | Performed by: EMERGENCY MEDICINE

## 2024-11-01 PROCEDURE — 82962 GLUCOSE BLOOD TEST: CPT

## 2024-11-01 PROCEDURE — 700111 HCHG RX REV CODE 636 W/ 250 OVERRIDE (IP): Mod: JZ | Performed by: INTERNAL MEDICINE

## 2024-11-01 PROCEDURE — 700102 HCHG RX REV CODE 250 W/ 637 OVERRIDE(OP): Performed by: INTERNAL MEDICINE

## 2024-11-01 PROCEDURE — A9270 NON-COVERED ITEM OR SERVICE: HCPCS | Performed by: STUDENT IN AN ORGANIZED HEALTH CARE EDUCATION/TRAINING PROGRAM

## 2024-11-01 PROCEDURE — 85025 COMPLETE CBC W/AUTO DIFF WBC: CPT

## 2024-11-01 PROCEDURE — C2617 STENT, NON-COR, TEM W/O DEL: HCPCS | Performed by: UROLOGY

## 2024-11-01 PROCEDURE — 93005 ELECTROCARDIOGRAM TRACING: CPT

## 2024-11-01 DEVICE — STENT PERCUFLEX PLUS 4.8X26: Type: IMPLANTABLE DEVICE | Site: URETER | Status: FUNCTIONAL

## 2024-11-01 RX ORDER — TERAZOSIN 5 MG/1
10 CAPSULE ORAL DAILY
Status: DISCONTINUED | OUTPATIENT
Start: 2024-11-01 | End: 2024-11-02 | Stop reason: HOSPADM

## 2024-11-01 RX ORDER — ONDANSETRON 2 MG/ML
4 INJECTION INTRAMUSCULAR; INTRAVENOUS
Status: DISCONTINUED | OUTPATIENT
Start: 2024-11-01 | End: 2024-11-01 | Stop reason: HOSPADM

## 2024-11-01 RX ORDER — OXYCODONE HYDROCHLORIDE 5 MG/1
2.5 TABLET ORAL
Status: DISCONTINUED | OUTPATIENT
Start: 2024-11-01 | End: 2024-11-02 | Stop reason: HOSPADM

## 2024-11-01 RX ORDER — SODIUM CHLORIDE 9 MG/ML
INJECTION, SOLUTION INTRAVENOUS CONTINUOUS
Status: DISCONTINUED | OUTPATIENT
Start: 2024-11-01 | End: 2024-11-01 | Stop reason: HOSPADM

## 2024-11-01 RX ORDER — LIDOCAINE HYDROCHLORIDE 20 MG/ML
INJECTION, SOLUTION EPIDURAL; INFILTRATION; INTRACAUDAL; PERINEURAL PRN
Status: DISCONTINUED | OUTPATIENT
Start: 2024-11-01 | End: 2024-11-01 | Stop reason: SURG

## 2024-11-01 RX ORDER — OXYCODONE HYDROCHLORIDE 5 MG/1
5 TABLET ORAL
Status: DISCONTINUED | OUTPATIENT
Start: 2024-11-01 | End: 2024-11-02 | Stop reason: HOSPADM

## 2024-11-01 RX ORDER — EPHEDRINE SULFATE 50 MG/ML
5 INJECTION, SOLUTION INTRAVENOUS
Status: DISCONTINUED | OUTPATIENT
Start: 2024-11-01 | End: 2024-11-01 | Stop reason: HOSPADM

## 2024-11-01 RX ORDER — FINASTERIDE 5 MG/1
5 TABLET, FILM COATED ORAL DAILY
Status: DISCONTINUED | OUTPATIENT
Start: 2024-11-01 | End: 2024-11-02 | Stop reason: HOSPADM

## 2024-11-01 RX ORDER — OXYCODONE HCL 5 MG/5 ML
10 SOLUTION, ORAL ORAL
Status: DISCONTINUED | OUTPATIENT
Start: 2024-11-01 | End: 2024-11-01 | Stop reason: HOSPADM

## 2024-11-01 RX ORDER — SODIUM CHLORIDE 9 MG/ML
INJECTION, SOLUTION INTRAVENOUS CONTINUOUS
Status: DISPENSED | OUTPATIENT
Start: 2024-11-01 | End: 2024-11-02

## 2024-11-01 RX ORDER — ALLOPURINOL 100 MG/1
200 TABLET ORAL DAILY
Status: DISCONTINUED | OUTPATIENT
Start: 2024-11-02 | End: 2024-11-02 | Stop reason: HOSPADM

## 2024-11-01 RX ORDER — HYDRALAZINE HYDROCHLORIDE 20 MG/ML
5 INJECTION INTRAMUSCULAR; INTRAVENOUS
Status: DISCONTINUED | OUTPATIENT
Start: 2024-11-01 | End: 2024-11-01 | Stop reason: HOSPADM

## 2024-11-01 RX ORDER — ROSUVASTATIN CALCIUM 20 MG/1
20 TABLET, COATED ORAL EVERY EVENING
Status: DISCONTINUED | OUTPATIENT
Start: 2024-11-01 | End: 2024-11-02 | Stop reason: HOSPADM

## 2024-11-01 RX ORDER — ONDANSETRON 2 MG/ML
INJECTION INTRAMUSCULAR; INTRAVENOUS PRN
Status: DISCONTINUED | OUTPATIENT
Start: 2024-11-01 | End: 2024-11-01 | Stop reason: SURG

## 2024-11-01 RX ORDER — PHENAZOPYRIDINE HYDROCHLORIDE 200 MG/1
200 TABLET, FILM COATED ORAL ONCE
Status: COMPLETED | OUTPATIENT
Start: 2024-11-01 | End: 2024-11-01

## 2024-11-01 RX ORDER — MIRABEGRON 50 MG/1
50 TABLET, FILM COATED, EXTENDED RELEASE ORAL DAILY
Status: DISCONTINUED | OUTPATIENT
Start: 2024-11-01 | End: 2024-11-01

## 2024-11-01 RX ORDER — CEFDINIR 300 MG/1
300 CAPSULE ORAL 2 TIMES DAILY
Status: SHIPPED | COMMUNITY
Start: 2024-10-12 | End: 2024-11-01

## 2024-11-01 RX ORDER — DEXTROSE MONOHYDRATE 25 G/50ML
25 INJECTION, SOLUTION INTRAVENOUS
Status: DISCONTINUED | OUTPATIENT
Start: 2024-11-01 | End: 2024-11-02 | Stop reason: HOSPADM

## 2024-11-01 RX ORDER — OXYCODONE HCL 5 MG/5 ML
5 SOLUTION, ORAL ORAL
Status: DISCONTINUED | OUTPATIENT
Start: 2024-11-01 | End: 2024-11-01 | Stop reason: HOSPADM

## 2024-11-01 RX ORDER — GABAPENTIN 300 MG/1
600 CAPSULE ORAL 2 TIMES DAILY
Status: DISCONTINUED | OUTPATIENT
Start: 2024-11-01 | End: 2024-11-02 | Stop reason: HOSPADM

## 2024-11-01 RX ORDER — DEXTROSE MONOHYDRATE 25 G/50ML
25 INJECTION, SOLUTION INTRAVENOUS
Status: DISCONTINUED | OUTPATIENT
Start: 2024-11-01 | End: 2024-11-01 | Stop reason: HOSPADM

## 2024-11-01 RX ORDER — INSULIN LISPRO 100 [IU]/ML
2-9 INJECTION, SOLUTION INTRAVENOUS; SUBCUTANEOUS EVERY 6 HOURS
Status: DISCONTINUED | OUTPATIENT
Start: 2024-11-01 | End: 2024-11-02 | Stop reason: HOSPADM

## 2024-11-01 RX ORDER — OXYBUTYNIN CHLORIDE 5 MG/1
5 TABLET ORAL ONCE
Status: COMPLETED | OUTPATIENT
Start: 2024-11-01 | End: 2024-11-01

## 2024-11-01 RX ORDER — ALBUTEROL SULFATE 5 MG/ML
2.5 SOLUTION RESPIRATORY (INHALATION)
Status: DISCONTINUED | OUTPATIENT
Start: 2024-11-01 | End: 2024-11-01 | Stop reason: HOSPADM

## 2024-11-01 RX ORDER — HYDROMORPHONE HYDROCHLORIDE 1 MG/ML
0.2 INJECTION, SOLUTION INTRAMUSCULAR; INTRAVENOUS; SUBCUTANEOUS
Status: DISCONTINUED | OUTPATIENT
Start: 2024-11-01 | End: 2024-11-01 | Stop reason: HOSPADM

## 2024-11-01 RX ORDER — DEXAMETHASONE SODIUM PHOSPHATE 4 MG/ML
INJECTION, SOLUTION INTRA-ARTICULAR; INTRALESIONAL; INTRAMUSCULAR; INTRAVENOUS; SOFT TISSUE PRN
Status: DISCONTINUED | OUTPATIENT
Start: 2024-11-01 | End: 2024-11-01 | Stop reason: SURG

## 2024-11-01 RX ORDER — INSULIN LISPRO 100 [IU]/ML
2-9 INJECTION, SOLUTION INTRAVENOUS; SUBCUTANEOUS EVERY 6 HOURS
Status: DISCONTINUED | OUTPATIENT
Start: 2024-11-01 | End: 2024-11-01 | Stop reason: HOSPADM

## 2024-11-01 RX ORDER — HYDROMORPHONE HYDROCHLORIDE 1 MG/ML
0.1 INJECTION, SOLUTION INTRAMUSCULAR; INTRAVENOUS; SUBCUTANEOUS
Status: DISCONTINUED | OUTPATIENT
Start: 2024-11-01 | End: 2024-11-01 | Stop reason: HOSPADM

## 2024-11-01 RX ORDER — HYDROMORPHONE HYDROCHLORIDE 1 MG/ML
0.4 INJECTION, SOLUTION INTRAMUSCULAR; INTRAVENOUS; SUBCUTANEOUS
Status: DISCONTINUED | OUTPATIENT
Start: 2024-11-01 | End: 2024-11-01 | Stop reason: HOSPADM

## 2024-11-01 RX ORDER — HYDRALAZINE HYDROCHLORIDE 20 MG/ML
10 INJECTION INTRAMUSCULAR; INTRAVENOUS EVERY 4 HOURS PRN
Status: DISCONTINUED | OUTPATIENT
Start: 2024-11-01 | End: 2024-11-02 | Stop reason: HOSPADM

## 2024-11-01 RX ORDER — ROCURONIUM BROMIDE 10 MG/ML
INJECTION, SOLUTION INTRAVENOUS PRN
Status: DISCONTINUED | OUTPATIENT
Start: 2024-11-01 | End: 2024-11-01 | Stop reason: SURG

## 2024-11-01 RX ORDER — PAROXETINE 20 MG/1
20 TABLET, FILM COATED ORAL DAILY
Status: DISCONTINUED | OUTPATIENT
Start: 2024-11-01 | End: 2024-11-02 | Stop reason: HOSPADM

## 2024-11-01 RX ORDER — HYDROMORPHONE HYDROCHLORIDE 1 MG/ML
0.25 INJECTION, SOLUTION INTRAMUSCULAR; INTRAVENOUS; SUBCUTANEOUS
Status: DISCONTINUED | OUTPATIENT
Start: 2024-11-01 | End: 2024-11-02 | Stop reason: HOSPADM

## 2024-11-01 RX ORDER — INSULIN LISPRO 100 [IU]/ML
INJECTION, SOLUTION INTRAVENOUS; SUBCUTANEOUS
Status: DISPENSED
Start: 2024-11-01 | End: 2024-11-02

## 2024-11-01 RX ORDER — ACETAMINOPHEN 500 MG
1000 TABLET ORAL ONCE
Status: COMPLETED | OUTPATIENT
Start: 2024-11-01 | End: 2024-11-01

## 2024-11-01 RX ORDER — DIPHENHYDRAMINE HYDROCHLORIDE 50 MG/ML
12.5 INJECTION INTRAMUSCULAR; INTRAVENOUS
Status: DISCONTINUED | OUTPATIENT
Start: 2024-11-01 | End: 2024-11-01 | Stop reason: HOSPADM

## 2024-11-01 RX ORDER — LABETALOL HYDROCHLORIDE 5 MG/ML
5 INJECTION, SOLUTION INTRAVENOUS
Status: DISCONTINUED | OUTPATIENT
Start: 2024-11-01 | End: 2024-11-01 | Stop reason: HOSPADM

## 2024-11-01 RX ORDER — AMLODIPINE BESYLATE 10 MG/1
5 TABLET ORAL DAILY
Status: DISCONTINUED | OUTPATIENT
Start: 2024-11-02 | End: 2024-11-02 | Stop reason: HOSPADM

## 2024-11-01 RX ORDER — CEFTRIAXONE 2 G/1
2000 INJECTION, POWDER, FOR SOLUTION INTRAMUSCULAR; INTRAVENOUS ONCE
Status: DISCONTINUED | OUTPATIENT
Start: 2024-11-01 | End: 2024-11-01

## 2024-11-01 RX ORDER — HALOPERIDOL 5 MG/ML
1 INJECTION INTRAMUSCULAR
Status: DISCONTINUED | OUTPATIENT
Start: 2024-11-01 | End: 2024-11-01 | Stop reason: HOSPADM

## 2024-11-01 RX ORDER — ACETAMINOPHEN 325 MG/1
650 TABLET ORAL EVERY 6 HOURS PRN
Status: DISCONTINUED | OUTPATIENT
Start: 2024-11-01 | End: 2024-11-02 | Stop reason: HOSPADM

## 2024-11-01 RX ORDER — ONDANSETRON 4 MG/1
4 TABLET, ORALLY DISINTEGRATING ORAL EVERY 4 HOURS PRN
Status: DISCONTINUED | OUTPATIENT
Start: 2024-11-01 | End: 2024-11-02 | Stop reason: HOSPADM

## 2024-11-01 RX ORDER — SODIUM CHLORIDE 9 MG/ML
1000 INJECTION, SOLUTION INTRAVENOUS ONCE
Status: COMPLETED | OUTPATIENT
Start: 2024-11-01 | End: 2024-11-01

## 2024-11-01 RX ORDER — ONDANSETRON 2 MG/ML
4 INJECTION INTRAMUSCULAR; INTRAVENOUS EVERY 4 HOURS PRN
Status: DISCONTINUED | OUTPATIENT
Start: 2024-11-01 | End: 2024-11-02 | Stop reason: HOSPADM

## 2024-11-01 RX ADMIN — OXYBUTYNIN CHLORIDE 5 MG: 5 TABLET ORAL at 13:43

## 2024-11-01 RX ADMIN — DEXAMETHASONE SODIUM PHOSPHATE 4 MG: 4 INJECTION INTRA-ARTICULAR; INTRALESIONAL; INTRAMUSCULAR; INTRAVENOUS; SOFT TISSUE at 12:25

## 2024-11-01 RX ADMIN — INSULIN LISPRO 5 UNITS: 100 INJECTION, SOLUTION INTRAVENOUS; SUBCUTANEOUS at 17:18

## 2024-11-01 RX ADMIN — INSULIN LISPRO 5 UNITS: 100 INJECTION, SOLUTION INTRAVENOUS; SUBCUTANEOUS at 23:31

## 2024-11-01 RX ADMIN — PHENAZOPYRIDINE 200 MG: 200 TABLET ORAL at 13:43

## 2024-11-01 RX ADMIN — SODIUM CHLORIDE: 9 INJECTION, SOLUTION INTRAVENOUS at 21:08

## 2024-11-01 RX ADMIN — VIBEGRON 75 MG: 75 TABLET, FILM COATED ORAL at 15:33

## 2024-11-01 RX ADMIN — ACETAMINOPHEN 1000 MG: 500 TABLET ORAL at 13:43

## 2024-11-01 RX ADMIN — INSULIN HUMAN 6 UNITS: 100 INJECTION, SOLUTION PARENTERAL at 12:40

## 2024-11-01 RX ADMIN — ONDANSETRON 4 MG: 2 INJECTION INTRAMUSCULAR; INTRAVENOUS at 12:25

## 2024-11-01 RX ADMIN — GABAPENTIN 600 MG: 300 CAPSULE ORAL at 23:33

## 2024-11-01 RX ADMIN — PIPERACILLIN AND TAZOBACTAM 4.5 G: 4; .5 INJECTION, POWDER, FOR SOLUTION INTRAVENOUS at 10:22

## 2024-11-01 RX ADMIN — ROSUVASTATIN CALCIUM 20 MG: 20 TABLET, FILM COATED ORAL at 17:14

## 2024-11-01 RX ADMIN — SODIUM CHLORIDE: 9 INJECTION, SOLUTION INTRAVENOUS at 12:00

## 2024-11-01 RX ADMIN — ROCURONIUM BROMIDE 50 MG: 50 INJECTION, SOLUTION INTRAVENOUS at 12:11

## 2024-11-01 RX ADMIN — PROPOFOL 125 MG: 10 INJECTION, EMULSION INTRAVENOUS at 12:11

## 2024-11-01 RX ADMIN — SODIUM CHLORIDE: 9 INJECTION, SOLUTION INTRAVENOUS at 15:35

## 2024-11-01 RX ADMIN — LIDOCAINE HYDROCHLORIDE 60 MG: 20 INJECTION, SOLUTION EPIDURAL; INFILTRATION; INTRACAUDAL at 12:11

## 2024-11-01 RX ADMIN — FENTANYL CITRATE 50 MCG: 50 INJECTION, SOLUTION INTRAMUSCULAR; INTRAVENOUS at 12:11

## 2024-11-01 RX ADMIN — SUGAMMADEX 200 MG: 100 INJECTION, SOLUTION INTRAVENOUS at 12:46

## 2024-11-01 RX ADMIN — PIPERACILLIN AND TAZOBACTAM 3.38 G: 3; .375 INJECTION, POWDER, FOR SOLUTION INTRAVENOUS at 21:11

## 2024-11-01 RX ADMIN — SODIUM CHLORIDE 1000 ML: 9 INJECTION, SOLUTION INTRAVENOUS at 10:23

## 2024-11-01 SDOH — ECONOMIC STABILITY: TRANSPORTATION INSECURITY
IN THE PAST 12 MONTHS, HAS LACK OF RELIABLE TRANSPORTATION KEPT YOU FROM MEDICAL APPOINTMENTS, MEETINGS, WORK OR FROM GETTING THINGS NEEDED FOR DAILY LIVING?: NO

## 2024-11-01 SDOH — ECONOMIC STABILITY: TRANSPORTATION INSECURITY
IN THE PAST 12 MONTHS, HAS THE LACK OF TRANSPORTATION KEPT YOU FROM MEDICAL APPOINTMENTS OR FROM GETTING MEDICATIONS?: NO

## 2024-11-01 ASSESSMENT — ENCOUNTER SYMPTOMS
DIZZINESS: 0
VOMITING: 0
WEAKNESS: 1
DEPRESSION: 0
CHILLS: 0
SHORTNESS OF BREATH: 0
MYALGIAS: 0
NAUSEA: 0
FEVER: 0
BACK PAIN: 1
HEADACHES: 0
BLURRED VISION: 0
ABDOMINAL PAIN: 1

## 2024-11-01 ASSESSMENT — COGNITIVE AND FUNCTIONAL STATUS - GENERAL
DAILY ACTIVITIY SCORE: 20
DRESSING REGULAR LOWER BODY CLOTHING: A LITTLE
STANDING UP FROM CHAIR USING ARMS: A LITTLE
HELP NEEDED FOR BATHING: A LITTLE
TURNING FROM BACK TO SIDE WHILE IN FLAT BAD: A LITTLE
DRESSING REGULAR UPPER BODY CLOTHING: A LITTLE
TOILETING: A LITTLE
WALKING IN HOSPITAL ROOM: A LITTLE
SUGGESTED CMS G CODE MODIFIER DAILY ACTIVITY: CJ
MOVING TO AND FROM BED TO CHAIR: A LITTLE
MOVING FROM LYING ON BACK TO SITTING ON SIDE OF FLAT BED: A LITTLE
SUGGESTED CMS G CODE MODIFIER MOBILITY: CK
CLIMB 3 TO 5 STEPS WITH RAILING: A LITTLE
MOBILITY SCORE: 18

## 2024-11-01 ASSESSMENT — LIFESTYLE VARIABLES
AVERAGE NUMBER OF DAYS PER WEEK YOU HAVE A DRINK CONTAINING ALCOHOL: 1
DOES PATIENT WANT TO STOP DRINKING: NO
TOTAL SCORE: 0
TOTAL SCORE: 0
ALCOHOL_USE: YES
HAVE YOU EVER FELT YOU SHOULD CUT DOWN ON YOUR DRINKING: NO
EVER FELT BAD OR GUILTY ABOUT YOUR DRINKING: NO
TOTAL SCORE: 0
HOW MANY TIMES IN THE PAST YEAR HAVE YOU HAD 5 OR MORE DRINKS IN A DAY: 0
EVER HAD A DRINK FIRST THING IN THE MORNING TO STEADY YOUR NERVES TO GET RID OF A HANGOVER: NO
CONSUMPTION TOTAL: NEGATIVE
HAVE PEOPLE ANNOYED YOU BY CRITICIZING YOUR DRINKING: NO
ON A TYPICAL DAY WHEN YOU DRINK ALCOHOL HOW MANY DRINKS DO YOU HAVE: 1

## 2024-11-01 ASSESSMENT — SOCIAL DETERMINANTS OF HEALTH (SDOH)
IN THE PAST 12 MONTHS, HAS THE ELECTRIC, GAS, OIL, OR WATER COMPANY THREATENED TO SHUT OFF SERVICE IN YOUR HOME?: NO
WITHIN THE LAST YEAR, HAVE YOU BEEN KICKED, HIT, SLAPPED, OR OTHERWISE PHYSICALLY HURT BY YOUR PARTNER OR EX-PARTNER?: NO
WITHIN THE PAST 12 MONTHS, THE FOOD YOU BOUGHT JUST DIDN'T LAST AND YOU DIDN'T HAVE MONEY TO GET MORE: NEVER TRUE
WITHIN THE PAST 12 MONTHS, YOU WORRIED THAT YOUR FOOD WOULD RUN OUT BEFORE YOU GOT THE MONEY TO BUY MORE: NEVER TRUE
WITHIN THE LAST YEAR, HAVE YOU BEEN AFRAID OF YOUR PARTNER OR EX-PARTNER?: NO
WITHIN THE LAST YEAR, HAVE YOU BEEN HUMILIATED OR EMOTIONALLY ABUSED IN OTHER WAYS BY YOUR PARTNER OR EX-PARTNER?: NO
WITHIN THE LAST YEAR, HAVE TO BEEN RAPED OR FORCED TO HAVE ANY KIND OF SEXUAL ACTIVITY BY YOUR PARTNER OR EX-PARTNER?: NO

## 2024-11-01 ASSESSMENT — PAIN DESCRIPTION - PAIN TYPE
TYPE: ACUTE PAIN
TYPE: SURGICAL PAIN
TYPE: ACUTE PAIN
TYPE: SURGICAL PAIN
TYPE: ACUTE PAIN
TYPE: ACUTE PAIN

## 2024-11-01 ASSESSMENT — PATIENT HEALTH QUESTIONNAIRE - PHQ9
SUM OF ALL RESPONSES TO PHQ9 QUESTIONS 1 AND 2: 0
1. LITTLE INTEREST OR PLEASURE IN DOING THINGS: NOT AT ALL
2. FEELING DOWN, DEPRESSED, IRRITABLE, OR HOPELESS: NOT AT ALL

## 2024-11-01 ASSESSMENT — FIBROSIS 4 INDEX
FIB4 SCORE: 1.97
FIB4 SCORE: 1.41

## 2024-11-01 ASSESSMENT — PAIN SCALES - GENERAL: PAIN_LEVEL: 0

## 2024-11-01 NOTE — ANESTHESIA PREPROCEDURE EVALUATION
Case: [id number] Date/Time: 11/01/24 1115    Procedures:       CYSTOSCOPY      STENT PLACEMENT      LITHOTRIPSY, USING LASER    Location: [facility] ROOM 25 / SURGERY SAME DAY [facility]    Surgeons: Andrea Galicia M.D.            Relevant Problems   ANESTHESIA   (positive) Sleep apnea      NEURO (within normal limits)      CARDIAC   (positive) Acute deep vein thrombosis (DVT) of femoral vein of right lower extremity (HCC)   (positive) Essential hypertension   (positive) Pulmonary embolism, bilateral (HCC)   (positive) Pulmonary hypertension (HCC)         (positive) Acute kidney injury superimposed on stage 3a chronic kidney disease (HCC)   (positive) Stage 3a chronic kidney disease      ENDO   (positive) Type 2 diabetes mellitus without complication, without long-term current use of insulin (HCC)      Other   (positive) Benign prostatic hyperplasia with urinary frequency   (positive) Depression   (positive) History of duodenal ulcer   (positive) Hyperlipidemia     Lab Results   Component Value Date/Time    WBC 15.5 (H) 11/01/2024 08:57 AM    RBC 4.54 (L) 11/01/2024 08:57 AM    HEMOGLOBIN 14.3 11/01/2024 08:57 AM    HEMATOCRIT 42.7 11/01/2024 08:57 AM    MCV 94.1 11/01/2024 08:57 AM    MCH 31.5 11/01/2024 08:57 AM    MCHC 33.5 11/01/2024 08:57 AM    MPV 10.7 11/01/2024 08:57 AM    NEUTSPOLYS 85.00 (H) 11/01/2024 08:57 AM    LYMPHOCYTES 4.80 (L) 11/01/2024 08:57 AM    MONOCYTES 8.90 11/01/2024 08:57 AM    EOSINOPHILS 0.30 11/01/2024 08:57 AM    BASOPHILS 0.50 11/01/2024 08:57 AM        Lab Results   Component Value Date/Time    SODIUM 133 (L) 11/01/2024 08:57 AM    POTASSIUM 4.3 11/01/2024 08:57 AM    CHLORIDE 100 11/01/2024 08:57 AM    CO2 20 11/01/2024 08:57 AM    GLUCOSE 240 (H) 11/01/2024 08:57 AM    BUN 40 (H) 11/01/2024 08:57 AM    CREATININE 2.27 (H) 11/01/2024 08:57 AM    BUNCREATRAT 22 07/12/2013 08:09 AM      EKG  Results for orders placed or performed during the hospital encounter of 11/01/24   EKG    Result Value Ref Range    Report       [facility] Emergency Dept.    Test Date:  2024  Pt Name:    SEAMUS ALVAREZ                   Department: ER  MRN:        [id number]                      Room:        25  Gender:     Male                         Technician: [id number]  :        1950                   Requested By:ER TRIAGE PROTOCOL  Order #:    [id number]                    Reading MD:    Measurements  Intervals                                Axis  Rate:       74                           P:          34  CO:         191                          QRS:        18  QRSD:       96                           T:          40  QT:         391  QTc:        434    Interpretive Statements  Sinus rhythm  Multiple premature complexes, vent & supraven  Sinus pause  Compared to ECG 2023 08:36:43  Sinus pause or arrest now present  Atrial premature complex(es) no longer present       Echo 2024  Normal left ventricular size and systolic function.  The left   ventricular ejection fraction is visually estimated to be 65%.  Mild concentric left ventricular hypertrophy.  Grade I (impaired   relaxation) diastolic function.  No significant valvular abnormalities.  The ascending aorta is dilated with a diameter of 4.0 cm.    Ambulatory Vitals  Encounter Vitals  Temperature: 36.5 °C (97.7 °F)  Temp src: Temporal  Blood Pressure : 131/73  Pulse: 69  Respiration: 20  Pulse Oximetry: 92 %  O2 (LPM): 2  O2 Delivery Device: Nasal Cannula  Weight: 120 kg (265 lb)  Weight Source: Other Healthcare Provider  Height: 182.9 cm (6')  BMI (Calculated): 35.94    Physical Exam    Airway   Mallampati: IV  TM distance: <3 FB  Neck ROM: limited       Cardiovascular - normal exam  Rhythm: regular  Rate: normal     Dental - normal exam           Pulmonary - normal exam  Breath sounds clear to auscultation     Abdominal    Neurological - normal exam                   Anesthesia Plan    ASA 3 (multiple comorbidities including  PE hx and acute on chronic kidney injury)   ASA physical status 3 criteria: other (comment)    Plan - general       Airway plan will be ETT          Induction: intravenous    Postoperative Plan: Postoperative administration of opioids is intended.    Pertinent diagnostic labs and testing reviewed    Informed Consent:    Anesthetic plan and risks discussed with patient.    Use of blood products discussed with: patient whom consented to blood products.       Risks of general anesthesia discussed including sore throat, damage to lips/teeth, anaphylaxis/drug reaction, aspiration, awareness, post-op nausea/vomiting, post-op delirium, myocardial infarction, cerebral vascular accident up to and including death.

## 2024-11-01 NOTE — ED PROVIDER NOTES
ED Provider Note    CHIEF COMPLAINT  Chief Complaint   Patient presents with    Urinary Frequency    Weakness     Pt recently treated for a uti and feels like the symptoms have returned. Pt having urinary frequency, painful urination, and weakness. Pt denies any fevers. Pt was feeling weak throughout the evening and had to help himself to the floor while taking a shower. Denies any falls.       EXTERNAL RECORDS REVIEWED  Outpatient Notes seen in urgent care 10/12/2024 for urinary frequency, urgency, and fatigue with history of urinary tract infection.  Urinalysis was positive for nitrites and leukocyte Estrace with large blood.  He was diagnosed with cystitis and started on cefdinir.  Urine culture was positive for pansensitive Pseudomonas UTI.    HPI/ROS  LIMITATION TO HISTORY   Select: : None  OUTSIDE HISTORIAN(S):  None    Ramu Barber is a 74 y.o. male who presents with a chief complaint of urinary frequency, urgency, and intermittent dysuria.  Patient states that he had similar symptoms last month and was diagnosed with a urinary tract infection.  He took antibiotics and his symptoms resolved.  3 days ago his symptoms presented once again.  He has not had any fevers, nausea, vomiting, diarrhea, incontinence.  He denies blood in his urine.  He has some left lower quadrant abdominal pain but no new back or flank pain.  He has never had a kidney stone.  Today he was weak and lowered himself to the ground while taking a shower but did not fall and there was no head trauma.  EMS was contacted and he was brought to the ER for evaluation.  He is circumcised.  He follows with urologist and was supposed to be seen there today however came to the ER for evaluation instead.    PAST MEDICAL HISTORY   has a past medical history of Arthritis, Back pain, Chickenpox, High cholesterol, Hypertension, Obesity, Pain, Pneumonia, Polycythemia, secondary, Sleep apnea, Snoring, and Type II or unspecified type diabetes mellitus  Urology consulted and attributed to post-op possibly related to hematoma   without mention of complication, not stated as uncontrolled.    SURGICAL HISTORY   has a past surgical history that includes fusion, spine, lumbar, plif (1/21/2015); inguinal hernia repair (Right, 2/16/2016); lumbar laminectomy diskectomy (2/18/2018); hardware removal neuro (2/18/2018); carpal tunnel release (Right, 2014); shoulder arthroscopy (Right, 2014); lumbar decompression (2004); lumbar decompression (2003); cervical disk and fusion anterior (2005); ins/rplc spi npg/rcvr pocket (7/23/2019); egd w/control bleeding (8/22/2020); and gastroscopy (N/A, 8/22/2020).    FAMILY HISTORY  Family History   Problem Relation Age of Onset    Sleep Apnea Mother     Heart Attack Father     Heart Attack Brother        SOCIAL HISTORY  Social History     Tobacco Use    Smoking status: Never    Smokeless tobacco: Former     Types: Chew     Quit date: 12/2018   Vaping Use    Vaping status: Never Used   Substance and Sexual Activity    Alcohol use: Yes     Alcohol/week: 1.2 oz     Types: 2 Shots of liquor per week     Comment: 2 a week    Drug use: No    Sexual activity: Not on file       CURRENT MEDICATIONS  Home Medications       Reviewed by Nagi Goyal R.N. (Registered Nurse) on 11/01/24 at 0905  Med List Status: Not Addressed     Medication Last Dose Status   allopurinol (ZYLOPRIM) 100 MG Tab  Active   amLODIPine (NORVASC) 5 MG Tab  Active   aspirin 81 MG EC tablet  Active   carvedilol (COREG) 25 MG Tab  Active   cloNIDine (CATAPRES) 0.2 MG Tab  Active   ELIQUIS 5 MG Tab  Active   finasteride (PROSCAR) 5 MG Tab  Active   fosinopril (MONOPRIL) 40 MG tablet  Active   gabapentin (NEURONTIN) 600 MG tablet  Active   glipiZIDE SR (GLUCOTROL) 2.5 MG TABLET SR 24 HR  Active   ibuprofen (MOTRIN) 800 MG Tab  Active   lisinopril (PRINIVIL) 10 MG Tab  Active   lovastatin (MEVACOR) 20 MG Tab  Active   metFORMIN (GLUCOPHAGE) 850 MG Tab  Active   methenamine hip (HIPPREX) 1 GM Tab  Active   Mirabegron ER 50 MG TABLET SR 24 HR  Active    OZEMPIC, 0.25 OR 0.5 MG/DOSE, 2 MG/1.5ML Solution Pen-injector  Active   PARoxetine (PAXIL) 20 MG Tab  Active   polyethylene glycol/lytes (MIRALAX) Pack  Active   rosuvastatin (CRESTOR) 20 MG Tab  Active   senna-docusate (PERICOLACE OR SENOKOT S) 8.6-50 MG Tab  Active   SM VITAMIN D3 25 MCG (1000 UT) Tab  Active   terazosin (HYTRIN) 10 MG capsule  Active   triamterene-hctz (MAXZIDE-25/DYAZIDE) 37.5-25 MG Tab  Active   Wheat Dextrin (BENEFIBER) Powder  Active                    ALLERGIES  No Known Allergies    PHYSICAL EXAM  VITAL SIGNS: /67   Pulse 76   Temp 36.5 °C (97.7 °F) (Temporal)   Resp 20   Ht 1.829 m (6')   Wt 120 kg (265 lb)   SpO2 91%   BMI 35.94 kg/m²    Physical Exam  Vitals and nursing note reviewed.   Constitutional:       Appearance: Normal appearance.   HENT:      Head: Normocephalic and atraumatic.      Right Ear: External ear normal.      Left Ear: External ear normal.      Nose: Nose normal.      Mouth/Throat:      Mouth: Mucous membranes are dry.      Pharynx: Oropharynx is clear.   Eyes:      Extraocular Movements: Extraocular movements intact.      Conjunctiva/sclera: Conjunctivae normal.      Pupils: Pupils are equal, round, and reactive to light.   Cardiovascular:      Rate and Rhythm: Normal rate.   Pulmonary:      Effort: Pulmonary effort is normal.   Abdominal:      Palpations: Abdomen is soft.      Tenderness: There is no abdominal tenderness. There is no right CVA tenderness or left CVA tenderness.   Musculoskeletal:         General: Normal range of motion.      Cervical back: Normal range of motion and neck supple.   Skin:     General: Skin is warm and dry.   Neurological:      General: No focal deficit present.      Mental Status: He is alert and oriented to person, place, and time.   Psychiatric:         Mood and Affect: Mood normal.         Behavior: Behavior normal.       EKG/LABS  Results for orders placed or performed during the hospital encounter of 11/01/24   CBC  WITH DIFFERENTIAL    Collection Time: 11/01/24  8:57 AM   Result Value Ref Range    WBC 15.5 (H) 4.8 - 10.8 K/uL    RBC 4.54 (L) 4.70 - 6.10 M/uL    Hemoglobin 14.3 14.0 - 18.0 g/dL    Hematocrit 42.7 42.0 - 52.0 %    MCV 94.1 81.4 - 97.8 fL    MCH 31.5 27.0 - 33.0 pg    MCHC 33.5 32.3 - 36.5 g/dL    RDW 48.1 35.9 - 50.0 fL    Platelet Count 217 164 - 446 K/uL    MPV 10.7 9.0 - 12.9 fL    Neutrophils-Polys 85.00 (H) 44.00 - 72.00 %    Lymphocytes 4.80 (L) 22.00 - 41.00 %    Monocytes 8.90 0.00 - 13.40 %    Eosinophils 0.30 0.00 - 6.90 %    Basophils 0.50 0.00 - 1.80 %    Immature Granulocytes 0.50 0.00 - 0.90 %    Nucleated RBC 0.00 0.00 - 0.20 /100 WBC    Neutrophils (Absolute) 13.19 (H) 1.82 - 7.42 K/uL    Lymphs (Absolute) 0.74 (L) 1.00 - 4.80 K/uL    Monos (Absolute) 1.38 (H) 0.00 - 0.85 K/uL    Eos (Absolute) 0.04 0.00 - 0.51 K/uL    Baso (Absolute) 0.07 0.00 - 0.12 K/uL    Immature Granulocytes (abs) 0.08 0.00 - 0.11 K/uL    NRBC (Absolute) 0.00 K/uL   Comp Metabolic Panel    Collection Time: 11/01/24  8:57 AM   Result Value Ref Range    Sodium 133 (L) 135 - 145 mmol/L    Potassium 4.3 3.6 - 5.5 mmol/L    Chloride 100 96 - 112 mmol/L    Co2 20 20 - 33 mmol/L    Anion Gap 13.0 7.0 - 16.0    Glucose 240 (H) 65 - 99 mg/dL    Bun 40 (H) 8 - 22 mg/dL    Creatinine 2.27 (H) 0.50 - 1.40 mg/dL    Calcium 9.2 8.5 - 10.5 mg/dL    Correct Calcium 9.4 8.5 - 10.5 mg/dL    AST(SGOT) 20 12 - 45 U/L    ALT(SGPT) 12 2 - 50 U/L    Alkaline Phosphatase 81 30 - 99 U/L    Total Bilirubin 0.6 0.1 - 1.5 mg/dL    Albumin 3.8 3.2 - 4.9 g/dL    Total Protein 7.2 6.0 - 8.2 g/dL    Globulin 3.4 1.9 - 3.5 g/dL    A-G Ratio 1.1 g/dL   ESTIMATED GFR    Collection Time: 11/01/24  8:57 AM   Result Value Ref Range    GFR (CKD-EPI) 30 (A) >60 mL/min/1.73 m 2   EKG    Collection Time: 11/01/24  9:01 AM   Result Value Ref Range    Report       [facility] Emergency Dept.    Test Date:  2024-11-01  Pt Name:    SEAMUS ALVAREZ                    Department: ER  MRN:        [id number]                      Room:       [facility]  Gender:     Male                         Technician: [id number]  :        1950                   Requested By:ER TRIAGE PROTOCOL  Order #:    [id number]                    Reading MD:    Measurements  Intervals                                Axis  Rate:       74                           P:          34  NM:         191                          QRS:        18  QRSD:       96                           T:          40  QT:         391  QTc:        434    Interpretive Statements  Sinus rhythm  Multiple premature complexes, vent & supraven  Sinus pause  Compared to ECG 2023 08:36:43  Sinus pause or arrest now present  Atrial premature complex(es) no longer present     Urinalysis, Culture if Indicated    Collection Time: 24  9:14 AM    Specimen: Urine, Clean Catch   Result Value Ref Range    Color Yellow     Character Cloudy (A)     Specific Gravity 1.020 <1.035    Ph 6.0 5.0 - 8.0    Glucose Negative Negative mg/dL    Ketones Negative Negative mg/dL    Protein 30 (A) Negative mg/dL    Bilirubin Negative Negative    Urobilinogen, Urine 0.2 <=1.0 EU/dL    Nitrite Positive (A) Negative    Leukocyte Esterase Moderate (A) Negative    Occult Blood Large (A) Negative    Micro Urine Req Microscopic    URINE MICROSCOPIC (W/UA)    Collection Time: 24  9:14 AM   Result Value Ref Range    WBC 21-50 (A) /hpf    RBC 6-10 (A) 0 - 2 /hpf    Bacteria Few (A) None /hpf    Epithelial Cells 3-5 0 - 5 /hpf    Urine Casts 0-2 Hyaline 0 - 2 /lpf   URINE SODIUM RANDOM    Collection Time: 24 10:25 AM   Result Value Ref Range    Sodium, Urine -per volume 70 mmol/L   URINE POTASSIUM RANDOM    Collection Time: 24 10:25 AM   Result Value Ref Range    Potassium 42.9 mmol/L   URINE CHLORIDE RANDOM    Collection Time: 24 10:25 AM   Result Value Ref Range    Chloride, Urine-per volume 74 mmol/L   URINE CREATININE RANDOM     Collection Time: 11/01/24 10:25 AM   Result Value Ref Range    Creatinine, Random Urine 138.00 mg/dL   POCT glucose device results    Collection Time: 11/01/24 12:14 PM   Result Value Ref Range    POC Glucose, Blood 200 (H) 65 - 99 mg/dL   POCT glucose device results    Collection Time: 11/01/24  1:06 PM   Result Value Ref Range    POC Glucose, Blood 159 (H) 65 - 99 mg/dL     I have independently interpreted this EKG    RADIOLOGY/PROCEDURES   I have independently interpreted the diagnostic imaging associated with this visit and am waiting the final reading from the radiologist.   My preliminary interpretation is as follows: Multiple left ureteral stones with left hydronephrosis.    Radiologist interpretation:  DX-PORTABLE FLUOROSCOPY < 1 HOUR Reason For Exam: stent   Final Result      Portable fluoroscopy utilized for 0.18 minutes.      INTERPRETING LOCATION: [address] [facility], [facility], [address]      [id number] VIEW   Final Result      Digitized intraoperative radiograph is submitted for review. This examination is not for diagnostic purpose but for guidance during a surgical procedure. Please see the patient's chart for full procedural details.         INTERPRETING LOCATION: [address] [facility], [address]      CT-RENAL COLIC EVALUATION(A/P W/O)   Final Result      1.  LEFT distal ureteral stones x3 as detailed above with LEFT hydroureteronephrosis   2.  Small pericardial effusion, similar to prior   3.  Hepatic steatosis   4.  Colonic diverticulosis        COURSE & MEDICAL DECISION MAKING    ASSESSMENT, COURSE AND PLAN  Care Narrative: This is a very pleasant 74-year-old gentleman who is here with urinary frequency, urgency, dysuria, and weakness.    Differential diagnosis includes, but is not limited to, BPH, neoplasm, cystitis, pyelonephritis, nephrolithiasis, infected stone.    Arrives afebrile with normal vital signs.  Appears slightly dehydrated with tacky mucous membranes but nontoxic.  Abdomen is soft and nontender.   There is no CVA tenderness.    CBC reveals leukocytosis with neutrophilic predominance.  He has a new GENI with creatinine of 2.27.  Urinalysis suggest infection with large blood, positive nitrites, leukocyte esterase, white cells, and bacteria.    CT of the abdomen/pelvis demonstrates 3 left ureteral stones, too large to pass.    I spoke with our pharmacist to discuss antibiotic selection given the patient's history of ESBL UTI.  Plan is for Zosyn which was initiated.  Patient received IV fluids for GENI and renal electrolytes were ordered. I spoke with Dr. Galicia, on-call for urology, he will take the patient to the OR for stenting.  Patient was discussed with the hospitalist and admitted in guarded condition.    Hydration: Based on the patient's presentation of Acute Kidney Injury the patient was given IV fluids. IV Hydration was used because oral hydration was not adequate alone. Upon recheck following hydration, the patient was unchanged.    ADDITIONAL PROBLEMS MANAGED  None    DISPOSITION AND DISCUSSIONS  I have discussed management of the patient with the following physicians and JORDYN's: Dr. Galicia, urology.  Dr. Bermudez, hospitalist.    Discussion of management with other Q or appropriate source(s): Pharmacy assisted with antibiotic selection .      Escalation of care considered, and ultimately not performed: N/A    Barriers to care at this time, including but not limited to:  None .     Decision tools and prescription drugs considered including, but not limited to:  None .    FINAL DIAGNOSIS  1.  Infected left ureterolithiasis  2.  GENI     Electronically signed by: Arcenio Walsh M.D., 11/1/2024 9:12 AM

## 2024-11-01 NOTE — PROGRESS NOTES
4 Eyes Skin Assessment Completed by ARIAN Pitts and ARIAN Spain.    Head WDL  Ears Redness and Blanching  Nose WDL  Mouth WDL  Neck WDL  Breast/Chest WDL  Shoulder Blades WDL  Spine WDL  (R) Arm/Elbow/Hand Redness, Blanching, and Bruising  (L) Arm/Elbow/Hand Redness, Blanching, Bruising, and Abrasion  Abdomen WDL  Groin WDL  Scrotum/Coccyx/Buttocks Redness and Blanching  (R) Leg WDL  (L) Leg WDL  (R) Heel/Foot/Toe Redness, Blanching, and Boggy  (L) Heel/Foot/Toe Redness, Blanching, and Boggy          Devices In Places Blood Pressure Cuff, Pulse Ox, SCD's, and Nasal Cannula      Interventions In Place Gray Ear Foams, NC W/Ear Foams, Pillows, and Heels Loaded W/Pillows    Possible Skin Injury No    Pictures Uploaded Into Epic N/A  Wound Consult Placed N/A  RN Wound Prevention Protocol Ordered No

## 2024-11-01 NOTE — PROGRESS NOTES
Pt admitted to room T426 via transport in hospital bed from PACU at 1410. Report received from ARIAN Edmonds.      Patient reports pain at 0 on a scale of 0-10. Educated patient regarding pharmacologic and non pharmacologic modalities for pain management. Oriented to room call light and smoking policy.  Reviewed plan of care (equipment, incentive spirometer, sequential compression devices, medications, activity, diet, fall precautions, skin care, and pain) with patient and family. Welcome packet given and reviewed with patient, all questions answered. Education provided on oral hygiene program.     AA&Ox4. Denies CP/SOB.  See 2 RN skin note  Tolerating NPO diet. Denies N/V.  + void - BM. Last BM 10/31  Pt ambulates x1 with FWW    All needs met at this time. Call light within reach. Pt calls appropriately. Bed low and locked, non skid socks in place. Hourly rounding in place.

## 2024-11-01 NOTE — ED TRIAGE NOTES
Chief Complaint   Patient presents with    Urinary Frequency    Weakness     Pt recently treated for a uti and feels like the symptoms have returned. Pt having urinary frequency, painful urination, and weakness. Pt denies any fevers. Pt was feeling weak throughout the evening and had to help himself to the floor while taking a shower. Denies any falls.     Vitals:    11/01/24 0904   BP: 117/67   Pulse: 76   Resp: 20   Temp: 36.5 °C (97.7 °F)   SpO2: 91%

## 2024-11-01 NOTE — H&P
Hospital Medicine History & Physical Note    Date of Service  11/1/2024    Primary Care Physician  Chuck Chappell M.D.    Consultants  urology    Specialist Names: Dr. Galicia     Code Status  Full Code    Chief Complaint  Chief Complaint   Patient presents with    Urinary Frequency    Weakness     Pt recently treated for a uti and feels like the symptoms have returned. Pt having urinary frequency, painful urination, and weakness. Pt denies any fevers. Pt was feeling weak throughout the evening and had to help himself to the floor while taking a shower. Denies any falls.       History of Presenting Illness  Ramu Barber is a 74 y.o. male with past medical history of CKD stage III, hypertension, obesity, LYLE, dyslipidemia, diabetes who presented 11/1/2024 with dysuria and weakness.  Patient reports 3 days ago he started having urinary frequency and dysuria denied hematuria.  He recently had similar symptoms a month ago was diagnosed with urinary tract infection and has since finished his cefdinir.  He denies any fevers, chills, nausea, chest pain or shortness of breath.  Patient reports chronic back pain, has some left lower quadrant abdominal pain but no new flank pain.  This morning he was in the shower when he felt extremely weak and lowered himself to the ground.  He denied any dizziness, trauma to the head or loss of consciousness.  His wife is unable to help him get out of the shower and EMS was called.  He has never had a kidney stone previously.    In the ER vitals stable on 2 L nasal cannula.  Labs significant for WBC 15.5, sodium 133, creatinine 2.27, GFR 30, glucose 240 and UA positive.  CT showed left distal ureteral stones x 3 with left hydronephrosis.  ERP consulted urology and plan for surgery today.    I discussed the plan of care with patient, bedside RN, and ERP .    Review of Systems  Review of Systems   Constitutional:  Positive for malaise/fatigue. Negative for chills and fever.   HENT:   Negative for congestion.    Eyes:  Negative for blurred vision.   Respiratory:  Negative for shortness of breath.    Cardiovascular:  Negative for chest pain.   Gastrointestinal:  Positive for abdominal pain. Negative for nausea and vomiting.   Genitourinary:  Positive for dysuria and frequency. Negative for hematuria.   Musculoskeletal:  Positive for back pain. Negative for myalgias.   Skin:  Negative for rash.   Neurological:  Positive for weakness. Negative for dizziness and headaches.   Psychiatric/Behavioral:  Negative for depression.    All other systems reviewed and are negative.      Past Medical History   has a past medical history of Arthritis, Back pain, Chickenpox, High cholesterol, Hypertension, Obesity, Pain, Pneumonia, Polycythemia, secondary, Sleep apnea, Snoring, and Type II or unspecified type diabetes mellitus without mention of complication, not stated as uncontrolled.    Surgical History   has a past surgical history that includes fusion, spine, lumbar, plif (1/21/2015); inguinal hernia repair (Right, 2/16/2016); lumbar laminectomy diskectomy (2/18/2018); hardware removal neuro (2/18/2018); carpal tunnel release (Right, 2014); shoulder arthroscopy (Right, 2014); lumbar decompression (2004); lumbar decompression (2003); cervical disk and fusion anterior (2005); pr ins/rplc spi npg/rcvr pocket (7/23/2019); egd w/control bleeding (8/22/2020); and gastroscopy (N/A, 8/22/2020).     Family History  family history includes Heart Attack in his brother and father; Sleep Apnea in his mother.   Family history reviewed with patient. There is no family history that is pertinent to the chief complaint.     Social History   reports that he has never smoked. He quit smokeless tobacco use about 5 years ago.  His smokeless tobacco use included chew. He reports current alcohol use of about 1.2 oz of alcohol per week. He reports that he does not use drugs.    Allergies  No Known Allergies    Medications  Prior to  Admission Medications   Prescriptions Last Dose Informant Patient Reported? Taking?   ELIQUIS 5 MG Tab  Patient Yes No   Sig: Take 5 mg by mouth 2 times a day.   Mirabegron ER 50 MG TABLET SR 24 HR   Yes No   Sig: Take  by mouth every day.   OZEMPIC, 0.25 OR 0.5 MG/DOSE, 2 MG/1.5ML Solution Pen-injector  Patient Yes No   Sig: Take 0.25 mg by mouth every 7 days.   PARoxetine (PAXIL) 20 MG Tab  Patient Yes No   Sig: Take 1 Tablet by mouth.   SM VITAMIN D3 25 MCG (1000 UT) Tab   Yes No   Sig: Take 1,000 Units by mouth every day.   Wheat Dextrin (BENEFIBER) Powder  Patient Yes No   Sig: Take 1 Dose by mouth 2 times a day.   allopurinol (ZYLOPRIM) 100 MG Tab   Yes No   Sig: Take 200 mg by mouth every day.   amLODIPine (NORVASC) 5 MG Tab  Patient Yes No   Sig: Take 5 mg by mouth every day.   aspirin 81 MG EC tablet   Yes No   Sig: TAKE 1 TABLET (81 MG) BY ORAL ROUTE ONCE DAILY**(last dose 10 days prior)**   carvedilol (COREG) 25 MG Tab   Yes No   Sig: Take 1 tablet twice a day by oral route.   cloNIDine (CATAPRES) 0.2 MG Tab   Yes No   Sig: Take 1 tablet twice a day by oral route.   finasteride (PROSCAR) 5 MG Tab  Patient Yes No   Sig: Take 1 Tablet by mouth every day.   fosinopril (MONOPRIL) 40 MG tablet   Yes No   Sig: Take 1 tablet every day by oral route in the morning.   gabapentin (NEURONTIN) 600 MG tablet  Patient Yes No   Sig: Take 600 mg by mouth 2 times a day.   glipiZIDE SR (GLUCOTROL) 2.5 MG TABLET SR 24 HR  Patient Yes No   Sig: Take 2.5 mg by mouth 2 times a day.   ibuprofen (MOTRIN) 800 MG Tab   Yes No   Sig: TAKE 1 TABLET (800 MG) BY ORAL ROUTE as needed **(last dose 10 days prior)**   lisinopril (PRINIVIL) 10 MG Tab   Yes No   Sig: Take 10 mg by mouth every day.   lovastatin (MEVACOR) 20 MG Tab  Patient Yes No   Sig: Take 20 mg by mouth every day.   metFORMIN (GLUCOPHAGE) 850 MG Tab  Patient Yes No   Sig: Take 850 mg by mouth 2 times a day.   methenamine hip (HIPPREX) 1 GM Tab  Patient Yes No   Sig: Take  1 Tablet by mouth 2 times a day.   polyethylene glycol/lytes (MIRALAX) Pack   No No   Sig: Take 1 Packet by mouth 1 time a day as needed (if sennosides and docusate ineffective after 24 hours).   rosuvastatin (CRESTOR) 20 MG Tab  Patient No No   Sig: Take 1 Tablet by mouth every evening.   senna-docusate (PERICOLACE OR SENOKOT S) 8.6-50 MG Tab   No No   Sig: Take 2 Tablets by mouth 2 times a day.   terazosin (HYTRIN) 10 MG capsule  Patient Yes No   Sig: Take 10 mg by mouth every day.   triamterene-hctz (MAXZIDE-25/DYAZIDE) 37.5-25 MG Tab  Patient Yes No   Sig: Take 1 Tablet by mouth every morning.      Facility-Administered Medications: None       Physical Exam  Temp:  [36.5 °C (97.7 °F)] 36.5 °C (97.7 °F)  Pulse:  [69-76] 69  Resp:  [14-20] 20  BP: (117-131)/(67-73) 131/73  SpO2:  [88 %-93 %] 92 %  Blood Pressure : 131/73   Temperature: 36.5 °C (97.7 °F)   Pulse: 69   Respiration: 20   Pulse Oximetry: 92 %       Physical Exam  Vitals and nursing note reviewed. Exam conducted with a chaperone present (Wife at bedside, daughter on the phone).   Constitutional:       General: He is not in acute distress.     Appearance: He is obese. He is ill-appearing.   HENT:      Head: Normocephalic and atraumatic.      Right Ear: External ear normal.      Left Ear: External ear normal.      Nose: Nose normal.      Mouth/Throat:      Mouth: Mucous membranes are dry.      Pharynx: Oropharynx is clear.   Eyes:      Extraocular Movements: Extraocular movements intact.      Conjunctiva/sclera: Conjunctivae normal.   Cardiovascular:      Rate and Rhythm: Normal rate and regular rhythm.      Pulses: Normal pulses.   Pulmonary:      Effort: Pulmonary effort is normal. No respiratory distress.      Breath sounds: Normal breath sounds. No wheezing or rales.      Comments: On NC   Abdominal:      General: Abdomen is flat. There is distension.      Palpations: Abdomen is soft.      Tenderness: There is abdominal tenderness (LLQ). There is no  "right CVA tenderness, left CVA tenderness or guarding.   Musculoskeletal:         General: Normal range of motion.      Cervical back: Normal range of motion and neck supple.      Right lower leg: No edema.      Left lower leg: No edema.   Skin:     General: Skin is warm and dry.      Findings: No rash.   Neurological:      General: No focal deficit present.      Mental Status: He is alert and oriented to person, place, and time.      Cranial Nerves: No cranial nerve deficit.      Motor: Weakness present.   Psychiatric:         Mood and Affect: Mood normal.         Behavior: Behavior normal.         Laboratory:  Recent Labs     11/01/24  0857   WBC 15.5*   RBC 4.54*   HEMOGLOBIN 14.3   HEMATOCRIT 42.7   MCV 94.1   MCH 31.5   MCHC 33.5   RDW 48.1   PLATELETCT 217   MPV 10.7     Recent Labs     11/01/24  0857   SODIUM 133*   POTASSIUM 4.3   CHLORIDE 100   CO2 20   GLUCOSE 240*   BUN 40*   CREATININE 2.27*   CALCIUM 9.2     Recent Labs     11/01/24  0857   ALTSGPT 12   ASTSGOT 20   ALKPHOSPHAT 81   TBILIRUBIN 0.6   GLUCOSE 240*         No results for input(s): \"NTPROBNP\" in the last 72 hours.      No results for input(s): \"TROPONINT\" in the last 72 hours.    Imaging:  CT-RENAL COLIC EVALUATION(A/P W/O)   Final Result      1.  LEFT distal ureteral stones x3 as detailed above with LEFT hydroureteronephrosis   2.  Small pericardial effusion, similar to prior   3.  Hepatic steatosis   4.  Colonic diverticulosis          EKG:  I have personally reviewed the images and compared with prior images.    Assessment/Plan:  Justification for Admission Status  I anticipate this patient will require at least two midnights for appropriate medical management, necessitating inpatient admission because obstructing ureteral lithiasis with hydronephrosis and UTI and associated GENI.  Started on IV fluids, IV antibiotics and urology consulted, plan for OR today.      * Ureterolithiasis- (present on admission)  Assessment & Plan  CT showed " left distal ureteral stones x 3 with left hydroureteronephrosis  Urology consulted  -N.p.o. for surgery today  Pain control  IV fluids    Weakness- (present on admission)  Assessment & Plan  Reports he becomes weak when he gets urinary tract infections  Sat down in the shower was unable to get up  PT/OT    Leukocytosis- (present on admission)  Assessment & Plan  Due to UTI, no sepsis on admission  IV antibiotics    Acute kidney injury superimposed on stage 3a chronic kidney disease (HCC)  Assessment & Plan  Due to obstruction and infection  Urology consulted  IV fluids  Avoid nephrotoxic medication  Repeat BMP in a.m.    Depression- (present on admission)  Assessment & Plan  Continue Paxil    Pulmonary embolism, bilateral (HCC)- (present on admission)  Assessment & Plan  Last CT 11/23: small amount of residual chronic mural thrombus within the left upper lobe pulmonary artery. No acute pulmonary embolism identified.   Hold Eliquis for surgery     Hyperlipidemia- (present on admission)  Assessment & Plan  Continue rosuvastatin    Type 2 diabetes mellitus without complication, without long-term current use of insulin (HCC)- (present on admission)  Assessment & Plan  Hold home medications  ISS with hypoglycemic protocol    Benign prostatic hyperplasia with urinary frequency- (present on admission)  Assessment & Plan  Continue home meds    Acute cystitis without hematuria- (present on admission)  Assessment & Plan  UA positive on admission with dysuria  Due to stones  Recent UTI culture positive for Pseudomonas.  1 year ago had a urine culture with ESBL  Continue Zosyn  Urine culture pending    Obesity (BMI 30-39.9)- (present on admission)  Assessment & Plan  BMI 35.9 on admission  Weight loss counseling when appropriate    Essential hypertension- (present on admission)  Assessment & Plan  Continue amlodipine  Hold lisinopril and Maxide for GENI  As needed antihypertensives        VTE prophylaxis: SCDs/TEDs

## 2024-11-01 NOTE — CONSULTS
[facility] [place] Consult/H&P Note    Patient's Name/MRN: Ramu Barber, [id number]   Room #: GR 25/25 GRN    Admit Date:11/1/2024  Today's Date: 11/1/2024   Length of stay:  LOS: 0 days      Reason for consult/chief complaint: kidney stones  ID/HPI: Ramu Barber is a 74 y.o. male patient who p/w weakness and UTI symptoms recently failing outpatient abx therapy for UTI. He has had -N, -V, -F, -C.  This is his first episode of stones. Has not had prior stone procedures in past.  In ER, WBC was 15.5, UA was nitrate positive, few bacteria, 21-50 wbc, large blood c/w infection, and Cr was 2.27 up from baseline at 1.3.  CT was doen showing multiple distal left ureteral stones measuring 5,7,8mm. We were consulted for further recommendations/interventions.       Past Medical History:   Past Medical History:   Diagnosis Date    Arthritis     back, hips    Back pain     Chickenpox     High cholesterol     Hypertension     Obesity     Pain     back pain s/p multiple surgeries and spinal stimulator    Pneumonia     Polycythemia, secondary     Sleep apnea     cpap    Snoring     Type II or unspecified type diabetes mellitus without mention of complication, not stated as uncontrolled     oral meds only        Past Surgical History:   Past Surgical History:   Procedure Laterality Date    EGD W/CONTROL BLEEDING  8/22/2020         GASTROSCOPY N/A 8/22/2020    Procedure: GASTROSCOPY;  Surgeon: Tawanda Gomez M.D.;  Location: [facility];  Service: Gastroenterology    OH INS/RPLC SPI NPG/RCVR POCKET  7/23/2019    Procedure: INSERTION, NEUROSTIMULATOR, PERMANENT, SPINAL CORD;  Surgeon: Teddy Santos M.D.;  Location: [facility];  Service: Pain Management    LUMBAR LAMINECTOMY DISKECTOMY  2/18/2018    Procedure: LUMBAR LAMINECTOMY DISKECTOMY- LT L1-2 HEMILAMI-;  Surgeon: Tom Pittman M.D.;  Location: [facility];  Service: Neurosurgery    HARDWARE REMOVAL NEURO  2/18/2018    Procedure:  HARDWARE REMOVAL NEURO- L2-3 PEDICLE SCREWS;  Surgeon: Tom Pittman M.D.;  Location: SUR[facility]ERY [facility];  Service: Neurosurgery    INGUINAL HERNIA REPAIR Right 2/16/2016    Procedure: INGUINAL HERNIA REPAIR;  Surgeon: Sj Baird M.D.;  Location: SURGERY [facility];  Service:     FUSION, SPINE, LUMBAR, PLIF  1/21/2015    Performed by Ko Suarez M.D. at SURGERY [facility]    CARPAL TUNNEL RELEASE Right 2014    SHOULDER ARTHROSCOPY Right 2014    CERVICAL DISK AND FUSION ANTERIOR  2005    LUMBAR DECOMPRESSION  2004    LUMBAR DECOMPRESSION  2003        Family History:   Family History   Problem Relation Age of Onset    Sleep Apnea Mother     Heart Attack Father     Heart Attack Brother          Social History:   Social History     Tobacco Use    Smoking status: Never    Smokeless tobacco: Former     Types: Chew     Quit date: 12/2018   Vaping Use    Vaping status: Never Used   Substance Use Topics    Alcohol use: Yes     Alcohol/week: 1.2 oz     Types: 2 Shots of liquor per week     Comment: 2 a week    Drug use: No      Social History     Social History Narrative    Not on file        Allergies: he Patient has no known allergies.    Medications:   (Not in a hospital admission)        Review of Systems  Review of Systems   Constitutional:  Negative for chills and fever.   Neurological:  Positive for weakness.        Physical Exam  VITAL SIGNS: /73   Pulse 69   Temp 36.5 °C (97.7 °F) (Temporal)   Resp 20   Ht 1.829 m (6')   Wt 120 kg (265 lb)   SpO2 92%   BMI 35.94 kg/m²   GENERAL:  alert, in no acute distress  EYES: EOMI and normal accomodation  Neck: Supple  BACK: No CVA tenderness.   CHEST AND LUNGS: good air entry, no audible wheezes  ABDOMEN: Abdomen soft, nontender.   EXTREMITIES: Warm and well perfused without c/c/e  NEURO: No focal deficits  SKIN: Skin color, texture, turgor normal. No rashes, lesions, nor jaundice.      Labs:  Recent Labs     11/01/24  0857   WBC 15.5*    RBC 4.54*   HEMOGLOBIN 14.3   HEMATOCRIT 42.7   MCV 94.1   MCH 31.5   MCHC 33.5   RDW 48.1   PLATELETCT 217   MPV 10.7     Recent Labs     11/01/24  0857   SODIUM 133*   POTASSIUM 4.3   CHLORIDE 100   CO2 20   GLUCOSE 240*   BUN 40*   CREATININE 2.27*   CALCIUM 9.2         Glucose:  Lab Results   Component Value Date/Time    GLUCOSE 240 (H) 11/01/2024 08:57 AM    GLUCOSE 311 (H) 12/23/2023 09:50 AM    GLUCOSE 258 (H) 12/22/2023 09:44 AM    GLUCOSE 172 (H) 12/22/2023 12:40 AM     Coags:  Lab Results   Component Value Date/Time    INR 1.15 (H) 04/16/2023 12:10 AM    INR 1.14 (H) 04/15/2023 07:06 PM    INR 1.16 (H) 08/22/2020 10:18 AM         Urinalysis:   Lab Results   Component Value Date/Time    COLORURINE Yellow 11/01/2024 09:14 AM    CLARITY Cloudy (A) 11/01/2024 09:14 AM    SPECGRAVITY 1.020 11/01/2024 09:14 AM    PHURINE 6.0 11/01/2024 09:14 AM    GLUCOSEUR Negative 11/01/2024 09:14 AM    KETONES Negative 11/01/2024 09:14 AM    NITRITE Positive (A) 11/01/2024 09:14 AM    OCCULTBLOOD Large (A) 11/01/2024 09:14 AM    RBCURINE 6-10 (A) 11/01/2024 09:14 AM    BACTERIA Few (A) 11/01/2024 09:14 AM    EPITHELCELL 3-5 11/01/2024 09:14 AM       Imaging:                                          Results for orders placed during the hospital encounter of 03/28/19    CT-PANCREAS AND ABDOMEN WITH & W/O    Impression  1.  Abnormality described on recent lumbar spine MRI likely represented somewhat denser appearing pancreatic head. No discrete mass is identified.    2.  Atherosclerosis.    3.  Diverticulosis with diverticulitis in the proximal sigmoid colon. No abscess or pneumoperitoneum identified.    4.  Atherosclerosis.    5.  Bilateral renal cysts.    6.  Left nephrolithiasis.        Comment: Results called to Dr. Chappell's office at approximately 2:20 PM               Assessment/Recommendation   74 y.o.male with  3 distal ureteral stones and UTI.  He understands the risk of inability to access ureter, the need for second  procedures, the possibility of negative ureteroscopy, that he may have stent discomfort until this is removed, bleeding, infection, ureteral injury or stricture, bladder injury, post op urinary retention requiring sexton catheter, and the general pulmonary and cardiovascular risks associated with anesthesia.  After a full discussion of the alternatives risks and benefits of the procedure, the patient consented to proceeding with the planned procedure.     Consent obatined  Add on for Cystoscopy, left ureteroscopy and stent placement. He will need antibiotic therapy with stent in place for a total of two weeks with plans for definitive stone treatment after completion of this. We will arrange that on an outpatient basis, based off urine culture results.   Dr. Galicia is aware of this consult and dictated the above plan of care.        Enid Dennis, P.A.-C.   [id number] [id number]   [phone]

## 2024-11-01 NOTE — ED NOTES
Pharmacy Medication Reconciliation    ~Med rec updated and complete per patient at bedside & patient home pharmacy   ~Allergies have been verified  ~Pt home pharmacy: [facility]      ~Patient reports that he completed a 10 day course of Omnicef that was started on 10/12/2024      ~Anticoagulants (rivaroxaban, apixaban, edoxaban, dabigatran, warfarin, enoxaparin) taken in the last 14 days? Yes  ~Anticoagulant: Eliquis, Last dose: 10/31/2024

## 2024-11-01 NOTE — OR SURGEON
Immediate Post OP Note    PreOp Diagnosis: left ureteral stones, UTI      PostOp Diagnosis: same      Procedure(s):  CYSTOSCOPY - Wound Class: Clean Contaminated  RETROGRADE PYELOGRAM, LEFT  LEFT STENT PLACEMENT - Wound Class: Clean Contaminated    Surgeon(s):  Andrea Galicia M.D.    Anesthesiologist/Type of Anesthesia:  Anesthesiologist: Noah Mckeon M.D./General    Surgical Staff:  Circulator: Queenie Gonzales R.N.; Carolyn Durán R.N.  Scrub Person: Teddy Kraft  Radiology Technologist: Meka Bravo    Specimens removed if any:  * No specimens in log *    Estimated Blood Loss: none    Findings: very cloudy urine. Very edematous left UO, 5x26 stent (6fr could not pass)    Complications: none        11/1/2024 12:45 PM Andrea Galicia M.D.   Returned to room via bed after hemodialysis completed with 2 liters removed, See doc flowsheet for VS/assessment. Brother at bs.

## 2024-11-01 NOTE — ASSESSMENT & PLAN NOTE
CT showed left distal ureteral stones x 3 with left hydroureteronephrosis  Urology consulted  -N.p.o. for surgery today  Pain control  IV fluids

## 2024-11-01 NOTE — ASSESSMENT & PLAN NOTE
UA positive on admission with dysuria  Due to stones  Recent UTI culture positive for Pseudomonas.  1 year ago had a urine culture with ESBL  Continue Zosyn  Urine culture pending

## 2024-11-01 NOTE — ANESTHESIA POSTPROCEDURE EVALUATION
Patient: Ramu Barber    Procedure Summary       Date: 11/01/24 Room / Location: [facility] ROOM 25 / SURGERY SAME DAY [facility]    Anesthesia Start: 1200 Anesthesia Stop: 1256    Procedures:       CYSTOSCOPY (Bladder)      STENT PLACEMENT (Left: Ureter) Diagnosis: (Left ureteral stones, Urinary track infection)    Surgeons: Andrea Galicia M.D. Responsible Provider: Noah Mckeon M.D.    Anesthesia Type: general ASA Status: 3            Final Anesthesia Type: general  Last vitals  BP   Blood Pressure : 123/76    Temp   36.5 °C (97.7 °F)    Pulse   65   Resp   16    SpO2   92 %      Anesthesia Post Evaluation    Patient location during evaluation: PACU  Patient participation: complete - patient participated  Level of consciousness: awake and alert  Pain score: 0    Airway patency: patent  Anesthetic complications: no  Cardiovascular status: hemodynamically stable  Respiratory status: acceptable  Hydration status: euvolemic    PONV: none          No notable events documented.     Nurse Pain Score: 0 (NPRS)

## 2024-11-01 NOTE — ANESTHESIA TIME REPORT
Anesthesia Start and Stop Event Times       Date Time Event    11/1/2024 1155 Ready for Procedure     1200 Anesthesia Start     1256 Anesthesia Stop          Responsible Staff  11/01/24      Name Role Begin End    Noah Mckeon M.D. Anesth 1200 1256          Overtime Reason:  no overtime (within assigned shift)    Comments:

## 2024-11-01 NOTE — OP REPORT
DATE OF SERVICE:  11/01/2024     NAME OF OPERATION:  Cystoscopy with left retrograde pyelogram and left   ureteral stent placement.     PREOPERATIVE DIAGNOSES:  1.  Left distal ureteral stones.  2.  Urinary tract infection.     POSTOPERATIVE DIAGNOSES:  1.  Left distal ureteral stones.  2.  Urinary tract infection.     PRIMARY SURGEON:  Andrea Galicia MD     ANESTHESIOLOGIST:  Noah Mckeon MD     FINDINGS:  Extremely cloudy urine with highly edematous left ureteral orifice   making its identification very challenging.  A 6-Angolan stent would not pass   through the distal ureter.  Thus, a 5-Angolan stent was placed.  Appearing well   positioned at the case conclusion.     INDICATIONS:  Briefly, the patient is a 74-year-old gentleman who presented   with some signs and symptoms of sepsis, was found to have an infected urine   and CT scan demonstrated the presence of obstructing left distal ureteral   stones.  Upon consideration of his options, he has elected our recommendation   to undergo left stent placement.  Informed consent has been obtained.     OPERATION IN DETAIL:  The patient was taken to the operating room and placed   on the operating table in supine position.  After administration of general   anesthetic, he was placed in lithotomy.  Genitals were prepped and draped   sterilely.  A 22-Angolan cystoscope was passed in the bladder.  Urethra was   within normal limits.  Prostate showed some mild bilateral hypertrophy and   otherwise a small median prostate.  In the bladder, there was a significant   amount of cloudy urine.  This was rinsed and drained several times, though   visualization was still somewhat poor.  Ultimately, I was able to visualize   the left trigone, which was highly edematous. Attempts to initially place the   wire blindly into what was thought to be the ureteral orifice were   unsuccessful.  A 6-Angolan open-ended ureteral catheter was then required to   ultimately guide what  appeared to be the appropriate position.  To better   delineate this, since the wire would not pass, half strength contrast injected   retrograde through the ureteral access catheter.  It did delineate a very   dilated left distal ureter, thus showing the correct passage.  Ultimately, I   was able to thread a wire through this area up into the left kidney.     Attempts to pass a 6-Comoran 26 cm ureteral stent were unsuccessful in the   distal ureter.  This had to be removed and the scope reloaded over the wire   such that a 5-Comoran 26 cm stent was ultimately placed.  Its proximal coil was   seen in the vicinity of the left renal pelvis and its distal coil was seen in   the bladder.  There appeared to be good drainage.  The bladder was drained   and case concluded, he tolerated the procedure well and was taken to recovery   room in stable condition.     He will be readmitted to the hospital service for ongoing medical attention to   his likely sepsis.  We will plan for an outpatient left ureteroscopic laser   lithotripsy of his 3 stones in the distal ureter, possibly in approximately 2   weeks' time.        ______________________________  Andrea Galicia MD MCM/FAITH    DD:  11/01/2024 12:51  DT:  11/01/2024 13:43    Job#:  [id number]

## 2024-11-01 NOTE — OR NURSING
1256: to pacu 6 from OR at 1252, place on monitor and 6l02 per mask. Resps even and unlabored, VSS. Rec'd report and assumed care.   1310: FSBG = 159. Denies pain. Tolerates sips water.  1340: Pt resting quietly, easily arouses and is appropriate. VSS. Report called to ARIAN Pitts. Awaiting medication to give from pharmacy.  1347: tylenol, ditropan and pyridium given as anesthesia md ordered.  1357: to room T426 via [facility] with transport staff, on 4l02 per NC. Pt a/o x 3 without complaints. Wife at bs. Clothing on gurney.

## 2024-11-01 NOTE — ASSESSMENT & PLAN NOTE
Reports he becomes weak when he gets urinary tract infections  Sat down in the shower was unable to get up  PT/OT

## 2024-11-01 NOTE — ED NOTES
Pt transported off unit with transport staff for admission. Report given, belongings sent with patient. Pt on 2:L O2. Family with patient. Report given to pre-op

## 2024-11-01 NOTE — ASSESSMENT & PLAN NOTE
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 No acute pulmonary embolism identified.   Hold [facility] for surgery

## 2024-11-01 NOTE — PROGRESS NOTES
Virtual Nurse admission profile and rounding complete.    Round Needs: No needs at this time. Wife at bedside. Patient resting comfortably in bed.

## 2024-11-01 NOTE — ANESTHESIA PROCEDURE NOTES
Airway    Date/Time: 11/1/2024 12:14 PM    Performed by: Noah Mckeon M.D.  Authorized by: Noah Mckeon M.D.    Location:  OR  Urgency:  Elective  Difficult Airway: No    Indications for Airway Management:  Anesthesia      Spontaneous Ventilation: absent    Sedation Level:  Deep  Preoxygenated: Yes    Patient Position:  Sniffing  Mask Difficulty Assessment:  2 - vent by mask + OA or adjuvant +/- NMBA  Final Airway Type:  Endotracheal airway  Final Endotracheal Airway:  ETT  Cuffed: Yes    Technique Used for Successful ETT Placement:  Direct laryngoscopy  Devices/Methods Used in Placement:  Cricoid pressure    Insertion Site:  Oral  Blade Type:  Glide  Laryngoscope Blade/Videolaryngoscope Blade Size:  3  ETT Size (mm):  7.5  Measured from:  Teeth  ETT to Teeth (cm):  23  Placement Verified by: auscultation and capnometry    Cormack-Lehane Classification:  Grade I - full view of glottis  Number of Attempts at Approach:  1        
Patient informed

## 2024-11-01 NOTE — ASSESSMENT & PLAN NOTE
Due to obstruction and infection  Urology consulted  IV fluids  Avoid nephrotoxic medication  Repeat BMP in a.m.

## 2024-11-02 ENCOUNTER — PHARMACY VISIT (OUTPATIENT)
Dept: PHARMACY | Facility: MEDICAL CENTER | Age: 74
End: 2024-11-02
Payer: COMMERCIAL

## 2024-11-02 VITALS
TEMPERATURE: 97.5 F | SYSTOLIC BLOOD PRESSURE: 130 MMHG | DIASTOLIC BLOOD PRESSURE: 77 MMHG | OXYGEN SATURATION: 94 % | WEIGHT: 269.84 LBS | RESPIRATION RATE: 16 BRPM | HEART RATE: 61 BPM | HEIGHT: 72 IN | BODY MASS INDEX: 36.55 KG/M2

## 2024-11-02 LAB
ANION GAP SERPL CALC-SCNC: 12 MMOL/L (ref 7–16)
BUN SERPL-MCNC: 41 MG/DL (ref 8–22)
CALCIUM SERPL-MCNC: 9 MG/DL (ref 8.5–10.5)
CHLORIDE SERPL-SCNC: 104 MMOL/L (ref 96–112)
CO2 SERPL-SCNC: 19 MMOL/L (ref 20–33)
CREAT SERPL-MCNC: 2.03 MG/DL (ref 0.5–1.4)
ERYTHROCYTE [DISTWIDTH] IN BLOOD BY AUTOMATED COUNT: 48.3 FL (ref 35.9–50)
GFR SERPLBLD CREATININE-BSD FMLA CKD-EPI: 34 ML/MIN/1.73 M 2
GLUCOSE BLD STRIP.AUTO-MCNC: 231 MG/DL (ref 65–99)
GLUCOSE SERPL-MCNC: 310 MG/DL (ref 65–99)
HCT VFR BLD AUTO: 41.8 % (ref 42–52)
HGB BLD-MCNC: 13.7 G/DL (ref 14–18)
MCH RBC QN AUTO: 31.1 PG (ref 27–33)
MCHC RBC AUTO-ENTMCNC: 32.8 G/DL (ref 32.3–36.5)
MCV RBC AUTO: 95 FL (ref 81.4–97.8)
PLATELET # BLD AUTO: 216 K/UL (ref 164–446)
PMV BLD AUTO: 10.4 FL (ref 9–12.9)
POTASSIUM SERPL-SCNC: 4.3 MMOL/L (ref 3.6–5.5)
RBC # BLD AUTO: 4.4 M/UL (ref 4.7–6.1)
SODIUM SERPL-SCNC: 135 MMOL/L (ref 135–145)
WBC # BLD AUTO: 20.4 K/UL (ref 4.8–10.8)

## 2024-11-02 PROCEDURE — 99239 HOSP IP/OBS DSCHRG MGMT >30: CPT | Performed by: STUDENT IN AN ORGANIZED HEALTH CARE EDUCATION/TRAINING PROGRAM

## 2024-11-02 PROCEDURE — RXMED WILLOW AMBULATORY MEDICATION CHARGE: Performed by: STUDENT IN AN ORGANIZED HEALTH CARE EDUCATION/TRAINING PROGRAM

## 2024-11-02 PROCEDURE — 80048 BASIC METABOLIC PNL TOTAL CA: CPT

## 2024-11-02 PROCEDURE — 82962 GLUCOSE BLOOD TEST: CPT

## 2024-11-02 PROCEDURE — 97162 PT EVAL MOD COMPLEX 30 MIN: CPT

## 2024-11-02 PROCEDURE — 700105 HCHG RX REV CODE 258: Performed by: INTERNAL MEDICINE

## 2024-11-02 PROCEDURE — A9270 NON-COVERED ITEM OR SERVICE: HCPCS | Performed by: INTERNAL MEDICINE

## 2024-11-02 PROCEDURE — 700111 HCHG RX REV CODE 636 W/ 250 OVERRIDE (IP): Mod: JZ | Performed by: INTERNAL MEDICINE

## 2024-11-02 PROCEDURE — 700102 HCHG RX REV CODE 250 W/ 637 OVERRIDE(OP): Performed by: INTERNAL MEDICINE

## 2024-11-02 PROCEDURE — 85027 COMPLETE CBC AUTOMATED: CPT

## 2024-11-02 RX ORDER — CIPROFLOXACIN 250 MG/1
250 TABLET, FILM COATED ORAL 2 TIMES DAILY
Qty: 14 TABLET | Refills: 0 | Status: ACTIVE | OUTPATIENT
Start: 2024-11-02 | End: 2024-11-02

## 2024-11-02 RX ORDER — CIPROFLOXACIN 250 MG/1
250 TABLET, FILM COATED ORAL 2 TIMES DAILY
Qty: 14 TABLET | Refills: 0 | Status: ACTIVE | OUTPATIENT
Start: 2024-11-02 | End: 2024-11-16

## 2024-11-02 RX ADMIN — ALLOPURINOL 200 MG: 100 TABLET ORAL at 05:04

## 2024-11-02 RX ADMIN — PAROXETINE HYDROCHLORIDE 20 MG: 20 TABLET, FILM COATED ORAL at 05:04

## 2024-11-02 RX ADMIN — AMLODIPINE BESYLATE 5 MG: 10 TABLET ORAL at 05:04

## 2024-11-02 RX ADMIN — TERAZOSIN HYDROCHLORIDE 10 MG: 5 CAPSULE ORAL at 05:04

## 2024-11-02 RX ADMIN — INSULIN LISPRO 3 UNITS: 100 INJECTION, SOLUTION INTRAVENOUS; SUBCUTANEOUS at 05:24

## 2024-11-02 RX ADMIN — VIBEGRON 75 MG: 75 TABLET, FILM COATED ORAL at 05:07

## 2024-11-02 RX ADMIN — FINASTERIDE 5 MG: 5 TABLET, FILM COATED ORAL at 05:04

## 2024-11-02 RX ADMIN — PIPERACILLIN AND TAZOBACTAM 3.38 G: 3; .375 INJECTION, POWDER, FOR SOLUTION INTRAVENOUS at 05:14

## 2024-11-02 ASSESSMENT — COGNITIVE AND FUNCTIONAL STATUS - GENERAL
MOBILITY SCORE: 24
SUGGESTED CMS G CODE MODIFIER MOBILITY: CH

## 2024-11-02 ASSESSMENT — ENCOUNTER SYMPTOMS
FLANK PAIN: 0
NAUSEA: 0
FEVER: 0
HEADACHES: 0
SHORTNESS OF BREATH: 0
VOMITING: 0
CHILLS: 0
BACK PAIN: 0

## 2024-11-02 ASSESSMENT — PAIN DESCRIPTION - PAIN TYPE
TYPE: ACUTE PAIN

## 2024-11-02 ASSESSMENT — GAIT ASSESSMENTS
GAIT LEVEL OF ASSIST: SUPERVISED
ASSISTIVE DEVICE: FRONT WHEEL WALKER
DISTANCE (FEET): 150

## 2024-11-02 NOTE — PROGRESS NOTES
Patient discharged to [facility] with wife accompanying. Patient states he has all belongings. All needs met at this time.

## 2024-11-02 NOTE — CARE PLAN
The patient is Stable - Low risk of patient condition declining or worsening    Shift Goals  Clinical Goals: O2 management, safety  Patient Goals: rest      Problem: Knowledge Deficit - Standard  Goal: Patient and family/care givers will demonstrate understanding of plan of care, disease process/condition, diagnostic tests and medications  Description: Target End Date:  1-3 days or as soon as patient condition allows    Document in Patient Education    1.  Patient and family/caregiver oriented to unit, equipment, visitation policy and means for communicating concern  2.  Complete/review Learning Assessment  3.  Assess knowledge level of disease process/condition, treatment plan, diagnostic tests and medications  4.  Explain disease process/condition, treatment plan, diagnostic tests and medications  Outcome: Progressing     Problem: Fall Risk  Goal: Patient will remain free from falls  Description: Target End Date:  Prior to discharge or change in level of care    Document interventions on the [facility] Fall Risk Assessment    1.  Assess for fall risk factors  2.  Implement fall precautions  Outcome: Progressing       Progress made toward(s) clinical / shift goals:  Pt. Remained free from falls during this shift. Pt. Was given treaded slipper socks, bed was in lowest position, and pt. Called appropriately. Pt ambulated to the bathroom with FWW with RN. Pt. Was educated on pharmacological and non-pharmacological modalities. Pt. Verbalized understanding. Pt. Rested intermittently throughout shift.     Patient is not progressing towards the following goals:

## 2024-11-02 NOTE — PROGRESS NOTES
Received report from previous shift RN  Assessment complete.  A&O x 4 . Patient calls appropriately.  Patient ambulates with SBA x1 with FWW. Bed alarm on .  Patient denies of pain and refuses intervention  Denies N&V. Tolerating CHO diet.  Skin per flowsheets   + void, + flatus, -BM.  Patient denies SOB. Spo2>90% on 2L NC   SCD's on.  Patient is resting in bed.  Reviewed plan of care with patient. Call light and personal belongings with in reach. Hourly rounding in place. All needs met at this time.

## 2024-11-02 NOTE — PROGRESS NOTES
Note to reader: this note follows the APSO format rather than the historical SOAP format. Assessment and plan located at the top of the note for ease of use.    Chief Complaint  74 y.o. year old male here with left ureteral stone and urinary tract infection.    Assessment/Plan  Interval History   Urinary tract infection  Left ureteral stone  -Continue antibiotics for 2 weeks per hospitalist and culture results  -Our office will schedule a left CULTS in 2 weeks with Dr. Galicia    Urology signing off call with questions or concerns.  Case discussed with patient, RN, and Dr. Galicia-urology    Patient seen and examined    11/2.  S/p left ureteral stent placement with Dr. Galicia 11/1 . Patient sitting in chair next to bed resting comfortably no obvious distress.  Reports he is feeling much better today.  No acute clinical changes overnight.  Reports he is hoping to discharge soon.  Labs and vitals are stable with creatinine improving after stent placement.         Review of Systems  Physical Exam   Review of Systems   Constitutional:  Negative for chills, fever and malaise/fatigue.   Respiratory:  Negative for shortness of breath.    Cardiovascular:  Negative for chest pain.   Gastrointestinal:  Negative for nausea and vomiting.   Genitourinary:  Positive for hematuria. Negative for dysuria, flank pain, frequency and urgency.   Musculoskeletal:  Negative for back pain.   Neurological:  Negative for headaches.     Vitals:    11/01/24 2324 11/02/24 0430 11/02/24 0714 11/02/24 1116   BP: 129/76 (!) 146/84 114/75 130/77   Pulse: (!) 57 61 65 61   Resp: 16 14 16 16   Temp: 36.6 °C (97.9 °F) 36.6 °C (97.9 °F) 36.3 °C (97.3 °F) 36.4 °C (97.5 °F)   TempSrc: Temporal Temporal Temporal Temporal   SpO2: 94% 96% 96% 94%   Weight:       Height:         Physical Exam  Vitals and nursing note reviewed.   HENT:      Head: Normocephalic.   Eyes:      Pupils: Pupils are equal, round, and reactive to light.   Pulmonary:       Effort: Pulmonary effort is normal.   Abdominal:      General: Abdomen is flat. There is no distension.      Palpations: Abdomen is soft.      Tenderness: There is no abdominal tenderness.   Musculoskeletal:         General: Normal range of motion.      Cervical back: Normal range of motion.   Skin:     General: Skin is warm and dry.      Capillary Refill: Capillary refill takes less than 2 seconds.      Coloration: Skin is not jaundiced.   Neurological:      General: No focal deficit present.      Mental Status: He is alert and oriented to person, place, and time.   Psychiatric:         Mood and Affect: Mood normal.         Behavior: Behavior normal.         Thought Content: Thought content normal.         Judgment: Judgment normal.          Hematology Chemistry   Lab Results   Component Value Date/Time    WBC 20.4 (H) 11/02/2024 01:21 AM    HEMOGLOBIN 13.7 (L) 11/02/2024 01:21 AM    HEMATOCRIT 41.8 (L) 11/02/2024 01:21 AM    PLATELETCT 216 11/02/2024 01:21 AM     Lab Results   Component Value Date/Time    SODIUM 135 11/02/2024 01:21 AM    POTASSIUM 4.3 11/02/2024 01:21 AM    CHLORIDE 104 11/02/2024 01:21 AM    CO2 19 (L) 11/02/2024 01:21 AM    GLUCOSE 310 (H) 11/02/2024 01:21 AM    BUN 41 (H) 11/02/2024 01:21 AM    CREATININE 2.03 (H) 11/02/2024 01:21 AM         Labs not explicitly included in this progress note were reviewed by the author.   Radiology/imaging not explicitly included in this progress note was reviewed by the author.     Core Measures

## 2024-11-02 NOTE — DISCHARGE SUMMARY
Discharge Summary    CHIEF COMPLAINT ON ADMISSION  Chief Complaint   Patient presents with    Urinary Frequency    Weakness     Pt recently treated for a uti and feels like the symptoms have returned. Pt having urinary frequency, painful urination, and weakness. Pt denies any fevers. Pt was feeling weak throughout the evening and had to help himself to the floor while taking a shower. Denies any falls.       Reason for Admission  ems     Admission Date  11/1/2024    CODE STATUS  Full Code    HPI & HOSPITAL COURSE  Ramu Barber is a 74-year-old man with history of CKD 3, hypertension, obesity, LYLE, dyslipidemia and diabetes.  He presented 11/1 with dysuria and weakness over the last 3 days, along with increased urinary frequency and dysuria without hematuria.  He had similar symptoms last month and was treated for UTI with cefdinir which she completed.  He had left lower quadrant abdominal pain but no flank pain.  He was in the shower and felt very weak and his wife was unable to help him out and so called EMS and he was brought to the ED.  CT scan showed left distal ureteral stones with left hydronephrosis.  Urology was consulted and he was taken for surgery, cystoscopy with left retrograde pyelogram and left ureteral stent placement on 11/1.  The following day he felt well and was eager for discharge.  His kidney function did mildly improve with creatinine going from 2.27 down to 2, was voiding well, preferred for prompt discharge rather than further observation.  I discussed with the patient, his wife, and his son-in-law Matias who is a physician as well and everyone else good about this plan.  His urine cultures did grow Pseudomonas which she has previously grown, it was largely sensitive without resistance, discussed with pharmacy and he will be sent with a weeklong course of ciprofloxacin with a slightly reduced dose due to his decreased GFR.  He has follow-up with urology.  Also encouraged prompt follow-up  with PCP for repeat labs.  In the meantime we will continue to hold lisinopril and metformin until renal function normalizes per PCP.    Therefore, he is discharged in fair and stable condition to home with close outpatient follow-up.    The patient recovered much more quickly than anticipated on admission.    Discharge Date  11/2/2024    FOLLOW UP ITEMS POST DISCHARGE  Follow-up with urology and PCP    DISCHARGE DIAGNOSES  Principal Problem:    Ureterolithiasis (POA: Yes)  Active Problems:    Essential hypertension (POA: Yes)    Obesity (BMI 30-39.9) (POA: Yes)    Acute cystitis without hematuria (POA: Yes)    Benign prostatic hyperplasia with urinary frequency (POA: Yes)    Type 2 diabetes mellitus without complication, without long-term current use of insulin (HCC) (POA: Yes)    Hyperlipidemia (POA: Yes)    Pulmonary embolism, bilateral (HCC) (POA: Yes)    Depression (POA: Yes)    Acute kidney injury superimposed on stage 3a chronic kidney disease (HCC) (POA: Unknown)    Leukocytosis (POA: Yes)    Weakness (POA: Yes)  Resolved Problems:    * No resolved hospital problems. *      FOLLOW UP  No future appointments.  No follow-up provider specified.    MEDICATIONS ON DISCHARGE     Medication List        START taking these medications        Instructions   ciprofloxacin 500 MG Tabs  Commonly known as: Cipro   Take 0.5 Tablets by mouth 2 times a day for 7 days.  Dose: 250 mg            CONTINUE taking these medications        Instructions   allopurinol 100 MG Tabs  Commonly known as: Zyloprim   Take 200 mg by mouth every day.  Dose: 200 mg     amLODIPine 5 MG Tabs  Commonly known as: Norvasc   Take 5 mg by mouth every day.  Dose: 5 mg     Benefiber Powd   Take 1 Dose by mouth 2 times a day.  Dose: 1 Dose     Eliquis 5 MG Tabs  Generic drug: apixaban   Take 5 mg by mouth 2 times a day.  Dose: 5 mg     finasteride 5 MG Tabs  Commonly known as: Proscar   Take 1 Tablet by mouth every day.  Dose: 5 mg     gabapentin 600 MG  tablet  Commonly known as: Neurontin   Take 600 mg by mouth 2 times a day.  Dose: 600 mg     glipiZIDE ER 2.5 MG Tb24  Commonly known as: Glucotrol XL   Take 2.5 mg by mouth 2 times a day.  Dose: 2.5 mg     Mirabegron ER 50 MG Tb24   Take 50 mg by mouth every day.  Dose: 50 mg     Ozempic (0.25 or 0.5 MG/DOSE) 2 MG/1.5ML Sopn  Generic drug: Semaglutide(0.25 or 0.5MG/DOS)   Take 0.25 mg by mouth every 7 days.  Dose: 0.25 mg     PARoxetine 20 MG Tabs  Commonly known as: Paxil   Take 20 mg by mouth every day.  Dose: 20 mg     PEG 3350 Pack   Take 1 Packet by mouth 1 time a day as needed (if sennosides and docusate ineffective after 24 hours).  Dose: 17 g     rosuvastatin 20 MG Tabs  Commonly known as: Crestor   Take 1 Tablet by mouth every evening.  Dose: 20 mg     SM Vitamin D3 25 MCG (1000 UT) Tabs  Generic drug: vitamin D   Take 1,000 Units by mouth every day.  Dose: 1,000 Units     terazosin 10 MG capsule  Commonly known as: Hytrin   Take 10 mg by mouth every day.  Dose: 10 mg            STOP taking these medications      lisinopril 10 MG Tabs  Commonly known as: Prinivil     metformin 1000 MG tablet  Commonly known as: Glucophage     methenamine hip 1 GM Tabs  Commonly known as: Hipprex     triamterene-hctz 37.5-25 MG Tabs  Commonly known as: Maxzide-25/Dyazide              Allergies  No Known Allergies    DIET  Orders Placed This Encounter   Procedures    Diet Order Diet: Consistent CHO (Diabetic)     Standing Status:   Standing     Number of Occurrences:   1     Order Specific Question:   Diet:     Answer:   Consistent CHO (Diabetic) [4]       ACTIVITY  As tolerated.  Weight bearing as tolerated    CONSULTATIONS  Urology    PROCEDURES  Cystoscopy    LABORATORY  Lab Results   Component Value Date    SODIUM 135 11/02/2024    POTASSIUM 4.3 11/02/2024    CHLORIDE 104 11/02/2024    CO2 19 (L) 11/02/2024    GLUCOSE 310 (H) 11/02/2024    BUN 41 (H) 11/02/2024    CREATININE 2.03 (H) 11/02/2024        Lab Results    Component Value Date    WBC 20.4 (H) 11/02/2024    HEMOGLOBIN 13.7 (L) 11/02/2024    HEMATOCRIT 41.8 (L) 11/02/2024    PLATELETCT 216 11/02/2024        Total time of the discharge process exceeds 53 minutes.

## 2024-11-02 NOTE — PROGRESS NOTES
Patient arrived to discharge lounge. PIV removed, tip intact. Discussed discharge paperwork and medications with patient and family. Answered all questions. No home meds to return, meds to beds given to patient. Patient escorted out with family via wheelchair, personal belongings in hand.

## 2024-11-02 NOTE — PROGRESS NOTES
Bedside report received.  Assessment complete.  A&O x 4. Patient calls appropriately.  Patient ambulates with x1 with FWW assist. Bed alarm on.   Patient has 0/10 pain. Patient medicated per MAR.  Denies N&V. Tolerating CHO diet.  + void, - flatus, - BM.  Patient denies SOB.  SCD's on.  Review plan with of care with patient. Call light and personal belongings within reach. Hourly rounding in place. All needs met at this time.   98.1

## 2024-11-02 NOTE — THERAPY
Physical Therapy   Initial Evaluation     Patient Name: Ramu Barber  Age:  74 y.o., Sex:  male  Medical Record #: [id number]  Today's Date: 11/2/2024          Assessment  Patient is 74 y.o. male w/ hx of CKD3, HTN, obesity, LYLE, dyslipidemia, DM.  Admitted w/ weakness, dysuria, lowered himself to the ground 2/2 weakness.  Found uretolithiasis, acute cystitis.  S/p cystoscopy w/ ureteral stent.  Pt lives w/ spouse in a single story house w/ two steps to enter.  He lives w/ his wife.  Pt was mobile w/ a fww.  Today, he is able to mobilize essentially at his PLOF per his own admission.  No acute PT needs.  Plan    Physical Therapy Initial Treatment Plan   Duration: (P) Evaluation only    DC Equipment Recommendations: (P) None  Discharge Recommendations: (P) Anticipate that the patient will have no further physical therapy needs after discharge from the hospital     Objective       11/02/24 0945   Prior Living Situation   Housing / Facility 1 Story House   Steps Into Home 0   Steps In Home 0   Equipment Owned Front-Wheel Walker   Lives with - Patient's Self Care Capacity Spouse   Prior Level of Functional Mobility   Bed Mobility Independent   Transfer Status Independent   Ambulation Independent   Assistive Devices Used Front-Wheel Walker   Cognition    Level of Consciousness Alert   Strength Lower Body   Comments grossly wnl   Balance Assessment   Sitting Balance (Static) Fair +   Sitting Balance (Dynamic) Fair +   Standing Balance (Static) Fair   Standing Balance (Dynamic) Fair   Weight Shift Sitting Good   Weight Shift Standing Good   Comments w/ fww   Bed Mobility    Supine to Sit Supervised   Sit to Supine Supervised   Gait Analysis   Gait Level Of Assist Supervised   Assistive Device Front Wheel Walker   Distance (Feet) 150   # of Stairs Climbed 5   Level of Assist with Stairs Supervised   Functional Mobility   Sit to Stand Supervised   Bed, Chair, Wheelchair Transfer Supervised   Physical Therapy Initial  Treatment Plan    Duration Evaluation only   Anticipated Discharge Equipment and Recommendations   DC Equipment Recommendations None   Discharge Recommendations Anticipate that the patient will have no further physical therapy needs after discharge from the hospital

## 2024-11-02 NOTE — CARE PLAN
The patient is Stable - Low risk of patient condition declining or worsening    Shift Goals  Clinical Goals: orient to unit, safety,  pain control  Patient Goals: rest    Progress made toward(s) clinical / shift goals:      Patient will remain free from falls with use of bed alarm, appropriate use of call light, frequent toileting, and appropriate use of DME.    Problem: Fall Risk  Goal: Patient will remain free from falls  Outcome: Progressing

## 2024-11-17 ENCOUNTER — APPOINTMENT (OUTPATIENT)
Dept: RADIOLOGY | Facility: MEDICAL CENTER | Age: 74
DRG: 862 | End: 2024-11-17
Attending: STUDENT IN AN ORGANIZED HEALTH CARE EDUCATION/TRAINING PROGRAM
Payer: COMMERCIAL

## 2024-11-17 ENCOUNTER — HOSPITAL ENCOUNTER (INPATIENT)
Facility: MEDICAL CENTER | Age: 74
LOS: 1 days | DRG: 862 | End: 2024-11-19
Attending: STUDENT IN AN ORGANIZED HEALTH CARE EDUCATION/TRAINING PROGRAM | Admitting: HOSPITALIST
Payer: COMMERCIAL

## 2024-11-17 DIAGNOSIS — R31.9 URINARY TRACT INFECTION WITH HEMATURIA, SITE UNSPECIFIED: Primary | ICD-10-CM

## 2024-11-17 DIAGNOSIS — N39.0 URINARY TRACT INFECTION WITH HEMATURIA, SITE UNSPECIFIED: Primary | ICD-10-CM

## 2024-11-17 DIAGNOSIS — J96.01 ACUTE RESPIRATORY FAILURE WITH HYPOXIA (HCC): ICD-10-CM

## 2024-11-17 DIAGNOSIS — I50.9 ACUTE ON CHRONIC CONGESTIVE HEART FAILURE, UNSPECIFIED HEART FAILURE TYPE (HCC): ICD-10-CM

## 2024-11-17 DIAGNOSIS — R79.89 ELEVATED TROPONIN: ICD-10-CM

## 2024-11-17 DIAGNOSIS — N10 ACUTE PYELONEPHRITIS: ICD-10-CM

## 2024-11-17 LAB
ALBUMIN SERPL BCP-MCNC: 3.5 G/DL (ref 3.2–4.9)
ALBUMIN/GLOB SERPL: 1.2 G/DL
ALP SERPL-CCNC: 77 U/L (ref 30–99)
ALT SERPL-CCNC: 13 U/L (ref 2–50)
ANION GAP SERPL CALC-SCNC: 12 MMOL/L (ref 7–16)
APPEARANCE UR: ABNORMAL
AST SERPL-CCNC: 14 U/L (ref 12–45)
BACTERIA #/AREA URNS HPF: ABNORMAL /HPF
BASOPHILS # BLD AUTO: 0.4 % (ref 0–1.8)
BASOPHILS # BLD: 0.06 K/UL (ref 0–0.12)
BILIRUB SERPL-MCNC: 0.9 MG/DL (ref 0.1–1.5)
BILIRUB UR QL STRIP.AUTO: ABNORMAL
BUN SERPL-MCNC: 22 MG/DL (ref 8–22)
CALCIUM ALBUM COR SERPL-MCNC: 9 MG/DL (ref 8.5–10.5)
CALCIUM SERPL-MCNC: 8.6 MG/DL (ref 8.5–10.5)
CASTS URNS QL MICRO: ABNORMAL /LPF (ref 0–2)
CHLORIDE SERPL-SCNC: 97 MMOL/L (ref 96–112)
CO2 SERPL-SCNC: 23 MMOL/L (ref 20–33)
COLOR UR: ABNORMAL
CREAT SERPL-MCNC: 1.62 MG/DL (ref 0.5–1.4)
EKG IMPRESSION: NORMAL
EOSINOPHIL # BLD AUTO: 0.04 K/UL (ref 0–0.51)
EOSINOPHIL NFR BLD: 0.3 % (ref 0–6.9)
EPITHELIAL CELLS 1715: ABNORMAL /HPF (ref 0–5)
ERYTHROCYTE [DISTWIDTH] IN BLOOD BY AUTOMATED COUNT: 49.7 FL (ref 35.9–50)
GFR SERPLBLD CREATININE-BSD FMLA CKD-EPI: 44 ML/MIN/1.73 M 2
GLOBULIN SER CALC-MCNC: 3 G/DL (ref 1.9–3.5)
GLUCOSE SERPL-MCNC: 329 MG/DL (ref 65–99)
GLUCOSE UR STRIP.AUTO-MCNC: 500 MG/DL
HCT VFR BLD AUTO: 40.4 % (ref 42–52)
HGB BLD-MCNC: 13.4 G/DL (ref 14–18)
IMM GRANULOCYTES # BLD AUTO: 0.07 K/UL (ref 0–0.11)
IMM GRANULOCYTES NFR BLD AUTO: 0.5 % (ref 0–0.9)
KETONES UR STRIP.AUTO-MCNC: NEGATIVE MG/DL
LEUKOCYTE ESTERASE UR QL STRIP.AUTO: ABNORMAL
LIPASE SERPL-CCNC: 26 U/L (ref 11–82)
LYMPHOCYTES # BLD AUTO: 1.07 K/UL (ref 1–4.8)
LYMPHOCYTES NFR BLD: 7.2 % (ref 22–41)
MCH RBC QN AUTO: 31.4 PG (ref 27–33)
MCHC RBC AUTO-ENTMCNC: 33.2 G/DL (ref 32.3–36.5)
MCV RBC AUTO: 94.6 FL (ref 81.4–97.8)
MICRO URNS: ABNORMAL
MONOCYTES # BLD AUTO: 1.69 K/UL (ref 0–0.85)
MONOCYTES NFR BLD AUTO: 11.4 % (ref 0–13.4)
NEUTROPHILS # BLD AUTO: 11.92 K/UL (ref 1.82–7.42)
NEUTROPHILS NFR BLD: 80.2 % (ref 44–72)
NITRITE UR QL STRIP.AUTO: POSITIVE
NRBC # BLD AUTO: 0 K/UL
NRBC BLD-RTO: 0 /100 WBC (ref 0–0.2)
NT-PROBNP SERPL IA-MCNC: 876 PG/ML (ref 0–125)
PH UR STRIP.AUTO: 5.5 [PH] (ref 5–8)
PLATELET # BLD AUTO: 248 K/UL (ref 164–446)
PMV BLD AUTO: 11 FL (ref 9–12.9)
POTASSIUM SERPL-SCNC: 3.9 MMOL/L (ref 3.6–5.5)
PROT SERPL-MCNC: 6.5 G/DL (ref 6–8.2)
PROT UR QL STRIP: 300 MG/DL
RBC # BLD AUTO: 4.27 M/UL (ref 4.7–6.1)
RBC # URNS HPF: ABNORMAL /HPF (ref 0–2)
RBC UR QL AUTO: ABNORMAL
SODIUM SERPL-SCNC: 132 MMOL/L (ref 135–145)
SP GR UR STRIP.AUTO: 1.02
TROPONIN T SERPL-MCNC: 44 NG/L (ref 6–19)
TROPONIN T SERPL-MCNC: 61 NG/L (ref 6–19)
UROBILINOGEN UR STRIP.AUTO-MCNC: 1 EU/DL
WBC # BLD AUTO: 14.9 K/UL (ref 4.8–10.8)
WBC #/AREA URNS HPF: ABNORMAL /HPF

## 2024-11-17 PROCEDURE — 93005 ELECTROCARDIOGRAM TRACING: CPT

## 2024-11-17 PROCEDURE — 96374 THER/PROPH/DIAG INJ IV PUSH: CPT

## 2024-11-17 PROCEDURE — 70450 CT HEAD/BRAIN W/O DYE: CPT

## 2024-11-17 PROCEDURE — 85025 COMPLETE CBC W/AUTO DIFF WBC: CPT

## 2024-11-17 PROCEDURE — 36415 COLL VENOUS BLD VENIPUNCTURE: CPT

## 2024-11-17 PROCEDURE — 71045 X-RAY EXAM CHEST 1 VIEW: CPT

## 2024-11-17 PROCEDURE — 700111 HCHG RX REV CODE 636 W/ 250 OVERRIDE (IP): Mod: JZ | Performed by: STUDENT IN AN ORGANIZED HEALTH CARE EDUCATION/TRAINING PROGRAM

## 2024-11-17 PROCEDURE — 96375 TX/PRO/DX INJ NEW DRUG ADDON: CPT

## 2024-11-17 PROCEDURE — 84484 ASSAY OF TROPONIN QUANT: CPT | Mod: 91

## 2024-11-17 PROCEDURE — 81001 URINALYSIS AUTO W/SCOPE: CPT

## 2024-11-17 PROCEDURE — 76775 US EXAM ABDO BACK WALL LIM: CPT

## 2024-11-17 PROCEDURE — 99285 EMERGENCY DEPT VISIT HI MDM: CPT

## 2024-11-17 PROCEDURE — 80053 COMPREHEN METABOLIC PANEL: CPT

## 2024-11-17 PROCEDURE — 83880 ASSAY OF NATRIURETIC PEPTIDE: CPT

## 2024-11-17 PROCEDURE — 99222 1ST HOSP IP/OBS MODERATE 55: CPT | Performed by: HOSPITALIST

## 2024-11-17 PROCEDURE — 83690 ASSAY OF LIPASE: CPT

## 2024-11-17 RX ORDER — FUROSEMIDE 10 MG/ML
20 INJECTION INTRAMUSCULAR; INTRAVENOUS ONCE
Status: COMPLETED | OUTPATIENT
Start: 2024-11-18 | End: 2024-11-17

## 2024-11-17 RX ORDER — CEFEPIME HYDROCHLORIDE 2 G/1
2 INJECTION, POWDER, FOR SOLUTION INTRAVENOUS ONCE
Status: COMPLETED | OUTPATIENT
Start: 2024-11-17 | End: 2024-11-17

## 2024-11-17 RX ADMIN — FUROSEMIDE 20 MG: 10 INJECTION, SOLUTION INTRAVENOUS at 23:49

## 2024-11-17 RX ADMIN — CEFEPIME 2 G: 2 INJECTION, POWDER, FOR SOLUTION INTRAVENOUS at 23:29

## 2024-11-17 ASSESSMENT — FIBROSIS 4 INDEX: FIB4 SCORE: 1.98

## 2024-11-18 PROBLEM — I48.91 AF (ATRIAL FIBRILLATION) (HCC): Status: ACTIVE | Noted: 2024-11-18

## 2024-11-18 PROBLEM — N10 ACUTE PYELONEPHRITIS: Status: ACTIVE | Noted: 2024-11-18

## 2024-11-18 PROBLEM — N20.0 NEPHROLITHIASIS: Status: ACTIVE | Noted: 2024-11-18

## 2024-11-18 LAB
EST. AVERAGE GLUCOSE BLD GHB EST-MCNC: 203 MG/DL
GLUCOSE BLD STRIP.AUTO-MCNC: 181 MG/DL (ref 65–99)
GLUCOSE BLD STRIP.AUTO-MCNC: 197 MG/DL (ref 65–99)
GLUCOSE BLD STRIP.AUTO-MCNC: 279 MG/DL (ref 65–99)
GLUCOSE BLD STRIP.AUTO-MCNC: 282 MG/DL (ref 65–99)
HBA1C MFR BLD: 8.7 % (ref 4–5.6)

## 2024-11-18 PROCEDURE — 770006 HCHG ROOM/CARE - MED/SURG/GYN SEMI*

## 2024-11-18 PROCEDURE — 700111 HCHG RX REV CODE 636 W/ 250 OVERRIDE (IP): Performed by: HOSPITALIST

## 2024-11-18 PROCEDURE — 87086 URINE CULTURE/COLONY COUNT: CPT

## 2024-11-18 PROCEDURE — A9270 NON-COVERED ITEM OR SERVICE: HCPCS | Performed by: HOSPITALIST

## 2024-11-18 PROCEDURE — 700105 HCHG RX REV CODE 258: Performed by: HOSPITALIST

## 2024-11-18 PROCEDURE — 82962 GLUCOSE BLOOD TEST: CPT

## 2024-11-18 PROCEDURE — 99233 SBSQ HOSP IP/OBS HIGH 50: CPT | Performed by: HOSPITALIST

## 2024-11-18 PROCEDURE — 700102 HCHG RX REV CODE 250 W/ 637 OVERRIDE(OP): Performed by: HOSPITALIST

## 2024-11-18 PROCEDURE — 83036 HEMOGLOBIN GLYCOSYLATED A1C: CPT

## 2024-11-18 RX ORDER — ACETAMINOPHEN 325 MG/1
650 TABLET ORAL EVERY 6 HOURS PRN
Status: DISCONTINUED | OUTPATIENT
Start: 2024-11-18 | End: 2024-11-19 | Stop reason: HOSPADM

## 2024-11-18 RX ORDER — CIPROFLOXACIN 250 MG/1
250 TABLET, FILM COATED ORAL 2 TIMES DAILY
Status: ON HOLD | COMMUNITY
End: 2024-11-19

## 2024-11-18 RX ORDER — TERAZOSIN 5 MG/1
10 CAPSULE ORAL DAILY
Status: DISCONTINUED | OUTPATIENT
Start: 2024-11-18 | End: 2024-11-19 | Stop reason: HOSPADM

## 2024-11-18 RX ORDER — ONDANSETRON 2 MG/ML
4 INJECTION INTRAMUSCULAR; INTRAVENOUS EVERY 4 HOURS PRN
Status: DISCONTINUED | OUTPATIENT
Start: 2024-11-18 | End: 2024-11-19 | Stop reason: HOSPADM

## 2024-11-18 RX ORDER — NITROFURANTOIN 25; 75 MG/1; MG/1
100 CAPSULE ORAL EVERY 12 HOURS
Status: ON HOLD | COMMUNITY
End: 2024-11-19

## 2024-11-18 RX ORDER — FINASTERIDE 5 MG/1
5 TABLET, FILM COATED ORAL DAILY
Status: DISCONTINUED | OUTPATIENT
Start: 2024-11-18 | End: 2024-11-19 | Stop reason: HOSPADM

## 2024-11-18 RX ORDER — ROSUVASTATIN CALCIUM 20 MG/1
20 TABLET, COATED ORAL EVERY EVENING
Status: DISCONTINUED | OUTPATIENT
Start: 2024-11-18 | End: 2024-11-19 | Stop reason: HOSPADM

## 2024-11-18 RX ORDER — AMLODIPINE BESYLATE 5 MG/1
5 TABLET ORAL DAILY
Status: DISCONTINUED | OUTPATIENT
Start: 2024-11-18 | End: 2024-11-19 | Stop reason: HOSPADM

## 2024-11-18 RX ORDER — SEMAGLUTIDE 1.34 MG/ML
1 INJECTION, SOLUTION SUBCUTANEOUS
COMMUNITY

## 2024-11-18 RX ORDER — DEXTROSE MONOHYDRATE 25 G/50ML
25 INJECTION, SOLUTION INTRAVENOUS
Status: DISCONTINUED | OUTPATIENT
Start: 2024-11-18 | End: 2024-11-19 | Stop reason: HOSPADM

## 2024-11-18 RX ORDER — PAROXETINE 20 MG/1
20 TABLET, FILM COATED ORAL DAILY
Status: DISCONTINUED | OUTPATIENT
Start: 2024-11-18 | End: 2024-11-19 | Stop reason: HOSPADM

## 2024-11-18 RX ORDER — GABAPENTIN 300 MG/1
600 CAPSULE ORAL 2 TIMES DAILY
Status: DISCONTINUED | OUTPATIENT
Start: 2024-11-18 | End: 2024-11-19 | Stop reason: HOSPADM

## 2024-11-18 RX ORDER — INSULIN LISPRO 100 [IU]/ML
2-9 INJECTION, SOLUTION INTRAVENOUS; SUBCUTANEOUS
Status: DISCONTINUED | OUTPATIENT
Start: 2024-11-18 | End: 2024-11-19 | Stop reason: HOSPADM

## 2024-11-18 RX ORDER — PHENAZOPYRIDINE HYDROCHLORIDE 200 MG/1
200 TABLET, FILM COATED ORAL 3 TIMES DAILY
COMMUNITY

## 2024-11-18 RX ORDER — ONDANSETRON 4 MG/1
4 TABLET, ORALLY DISINTEGRATING ORAL EVERY 4 HOURS PRN
Status: DISCONTINUED | OUTPATIENT
Start: 2024-11-18 | End: 2024-11-19 | Stop reason: HOSPADM

## 2024-11-18 RX ADMIN — ROSUVASTATIN CALCIUM 20 MG: 20 TABLET, FILM COATED ORAL at 17:20

## 2024-11-18 RX ADMIN — MEROPENEM 500 MG: 500 INJECTION, POWDER, FOR SOLUTION INTRAVENOUS at 05:25

## 2024-11-18 RX ADMIN — MEROPENEM 500 MG: 500 INJECTION, POWDER, FOR SOLUTION INTRAVENOUS at 17:18

## 2024-11-18 RX ADMIN — TERAZOSIN HYDROCHLORIDE 10 MG: 5 CAPSULE ORAL at 05:22

## 2024-11-18 RX ADMIN — INSULIN LISPRO 2 UNITS: 100 INJECTION, SOLUTION INTRAVENOUS; SUBCUTANEOUS at 08:27

## 2024-11-18 RX ADMIN — MEROPENEM 500 MG: 500 INJECTION, POWDER, FOR SOLUTION INTRAVENOUS at 11:46

## 2024-11-18 RX ADMIN — PAROXETINE HYDROCHLORIDE 20 MG: 20 TABLET, FILM COATED ORAL at 05:23

## 2024-11-18 RX ADMIN — AMLODIPINE BESYLATE 5 MG: 5 TABLET ORAL at 05:23

## 2024-11-18 RX ADMIN — GABAPENTIN 600 MG: 300 CAPSULE ORAL at 17:20

## 2024-11-18 RX ADMIN — APIXABAN 5 MG: 5 TABLET, FILM COATED ORAL at 17:20

## 2024-11-18 RX ADMIN — FINASTERIDE 5 MG: 5 TABLET, FILM COATED ORAL at 05:22

## 2024-11-18 RX ADMIN — INSULIN LISPRO 5 UNITS: 100 INJECTION, SOLUTION INTRAVENOUS; SUBCUTANEOUS at 21:30

## 2024-11-18 RX ADMIN — INSULIN LISPRO 5 UNITS: 100 INJECTION, SOLUTION INTRAVENOUS; SUBCUTANEOUS at 13:23

## 2024-11-18 RX ADMIN — GABAPENTIN 600 MG: 300 CAPSULE ORAL at 05:23

## 2024-11-18 RX ADMIN — APIXABAN 5 MG: 5 TABLET, FILM COATED ORAL at 05:23

## 2024-11-18 RX ADMIN — INSULIN LISPRO 2 UNITS: 100 INJECTION, SOLUTION INTRAVENOUS; SUBCUTANEOUS at 17:22

## 2024-11-18 RX ADMIN — MEROPENEM 500 MG: 500 INJECTION, POWDER, FOR SOLUTION INTRAVENOUS at 01:22

## 2024-11-18 ASSESSMENT — ENCOUNTER SYMPTOMS
SHORTNESS OF BREATH: 0
EYE DISCHARGE: 0
EYE PAIN: 0
CLAUDICATION: 0
BLOOD IN STOOL: 0
DIZZINESS: 0
HEMOPTYSIS: 0
MYALGIAS: 0
TREMORS: 0
CHILLS: 0
STRIDOR: 0
FALLS: 0
SINUS PAIN: 0
FOCAL WEAKNESS: 0
FLANK PAIN: 0
CONSTIPATION: 0
HEADACHES: 0
POLYDIPSIA: 0
SENSORY CHANGE: 0
SORE THROAT: 0
WHEEZING: 0
ORTHOPNEA: 0
SPEECH CHANGE: 0
PALPITATIONS: 0
BLURRED VISION: 0
COUGH: 0
DEPRESSION: 0
LOSS OF CONSCIOUSNESS: 0
DIARRHEA: 0
VOMITING: 0
WEAKNESS: 1
TINGLING: 0
PHOTOPHOBIA: 0
HALLUCINATIONS: 0
PND: 0
NAUSEA: 0
HEARTBURN: 0
ABDOMINAL PAIN: 0
SPUTUM PRODUCTION: 0
DIAPHORESIS: 0
DOUBLE VISION: 0
BACK PAIN: 0
WEAKNESS: 0
NECK PAIN: 0
FEVER: 0
BRUISES/BLEEDS EASILY: 0

## 2024-11-18 ASSESSMENT — COGNITIVE AND FUNCTIONAL STATUS - GENERAL
MOVING FROM LYING ON BACK TO SITTING ON SIDE OF FLAT BED: A LITTLE
DRESSING REGULAR LOWER BODY CLOTHING: A LITTLE
TURNING FROM BACK TO SIDE WHILE IN FLAT BAD: A LITTLE
WALKING IN HOSPITAL ROOM: A LITTLE
STANDING UP FROM CHAIR USING ARMS: A LITTLE
MOVING TO AND FROM BED TO CHAIR: A LITTLE
HELP NEEDED FOR BATHING: A LITTLE
TOILETING: A LITTLE
DRESSING REGULAR UPPER BODY CLOTHING: A LITTLE
MOBILITY SCORE: 17
SUGGESTED CMS G CODE MODIFIER DAILY ACTIVITY: CJ
DAILY ACTIVITIY SCORE: 20
SUGGESTED CMS G CODE MODIFIER MOBILITY: CK
CLIMB 3 TO 5 STEPS WITH RAILING: A LOT

## 2024-11-18 ASSESSMENT — LIFESTYLE VARIABLES
AVERAGE NUMBER OF DAYS PER WEEK YOU HAVE A DRINK CONTAINING ALCOHOL: 1
TOTAL SCORE: 0
HOW MANY TIMES IN THE PAST YEAR HAVE YOU HAD 5 OR MORE DRINKS IN A DAY: 0
HAVE YOU EVER FELT YOU SHOULD CUT DOWN ON YOUR DRINKING: NO
ALCOHOL_USE: YES
CONSUMPTION TOTAL: NEGATIVE
SUBSTANCE_ABUSE: 0
EVER HAD A DRINK FIRST THING IN THE MORNING TO STEADY YOUR NERVES TO GET RID OF A HANGOVER: NO
HAVE PEOPLE ANNOYED YOU BY CRITICIZING YOUR DRINKING: NO
DOES PATIENT WANT TO STOP DRINKING: NO
TOTAL SCORE: 0
ON A TYPICAL DAY WHEN YOU DRINK ALCOHOL HOW MANY DRINKS DO YOU HAVE: 1
TOTAL SCORE: 0
EVER FELT BAD OR GUILTY ABOUT YOUR DRINKING: NO

## 2024-11-18 ASSESSMENT — PAIN DESCRIPTION - PAIN TYPE: TYPE: ACUTE PAIN

## 2024-11-18 ASSESSMENT — CHA2DS2 SCORE
DIABETES: YES
CHA2DS2 VASC SCORE: 3
PRIOR STROKE OR TIA OR THROMBOEMBOLISM: NO
SEX: MALE
AGE 65 TO 74: YES
AGE 75 OR GREATER: NO
HYPERTENSION: YES
VASCULAR DISEASE: NO
CHF OR LEFT VENTRICULAR DYSFUNCTION: NO

## 2024-11-18 ASSESSMENT — FIBROSIS 4 INDEX: FIB4 SCORE: 1.16

## 2024-11-18 ASSESSMENT — SOCIAL DETERMINANTS OF HEALTH (SDOH)
WITHIN THE PAST 12 MONTHS, YOU WORRIED THAT YOUR FOOD WOULD RUN OUT BEFORE YOU GOT THE MONEY TO BUY MORE: NEVER TRUE
WITHIN THE LAST YEAR, HAVE YOU BEEN HUMILIATED OR EMOTIONALLY ABUSED IN OTHER WAYS BY YOUR PARTNER OR EX-PARTNER?: NO
WITHIN THE LAST YEAR, HAVE YOU BEEN KICKED, HIT, SLAPPED, OR OTHERWISE PHYSICALLY HURT BY YOUR PARTNER OR EX-PARTNER?: NO
IN THE PAST 12 MONTHS, HAS THE ELECTRIC, GAS, OIL, OR WATER COMPANY THREATENED TO SHUT OFF SERVICE IN YOUR HOME?: NO
WITHIN THE LAST YEAR, HAVE TO BEEN RAPED OR FORCED TO HAVE ANY KIND OF SEXUAL ACTIVITY BY YOUR PARTNER OR EX-PARTNER?: NO
WITHIN THE PAST 12 MONTHS, THE FOOD YOU BOUGHT JUST DIDN'T LAST AND YOU DIDN'T HAVE MONEY TO GET MORE: NEVER TRUE
WITHIN THE LAST YEAR, HAVE YOU BEEN AFRAID OF YOUR PARTNER OR EX-PARTNER?: NO

## 2024-11-18 NOTE — H&P
Hospital Medicine History & Physical Note    Date of Service  11/17/2024    Primary Care Physician  Chuck Chappell M.D.    Consultants  Urology    Specialist Names:       Code Status  Full Code    Chief Complaint  Chief Complaint   Patient presents with    Weakness     Pt BIB EMS after pt had episode of weakness and slid from chair to ground when attempting to stand. -HS, +Eliquis. Upon Ems arrival pt GCS 15 with BP difference x>20 from left to right arm which resolved once pt repositioned. Pt placed on 4 L NC for O2 @ 88% on RA. HX recent kidney stent placement       History of Presenting Illness  Ramu Barber is a 74 y.o. male who presented 11/17/2024 with past medical history of ESBL, atrial fibrillation, CKD stage III, hypertension, sleep apnea, hyperlipidemia, diabetes history of pulm embolism who presents to the hospital for generalized weakness that started today.  Patient was unable to get out of a chair today.  He does complain about back pain but does not radiate down the lower extremities.  He denies any muscle weakness or numbness.  Patient was recently admitted in the hospital 11/1 for nephrolithiasis and urine infection.  The patient did have a stent placement on 11/1.  Patient still has the stent and it was close removed in a week week..  Patient's urine culture grew Pseudomonas and he was discharged on ciprofloxacin.  The patient states that the antibiotics was changed as an outpatient to Macrobid.  He denies any fevers, cough, nausea, vomiting, diarrhea.    Chest x-ray interpreted by me and found no acute pulmonary process  CT scan of the head was negative  EKG interpreted by me for normal sinus rhythm, frequent PACs and PVCs    I discussed the plan of care with patient.    Review of Systems  Review of Systems   Constitutional:  Negative for chills, diaphoresis, fever and malaise/fatigue.   HENT:  Negative for congestion, ear discharge, ear pain, hearing loss, nosebleeds, sinus pain,  sore throat and tinnitus.    Eyes:  Negative for blurred vision, double vision, photophobia and pain.   Respiratory:  Negative for cough, hemoptysis, sputum production, shortness of breath, wheezing and stridor.    Cardiovascular:  Negative for chest pain, palpitations, orthopnea, claudication, leg swelling and PND.   Gastrointestinal:  Negative for abdominal pain, blood in stool, constipation, diarrhea, heartburn, melena, nausea and vomiting.   Genitourinary:  Negative for dysuria, flank pain, frequency, hematuria and urgency.   Musculoskeletal:  Negative for back pain, falls, joint pain, myalgias and neck pain.   Skin:  Negative for itching and rash.   Neurological:  Positive for weakness. Negative for dizziness, tingling, tremors and headaches.   Endo/Heme/Allergies:  Negative for environmental allergies and polydipsia. Does not bruise/bleed easily.   Psychiatric/Behavioral:  Negative for depression, hallucinations, substance abuse and suicidal ideas.        Past Medical History   has a past medical history of Arthritis, Back pain, Chickenpox, High cholesterol, Hypertension, Obesity, Pain, Pneumonia, Polycythemia, secondary, Sleep apnea, Snoring, and Type II or unspecified type diabetes mellitus without mention of complication, not stated as uncontrolled.    Surgical History   has a past surgical history that includes fusion, spine, lumbar, plif (1/21/2015); inguinal hernia repair (Right, 2/16/2016); lumbar laminectomy diskectomy (2/18/2018); hardware removal neuro (2/18/2018); carpal tunnel release (Right, 2014); shoulder arthroscopy (Right, 2014); lumbar decompression (2004); lumbar decompression (2003); cervical disk and fusion anterior (2005); pr ins/rplc spi npg/rcvr pocket (7/23/2019); egd w/control bleeding (8/22/2020); gastroscopy (N/A, 8/22/2020); pr cystourethroscopy (N/A, 11/1/2024); and stent placement (Left, 11/1/2024).     Family History  family history includes Heart Attack in his brother and  father; Sleep Apnea in his mother.   Family history reviewed with patient. There is no family history that is pertinent to the chief complaint.     Social History   reports that he has never smoked. He quit smokeless tobacco use about 5 years ago.  His smokeless tobacco use included chew. He reports current alcohol use of about 1.2 oz of alcohol per week. He reports that he does not use drugs.    Allergies  No Known Allergies    Medications  Prior to Admission Medications   Prescriptions Last Dose Informant Patient Reported? Taking?   ELIQUIS 5 MG Tab  Patient's Home Pharmacy Yes No   Sig: Take 5 mg by mouth 2 times a day.   Mirabegron ER 50 MG TABLET SR 24 HR  Patient's Home Pharmacy Yes No   Sig: Take 50 mg by mouth every day.   OZEMPIC, 0.25 OR 0.5 MG/DOSE, 2 MG/1.5ML Solution Pen-injector  Patient's Home Pharmacy Yes No   Sig: Take 0.25 mg by mouth every 7 days.   PARoxetine (PAXIL) 20 MG Tab  Patient's Home Pharmacy Yes No   Sig: Take 20 mg by mouth every day.   SM VITAMIN D3 25 MCG (1000 UT) Tab  Patient's Home Pharmacy Yes No   Sig: Take 1,000 Units by mouth every day.   Wheat Dextrin (BENEFIBER) Powder  Patient, Patient's Home Pharmacy Yes No   Sig: Take 1 Dose by mouth 2 times a day.   allopurinol (ZYLOPRIM) 100 MG Tab  Patient's Home Pharmacy Yes No   Sig: Take 200 mg by mouth every day.   amLODIPine (NORVASC) 5 MG Tab  Patient's Home Pharmacy Yes No   Sig: Take 5 mg by mouth every day.   finasteride (PROSCAR) 5 MG Tab  Patient's Home Pharmacy Yes No   Sig: Take 1 Tablet by mouth every day.   gabapentin (NEURONTIN) 600 MG tablet  Patient's Home Pharmacy Yes No   Sig: Take 600 mg by mouth 2 times a day.   glipiZIDE SR (GLUCOTROL) 2.5 MG TABLET SR 24 HR  Patient's Home Pharmacy Yes No   Sig: Take 2.5 mg by mouth 2 times a day.   polyethylene glycol/lytes (MIRALAX) Pack  Patient's Home Pharmacy No No   Sig: Take 1 Packet by mouth 1 time a day as needed (if sennosides and docusate ineffective after 24 hours).    rosuvastatin (CRESTOR) 20 MG Tab  Patient's Home Pharmacy No No   Sig: Take 1 Tablet by mouth every evening.   terazosin (HYTRIN) 10 MG capsule  Patient's Home Pharmacy Yes No   Sig: Take 10 mg by mouth every day.      Facility-Administered Medications: None       Physical Exam  Temp:  [36.9 °C (98.5 °F)] 36.9 °C (98.5 °F)  Pulse:  [] 77  Resp:  [18-20] 18  BP: (107-134)/(64-89) 114/75  SpO2:  [94 %-95 %] 94 %  Blood Pressure : 114/75   Temperature: 36.9 °C (98.5 °F)   Pulse: 77   Respiration: 18   Pulse Oximetry: 94 %       Physical Exam  Vitals and nursing note reviewed.   Constitutional:       General: He is not in acute distress.     Appearance: Normal appearance. He is not ill-appearing, toxic-appearing or diaphoretic.   HENT:      Head: Normocephalic and atraumatic.      Nose: No congestion or rhinorrhea.      Mouth/Throat:      Pharynx: No posterior oropharyngeal erythema.   Eyes:      General: No scleral icterus.        Right eye: No discharge.   Cardiovascular:      Rate and Rhythm: Normal rate and regular rhythm.      Pulses: Normal pulses.      Heart sounds: Normal heart sounds. No murmur heard.     No friction rub. No gallop.   Pulmonary:      Effort: Pulmonary effort is normal. No respiratory distress.      Breath sounds: Normal breath sounds. No stridor. No wheezing, rhonchi or rales.   Abdominal:      General: There is distension.      Tenderness: There is no abdominal tenderness.   Musculoskeletal:         General: No swelling, tenderness, deformity or signs of injury.      Cervical back: Normal range of motion.      Right lower leg: No edema.      Left lower leg: No edema.   Skin:     Coloration: Skin is not jaundiced or pale.      Findings: No bruising, erythema, lesion or rash.   Neurological:      General: No focal deficit present.      Mental Status: He is alert and oriented to person, place, and time.         Laboratory:  Recent Labs     11/17/24 2039   WBC 14.9*   RBC 4.27*    HEMOGLOBIN 13.4*   HEMATOCRIT 40.4*   MCV 94.6   MCH 31.4   MCHC 33.2   RDW 49.7   PLATELETCT 248   MPV 11.0     Recent Labs     11/17/24 2039   SODIUM 132*   POTASSIUM 3.9   CHLORIDE 97   CO2 23   GLUCOSE 329*   BUN 22   CREATININE 1.62*   CALCIUM 8.6     Recent Labs     11/17/24 2039   ALTSGPT 13   ASTSGOT 14   ALKPHOSPHAT 77   TBILIRUBIN 0.9   LIPASE 26   GLUCOSE 329*         Recent Labs     11/17/24 2039   NTPROBNP 876*         Recent Labs     11/17/24 2039 11/17/24  2245   TROPONINT 61* 44*       Imaging:  US-RENAL   Final Result         1.  Left nephrolithiasis without visualized obstructive changes.   2.  Bilateral renal cysts      CT-HEAD W/O   Final Result         1.  No acute intracranial abnormality is identified, there are nonspecific white matter changes, commonly associated with small vessel ischemic disease.  Associated mild cerebral atrophy is noted.   2.  Atherosclerosis.               DX-CHEST-PORTABLE (1 VIEW)   Final Result         1.  Left basilar atelectasis, no focal infiltrate          X-Ray:  I have personally reviewed the images and compared with prior images.  EKG:  I have personally reviewed the images and compared with prior images.    Assessment/Plan:  Justification for Admission Status  I anticipate this patient will require at least two midnights for appropriate medical management, necessitating inpatient admission because acute pyelonephritis    Patient will need a Med/Surg bed on MEDICAL service .  The need is secondary to acute pyelonephritis.    * Acute pyelonephritis- (present on admission)  Assessment & Plan  symptoms includes dysuria and frequency  UA + for bacteria, LE and nitrite  F/u urine cultures  Started pt on meropenem  Previous cultures found ESBL and Pseudomonas      Nephrolithiasis  Assessment & Plan  Patient had a recent lithotripsy and has a stent in place.  We will need to speak with urology about stent exchange    AF (atrial fibrillation) ([facility])  Assessment &  Plan  Rate controlled  Continue Eliquis    Stage 3a chronic kidney disease- (present on admission)  Assessment & Plan  Monitor BMP and assess response  Avoid IV contrast/nephrotoxins/NSAIDs  Dose adjust meds for decreased GFR      Heart failure with preserved ejection fraction, borderline, class II (HCC)- (present on admission)  Assessment & Plan  Compensated  Monitor volume status    Pulmonary hypertension (HCC)- (present on admission)  Assessment & Plan  Monitor volume status    Acute respiratory failure with hypoxia (HCC)- (present on admission)  Assessment & Plan  On 2 L of O2 above baseline    Type 2 diabetes mellitus without complication, without long-term current use of insulin (HCC)- (present on admission)  Assessment & Plan  Start on insulin sliding scale with serial Accu-Checks  Check hemoglobin A1c  Hypoglycemic protocol in place          VTE prophylaxis: SCDs/TEDs

## 2024-11-18 NOTE — ED NOTES
"RN noted slight bruising on pts right forehead, pt informed that he \"might\" have hit his head on a chair while trying to get up from the ground. Pt denies pain, ERP updated.  "

## 2024-11-18 NOTE — ASSESSMENT & PLAN NOTE
symptoms includes dysuria and frequency  UA + for bacteria, LE and nitrite  F/u urine cultures, unfortunately no cultures sent on admission 11/17.  Ordered add on urine culture 11/18, BCs for am. 11/19.   Started pt on meropenem IV on admission.  Previous cultures found ESBL and Pseudomonas.

## 2024-11-18 NOTE — PROGRESS NOTES
4 Eyes Skin Assessment Completed by ARIAN Guallpa and ARIAN Sandoval.    Head WDL  Ears WDL  Nose WDL  Mouth WDL  Neck WDL  Breast/Chest WDL  Shoulder Blades WDL  Spine WDL  (R) Arm/Elbow/Hand WDL  (L) Arm/Elbow/Hand Redness, Bruising, and Abrasion  Abdomen WDL  Groin WDL  Scrotum/Coccyx/Buttocks Redness and Blanching  (R) Leg Abrasion knee  (L) Leg Abrasion lower leg  (R) Heel/Foot/Toe WDL  (L) Heel/Foot/Toe WDL          Devices In Places Nasal Cannula      Interventions In Place Pillows    Possible Skin Injury Yes    Pictures Uploaded Into Epic Yes  Wound Consult Placed N/A  RN Wound Prevention Protocol Ordered No

## 2024-11-18 NOTE — ED NOTES
Pt resting with wife in room, breathing even and unlabored. Fall precautions in place with call light within reach.

## 2024-11-18 NOTE — ED NOTES
Bedside report received from off going RN/tech: Chris, assumed care of patient.  POC discussed with patient. Call light within reach, all needs addressed at this time.       Fall risk interventions in place: Move the patient closer to the nurse's station, Patient's personal possessions are with in their safe reach, Place socks on patient, Place fall risk sign on patient's door, Give patient urinal if applicable, Keep floor surfaces clean and dry, and Bed Alarm in use (all applicable per [place] Fall risk assessment)   Continuous monitoring: Cardiac Leads, Pulse Ox, or Blood Pressure  IVF/IV medications: Not Applicable   Oxygen: How many liters 3L and Traced the line to wall oxygen  Bedside sitter: Not Applicable   Isolation: Not Applicable

## 2024-11-18 NOTE — CONSULTS
Consult/H&P Note    Primary Service: Medicine  Attending: Karina Judge M.D.  Patient's Name/MRN: Ramu Barber, [id number]    Admit Date:11/17/2024  Today's Date: 11/18/2024   Length of stay:  LOS: 0 days   Room #: S609/01      Reason for consult/chief complaint: possible urosepsis  ID/HPI: Ramu Barber is a 74 y.o. male patient with a history of renal stones who underwent L CULTS on 11/13 with Dr Galicia. Intraoperatively it was noted that his stones were impacted and very hard, so a L ureteral stent was placed (off strings) with plans for a cystoscopy stent removal on 11/26. The pt reports that after the procedure he was overall feeling ok, but last night he was feeling weak and fatigued, and ended up falling out of his chair onto the ground, and his wife was unable to help him stand, thus the ambulance was called and he was admitted to the hospitalist service. He does have a hx of ESBL UTIs, and his urine culture on 11/06 was positive for ESBL E Coli, sensitive to ertapenem, meropenem, macrobid, zosyn, tetracycline, tobramycin, and bactrim. He had previously been taking macrobid prior to admission. He currently denies N/V/F/C, flank pain, GH. He is AFVSS, WBC is 15, Cr 1.62 (earlier this month it was elevated at 2.03), and his renal US demonstrates no hydronephrosis.       Past Medical History:   Past Medical History:   Diagnosis Date    Arthritis     back, hips    Back pain     Chickenpox     High cholesterol     Hypertension     Obesity     Pain     back pain s/p multiple surgeries and spinal stimulator    Pneumonia     Polycythemia, secondary     Sleep apnea     cpap    Snoring     Type II or unspecified type diabetes mellitus without mention of complication, not stated as uncontrolled     oral meds only        Past Surgical History:   Past Surgical History:   Procedure Laterality Date    PA CYSTOURETHROSCOPY N/A 11/1/2024    Procedure: CYSTOSCOPY;  Surgeon: Andrea Galicia M.D.;  Location:  SURGERY SAME DAY [facility];  Service: Urology    STENT PLACEMENT Left 11/1/2024    Procedure: STENT PLACEMENT;  Surgeon: Andrea Galicia M.D.;  Location: SURGERY SAME DAY [facility];  Service: Urology    EGD W/CONTROL BLEEDING  8/22/2020         GASTROSCOPY N/A 8/22/2020    Procedure: GASTROSCOPY;  Surgeon: Tawanda Gomez M.D.;  Location: SURGERY [facility];  Service: Gastroenterology    LA INS/RPLC SPI NPG/RCVR POCKET  7/23/2019    Procedure: INSERTION, NEUROSTIMULATOR, PERMANENT, SPINAL CORD;  Surgeon: Teddy Santos M.D.;  Location: SURGERY [facility];  Service: Pain Management    LUMBAR LAMINECTOMY DISKECTOMY  2/18/2018    Procedure: LUMBAR LAMINECTOMY DISKECTOMY- LT L1-2 HEMILAMI-;  Surgeon: Tom Pittman M.D.;  Location: SURGERY [facility];  Service: Neurosurgery    HARDWARE REMOVAL NEURO  2/18/2018    Procedure: HARDWARE REMOVAL NEURO- L2-3 PEDICLE SCREWS;  Surgeon: Tom Pittman M.D.;  Location: SURGERY [facility];  Service: Neurosurgery    INGUINAL HERNIA REPAIR Right 2/16/2016    Procedure: INGUINAL HERNIA REPAIR;  Surgeon: Sj Baird M.D.;  Location: SURGERY [facility];  Service:     FUSION, SPINE, LUMBAR, PLIF  1/21/2015    Performed by Ko Suarez M.D. at [facility]    CARPAL TUNNEL RELEASE Right 2014    SHOULDER ARTHROSCOPY Right 2014    CERVICAL DISK AND FUSION ANTERIOR  2005    LUMBAR DECOMPRESSION  2004    LUMBAR DECOMPRESSION  2003        Family History:   Family History   Problem Relation Age of Onset    Sleep Apnea Mother     Heart Attack Father     Heart Attack Brother          Social History:   Social History     Tobacco Use    Smoking status: Never    Smokeless tobacco: Former     Types: Chew     Quit date: 12/2018   Vaping Use    Vaping status: Never Used   Substance Use Topics    Alcohol use: Yes     Alcohol/week: 1.2 oz     Types: 2 Shots of liquor per week     Comment: 2 a week    Drug use: No      Social History     Social History  Narrative    Not on file        Allergies: he Patient has no known allergies.    Medications:   Medications Prior to Admission   Medication Sig Dispense Refill Last Dose/Taking    Semaglutide, 1 MG/DOSE, (OZEMPIC, 1 MG/DOSE,) 2 MG/1.5ML Solution Pen-injector Inject 1 mg under the skin every 7 days.   11/16/2024 Morning    ciprofloxacin (CIPRO) 250 MG Tab Take 250 mg by mouth 2 times a day. 7 day course started 11/2/24 - COMPLETED   Taking    nitrofurantoin (MACROBID) 100 MG Cap Take 100 mg by mouth every 12 hours. 7 day course started 11/13/24 11/17/2024 Morning    phenazopyridine (PYRIDIUM) 200 MG Tab Take 200 mg by mouth 3 times a day. 4 day course started 11/13/24 11/17/2024 Morning    allopurinol (ZYLOPRIM) 100 MG Tab Take 100 mg by mouth every day.   11/17/2024 Morning    SM VITAMIN D3 25 MCG (1000 UT) Tab Take 1,000 Units by mouth every day.   11/18/2024 Morning    Mirabegron ER 50 MG TABLET SR 24 HR Take 50 mg by mouth every day.   11/17/2024 Morning    ELIQUIS 5 MG Tab Take 5 mg by mouth 2 times a day.   11/17/2024 Morning    amLODIPine (NORVASC) 5 MG Tab Take 5 mg by mouth every day.   11/17/2024 Morning    gabapentin (NEURONTIN) 600 MG tablet Take 600 mg by mouth 2 times a day.   11/17/2024 Morning    rosuvastatin (CRESTOR) 20 MG Tab Take 1 Tablet by mouth every evening. 30 Tablet 11 11/16/2024 Evening    glipiZIDE SR (GLUCOTROL) 2.5 MG TABLET SR 24 HR Take 2.5 mg by mouth 2 times a day.   11/17/2024 Morning    terazosin (HYTRIN) 10 MG capsule Take 10 mg by mouth every day.   11/16/2024 Evening    finasteride (PROSCAR) 5 MG Tab Take 1 Tablet by mouth every day.   11/17/2024 Morning    PARoxetine (PAXIL) 20 MG Tab Take 20 mg by mouth every day.   11/17/2024 Morning         Review of Systems  Review of Systems   Constitutional:  Negative for chills and fever.   Gastrointestinal:  Negative for abdominal pain, nausea and vomiting.   Genitourinary:  Positive for dysuria and frequency. Negative for flank  "pain.   All other systems reviewed and are negative.       Physical Exam  VITAL SIGNS: /78   Pulse 63   Temp 36.9 °C (98.5 °F) (Temporal)   Resp 18   Ht 1.829 m (6')   Wt 120 kg (265 lb)   SpO2 95%   BMI 35.94 kg/m²   Physical Exam  Vitals and nursing note reviewed.   Constitutional:       General: He is not in acute distress.  HENT:      Head: Normocephalic.      Nose: Nose normal.      Mouth/Throat:      Pharynx: Oropharynx is clear.   Eyes:      Conjunctiva/sclera: Conjunctivae normal.   Pulmonary:      Effort: Pulmonary effort is normal.   Abdominal:      General: There is no distension.      Palpations: Abdomen is soft.   Musculoskeletal:         General: Normal range of motion.      Cervical back: Normal range of motion.   Skin:     General: Skin is warm.   Neurological:      General: No focal deficit present.      Mental Status: He is alert and oriented to person, place, and time.   Psychiatric:         Mood and Affect: Mood normal.         Behavior: Behavior normal.           Labs:  Recent Labs     11/17/24 2039   WBC 14.9*   RBC 4.27*   HEMOGLOBIN 13.4*   HEMATOCRIT 40.4*   MCV 94.6   MCH 31.4   MCHC 33.2   RDW 49.7   PLATELETCT 248   MPV 11.0     Recent Labs     11/17/24 2039   SODIUM 132*   POTASSIUM 3.9   CHLORIDE 97   CO2 23   GLUCOSE 329*   BUN 22   CREATININE 1.62*   CALCIUM 8.6         Glucose:  Recent Labs     11/17/24 2039   GLUCOSE 329*     Coags:  No results for input(s): \"INR\" in the last 72 hours.      Urinalysis:   Recent Labs     11/17/24 2215   COLORURINE Orange*   CLARITY Turbid*   SPECGRAVITY 1.019   PHURINE 5.5   GLUCOSEUR 500*   KETONES Negative   NITRITE Positive*   OCCULTBLOOD Large*   RBCURINE 21-50*   BACTERIA Few*   EPITHELCELL 6-10*       Imaging:  US-RENAL   Final Result         1.  Left nephrolithiasis without visualized obstructive changes.   2.  Bilateral renal cysts      CT-HEAD W/O   Final Result         1.  No acute intracranial abnormality is identified, " there are nonspecific white matter changes, commonly associated with small vessel ischemic disease.  Associated mild cerebral atrophy is noted.   2.  Atherosclerosis.               DX-CHEST-PORTABLE (1 VIEW)   Final Result         1.  Left basilar atelectasis, no focal infiltrate          @lastct@     Assessment/Recommendation   74 y.o. M with UTI after recent CULTS with ureteral stent placement    No indication for stent exchange at this time  Will continue with plan for cystoscopy and stent removal on 11/26 as scheduled  Recommend abx per primary team, culture sensitivities  Upon discharge, recommend PO abx to last through 11/27, to cover ureteral stent removal  No urologic intervention anticipated during this admission. Urology signing off, please call with questions.    Dr Foley is aware of consult and has directed this patient's plan of care.        Sera Parker, PLuizALuiz-CESAR.   [id number] [address]   [phone]

## 2024-11-18 NOTE — ED TRIAGE NOTES
Chief Complaint   Patient presents with    Weakness     Pt BIB EMS after pt had episode of weakness and slid from chair to ground when attempting to stand. -HS, +Eliquis. Upon Ems arrival pt GCS 15 with BP difference x>20 from left to right arm which resolved once pt repositioned. Pt placed on 4 L NC for O2 @ 88% on RA. HX recent kidney stent placement     Pt transferred to [facility], EKG performed. ERP at bedside and pt wife at bedside.     Hx: kidney stent, sleep apnea (wears cpap at night)    Ems provided 250 mL NS in route    /89   Pulse (!) 110   Temp 36.9 °C (98.5 °F) (Temporal)   Resp 18   Ht 1.829 m (6')   Wt 120 kg (265 lb)   SpO2 95%   BMI 35.94 kg/m²

## 2024-11-18 NOTE — CARE PLAN
The patient is Stable - Low risk of patient condition declining or worsening    Shift Goals  Clinical Goals: wean o2, vss, safety  Patient Goals: sleep  Family Goals: disha    Progress made toward(s) clinical / shift goals:  patient resting comfortably throughout shift. No new complaints at this time.    Patient is not progressing towards the following goals:      Problem: Respiratory:  Goal: Respiratory status will improve  Outcome: Not Progressing     Problem: Urinary Elimination:  Goal: Ability to reestablish a normal urinary elimination pattern will improve  Outcome: Not Progressing     Problem: Mobility  Goal: Risk for activity intolerance will decrease  Outcome: Not Progressing

## 2024-11-18 NOTE — ASSESSMENT & PLAN NOTE
Patient had a recent lithotripsy and has a stent in place.  We will need to speak with urology about stent exchange

## 2024-11-18 NOTE — ED PROVIDER NOTES
ER Provider Note    Scribed for Dawit Andrade M.d. by Karine Galicia. 11/17/2024  8:39 PM    Primary Care Provider: Chuck Chappell M.D.    CHIEF COMPLAINT   Chief Complaint   Patient presents with    Weakness     Pt BIB EMS after pt had episode of weakness and slid from chair to ground when attempting to stand. -HS, +Eliquis. Upon Ems arrival pt GCS 15 with BP difference x>20 from left to right arm which resolved once pt repositioned. Pt placed on 4 L NC for O2 @ 88% on RA. HX recent kidney stent placement     EXTERNAL RECORDS REVIEWED  Review of records reveal that the patient was here on the 1st of this month for UTI and kidney stone. The patient was seen by Urology, and subsequently had a stent placed. Urine grew Pseudomonas, which was sensitive to most antibiotics. He was discharged with a week long course of ciprofloxacin, and was advised to follow up with Urology.     HPI/ROS  LIMITATION TO HISTORY   Select: : None  OUTSIDE HISTORIAN(S):  Significant other wife present at bedside.    Ramu Barber is a 74 y.o. male who presents to the ED via EMS for weakness as he slid out of his chair and was unable to remove himself from the ground this evening. The patient explains that in the beginning of the month, he had a stent placed, and did follow up with Urology afterwards, which was four days ago. Urology reportedly removed the stent, and placed a new one, as well as was started the patient on Nitrofurantoin. Since then, he has noticed an increase in urinary frequency as well as generalized weakness. The patient reports having a productive cough, with most noticeable production of phlegm in the morning. He denies any new pain. The patient is not normally on oxygen, although has a CPAP machine. He denies any dysuria, shortness of breath, fever, vomiting, constipation, or diarrhea. The patient is not experiencing any abdominal pain. He denies edema in the lower extremities. He reportedly has been  consuming oral intake as normal.     PAST MEDICAL HISTORY  Past Medical History:   Diagnosis Date    Arthritis     back, hips    Back pain     Chickenpox     High cholesterol     Hypertension     Obesity     Pain     back pain s/p multiple surgeries and spinal stimulator    Pneumonia     Polycythemia, secondary     Sleep apnea     cpap    Snoring     Type II or unspecified type diabetes mellitus without mention of complication, not stated as uncontrolled     oral meds only       SURGICAL HISTORY  Past Surgical History:   Procedure Laterality Date    AK CYSTOURETHROSCOPY N/A 11/1/2024    Procedure: CYSTOSCOPY;  Surgeon: Andrea Galicia M.D.;  Location: SURGERY SAME DAY [facility];  Service: Urology    STENT PLACEMENT Left 11/1/2024    Procedure: STENT PLACEMENT;  Surgeon: Andrea Galicia M.D.;  Location: SURGERY SAME DAY [facility];  Service: Urology    EGD W/CONTROL BLEEDING  8/22/2020         GASTROSCOPY N/A 8/22/2020    Procedure: GASTROSCOPY;  Surgeon: Tawanda Gomez M.D.;  Location: [facility];  Service: Gastroenterology    AK INS/RPLC SPI NPG/RCVR POCKET  7/23/2019    Procedure: INSERTION, NEUROSTIMULATOR, PERMANENT, SPINAL CORD;  Surgeon: Teddy Santos M.D.;  Location: [facility];  Service: Pain Management    LUMBAR LAMINECTOMY DISKECTOMY  2/18/2018    Procedure: LUMBAR LAMINECTOMY DISKECTOMY- LT L1-2 HEMILAMI-;  Surgeon: Tom Pittman M.D.;  Location: [facility];  Service: Neurosurgery    HARDWARE REMOVAL NEURO  2/18/2018    Procedure: HARDWARE REMOVAL NEURO- L2-3 PEDICLE SCREWS;  Surgeon: Tom Pittman M.D.;  Location: [facility];  Service: Neurosurgery    INGUINAL HERNIA REPAIR Right 2/16/2016    Procedure: INGUINAL HERNIA REPAIR;  Surgeon: Sj Baird M.D.;  Location: [facility];  Service:     FUSION, SPINE, LUMBAR, PLIF  1/21/2015    Performed by Ko Suarez M.D. at [facility]    CARPAL TUNNEL RELEASE  Right 2014    SHOULDER ARTHROSCOPY Right 2014    CERVICAL DISK AND FUSION ANTERIOR  2005    LUMBAR DECOMPRESSION  2004    LUMBAR DECOMPRESSION  2003       FAMILY HISTORY  Family History   Problem Relation Age of Onset    Sleep Apnea Mother     Heart Attack Father     Heart Attack Brother        SOCIAL HISTORY   reports that he has never smoked. He quit smokeless tobacco use about 5 years ago.  His smokeless tobacco use included chew. He reports current alcohol use of about 1.2 oz of alcohol per week. He reports that he does not use drugs.    CURRENT MEDICATIONS  Previous Medications    ALLOPURINOL (ZYLOPRIM) 100 MG TAB    Take 200 mg by mouth every day.    AMLODIPINE (NORVASC) 5 MG TAB    Take 5 mg by mouth every day.    ELIQUIS 5 MG TAB    Take 5 mg by mouth 2 times a day.    FINASTERIDE (PROSCAR) 5 MG TAB    Take 1 Tablet by mouth every day.    GABAPENTIN (NEURONTIN) 600 MG TABLET    Take 600 mg by mouth 2 times a day.    GLIPIZIDE SR (GLUCOTROL) 2.5 MG TABLET SR 24 HR    Take 2.5 mg by mouth 2 times a day.    MIRABEGRON ER 50 MG TABLET SR 24 HR    Take 50 mg by mouth every day.    OZEMPIC, 0.25 OR 0.5 MG/DOSE, 2 MG/1.5ML SOLUTION PEN-INJECTOR    Take 0.25 mg by mouth every 7 days.    PAROXETINE (PAXIL) 20 MG TAB    Take 20 mg by mouth every day.    POLYETHYLENE GLYCOL/LYTES (MIRALAX) PACK    Take 1 Packet by mouth 1 time a day as needed (if sennosides and docusate ineffective after 24 hours).    ROSUVASTATIN (CRESTOR) 20 MG TAB    Take 1 Tablet by mouth every evening.    SM VITAMIN D3 25 MCG (1000 UT) TAB    Take 1,000 Units by mouth every day.    TERAZOSIN (HYTRIN) 10 MG CAPSULE    Take 10 mg by mouth every day.    WHEAT DEXTRIN (BENEFIBER) POWDER    Take 1 Dose by mouth 2 times a day.       ALLERGIES  Patient has no known allergies.    PHYSICAL EXAM  /89   Pulse (!) 110   Temp 36.9 °C (98.5 °F) (Temporal)   Resp 18   Ht 1.829 m (6')   Wt 120 kg (265 lb)   SpO2 95%   BMI 35.94 kg/m²    Constitutional: Awake and alert  HENT: Normal inspection  Eyes: Normal inspection  Neck: Grossly normal range of motion.  Cardiovascular: Normal heart rate, Normal rhythm.  Symmetric peripheral pulses.   Thorax & Lungs: No respiratory distress, No wheezing, No rales, No rhonchi, No chest tenderness.   Abdomen: Bowel sounds normal, soft, non-distended, nontender, no mass  Skin: No obvious rash.  Back: No tenderness, No CVA tenderness.   Extremities: 1+ pitting edema to bilateral lower extremities. No clubbing, cyanosis, no Homans or cords.  Neurologic: Grossly normal   Psychiatric: Normal for situation    DIAGNOSTIC STUDIES    EKG/LABS  Labs Reviewed   CBC WITH DIFFERENTIAL - Abnormal; Notable for the following components:       Result Value    WBC 14.9 (*)     RBC 4.27 (*)     Hemoglobin 13.4 (*)     Hematocrit 40.4 (*)     Neutrophils-Polys 80.20 (*)     Lymphocytes 7.20 (*)     Neutrophils (Absolute) 11.92 (*)     Monos (Absolute) 1.69 (*)     All other components within normal limits   COMP METABOLIC PANEL - Abnormal; Notable for the following components:    Sodium 132 (*)     Glucose 329 (*)     Creatinine 1.62 (*)     All other components within normal limits   TROPONIN - Abnormal; Notable for the following components:    Troponin T 61 (*)     All other components within normal limits   PROBRAIN NATRIURETIC PEPTIDE, NT - Abnormal; Notable for the following components:    NT-proBNP 876 (*)     All other components within normal limits   URINALYSIS - Abnormal; Notable for the following components:    Color Orange (*)     Character Turbid (*)     Glucose 500 (*)     Protein 300 (*)     Bilirubin Small (*)     Nitrite Positive (*)     Leukocyte Esterase Large (*)     Occult Blood Large (*)     All other components within normal limits   ESTIMATED GFR - Abnormal; Notable for the following components:    GFR (CKD-EPI) 44 (*)     All other components within normal limits   URINE MICROSCOPIC (W/UA) - Abnormal;  Notable for the following components:    WBC 21-50 (*)     RBC 21-50 (*)     Bacteria Few (*)     Epithelial Cells 6-10 (*)     All other components within normal limits   TROPONIN - Abnormal; Notable for the following components:    Troponin T 44 (*)     All other components within normal limits   LIPASE     Results for orders placed or performed during the hospital encounter of 24   EKG   Result Value Ref Range    Report       [facility] Emergency Dept.    Test Date:  2024  Pt Name:    SEAMUS ALVAREZ                   Department: ER  MRN:        [id number]                      Room:        02  Gender:     Male                         Technician: [id number]  :        [id number]                   Requested By:MAX MADSEN  Order #:    [id number]                    Reading MD:    Measurements  Intervals                                Axis  Rate:       68                           P:          47  SC:         185                          QRS:        79  QRSD:       100                          T:          36  QT:         392  QTc:        417    Interpretive Statements  Sinus rhythm  Multiple premature complexes, vent & supraven  Borderline low voltage, extremity leads  Compared to ECG 2024 09:01:20  Sinus pause or arrest no longer present       I have independently interpreted this EKG    RADIOLOGY/PROCEDURES   The attending emergency physician has independently interpreted the diagnostic imaging associated with this visit and am waiting the final reading from the radiologist.   My preliminary interpretation is a follows: No focal infiltrate in the chest.  No intracranial abnormality on CT head.  Ultrasound shows no obstructive changes.    Radiologist interpretation:  US-RENAL   Final Result         1.  Left nephrolithiasis without visualized obstructive changes.   2.  Bilateral renal cysts      CT-HEAD W/O   Final Result         1.  No acute intracranial abnormality is identified,  there are nonspecific white matter changes, commonly associated with small vessel ischemic disease.  Associated mild cerebral atrophy is noted.   2.  Atherosclerosis.               DX-CHEST-PORTABLE (1 VIEW)   Final Result         1.  Left basilar atelectasis, no focal infiltrate          COURSE & MEDICAL DECISION MAKING     ASSESSMENT, COURSE AND PLAN  Care Narrative:       8:39 PM- Patient seen and examined at bedside.  Patient is a 74-year-old with history of CHF, insulin-dependent diabetes, recent urologic procedure for obstructed and infected kidney stone presented to the emergency department with generalized weakness, fall out of chair with no LOC.  Patient evaluated, originally arrived tachycardic but was not hypotensive.  Patient does not appear toxic or in acute distress.  Physical exam largely unremarkable, did have an abrasion over his head, does not take any blood thinners.  Suspect likely infection, urinary source.  Patient had a recent lithotripsy and stent placements, was placed on Macrobid by urology.  Will obtain CBC, CMP, urinalysis, troponin, BNP, lipase.  Will also get a chest x-ray.  Patient was hypoxic on room air, suspect possibly underlying fluid overload.  Will get x-ray to evaluate for any pneumonia.  Discussed plan of care, including imaging and labs. Patient agrees to the plan of care. Ordered for DX chest, troponin, lipase, CMP, CBC w/ differential, UA, pro B fredi peptide to evaluate his symptoms.     Labs showed elevated troponin, mildly elevated BNP.  Urinalysis shows leukocyte Estrace, nitrates, WBCs, RBCs, bacteria.  Kidney function improving compared to previous, patient does have a leukocytosis of 15 with left shift although downtrending compared to most recent lab.    11:00 PM- Reviewed the patient's labs, urine is grossly infected. Paged Urologist on call, whom works with the patient's Urologist who recently completed his most recent surgery.     11:01 PM- Patient was reevaluated  at bedside. Discussed lab and radiology results with the patient and informed him of plans of care moving forward, as we are waiting to hear back from Urology.     11:10 PM-  I discussed the patient's case and the above findings with Dr. Titus (urology) who recommends renal US and admission for IV antibiotics.     11:15- Spoke with Pharmacy, going to place the patient on Cefepime.     Patient presentation concerning for urinary tract infection refractory to oral antibiotics, acute respiratory failure with hypoxia, fluid overload, elevated troponin.  Patient will be admitted for further evaluation, monitoring and management.    11:49 PM-  I discussed the patient's case and the above findings with Dr. Leger (Hospitalist) who agrees to see the patient for admission.         ADDITIONAL PROBLEM LIST  Good    DISPOSITION AND DISCUSSIONS  I have discussed management of the patient with the following physicians and JORDYN's:  Dr. Leger (Hospitalist) Dr. Titus (urology)    Discussion of management with other QHP or appropriate source(s): Pharmacy regarding Cefepime administration      DISPOSITION:  Patient will be hospitalized by Dr. Leger in guarded condition.    FINAL DIANGOSIS  1. Urinary tract infection with hematuria, site unspecified Acute   2. Elevated troponin Acute   3. Acute on chronic congestive heart failure, unspecified heart failure type (HCC) Acute   4. Acute respiratory failure with hypoxia (HCC) Acute          The note accurately reflects work and decisions made by me.  Dawit Andrade M.D.  11/18/2024  3:24 AM

## 2024-11-18 NOTE — ED NOTES
Medication history reviewed with patient/family at bedside  Med rec is complete  Allergies reviewed  Anticoagulants taken in the last 14 days? Yes  Anticoagulant: Eliquis, Last dose 11/17/24 AM    Dali Wharton, ElijahT

## 2024-11-18 NOTE — CARE PLAN
The patient is Stable - Low risk of patient condition declining or worsening    Shift Goals  Clinical Goals: wean O2, pt will remain free of falls this shift  Patient Goals: rest  Family Goals: disha    Progress made toward(s) clinical / shift goals:  alert and oriented, stand by assist, bed in low position, call light within reach, bed alarm on for safety.   Problem: Knowledge Deficit - Standard  Goal: Patient and family/care givers will demonstrate understanding of plan of care, disease process/condition, diagnostic tests and medications  Description: Target End Date:  1-3 days or as soon as patient condition allows    Document in Patient Education    1.  Patient and family/caregiver oriented to unit, equipment, visitation policy and means for communicating concern  2.  Complete/review Learning Assessment  3.  Assess knowledge level of disease process/condition, treatment plan, diagnostic tests and medications  4.  Explain disease process/condition, treatment plan, diagnostic tests and medications  Outcome: Progressing     Problem: Fall Risk  Goal: Patient will remain free from falls  Description: Target End Date:  Prior to discharge or change in level of care    Document interventions on the Angela Meraz Fall Risk Assessment    1.  Assess for fall risk factors  2.  Implement fall precautions  Outcome: Progressing       Patient is not progressing towards the following goals:

## 2024-11-19 ENCOUNTER — PHARMACY VISIT (OUTPATIENT)
Dept: PHARMACY | Facility: MEDICAL CENTER | Age: 74
End: 2024-11-19
Payer: COMMERCIAL

## 2024-11-19 VITALS
SYSTOLIC BLOOD PRESSURE: 134 MMHG | OXYGEN SATURATION: 91 % | DIASTOLIC BLOOD PRESSURE: 77 MMHG | BODY MASS INDEX: 35.89 KG/M2 | RESPIRATION RATE: 18 BRPM | HEART RATE: 91 BPM | WEIGHT: 265 LBS | HEIGHT: 72 IN | TEMPERATURE: 98 F

## 2024-11-19 LAB
ALBUMIN SERPL BCP-MCNC: 3.4 G/DL (ref 3.2–4.9)
ALBUMIN/GLOB SERPL: 1 G/DL
ALP SERPL-CCNC: 106 U/L (ref 30–99)
ALT SERPL-CCNC: 19 U/L (ref 2–50)
ANION GAP SERPL CALC-SCNC: 11 MMOL/L (ref 7–16)
AST SERPL-CCNC: 31 U/L (ref 12–45)
BASOPHILS # BLD AUTO: 0.7 % (ref 0–1.8)
BASOPHILS # BLD: 0.06 K/UL (ref 0–0.12)
BILIRUB SERPL-MCNC: 0.7 MG/DL (ref 0.1–1.5)
BUN SERPL-MCNC: 25 MG/DL (ref 8–22)
CALCIUM ALBUM COR SERPL-MCNC: 9.3 MG/DL (ref 8.5–10.5)
CALCIUM SERPL-MCNC: 8.8 MG/DL (ref 8.5–10.5)
CHLORIDE SERPL-SCNC: 102 MMOL/L (ref 96–112)
CO2 SERPL-SCNC: 25 MMOL/L (ref 20–33)
CREAT SERPL-MCNC: 1.56 MG/DL (ref 0.5–1.4)
EOSINOPHIL # BLD AUTO: 0.27 K/UL (ref 0–0.51)
EOSINOPHIL NFR BLD: 3 % (ref 0–6.9)
ERYTHROCYTE [DISTWIDTH] IN BLOOD BY AUTOMATED COUNT: 49.1 FL (ref 35.9–50)
GFR SERPLBLD CREATININE-BSD FMLA CKD-EPI: 46 ML/MIN/1.73 M 2
GLOBULIN SER CALC-MCNC: 3.4 G/DL (ref 1.9–3.5)
GLUCOSE BLD STRIP.AUTO-MCNC: 188 MG/DL (ref 65–99)
GLUCOSE BLD STRIP.AUTO-MCNC: 465 MG/DL (ref 65–99)
GLUCOSE SERPL-MCNC: 168 MG/DL (ref 65–99)
HCT VFR BLD AUTO: 44.5 % (ref 42–52)
HGB BLD-MCNC: 14.7 G/DL (ref 14–18)
IMM GRANULOCYTES # BLD AUTO: 0.04 K/UL (ref 0–0.11)
IMM GRANULOCYTES NFR BLD AUTO: 0.4 % (ref 0–0.9)
LYMPHOCYTES # BLD AUTO: 1.22 K/UL (ref 1–4.8)
LYMPHOCYTES NFR BLD: 13.6 % (ref 22–41)
MCH RBC QN AUTO: 31.3 PG (ref 27–33)
MCHC RBC AUTO-ENTMCNC: 33 G/DL (ref 32.3–36.5)
MCV RBC AUTO: 94.9 FL (ref 81.4–97.8)
MONOCYTES # BLD AUTO: 1.03 K/UL (ref 0–0.85)
MONOCYTES NFR BLD AUTO: 11.5 % (ref 0–13.4)
NEUTROPHILS # BLD AUTO: 6.36 K/UL (ref 1.82–7.42)
NEUTROPHILS NFR BLD: 70.8 % (ref 44–72)
NRBC # BLD AUTO: 0 K/UL
NRBC BLD-RTO: 0 /100 WBC (ref 0–0.2)
PLATELET # BLD AUTO: 245 K/UL (ref 164–446)
PMV BLD AUTO: 10.6 FL (ref 9–12.9)
POTASSIUM SERPL-SCNC: 3.7 MMOL/L (ref 3.6–5.5)
PROT SERPL-MCNC: 6.8 G/DL (ref 6–8.2)
RBC # BLD AUTO: 4.69 M/UL (ref 4.7–6.1)
SODIUM SERPL-SCNC: 138 MMOL/L (ref 135–145)
WBC # BLD AUTO: 9 K/UL (ref 4.8–10.8)

## 2024-11-19 PROCEDURE — 99239 HOSP IP/OBS DSCHRG MGMT >30: CPT | Performed by: STUDENT IN AN ORGANIZED HEALTH CARE EDUCATION/TRAINING PROGRAM

## 2024-11-19 PROCEDURE — 700102 HCHG RX REV CODE 250 W/ 637 OVERRIDE(OP): Performed by: HOSPITALIST

## 2024-11-19 PROCEDURE — A9270 NON-COVERED ITEM OR SERVICE: HCPCS | Performed by: HOSPITALIST

## 2024-11-19 PROCEDURE — 700111 HCHG RX REV CODE 636 W/ 250 OVERRIDE (IP): Performed by: HOSPITALIST

## 2024-11-19 PROCEDURE — 80053 COMPREHEN METABOLIC PANEL: CPT

## 2024-11-19 PROCEDURE — 82962 GLUCOSE BLOOD TEST: CPT

## 2024-11-19 PROCEDURE — 87040 BLOOD CULTURE FOR BACTERIA: CPT

## 2024-11-19 PROCEDURE — 85025 COMPLETE CBC W/AUTO DIFF WBC: CPT

## 2024-11-19 PROCEDURE — 97166 OT EVAL MOD COMPLEX 45 MIN: CPT

## 2024-11-19 PROCEDURE — RXMED WILLOW AMBULATORY MEDICATION CHARGE: Performed by: STUDENT IN AN ORGANIZED HEALTH CARE EDUCATION/TRAINING PROGRAM

## 2024-11-19 PROCEDURE — 700105 HCHG RX REV CODE 258: Performed by: HOSPITALIST

## 2024-11-19 RX ORDER — CIPROFLOXACIN 500 MG/1
500 TABLET, FILM COATED ORAL 2 TIMES DAILY
Qty: 16 TABLET | Refills: 0 | Status: ON HOLD | OUTPATIENT
Start: 2024-11-19 | End: 2024-11-29

## 2024-11-19 RX ADMIN — MEROPENEM 500 MG: 500 INJECTION, POWDER, FOR SOLUTION INTRAVENOUS at 12:06

## 2024-11-19 RX ADMIN — INSULIN LISPRO 2 UNITS: 100 INJECTION, SOLUTION INTRAVENOUS; SUBCUTANEOUS at 08:09

## 2024-11-19 RX ADMIN — GABAPENTIN 600 MG: 300 CAPSULE ORAL at 05:20

## 2024-11-19 RX ADMIN — APIXABAN 5 MG: 5 TABLET, FILM COATED ORAL at 05:21

## 2024-11-19 RX ADMIN — MEROPENEM 500 MG: 500 INJECTION, POWDER, FOR SOLUTION INTRAVENOUS at 00:51

## 2024-11-19 RX ADMIN — AMLODIPINE BESYLATE 5 MG: 5 TABLET ORAL at 05:21

## 2024-11-19 RX ADMIN — TERAZOSIN HYDROCHLORIDE 10 MG: 5 CAPSULE ORAL at 05:20

## 2024-11-19 RX ADMIN — INSULIN LISPRO 9 UNITS: 100 INJECTION, SOLUTION INTRAVENOUS; SUBCUTANEOUS at 12:11

## 2024-11-19 RX ADMIN — FINASTERIDE 5 MG: 5 TABLET, FILM COATED ORAL at 05:21

## 2024-11-19 RX ADMIN — PAROXETINE HYDROCHLORIDE 20 MG: 20 TABLET, FILM COATED ORAL at 05:21

## 2024-11-19 RX ADMIN — MEROPENEM 500 MG: 500 INJECTION, POWDER, FOR SOLUTION INTRAVENOUS at 05:22

## 2024-11-19 ASSESSMENT — COGNITIVE AND FUNCTIONAL STATUS - GENERAL
DRESSING REGULAR LOWER BODY CLOTHING: A LITTLE
HELP NEEDED FOR BATHING: A LITTLE
DRESSING REGULAR UPPER BODY CLOTHING: A LITTLE
DAILY ACTIVITIY SCORE: 20
SUGGESTED CMS G CODE MODIFIER DAILY ACTIVITY: CJ
TOILETING: A LITTLE

## 2024-11-19 ASSESSMENT — ACTIVITIES OF DAILY LIVING (ADL): TOILETING: INDEPENDENT

## 2024-11-19 ASSESSMENT — PAIN DESCRIPTION - PAIN TYPE: TYPE: ACUTE PAIN

## 2024-11-19 NOTE — PROGRESS NOTES
Hospital Medicine Daily Progress Note    Date of Service  11/18/2024    Chief Complaint  Ramu Barber is a 74 y.o. male admitted 11/17/2024 with sudden onset severe weakness, inability to stand up.    Hospital Course  Ramu Barber is a 74 y.o. male who presented 11/17/2024 with past medical history of ESBL, atrial fibrillation, CKD stage III, hypertension, sleep apnea, hyperlipidemia, diabetes history of pulm embolism who presents to the hospital for generalized weakness that started today.  Patient was unable to get out of a chair today.  He does complain about back pain but does not radiate down the lower extremities.  He denies any muscle weakness or numbness.  Patient was recently admitted in the hospital 11/1 for nephrolithiasis and urine infection.  The patient did have a stent placement on 11/1.  Patient still has the stent and it was close removed in a week week..  Patient's urine culture grew Pseudomonas and he was discharged on ciprofloxacin.  The patient states that the antibiotics was changed as an outpatient to Macrobid.  He denies any fevers, cough, nausea, vomiting, diarrhea.     Chest x-ray interpreted by me and found no acute pulmonary process  CT scan of the head was negative  EKG interpreted by me for normal sinus rhythm, frequent PACs and PVCs    Interval Problem Update  11/18:  ordered UC, BCs x2 given his severity of symptoms and recent ureteral stent placement.  He was started on meropenem IV on admission, Urine culture added on to existing urine sample, but blood cultures may not be true given post antibiotics. Continue meropenem until cultures returned.  Reviewed Urine culture from 11/1/24 with pseudomonas noted ss to cipro.    I have discussed this patient's plan of care and discharge plan at IDT rounds today with Case Management, Nursing, Nursing leadership, and other members of the IDT team.    Consultants/Specialty      Code Status  Full Code    Disposition  The patient is not  medically cleared for discharge to home or a post-acute facility.  Anticipate discharge to: home with close outpatient follow-up    I have placed the appropriate orders for post-discharge needs.    Review of Systems  Review of Systems   Constitutional:  Positive for malaise/fatigue. Negative for chills, diaphoresis and fever.   HENT:  Negative for congestion and sore throat.    Eyes:  Negative for pain and discharge.   Respiratory:  Negative for cough, hemoptysis, sputum production, shortness of breath and wheezing.    Cardiovascular:  Negative for chest pain, palpitations, claudication and leg swelling.   Gastrointestinal:  Negative for abdominal pain, constipation, diarrhea, melena, nausea and vomiting.   Genitourinary:  Negative for dysuria, frequency and urgency.   Musculoskeletal:  Negative for back pain, joint pain, myalgias and neck pain.   Skin:  Negative for itching and rash.   Neurological:  Negative for dizziness, sensory change, speech change, focal weakness, loss of consciousness, weakness and headaches.   Endo/Heme/Allergies:  Does not bruise/bleed easily.   Psychiatric/Behavioral:  Negative for depression, substance abuse and suicidal ideas.         Physical Exam  Temp:  [36.3 °C (97.4 °F)-36.9 °C (98.5 °F)] 36.6 °C (97.8 °F)  Pulse:  [54-77] 70  Resp:  [16-20] 18  BP: (107-153)/(67-86) 125/74  SpO2:  [92 %-96 %] 92 %    Physical Exam  Vitals and nursing note reviewed.   Constitutional:       General: He is not in acute distress.     Appearance: Normal appearance. He is not diaphoretic.   HENT:      Head: Normocephalic and atraumatic.      Mouth/Throat:      Pharynx: No oropharyngeal exudate.   Eyes:      General: No scleral icterus.        Right eye: No discharge.         Left eye: No discharge.      Conjunctiva/sclera: Conjunctivae normal.      Pupils: Pupils are equal, round, and reactive to light.   Neck:      Thyroid: No thyromegaly.      Vascular: No JVD.      Trachea: No tracheal deviation.    Cardiovascular:      Rate and Rhythm: Normal rate and regular rhythm.      Heart sounds: Normal heart sounds. No murmur heard.     No friction rub. No gallop.   Pulmonary:      Effort: Pulmonary effort is normal. No respiratory distress.      Breath sounds: Normal breath sounds. No wheezing or rales.   Chest:      Chest wall: No tenderness.   Abdominal:      General: Bowel sounds are normal. There is no distension.      Palpations: Abdomen is soft. There is no mass.      Tenderness: There is no abdominal tenderness. There is no guarding or rebound.   Musculoskeletal:         General: No tenderness. Normal range of motion.      Cervical back: Normal range of motion and neck supple.   Lymphadenopathy:      Cervical: No cervical adenopathy.   Skin:     General: Skin is warm and dry.      Capillary Refill: Capillary refill takes less than 2 seconds.      Findings: No erythema or rash.   Neurological:      General: No focal deficit present.      Mental Status: He is alert and oriented to person, place, and time.      Cranial Nerves: No cranial nerve deficit.      Motor: No abnormal muscle tone.   Psychiatric:         Mood and Affect: Mood normal.         Behavior: Behavior normal.         Thought Content: Thought content normal.         Judgment: Judgment normal.         Fluids    Intake/Output Summary (Last 24 hours) at 11/18/2024 2130  Last data filed at 11/18/2024 0844  Gross per 24 hour   Intake 240 ml   Output 950 ml   Net -710 ml        Laboratory  Recent Labs     11/17/24 2039   WBC 14.9*   RBC 4.27*   HEMOGLOBIN 13.4*   HEMATOCRIT 40.4*   MCV 94.6   MCH 31.4   MCHC 33.2   RDW 49.7   PLATELETCT 248   MPV 11.0     Recent Labs     11/17/24 2039   SODIUM 132*   POTASSIUM 3.9   CHLORIDE 97   CO2 23   GLUCOSE 329*   BUN 22   CREATININE 1.62*   CALCIUM 8.6                   Imaging  US-RENAL   Final Result         1.  Left nephrolithiasis without visualized obstructive changes.   2.  Bilateral renal cysts       CT-HEAD W/O   Final Result         1.  No acute intracranial abnormality is identified, there are nonspecific white matter changes, commonly associated with small vessel ischemic disease.  Associated mild cerebral atrophy is noted.   2.  Atherosclerosis.               DX-CHEST-PORTABLE (1 VIEW)   Final Result         1.  Left basilar atelectasis, no focal infiltrate           Assessment/Plan  * Acute pyelonephritis- (present on admission)  Assessment & Plan  symptoms includes dysuria and frequency  UA + for bacteria, LE and nitrite  F/u urine cultures, unfortunately no cultures sent on admission 11/17.  Ordered add on urine culture 11/18, BCs for am. 11/19.   Started pt on meropenem IV on admission.  Previous cultures found ESBL and Pseudomonas.      Nephrolithiasis- (present on admission)  Assessment & Plan  Patient had a recent lithotripsy and has a stent in place.  We will need to speak with urology about stent exchange    AF (atrial fibrillation) (HCC)  Assessment & Plan  Rate controlled  Continue Eliquis    Stage 3a chronic kidney disease- (present on admission)  Assessment & Plan  Monitor BMP and assess response  Avoid IV contrast/nephrotoxins/NSAIDs  Dose adjust meds for decreased GFR      Heart failure with preserved ejection fraction, borderline, class II (HCC)- (present on admission)  Assessment & Plan  Compensated  Monitor volume status    Pulmonary hypertension (HCC)- (present on admission)  Assessment & Plan  Monitor volume status    Acute respiratory failure with hypoxia (HCC)- (present on admission)  Assessment & Plan  On 2 L of O2 above baseline    Type 2 diabetes mellitus without complication, without long-term current use of insulin (HCC)- (present on admission)  Assessment & Plan  Start on insulin sliding scale with serial Accu-Checks  Check hemoglobin A1c  Hypoglycemic protocol in place           VTE prophylaxis:    therapeutic anticoagulation with eliquis 5 mg BID      I have performed a  physical exam and reviewed and updated ROS and Plan today (11/18/2024). In review of yesterday's note (11/17/2024), there are no changes except as documented above.

## 2024-11-19 NOTE — DISCHARGE PLANNING
Care Transition Team Assessment    Primary emergency Contact is pt's Wife Nona [phone]     LMSW met with pt at bedside to complete discharge assessment and chart review was completed to obtain the information used in this assessment. Pt is A/Ox4 and agreeable to assessment. Pt verified information on face sheet.      - Prior to admission, pt lived with his wife Nona of 54 years at [address] [address]   - Per pt, family is good support for discharge back into community and wife Nona is the patient's DC home today  - Pt stated to be independent with ADL/IADLs and drives self-prior to admission.   - Per pt, they have a Cane, Walker, and Wheelchair for use if needed  - Pt is retired and insured through [employer] and Medicare  - Preferred pharmacy is [facility] at [address] [place], [place] [id number]    LMSW met with the patient at bedside to complete an IMM. IMM scanned to DPA.     Information Source  Orientation Level: Oriented X4  Information Given By: Patient  Who is responsible for making decisions for patient? : Patient    Readmission Evaluation  Is this a readmission?: Yes - unplanned readmission  Was an appointment arranged for you prior to discharge?: Yes, attended appointment  Were there new prescriptions you were supposed to fill after you were discharged?: Yes, prescriptions filled  Did you understand your discharge instructions?: Yes  Did you have enough support after your last discharge?: Yes    Elopement Risk  Legal Hold: No  Ambulatory or Self Mobile in Wheelchair: Yes  Disoriented: No  Psychiatric Symptoms: None  History of Wandering: No  Elopement this Admit: No  Vocalizing Wanting to Leave: No  Displays Behaviors, Body Language Wanting to Leave: No-Not at Risk for Elopement  Elopement Risk: Not at Risk for Elopement    Interdisciplinary Discharge Planning  Lives with - Patient's Self Care Capacity: Spouse  Patient or legal guardian wants to designate a caregiver:  No  Support Systems: Family Member(s)  Housing / Facility: 1 Story House    Discharge Preparedness  What is your plan after discharge?: Home with help  What are your discharge supports?: Spouse  Prior Functional Level: Independent with Activities of Daily Living, Independent with Medication Management, Ambulatory, Uses Cane, Uses Walker  Difficulity with ADLs: Bathing, Walking  Difficulity with IADLs: Laundry, Shopping    Functional Assesment  Prior Functional Level: Independent with Activities of Daily Living, Independent with Medication Management, Ambulatory, Uses Cane, Uses Walker    Finances  Financial Barriers to Discharge: No  Prescription Coverage: Yes    Vision / Hearing Impairment  Right Eye Vision: Wears Glasses  Left Eye Vision: Wears Glasses         Advance Directive  Advance Directive?: None    Domestic Abuse  Have you ever been the victim of abuse or violence?: No  Possible Abuse/Neglect Reported to:: Not Applicable    Psychological Assessment  History of Substance Abuse: None  History of Psychiatric Problems: No  Non-compliant with Treatment: No  Newly Diagnosed Illness: No    Discharge Risks or Barriers  Discharge risks or barriers?: Complex medical needs  Patient risk factors: Vulnerable adult    Anticipated Discharge Information  Discharge Disposition: Discharged to home/self care (01)

## 2024-11-19 NOTE — DISCHARGE INSTRUCTIONS
You have a urinary tract infection, take the antibiotic until completion   You are set to have your stent removed on 11/26, follow up with urology as scheduled   If you develop any recurrent symptoms seek medical attention

## 2024-11-19 NOTE — CARE PLAN
The patient is Stable - Low risk of patient condition declining or worsening    Shift Goals  Clinical Goals: pt will remain free of falls this shift  Patient Goals: rest  Family Goals: disha    Progress made toward(s) clinical / shift goals:  alert and oriented, stand by assist, bed in low position, call light within reach, bed alarm on for safety.   Problem: Knowledge Deficit - Standard  Goal: Patient and family/care givers will demonstrate understanding of plan of care, disease process/condition, diagnostic tests and medications  Description: Target End Date:  1-3 days or as soon as patient condition allows    Document in Patient Education    1.  Patient and family/caregiver oriented to unit, equipment, visitation policy and means for communicating concern  2.  Complete/review Learning Assessment  3.  Assess knowledge level of disease process/condition, treatment plan, diagnostic tests and medications  4.  Explain disease process/condition, treatment plan, diagnostic tests and medications  Outcome: Progressing     Problem: Mobility  Goal: Risk for activity intolerance will decrease  Outcome: Progressing       Patient is not progressing towards the following goals:

## 2024-11-19 NOTE — CARE PLAN
The patient is Stable - Low risk of patient condition declining or worsening    Shift Goals  Clinical Goals: vss, safety,  Patient Goals: sleep  Family Goals: disha    Progress made toward(s) clinical / shift goals:  patient resting comfortably throughout shift. No new complaints at this time    Patient is not progressing towards the following goals:

## 2024-11-19 NOTE — PROGRESS NOTES
Patient arrived to discharge lounge. PIV removed, tip intact. Discussed discharge paperwork and medications with patient and family. Answered all questions. No home meds to return, meds to beds given to patient. Patient escorted out with family, personal belongings in hand.

## 2024-11-19 NOTE — THERAPY
Occupational Therapy   Initial Evaluation     Patient Name: Ramu Barber  Age:  74 y.o., Sex:  male  Medical Record #: [id number]  Today's Date: 11/19/2024          Assessment    Patient is 74 y.o. male admitted for sudden onset severe weakness, inability to stand up, recent uretal stent placement, pyelonephritis. Pt normally independent with functional mobility and ADLs living in a [facility] with spouse who is able to assist if needed. Pt able to complete all functional mobility and ADLs with supervision and use of FWW which he has several at home. Anticipate pt is at his functional baseline, no acute OT needs identified will complete order at this time.     Plan    DC Equipment Recommendations: (P) None  Discharge Recommendations: (P) Anticipate that the patient will have no further occupational therapy needs after discharge from the hospital     Objective       11/19/24 1110   Prior Living Situation   Prior Services None   Housing / Facility 1 Story House   Steps Into Home 0   Steps In Home 0   Bathroom Set up Walk In Shower   Equipment Owned Front-Wheel Walker;Single Point Cane   Lives with - Patient's Self Care Capacity Spouse   Prior Level of ADL Function   Self Feeding Independent   Grooming / Hygiene Independent   Bathing Independent   Dressing Independent   Toileting Independent   Prior Level of IADL Function   Medication Management Independent   Laundry Independent   Kitchen Mobility Independent   Finances Independent   Home Management Independent   Shopping Independent   Prior Level Of Mobility Independent With Device in Community   Driving / Transportation Driving Independent   Occupation (Pre-Hospital Vocational) Retired Due To Age   History of Falls   History of Falls No   Vitals   O2 Delivery Device None - Room Air   Pain 0 - 10 Group   Therapist Pain Assessment Post Activity Pain Same as Prior to Activity;Nurse Notified   Cognition    Cognition / Consciousness WDL   Level of Consciousness Alert   Active  ROM Upper Body   Active ROM Upper Body  WDL   Dominant Hand Right   Strength Upper Body   Upper Body Strength  WDL   Sensation Upper Body   Upper Extremity Sensation  WDL   Upper Body Muscle Tone   Upper Body Muscle Tone  WDL   Neurological Concerns   Neurological Concerns No   Coordination Upper Body   Coordination WDL   Balance Assessment   Sitting Balance (Static) Good   Sitting Balance (Dynamic) Good   Standing Balance (Static) Good   Standing Balance (Dynamic) Good   Weight Shift Sitting Good   Weight Shift Standing Good   Comments w/ FWW   Bed Mobility    Comments up in chair before/after   ADL Assessment   Grooming Supervision;Standing   Upper Body Dressing Supervision   Lower Body Dressing Supervision   How much help from another person does the patient currently need...   Putting on and taking off regular lower body clothing? 3   Bathing (including washing, rinsing, and drying)? 3   Toileting, which includes using a toilet, bedpan, or urinal? 3   Putting on and taking off regular upper body clothing? 3   Taking care of personal grooming such as brushing teeth? 4   Eating meals? 4   6 Clicks Daily Activity Score 20   Functional Mobility   Sit to Stand Supervised   Bed, Chair, Wheelchair Transfer Supervised   Transfer Method Stand Step   Mobility STS from chair, up to sink, hallway mobility, back to chair   Comments w/ FWW   Activity Tolerance   Sitting in Chair returned to chair at end of session   Standing 12 min   Education Group   Education Provided Role of Occupational Therapist   Role of Occupational Therapist Patient Response Patient;Acceptance;Explanation   Problem List   Problem List None   Anticipated Discharge Equipment and Recommendations   DC Equipment Recommendations None   Discharge Recommendations Anticipate that the patient will have no further occupational therapy needs after discharge from the hospital   Interdisciplinary Plan of Care Collaboration   IDT Collaboration with  Nursing    Patient Position at End of Therapy Seated;Chair Alarm On;Call Light within Reach;Tray Table within Reach;Phone within Reach   Collaboration Comments RN updated

## 2024-11-19 NOTE — DISCHARGE SUMMARY
Discharge Summary    CHIEF COMPLAINT ON ADMISSION  Chief Complaint   Patient presents with    Weakness     Pt BIB EMS after pt had episode of weakness and slid from chair to ground when attempting to stand. -HS, +Eliquis. Upon Ems arrival pt GCS 15 with BP difference x>20 from left to right arm which resolved once pt repositioned. Pt placed on 4 L NC for O2 @ 88% on RA. HX recent kidney stent placement       Reason for Admission  ems     Admission Date  11/17/2024    CODE STATUS  Full Code    HPI & HOSPITAL COURSE  Ramu Barber is a very pleasant 74 y.o. male who presented 11/17/2024 with past medical history of ESBL, atrial fibrillation, CKD stage III, hypertension, sleep apnea, hyperlipidemia, diabetes history of pulmonary embolism who presents to the hospital for generalized weakness that started day of admission.  He denies any muscle weakness or numbness.  Patient was recently admitted in the hospital 11/1 for nephrolithiasis and urine infection.  The patient did have a stent placement on 11/1.  Patient still has the stent and it was planned to have removal on 11/26.  Patient's previous urine culture growing Pseudomonas and and he was discharged on ciprofloxacin which was changed to macrobid. On initial evaluation chest xray and CT head negative. UA was grossly infected. He was started on antibiotics with meropenum prior to urine collection for culture. He did improved, based on previous cx he was discharged with additional week of cirpfloxacin. He is set to have his stent removed with urology on 11/26, antibiotics until post stent removal was recommended by urology.   At the time of discharge he is feeling improved and eager for discharge. He was given strict return precautions should he have any recurrent symptoms.       Therefore, he is discharged in fair and stable condition to home with close outpatient follow-up.    The patient met 2-midnight criteria for an inpatient stay at the time of  discharge.    Discharge Date  11/19/2024    FOLLOW UP ITEMS POST DISCHARGE  Resolution of UTI, Stent removal   Chronic medical conditions     DISCHARGE DIAGNOSES  Principal Problem:    Acute pyelonephritis (POA: Yes)  Active Problems:    Type 2 diabetes mellitus without complication, without long-term current use of insulin (HCC) (POA: Yes)    Acute respiratory failure with hypoxia (HCC) (POA: Yes)    Pulmonary hypertension (HCC) (POA: Yes)    Heart failure with preserved ejection fraction, borderline, class II (HCC) (POA: Yes)    Stage 3a chronic kidney disease (POA: Yes)    AF (atrial fibrillation) (HCC) (POA: Unknown)    Nephrolithiasis (POA: Yes)  Resolved Problems:    * No resolved hospital problems. *      FOLLOW UP  No future appointments.  Aleksander Foley M.D.  [address]  [place] [address]  [phone]    Follow up  go to your appointment    Chuck Chappell M.D.  [address]  [place] [address]  [phone]    Schedule an appointment as soon as possible for a visit in 2 week(s)  hospital follow up      MEDICATIONS ON DISCHARGE     Medication List        CHANGE how you take these medications        Instructions   ciprofloxacin 500 MG Tabs  What changed:   medication strength  how much to take  additional instructions  Commonly known as: Cipro   Take 1 Tablet by mouth 2 times a day for 8 days.  Dose: 500 mg            CONTINUE taking these medications        Instructions   allopurinol 100 MG Tabs  Commonly known as: Zyloprim   Take 100 mg by mouth every day.  Dose: 100 mg     amLODIPine 5 MG Tabs  Commonly known as: Norvasc   Take 5 mg by mouth every day.  Dose: 5 mg     Eliquis 5 MG Tabs  Generic drug: apixaban   Take 5 mg by mouth 2 times a day.  Dose: 5 mg     finasteride 5 MG Tabs  Commonly known as: Proscar   Take 1 Tablet by mouth every day.  Dose: 5 mg     gabapentin 600 MG tablet  Commonly known as: Neurontin   Take 600 mg by mouth 2 times a day.  Dose: 600 mg     glipiZIDE ER 2.5 MG  Tb24  Commonly known as: Glucotrol XL   Take 2.5 mg by mouth 2 times a day.  Dose: 2.5 mg     Mirabegron ER 50 MG Tb24   Take 50 mg by mouth every day.  Dose: 50 mg     Ozempic (1 MG/DOSE) 2 MG/1.5ML Sopn  Generic drug: Semaglutide (1 MG/DOSE)   Inject 1 mg under the skin every 7 days.  Dose: 1 mg     PARoxetine 20 MG Tabs  Commonly known as: Paxil   Take 20 mg by mouth every day.  Dose: 20 mg     phenazopyridine 200 MG Tabs  Commonly known as: Pyridium   Take 200 mg by mouth 3 times a day. 4 day course started 11/13/24  Dose: 200 mg     rosuvastatin 20 MG Tabs  Commonly known as: Crestor   Take 1 Tablet by mouth every evening.  Dose: 20 mg     SM Vitamin D3 25 MCG (1000 UT) Tabs  Generic drug: vitamin D   Take 1,000 Units by mouth every day.  Dose: 1,000 Units     terazosin 10 MG capsule  Commonly known as: Hytrin   Take 10 mg by mouth every day.  Dose: 10 mg            STOP taking these medications      nitrofurantoin 100 MG Caps  Commonly known as: Macrobid              Allergies  No Known Allergies    DIET  Orders Placed This Encounter   Procedures    Diet Order Diet: Regular     Standing Status:   Standing     Number of Occurrences:   1     Order Specific Question:   Diet:     Answer:   Regular [1]       ACTIVITY  As tolerated.      CONSULTATIONS  Urology     PROCEDURES  NA    LABORATORY  Lab Results   Component Value Date    SODIUM 138 11/19/2024    POTASSIUM 3.7 11/19/2024    CHLORIDE 102 11/19/2024    CO2 25 11/19/2024    GLUCOSE 168 (H) 11/19/2024    BUN 25 (H) 11/19/2024    CREATININE 1.56 (H) 11/19/2024        Lab Results   Component Value Date    WBC 9.0 11/19/2024    HEMOGLOBIN 14.7 11/19/2024    HEMATOCRIT 44.5 11/19/2024    PLATELETCT 245 11/19/2024        Total time of the discharge process exceeds 36 minutes.

## 2024-11-21 LAB
BACTERIA UR CULT: NORMAL
SIGNIFICANT IND 70042: NORMAL
SITE SITE: NORMAL
SOURCE SOURCE: NORMAL

## 2024-11-21 NOTE — DOCUMENTATION QUERY
[facility]                                                                       Query Response Note      PATIENT:               SEAMUS ALVAREZ  ACCT #:                  [id number]  MRN:                     [id number]  :                      1950  ADMIT DATE:       2024 8:34 PM  DISCH DATE:        2024 2:49 PM  RESPONDING  PROVIDER #:        [id number]           QUERY TEXT:    Acute respiratory failure with hypoxia is documented in the H&P.  Patient is noted to be on 2L of O2 above baseline, but with normal pulmonary effort, no respiratory distress, no shortness of breath, and normal breath sounds.  After further study, can the diagnosis of acute respiratory failure be clarified with supportive clinical indicators?    The patient's Clinical Indicators include:  H&P and Progress Note: Acute respiratory failure with hypoxia POA: on 2L of O2 above baseline  Pulmonary: pulmonary effort is normal.  No respiratory distress.  Normal breath sounds.  No stridor.  No wheezing, rhonchi or rales.    Respiratory: Negative for cough, hemoptysis, sputum production, shortness of breath, wheezing and stridor.   Vitals Flowsheet:   SpO2 94% on 4L, RR 18,  SpO2 94% on 2L -> 95% on RA  Treatment: supplemental O2    Important Note:  if new diagnosis or change to documentation made via query please include in daily documentation and/or DC Summary    Thank you,  Ysey Cano RN, BSN, CCDS  Clinical   Connect via jellyfish Messenger  Options provided:   -- Condition of _________________exists, (please document additional clinical indicators)   -- Condition of _______________does not exist and amended documentation provided in the medical record   -- Other explanation, (please specify other explanation)      Query created by: Yesy Cano on 2024 6:57 AM    RESPONSE TEXT:    Condition of _______________exists -  hypoxia       QUERY TEXT:    Please clarify the relationship between Acute Pyelonephritis and recent lithotripsy procedure:    Documentation indicators that raised basis for question:   H&P:  Acute pyelonephritis  Nephrolithiasis: pt had a recent lithotripsy and has a stent in place  Risk Factors: history of nephrolithiasis w/ lithotripsy and stent placement  Treatment: Cefepime, Merrem    Important Note:  if new diagnosis or change to documentation made via query please include in daily documentation and/or DC Summary    Thank you,  Yesy Cano RN, BSN, CCDS  Clinical   Connect via uGift  Options provided:   -- Acute pyelonephritis is unexpected and a complication of the procedure performed   -- Acute pyelonephritis is not a complication due to or associated with the procedure   -- Acute pyelonephritis is related to another etiology, (please document underlying etiology)   -- Other explanation, please specify   -- Unable to determine      Query created by: Yesy Cano on 11/19/2024 6:57 AM    RESPONSE TEXT:    Acute pyelonephritis is unexpected and a complication of the procedure performed          Electronically signed by:  NAHID LOPES 11/21/2024 7:27 AM

## 2024-11-27 ENCOUNTER — HOSPITAL ENCOUNTER (INPATIENT)
Facility: MEDICAL CENTER | Age: 74
LOS: 3 days | End: 2024-11-30
Attending: STUDENT IN AN ORGANIZED HEALTH CARE EDUCATION/TRAINING PROGRAM
Payer: COMMERCIAL

## 2024-11-27 ENCOUNTER — APPOINTMENT (OUTPATIENT)
Dept: RADIOLOGY | Facility: MEDICAL CENTER | Age: 74
DRG: 853 | End: 2024-11-27
Attending: STUDENT IN AN ORGANIZED HEALTH CARE EDUCATION/TRAINING PROGRAM
Payer: COMMERCIAL

## 2024-11-27 ENCOUNTER — ANESTHESIA (OUTPATIENT)
Dept: SURGERY | Facility: MEDICAL CENTER | Age: 74
End: 2024-11-27
Payer: COMMERCIAL

## 2024-11-27 ENCOUNTER — ANESTHESIA EVENT (OUTPATIENT)
Dept: SURGERY | Facility: MEDICAL CENTER | Age: 74
End: 2024-11-27
Payer: COMMERCIAL

## 2024-11-27 ENCOUNTER — APPOINTMENT (OUTPATIENT)
Dept: RADIOLOGY | Facility: MEDICAL CENTER | Age: 74
DRG: 853 | End: 2024-11-27
Attending: INTERNAL MEDICINE
Payer: COMMERCIAL

## 2024-11-27 ENCOUNTER — APPOINTMENT (OUTPATIENT)
Dept: RADIOLOGY | Facility: MEDICAL CENTER | Age: 74
DRG: 853 | End: 2024-11-27
Payer: COMMERCIAL

## 2024-11-27 ENCOUNTER — APPOINTMENT (OUTPATIENT)
Dept: RADIOLOGY | Facility: MEDICAL CENTER | Age: 74
DRG: 853 | End: 2024-11-27
Attending: UROLOGY
Payer: COMMERCIAL

## 2024-11-27 DIAGNOSIS — N20.1 URETEROLITHIASIS: ICD-10-CM

## 2024-11-27 DIAGNOSIS — R65.20 SEPSIS WITH ACUTE ORGAN DYSFUNCTION WITHOUT SEPTIC SHOCK, DUE TO UNSPECIFIED ORGANISM, UNSPECIFIED ORGAN DYSFUNCTION TYPE (HCC): ICD-10-CM

## 2024-11-27 DIAGNOSIS — A41.9 SEPSIS WITH ACUTE ORGAN DYSFUNCTION WITHOUT SEPTIC SHOCK, DUE TO UNSPECIFIED ORGANISM, UNSPECIFIED ORGAN DYSFUNCTION TYPE (HCC): ICD-10-CM

## 2024-11-27 DIAGNOSIS — N13.2 HYDRONEPHROSIS WITH URINARY OBSTRUCTION DUE TO RENAL CALCULUS: ICD-10-CM

## 2024-11-27 PROBLEM — E66.01 MORBID OBESITY (HCC): Status: ACTIVE | Noted: 2019-06-17

## 2024-11-27 PROBLEM — E87.29 HIGH ANION GAP METABOLIC ACIDOSIS: Status: ACTIVE | Noted: 2024-11-27

## 2024-11-27 PROBLEM — J98.11 ATELECTASIS: Status: ACTIVE | Noted: 2024-11-27

## 2024-11-27 PROBLEM — N28.1 KIDNEY CYSTS: Status: ACTIVE | Noted: 2024-11-27

## 2024-11-27 LAB
ALBUMIN SERPL BCP-MCNC: 3.5 G/DL (ref 3.2–4.9)
ALBUMIN/GLOB SERPL: 1.2 G/DL
ALP SERPL-CCNC: 103 U/L (ref 30–99)
ALT SERPL-CCNC: 25 U/L (ref 2–50)
ANION GAP SERPL CALC-SCNC: 11 MMOL/L (ref 7–16)
ANION GAP SERPL CALC-SCNC: 14 MMOL/L (ref 7–16)
APPEARANCE UR: CLEAR
APTT PPP: 119.8 SEC (ref 24.7–36)
APTT PPP: 31.1 SEC (ref 24.7–36)
APTT PPP: 87.7 SEC (ref 24.7–36)
AST SERPL-CCNC: 27 U/L (ref 12–45)
BACTERIA #/AREA URNS HPF: ABNORMAL /HPF
BACTERIA BLD CULT: NORMAL
BACTERIA BLD CULT: NORMAL
BASE EXCESS BLDA CALC-SCNC: -2 MMOL/L (ref -4–3)
BASE EXCESS BLDA CALC-SCNC: -4 MMOL/L (ref -4–3)
BASOPHILS # BLD AUTO: 0.2 % (ref 0–1.8)
BASOPHILS # BLD AUTO: 0.4 % (ref 0–1.8)
BASOPHILS # BLD: 0.03 K/UL (ref 0–0.12)
BASOPHILS # BLD: 0.04 K/UL (ref 0–0.12)
BILIRUB SERPL-MCNC: 0.4 MG/DL (ref 0.1–1.5)
BILIRUB UR QL STRIP.AUTO: NEGATIVE
BODY TEMPERATURE: ABNORMAL DEGREES
BODY TEMPERATURE: ABNORMAL DEGREES
BREATHS SETTING VENT: 18
BREATHS SETTING VENT: 22
BUN SERPL-MCNC: 17 MG/DL (ref 8–22)
BUN SERPL-MCNC: 22 MG/DL (ref 8–22)
CALCIUM ALBUM COR SERPL-MCNC: 9.1 MG/DL (ref 8.5–10.5)
CALCIUM SERPL-MCNC: 8 MG/DL (ref 8.5–10.5)
CALCIUM SERPL-MCNC: 8.7 MG/DL (ref 8.5–10.5)
CASTS URNS QL MICRO: ABNORMAL /LPF (ref 0–2)
CHLORIDE SERPL-SCNC: 103 MMOL/L (ref 96–112)
CHLORIDE SERPL-SCNC: 106 MMOL/L (ref 96–112)
CO2 BLDA-SCNC: 24 MMOL/L (ref 32–48)
CO2 BLDA-SCNC: 25 MMOL/L (ref 32–48)
CO2 SERPL-SCNC: 20 MMOL/L (ref 20–33)
CO2 SERPL-SCNC: 21 MMOL/L (ref 20–33)
COLOR UR: YELLOW
CREAT SERPL-MCNC: 1.1 MG/DL (ref 0.5–1.4)
CREAT SERPL-MCNC: 1.39 MG/DL (ref 0.5–1.4)
CYTOLOGY REG CYTOL: NORMAL
DELSYS IDSYS: ABNORMAL
DELSYS IDSYS: ABNORMAL
EKG IMPRESSION: NORMAL
END TIDAL CARBON DIOXIDE IECO2: 34 MMHG
END TIDAL CARBON DIOXIDE IECO2: 39 MMHG
EOSINOPHIL # BLD AUTO: 0 K/UL (ref 0–0.51)
EOSINOPHIL # BLD AUTO: 0.09 K/UL (ref 0–0.51)
EOSINOPHIL NFR BLD: 0 % (ref 0–6.9)
EOSINOPHIL NFR BLD: 1.1 % (ref 0–6.9)
EPITHELIAL CELLS 1715: ABNORMAL /HPF (ref 0–5)
ERYTHROCYTE [DISTWIDTH] IN BLOOD BY AUTOMATED COUNT: 47.8 FL (ref 35.9–50)
ERYTHROCYTE [DISTWIDTH] IN BLOOD BY AUTOMATED COUNT: 49.1 FL (ref 35.9–50)
FLUAV RNA SPEC QL NAA+PROBE: NEGATIVE
FLUBV RNA SPEC QL NAA+PROBE: NEGATIVE
FUNGUS SPEC FUNGUS STN: NORMAL
GFR SERPLBLD CREATININE-BSD FMLA CKD-EPI: 53 ML/MIN/1.73 M 2
GFR SERPLBLD CREATININE-BSD FMLA CKD-EPI: 70 ML/MIN/1.73 M 2
GLOBULIN SER CALC-MCNC: 3 G/DL (ref 1.9–3.5)
GLUCOSE BLD STRIP.AUTO-MCNC: 276 MG/DL (ref 65–99)
GLUCOSE BLD STRIP.AUTO-MCNC: 294 MG/DL (ref 65–99)
GLUCOSE BLD STRIP.AUTO-MCNC: 326 MG/DL (ref 65–99)
GLUCOSE SERPL-MCNC: 340 MG/DL (ref 65–99)
GLUCOSE SERPL-MCNC: 345 MG/DL (ref 65–99)
GLUCOSE UR STRIP.AUTO-MCNC: >=1000 MG/DL
GRAM STN SPEC: NORMAL
HCO3 BLDA-SCNC: 22.8 MMOL/L (ref 21–28)
HCO3 BLDA-SCNC: 23.2 MMOL/L (ref 21–28)
HCT VFR BLD AUTO: 40.7 % (ref 42–52)
HCT VFR BLD AUTO: 42.5 % (ref 42–52)
HGB BLD-MCNC: 13.5 G/DL (ref 14–18)
HGB BLD-MCNC: 13.7 G/DL (ref 14–18)
HOROWITZ INDEX BLDA+IHG-RTO: 230 MM[HG]
HOROWITZ INDEX BLDA+IHG-RTO: 78 MM[HG]
IMM GRANULOCYTES # BLD AUTO: 0.06 K/UL (ref 0–0.11)
IMM GRANULOCYTES # BLD AUTO: 0.15 K/UL (ref 0–0.11)
IMM GRANULOCYTES NFR BLD AUTO: 0.6 % (ref 0–0.9)
IMM GRANULOCYTES NFR BLD AUTO: 0.7 % (ref 0–0.9)
INR PPP: 1.54 (ref 0.87–1.13)
KETONES UR STRIP.AUTO-MCNC: NEGATIVE MG/DL
LACTATE BLD-SCNC: 1.4 MMOL/L (ref 0.5–2)
LACTATE BLD-SCNC: 1.8 MMOL/L (ref 0.5–2)
LACTATE SERPL-SCNC: 1.8 MMOL/L (ref 0.5–2)
LACTATE SERPL-SCNC: 2.2 MMOL/L (ref 0.5–2)
LACTATE SERPL-SCNC: 3.7 MMOL/L (ref 0.5–2)
LEUKOCYTE ESTERASE UR QL STRIP.AUTO: ABNORMAL
LYMPHOCYTES # BLD AUTO: 0.37 K/UL (ref 1–4.8)
LYMPHOCYTES # BLD AUTO: 0.55 K/UL (ref 1–4.8)
LYMPHOCYTES NFR BLD: 2.3 % (ref 22–41)
LYMPHOCYTES NFR BLD: 4.3 % (ref 22–41)
MCH RBC QN AUTO: 30.8 PG (ref 27–33)
MCH RBC QN AUTO: 31.1 PG (ref 27–33)
MCHC RBC AUTO-ENTMCNC: 32.2 G/DL (ref 32.3–36.5)
MCHC RBC AUTO-ENTMCNC: 33.2 G/DL (ref 32.3–36.5)
MCV RBC AUTO: 93.8 FL (ref 81.4–97.8)
MCV RBC AUTO: 95.5 FL (ref 81.4–97.8)
MICRO URNS: ABNORMAL
MODE IMODE: ABNORMAL
MODE IMODE: ABNORMAL
MONOCYTES # BLD AUTO: 0.05 K/UL (ref 0–0.85)
MONOCYTES # BLD AUTO: 0.46 K/UL (ref 0–0.85)
MONOCYTES NFR BLD AUTO: 0.6 % (ref 0–13.4)
MONOCYTES NFR BLD AUTO: 2 % (ref 0–13.4)
NEUTROPHILS # BLD AUTO: 22.26 K/UL (ref 1.82–7.42)
NEUTROPHILS # BLD AUTO: 7.94 K/UL (ref 1.82–7.42)
NEUTROPHILS NFR BLD: 92.9 % (ref 44–72)
NEUTROPHILS NFR BLD: 94.9 % (ref 44–72)
NITRITE UR QL STRIP.AUTO: POSITIVE
NRBC # BLD AUTO: 0 K/UL
NRBC # BLD AUTO: 0 K/UL
NRBC BLD-RTO: 0 /100 WBC (ref 0–0.2)
NRBC BLD-RTO: 0 /100 WBC (ref 0–0.2)
NT-PROBNP SERPL IA-MCNC: 1920 PG/ML (ref 0–125)
O2/TOTAL GAS SETTING VFR VENT: 100 %
O2/TOTAL GAS SETTING VFR VENT: 100 %
PCO2 BLDA: 38.7 MMHG (ref 32–48)
PCO2 BLDA: 48.7 MMHG (ref 32–48)
PCO2 TEMP ADJ BLDA: 38.1 MMHG (ref 32–48)
PCO2 TEMP ADJ BLDA: 48.1 MMHG (ref 32–48)
PEEP END EXPIRATORY PRESSURE IPEEP: 10 CMH20
PEEP END EXPIRATORY PRESSURE IPEEP: 8 CMH20
PH BLDA: 7.29 [PH] (ref 7.35–7.45)
PH BLDA: 7.38 [PH] (ref 7.35–7.45)
PH TEMP ADJ BLDA: 7.29 [PH] (ref 7.35–7.45)
PH TEMP ADJ BLDA: 7.38 [PH] (ref 7.35–7.45)
PH UR STRIP.AUTO: 5.5 [PH] (ref 5–8)
PLATELET # BLD AUTO: 243 K/UL (ref 164–446)
PLATELET # BLD AUTO: 258 K/UL (ref 164–446)
PMV BLD AUTO: 10.1 FL (ref 9–12.9)
PMV BLD AUTO: 10.2 FL (ref 9–12.9)
PO2 BLDA: 230 MMHG (ref 83–108)
PO2 BLDA: 78 MMHG (ref 83–108)
PO2 TEMP ADJ BLDA: 228 MMHG (ref 64–87)
PO2 TEMP ADJ BLDA: 77 MMHG (ref 64–87)
POTASSIUM SERPL-SCNC: 3.9 MMOL/L (ref 3.6–5.5)
POTASSIUM SERPL-SCNC: 4.3 MMOL/L (ref 3.6–5.5)
PROT SERPL-MCNC: 6.5 G/DL (ref 6–8.2)
PROT UR QL STRIP: 30 MG/DL
PROTHROMBIN TIME: 18.5 SEC (ref 12–14.6)
RBC # BLD AUTO: 4.34 M/UL (ref 4.7–6.1)
RBC # BLD AUTO: 4.45 M/UL (ref 4.7–6.1)
RBC # URNS HPF: ABNORMAL /HPF (ref 0–2)
RBC UR QL AUTO: ABNORMAL
RSV RNA SPEC QL NAA+PROBE: NEGATIVE
SAO2 % BLDA: 100 % (ref 93–99)
SAO2 % BLDA: 94 % (ref 93–99)
SARS-COV-2 RNA RESP QL NAA+PROBE: NOTDETECTED
SIGNIFICANT IND 70042: NORMAL
SITE SITE: NORMAL
SODIUM SERPL-SCNC: 137 MMOL/L (ref 135–145)
SODIUM SERPL-SCNC: 138 MMOL/L (ref 135–145)
SOURCE SOURCE: NORMAL
SP GR UR STRIP.AUTO: 1.01
SPECIMEN DRAWN FROM PATIENT: ABNORMAL
SPECIMEN DRAWN FROM PATIENT: ABNORMAL
TIDAL VOLUME IVT: 460 ML
TIDAL VOLUME IVT: 460 ML
TROPONIN T SERPL-MCNC: 28 NG/L (ref 6–19)
TROPONIN T SERPL-MCNC: 29 NG/L (ref 6–19)
UFH PPP CHRO-ACNC: 1.05 IU/ML
UROBILINOGEN UR STRIP.AUTO-MCNC: 0.2 EU/DL
WBC # BLD AUTO: 23.5 K/UL (ref 4.8–10.8)
WBC # BLD AUTO: 8.5 K/UL (ref 4.8–10.8)
WBC #/AREA URNS HPF: ABNORMAL /HPF

## 2024-11-27 PROCEDURE — 160039 HCHG SURGERY MINUTES - EA ADDL 1 MIN LEVEL 3: Performed by: UROLOGY

## 2024-11-27 PROCEDURE — 700102 HCHG RX REV CODE 250 W/ 637 OVERRIDE(OP)

## 2024-11-27 PROCEDURE — 700111 HCHG RX REV CODE 636 W/ 250 OVERRIDE (IP): Performed by: ANESTHESIOLOGY

## 2024-11-27 PROCEDURE — 94002 VENT MGMT INPAT INIT DAY: CPT

## 2024-11-27 PROCEDURE — 88305 TISSUE EXAM BY PATHOLOGIST: CPT

## 2024-11-27 PROCEDURE — 0T778DZ DILATION OF LEFT URETER WITH INTRALUMINAL DEVICE, VIA NATURAL OR ARTIFICIAL OPENING ENDOSCOPIC: ICD-10-PCS | Performed by: UROLOGY

## 2024-11-27 PROCEDURE — 36600 WITHDRAWAL OF ARTERIAL BLOOD: CPT

## 2024-11-27 PROCEDURE — 87205 SMEAR GRAM STAIN: CPT | Mod: 91

## 2024-11-27 PROCEDURE — 0241U HCHG SARS-COV-2 COVID-19 NFCT DS RESP RNA 4 TRGT ED POC: CPT

## 2024-11-27 PROCEDURE — 770022 HCHG ROOM/CARE - ICU (200)

## 2024-11-27 PROCEDURE — 87077 CULTURE AEROBIC IDENTIFY: CPT

## 2024-11-27 PROCEDURE — 87116 MYCOBACTERIA CULTURE: CPT

## 2024-11-27 PROCEDURE — 80048 BASIC METABOLIC PNL TOTAL CA: CPT

## 2024-11-27 PROCEDURE — 87086 URINE CULTURE/COLONY COUNT: CPT

## 2024-11-27 PROCEDURE — 85520 HEPARIN ASSAY: CPT

## 2024-11-27 PROCEDURE — 700111 HCHG RX REV CODE 636 W/ 250 OVERRIDE (IP): Mod: JZ | Performed by: STUDENT IN AN ORGANIZED HEALTH CARE EDUCATION/TRAINING PROGRAM

## 2024-11-27 PROCEDURE — 93005 ELECTROCARDIOGRAM TRACING: CPT

## 2024-11-27 PROCEDURE — 700105 HCHG RX REV CODE 258: Performed by: ANESTHESIOLOGY

## 2024-11-27 PROCEDURE — 85025 COMPLETE CBC W/AUTO DIFF WBC: CPT

## 2024-11-27 PROCEDURE — 700111 HCHG RX REV CODE 636 W/ 250 OVERRIDE (IP): Mod: JZ

## 2024-11-27 PROCEDURE — 81001 URINALYSIS AUTO W/SCOPE: CPT

## 2024-11-27 PROCEDURE — 700105 HCHG RX REV CODE 258: Performed by: INTERNAL MEDICINE

## 2024-11-27 PROCEDURE — 99223 1ST HOSP IP/OBS HIGH 75: CPT

## 2024-11-27 PROCEDURE — 99292 CRITICAL CARE ADDL 30 MIN: CPT | Mod: 25 | Performed by: INTERNAL MEDICINE

## 2024-11-27 PROCEDURE — 302978 HCHG BRONCHOSCOPY-DIAGNOSTIC

## 2024-11-27 PROCEDURE — 87186 SC STD MICRODIL/AGAR DIL: CPT

## 2024-11-27 PROCEDURE — A9270 NON-COVERED ITEM OR SERVICE: HCPCS | Performed by: INTERNAL MEDICINE

## 2024-11-27 PROCEDURE — 96365 THER/PROPH/DIAG IV INF INIT: CPT

## 2024-11-27 PROCEDURE — 83605 ASSAY OF LACTIC ACID: CPT | Mod: 91

## 2024-11-27 PROCEDURE — 87070 CULTURE OTHR SPECIMN AEROBIC: CPT

## 2024-11-27 PROCEDURE — 160028 HCHG SURGERY MINUTES - 1ST 30 MINS LEVEL 3: Performed by: UROLOGY

## 2024-11-27 PROCEDURE — 93010 ELECTROCARDIOGRAM REPORT: CPT | Performed by: STUDENT IN AN ORGANIZED HEALTH CARE EDUCATION/TRAINING PROGRAM

## 2024-11-27 PROCEDURE — 82803 BLOOD GASES ANY COMBINATION: CPT | Mod: 91

## 2024-11-27 PROCEDURE — 700117 HCHG RX CONTRAST REV CODE 255: Performed by: UROLOGY

## 2024-11-27 PROCEDURE — 700101 HCHG RX REV CODE 250: Performed by: ANESTHESIOLOGY

## 2024-11-27 PROCEDURE — 87040 BLOOD CULTURE FOR BACTERIA: CPT

## 2024-11-27 PROCEDURE — 94799 UNLISTED PULMONARY SVC/PX: CPT

## 2024-11-27 PROCEDURE — 87102 FUNGUS ISOLATION CULTURE: CPT

## 2024-11-27 PROCEDURE — C2617 STENT, NON-COR, TEM W/O DEL: HCPCS | Performed by: UROLOGY

## 2024-11-27 PROCEDURE — 88112 CYTOPATH CELL ENHANCE TECH: CPT

## 2024-11-27 PROCEDURE — 160048 HCHG OR STATISTICAL LEVEL 1-5: Performed by: UROLOGY

## 2024-11-27 PROCEDURE — 31645 BRNCHSC W/THER ASPIR 1ST: CPT | Performed by: INTERNAL MEDICINE

## 2024-11-27 PROCEDURE — 700105 HCHG RX REV CODE 258

## 2024-11-27 PROCEDURE — 160009 HCHG ANES TIME/MIN: Performed by: UROLOGY

## 2024-11-27 PROCEDURE — 700101 HCHG RX REV CODE 250: Performed by: INTERNAL MEDICINE

## 2024-11-27 PROCEDURE — 82962 GLUCOSE BLOOD TEST: CPT | Mod: 91

## 2024-11-27 PROCEDURE — 93005 ELECTROCARDIOGRAM TRACING: CPT | Performed by: STUDENT IN AN ORGANIZED HEALTH CARE EDUCATION/TRAINING PROGRAM

## 2024-11-27 PROCEDURE — 31624 DX BRONCHOSCOPE/LAVAGE: CPT | Performed by: INTERNAL MEDICINE

## 2024-11-27 PROCEDURE — 94003 VENT MGMT INPAT SUBQ DAY: CPT

## 2024-11-27 PROCEDURE — 84484 ASSAY OF TROPONIN QUANT: CPT | Mod: 91

## 2024-11-27 PROCEDURE — 71045 X-RAY EXAM CHEST 1 VIEW: CPT

## 2024-11-27 PROCEDURE — A9270 NON-COVERED ITEM OR SERVICE: HCPCS

## 2024-11-27 PROCEDURE — 87206 SMEAR FLUORESCENT/ACID STAI: CPT

## 2024-11-27 PROCEDURE — 0B9J8ZX DRAINAGE OF LEFT LOWER LUNG LOBE, VIA NATURAL OR ARTIFICIAL OPENING ENDOSCOPIC, DIAGNOSTIC: ICD-10-PCS | Performed by: INTERNAL MEDICINE

## 2024-11-27 PROCEDURE — 85610 PROTHROMBIN TIME: CPT

## 2024-11-27 PROCEDURE — 36415 COLL VENOUS BLD VENIPUNCTURE: CPT

## 2024-11-27 PROCEDURE — 74176 CT ABD & PELVIS W/O CONTRAST: CPT

## 2024-11-27 PROCEDURE — 700111 HCHG RX REV CODE 636 W/ 250 OVERRIDE (IP): Performed by: HOSPITALIST

## 2024-11-27 PROCEDURE — 83880 ASSAY OF NATRIURETIC PEPTIDE: CPT

## 2024-11-27 PROCEDURE — 99152 MOD SED SAME PHYS/QHP 5/>YRS: CPT

## 2024-11-27 PROCEDURE — 700102 HCHG RX REV CODE 250 W/ 637 OVERRIDE(OP): Performed by: INTERNAL MEDICINE

## 2024-11-27 PROCEDURE — 31575 DIAGNOSTIC LARYNGOSCOPY: CPT

## 2024-11-27 PROCEDURE — 87015 SPECIMEN INFECT AGNT CONCNTJ: CPT

## 2024-11-27 PROCEDURE — 99291 CRITICAL CARE FIRST HOUR: CPT

## 2024-11-27 PROCEDURE — 700111 HCHG RX REV CODE 636 W/ 250 OVERRIDE (IP): Mod: JZ | Performed by: INTERNAL MEDICINE

## 2024-11-27 PROCEDURE — 85730 THROMBOPLASTIN TIME PARTIAL: CPT | Mod: 91

## 2024-11-27 PROCEDURE — 700105 HCHG RX REV CODE 258: Performed by: STUDENT IN AN ORGANIZED HEALTH CARE EDUCATION/TRAINING PROGRAM

## 2024-11-27 PROCEDURE — 99291 CRITICAL CARE FIRST HOUR: CPT | Mod: 25 | Performed by: INTERNAL MEDICINE

## 2024-11-27 PROCEDURE — 80053 COMPREHEN METABOLIC PANEL: CPT

## 2024-11-27 DEVICE — STENT UROLOGICAL POLARIS 6X28 ULTRA: Type: IMPLANTABLE DEVICE | Site: URETER | Status: FUNCTIONAL

## 2024-11-27 RX ORDER — BISACODYL 10 MG
10 SUPPOSITORY, RECTAL RECTAL
Status: DISCONTINUED | OUTPATIENT
Start: 2024-11-27 | End: 2024-11-27

## 2024-11-27 RX ORDER — PHENAZOPYRIDINE HYDROCHLORIDE 200 MG/1
200 TABLET, FILM COATED ORAL 3 TIMES DAILY
Status: DISCONTINUED | OUTPATIENT
Start: 2024-11-27 | End: 2024-11-28

## 2024-11-27 RX ORDER — HEPARIN SODIUM 1000 [USP'U]/ML
3800 INJECTION, SOLUTION INTRAVENOUS; SUBCUTANEOUS PRN
Status: DISCONTINUED | OUTPATIENT
Start: 2024-11-27 | End: 2024-11-29

## 2024-11-27 RX ORDER — SODIUM CHLORIDE, SODIUM LACTATE, POTASSIUM CHLORIDE, CALCIUM CHLORIDE 600; 310; 30; 20 MG/100ML; MG/100ML; MG/100ML; MG/100ML
INJECTION, SOLUTION INTRAVENOUS CONTINUOUS
Status: DISCONTINUED | OUTPATIENT
Start: 2024-11-27 | End: 2024-11-29

## 2024-11-27 RX ORDER — GABAPENTIN 300 MG/1
600 CAPSULE ORAL 2 TIMES DAILY
Status: DISCONTINUED | OUTPATIENT
Start: 2024-11-27 | End: 2024-11-27

## 2024-11-27 RX ORDER — HYDROMORPHONE HYDROCHLORIDE 1 MG/ML
0.4 INJECTION, SOLUTION INTRAMUSCULAR; INTRAVENOUS; SUBCUTANEOUS
Status: DISCONTINUED | OUTPATIENT
Start: 2024-11-27 | End: 2024-11-27

## 2024-11-27 RX ORDER — FINASTERIDE 5 MG/1
5 TABLET, FILM COATED ORAL DAILY
Status: DISCONTINUED | OUTPATIENT
Start: 2024-11-27 | End: 2024-11-30 | Stop reason: HOSPADM

## 2024-11-27 RX ORDER — HEPARIN SODIUM 1000 [USP'U]/ML
80 INJECTION, SOLUTION INTRAVENOUS; SUBCUTANEOUS ONCE
Status: DISCONTINUED | OUTPATIENT
Start: 2024-11-27 | End: 2024-11-27

## 2024-11-27 RX ORDER — ONDANSETRON 2 MG/ML
4 INJECTION INTRAMUSCULAR; INTRAVENOUS
Status: DISCONTINUED | OUTPATIENT
Start: 2024-11-27 | End: 2024-11-27

## 2024-11-27 RX ORDER — HEPARIN SODIUM 5000 [USP'U]/100ML
0-30 INJECTION, SOLUTION INTRAVENOUS CONTINUOUS
Status: DISCONTINUED | OUTPATIENT
Start: 2024-11-27 | End: 2024-11-27

## 2024-11-27 RX ORDER — ALBUTEROL SULFATE 5 MG/ML
2.5 SOLUTION RESPIRATORY (INHALATION)
Status: DISCONTINUED | OUTPATIENT
Start: 2024-11-27 | End: 2024-11-30 | Stop reason: HOSPADM

## 2024-11-27 RX ORDER — DEXAMETHASONE SODIUM PHOSPHATE 4 MG/ML
INJECTION, SOLUTION INTRA-ARTICULAR; INTRALESIONAL; INTRAMUSCULAR; INTRAVENOUS; SOFT TISSUE PRN
Status: DISCONTINUED | OUTPATIENT
Start: 2024-11-27 | End: 2024-11-27 | Stop reason: SURG

## 2024-11-27 RX ORDER — DIPHENHYDRAMINE HYDROCHLORIDE 50 MG/ML
12.5 INJECTION INTRAMUSCULAR; INTRAVENOUS
Status: DISCONTINUED | OUTPATIENT
Start: 2024-11-27 | End: 2024-11-27

## 2024-11-27 RX ORDER — LIDOCAINE HYDROCHLORIDE 20 MG/ML
INJECTION, SOLUTION EPIDURAL; INFILTRATION; INTRACAUDAL; PERINEURAL PRN
Status: DISCONTINUED | OUTPATIENT
Start: 2024-11-27 | End: 2024-11-27 | Stop reason: SURG

## 2024-11-27 RX ORDER — PHENYLEPHRINE HCL IN 0.9% NACL 1 MG/10 ML
100-300 SYRINGE (ML) INTRAVENOUS
Status: ACTIVE | OUTPATIENT
Start: 2024-11-27 | End: 2024-11-27

## 2024-11-27 RX ORDER — TERAZOSIN 5 MG/1
10 CAPSULE ORAL DAILY
Status: DISCONTINUED | OUTPATIENT
Start: 2024-11-27 | End: 2024-11-27

## 2024-11-27 RX ORDER — PHENYLEPHRINE HCL IN 0.9% NACL 1 MG/10 ML
SYRINGE (ML) INTRAVENOUS
Status: ACTIVE
Start: 2024-11-27 | End: 2024-11-27

## 2024-11-27 RX ORDER — MIRABEGRON 50 MG/1
50 TABLET, FILM COATED, EXTENDED RELEASE ORAL DAILY
Status: DISCONTINUED | OUTPATIENT
Start: 2024-11-27 | End: 2024-11-28

## 2024-11-27 RX ORDER — SODIUM CHLORIDE, SODIUM LACTATE, POTASSIUM CHLORIDE, CALCIUM CHLORIDE 600; 310; 30; 20 MG/100ML; MG/100ML; MG/100ML; MG/100ML
INJECTION, SOLUTION INTRAVENOUS
Status: DISCONTINUED | OUTPATIENT
Start: 2024-11-27 | End: 2024-11-27 | Stop reason: SURG

## 2024-11-27 RX ORDER — HALOPERIDOL 5 MG/ML
1 INJECTION INTRAMUSCULAR
Status: DISCONTINUED | OUTPATIENT
Start: 2024-11-27 | End: 2024-11-27

## 2024-11-27 RX ORDER — ACETAMINOPHEN 325 MG/1
650 TABLET ORAL EVERY 6 HOURS PRN
Status: DISCONTINUED | OUTPATIENT
Start: 2024-11-27 | End: 2024-11-27

## 2024-11-27 RX ORDER — EPHEDRINE SULFATE 50 MG/ML
5 INJECTION, SOLUTION INTRAVENOUS
Status: DISCONTINUED | OUTPATIENT
Start: 2024-11-27 | End: 2024-11-27

## 2024-11-27 RX ORDER — OXYCODONE HCL 5 MG/5 ML
5 SOLUTION, ORAL ORAL
Status: DISCONTINUED | OUTPATIENT
Start: 2024-11-27 | End: 2024-11-27

## 2024-11-27 RX ORDER — PAROXETINE 20 MG/1
20 TABLET, FILM COATED ORAL DAILY
Status: DISCONTINUED | OUTPATIENT
Start: 2024-11-27 | End: 2024-11-27

## 2024-11-27 RX ORDER — HEPARIN SODIUM 1000 [USP'U]/ML
7000 INJECTION, SOLUTION INTRAVENOUS; SUBCUTANEOUS ONCE
Status: COMPLETED | OUTPATIENT
Start: 2024-11-27 | End: 2024-11-27

## 2024-11-27 RX ORDER — POLYETHYLENE GLYCOL 3350 17 G/17G
1 POWDER, FOR SOLUTION ORAL
Status: DISCONTINUED | OUTPATIENT
Start: 2024-11-27 | End: 2024-11-27

## 2024-11-27 RX ORDER — DEXTROSE MONOHYDRATE 25 G/50ML
25 INJECTION, SOLUTION INTRAVENOUS
Status: DISCONTINUED | OUTPATIENT
Start: 2024-11-27 | End: 2024-11-27

## 2024-11-27 RX ORDER — FAMOTIDINE 20 MG/1
20 TABLET, FILM COATED ORAL EVERY 12 HOURS
Status: DISCONTINUED | OUTPATIENT
Start: 2024-11-27 | End: 2024-11-27

## 2024-11-27 RX ORDER — SODIUM CHLORIDE 9 MG/ML
1000 INJECTION, SOLUTION INTRAVENOUS ONCE
Status: COMPLETED | OUTPATIENT
Start: 2024-11-27 | End: 2024-11-27

## 2024-11-27 RX ORDER — GABAPENTIN 300 MG/1
600 CAPSULE ORAL 2 TIMES DAILY
Status: DISCONTINUED | OUTPATIENT
Start: 2024-11-27 | End: 2024-11-28

## 2024-11-27 RX ORDER — TERAZOSIN 5 MG/1
10 CAPSULE ORAL DAILY
Status: DISCONTINUED | OUTPATIENT
Start: 2024-11-28 | End: 2024-11-28

## 2024-11-27 RX ORDER — HYDRALAZINE HYDROCHLORIDE 20 MG/ML
5 INJECTION INTRAMUSCULAR; INTRAVENOUS
Status: DISCONTINUED | OUTPATIENT
Start: 2024-11-27 | End: 2024-11-27

## 2024-11-27 RX ORDER — HYDROMORPHONE HYDROCHLORIDE 1 MG/ML
.5-2 INJECTION, SOLUTION INTRAMUSCULAR; INTRAVENOUS; SUBCUTANEOUS
Status: DISCONTINUED | OUTPATIENT
Start: 2024-11-27 | End: 2024-11-30 | Stop reason: HOSPADM

## 2024-11-27 RX ORDER — ROSUVASTATIN CALCIUM 10 MG/1
20 TABLET, COATED ORAL EVERY EVENING
Status: DISCONTINUED | OUTPATIENT
Start: 2024-11-27 | End: 2024-11-28

## 2024-11-27 RX ORDER — DEXMEDETOMIDINE HYDROCHLORIDE 4 UG/ML
.1-1.5 INJECTION, SOLUTION INTRAVENOUS CONTINUOUS
Status: DISCONTINUED | OUTPATIENT
Start: 2024-11-27 | End: 2024-11-28

## 2024-11-27 RX ORDER — AMLODIPINE BESYLATE 10 MG/1
5 TABLET ORAL
Status: DISCONTINUED | OUTPATIENT
Start: 2024-11-28 | End: 2024-11-27

## 2024-11-27 RX ORDER — AMOXICILLIN 250 MG
2 CAPSULE ORAL EVERY EVENING
Status: DISCONTINUED | OUTPATIENT
Start: 2024-11-27 | End: 2024-11-27

## 2024-11-27 RX ORDER — ONDANSETRON 4 MG/1
4 TABLET, ORALLY DISINTEGRATING ORAL EVERY 4 HOURS PRN
Status: DISCONTINUED | OUTPATIENT
Start: 2024-11-27 | End: 2024-11-27

## 2024-11-27 RX ORDER — ONDANSETRON 2 MG/ML
4 INJECTION INTRAMUSCULAR; INTRAVENOUS EVERY 4 HOURS PRN
Status: DISCONTINUED | OUTPATIENT
Start: 2024-11-27 | End: 2024-11-30 | Stop reason: HOSPADM

## 2024-11-27 RX ORDER — AMLODIPINE BESYLATE 10 MG/1
5 TABLET ORAL
Status: DISCONTINUED | OUTPATIENT
Start: 2024-11-27 | End: 2024-11-27

## 2024-11-27 RX ORDER — HEPARIN SODIUM 1000 [USP'U]/ML
40 INJECTION, SOLUTION INTRAVENOUS; SUBCUTANEOUS PRN
Status: DISCONTINUED | OUTPATIENT
Start: 2024-11-27 | End: 2024-11-27

## 2024-11-27 RX ORDER — SUCCINYLCHOLINE CHLORIDE 20 MG/ML
INJECTION INTRAMUSCULAR; INTRAVENOUS PRN
Status: DISCONTINUED | OUTPATIENT
Start: 2024-11-27 | End: 2024-11-27 | Stop reason: SURG

## 2024-11-27 RX ORDER — AMOXICILLIN 250 MG
2 CAPSULE ORAL 2 TIMES DAILY
Status: DISCONTINUED | OUTPATIENT
Start: 2024-11-27 | End: 2024-11-27

## 2024-11-27 RX ORDER — EPINEPHRINE 1 MG/ML(1)
AMPUL (ML) INJECTION PRN
Status: DISCONTINUED | OUTPATIENT
Start: 2024-11-27 | End: 2024-11-27 | Stop reason: SURG

## 2024-11-27 RX ORDER — INSULIN LISPRO 100 [IU]/ML
2-9 INJECTION, SOLUTION INTRAVENOUS; SUBCUTANEOUS EVERY 6 HOURS
Status: DISCONTINUED | OUTPATIENT
Start: 2024-11-27 | End: 2024-11-30 | Stop reason: HOSPADM

## 2024-11-27 RX ORDER — HYDROMORPHONE HYDROCHLORIDE 1 MG/ML
0.1 INJECTION, SOLUTION INTRAMUSCULAR; INTRAVENOUS; SUBCUTANEOUS
Status: DISCONTINUED | OUTPATIENT
Start: 2024-11-27 | End: 2024-11-27

## 2024-11-27 RX ORDER — ROCURONIUM BROMIDE 10 MG/ML
INJECTION, SOLUTION INTRAVENOUS PRN
Status: DISCONTINUED | OUTPATIENT
Start: 2024-11-27 | End: 2024-11-27 | Stop reason: SURG

## 2024-11-27 RX ORDER — ONDANSETRON 4 MG/1
4 TABLET, ORALLY DISINTEGRATING ORAL EVERY 4 HOURS PRN
Status: DISCONTINUED | OUTPATIENT
Start: 2024-11-27 | End: 2024-11-28

## 2024-11-27 RX ORDER — HYDRALAZINE HYDROCHLORIDE 20 MG/ML
10 INJECTION INTRAMUSCULAR; INTRAVENOUS EVERY 8 HOURS PRN
Status: DISCONTINUED | OUTPATIENT
Start: 2024-11-27 | End: 2024-11-30 | Stop reason: HOSPADM

## 2024-11-27 RX ORDER — SODIUM CHLORIDE, SODIUM LACTATE, POTASSIUM CHLORIDE, AND CALCIUM CHLORIDE .6; .31; .03; .02 G/100ML; G/100ML; G/100ML; G/100ML
30 INJECTION, SOLUTION INTRAVENOUS ONCE
Status: COMPLETED | OUTPATIENT
Start: 2024-11-27 | End: 2024-11-27

## 2024-11-27 RX ORDER — AMLODIPINE BESYLATE 10 MG/1
5 TABLET ORAL DAILY
Status: DISCONTINUED | OUTPATIENT
Start: 2024-11-27 | End: 2024-11-28

## 2024-11-27 RX ORDER — HYDROMORPHONE HYDROCHLORIDE 1 MG/ML
0.2 INJECTION, SOLUTION INTRAMUSCULAR; INTRAVENOUS; SUBCUTANEOUS
Status: DISCONTINUED | OUTPATIENT
Start: 2024-11-27 | End: 2024-11-27

## 2024-11-27 RX ORDER — HEPARIN SODIUM 5000 [USP'U]/100ML
INJECTION, SOLUTION INTRAVENOUS CONTINUOUS
Status: DISCONTINUED | OUTPATIENT
Start: 2024-11-27 | End: 2024-11-29

## 2024-11-27 RX ORDER — OXYCODONE HCL 5 MG/5 ML
10 SOLUTION, ORAL ORAL
Status: DISCONTINUED | OUTPATIENT
Start: 2024-11-27 | End: 2024-11-27

## 2024-11-27 RX ORDER — PAROXETINE 20 MG/1
20 TABLET, FILM COATED ORAL DAILY
Status: DISCONTINUED | OUTPATIENT
Start: 2024-11-28 | End: 2024-11-28

## 2024-11-27 RX ORDER — ONDANSETRON 2 MG/ML
INJECTION INTRAMUSCULAR; INTRAVENOUS PRN
Status: DISCONTINUED | OUTPATIENT
Start: 2024-11-27 | End: 2024-11-27 | Stop reason: SURG

## 2024-11-27 RX ORDER — ALLOPURINOL 100 MG/1
100 TABLET ORAL DAILY
Status: DISCONTINUED | OUTPATIENT
Start: 2024-11-28 | End: 2024-11-28

## 2024-11-27 RX ORDER — PROPOFOL 10 MG/ML
50 INJECTION, EMULSION INTRAVENOUS ONCE
Status: COMPLETED | OUTPATIENT
Start: 2024-11-27 | End: 2024-11-27

## 2024-11-27 RX ORDER — ACETAMINOPHEN 325 MG/1
650 TABLET ORAL EVERY 6 HOURS PRN
Status: DISCONTINUED | OUTPATIENT
Start: 2024-11-27 | End: 2024-11-28

## 2024-11-27 RX ORDER — PHENAZOPYRIDINE HYDROCHLORIDE 200 MG/1
200 TABLET, FILM COATED ORAL 3 TIMES DAILY
Status: DISCONTINUED | OUTPATIENT
Start: 2024-11-27 | End: 2024-11-27

## 2024-11-27 RX ORDER — DEXTROSE MONOHYDRATE 25 G/50ML
25 INJECTION, SOLUTION INTRAVENOUS
Status: DISCONTINUED | OUTPATIENT
Start: 2024-11-27 | End: 2024-11-30 | Stop reason: HOSPADM

## 2024-11-27 RX ORDER — ALLOPURINOL 100 MG/1
100 TABLET ORAL DAILY
Status: DISCONTINUED | OUTPATIENT
Start: 2024-11-27 | End: 2024-11-27

## 2024-11-27 RX ORDER — ROSUVASTATIN CALCIUM 10 MG/1
20 TABLET, COATED ORAL EVERY EVENING
Status: DISCONTINUED | OUTPATIENT
Start: 2024-11-27 | End: 2024-11-27

## 2024-11-27 RX ORDER — INSULIN LISPRO 100 [IU]/ML
2-9 INJECTION, SOLUTION INTRAVENOUS; SUBCUTANEOUS EVERY 6 HOURS
Status: DISCONTINUED | OUTPATIENT
Start: 2024-11-27 | End: 2024-11-27

## 2024-11-27 RX ADMIN — LIDOCAINE HYDROCHLORIDE 100 MG: 20 INJECTION, SOLUTION EPIDURAL; INFILTRATION; INTRACAUDAL; PERINEURAL at 06:16

## 2024-11-27 RX ADMIN — HYDRALAZINE HYDROCHLORIDE 10 MG: 20 INJECTION, SOLUTION INTRAMUSCULAR; INTRAVENOUS at 21:18

## 2024-11-27 RX ADMIN — PHENAZOPYRIDINE 200 MG: 200 TABLET ORAL at 17:23

## 2024-11-27 RX ADMIN — INSULIN LISPRO 5 UNITS: 100 INJECTION, SOLUTION INTRAVENOUS; SUBCUTANEOUS at 16:22

## 2024-11-27 RX ADMIN — PROPOFOL 50 MG: 10 INJECTION, EMULSION INTRAVENOUS at 08:19

## 2024-11-27 RX ADMIN — SODIUM CHLORIDE, POTASSIUM CHLORIDE, SODIUM LACTATE AND CALCIUM CHLORIDE: 600; 310; 30; 20 INJECTION, SOLUTION INTRAVENOUS at 06:05

## 2024-11-27 RX ADMIN — HYDROMORPHONE HYDROCHLORIDE 2 MG: 1 INJECTION, SOLUTION INTRAMUSCULAR; INTRAVENOUS; SUBCUTANEOUS at 08:16

## 2024-11-27 RX ADMIN — AMLODIPINE BESYLATE 5 MG: 10 TABLET ORAL at 14:04

## 2024-11-27 RX ADMIN — ONDANSETRON 4 MG: 2 INJECTION INTRAMUSCULAR; INTRAVENOUS at 06:20

## 2024-11-27 RX ADMIN — DEXMEDETOMIDINE HYDROCHLORIDE 0.2 MCG/KG/HR: 100 INJECTION, SOLUTION INTRAVENOUS at 09:11

## 2024-11-27 RX ADMIN — MEROPENEM 500 MG: 500 INJECTION, POWDER, FOR SOLUTION INTRAVENOUS at 12:25

## 2024-11-27 RX ADMIN — SODIUM CHLORIDE, POTASSIUM CHLORIDE, SODIUM LACTATE AND CALCIUM CHLORIDE: 600; 310; 30; 20 INJECTION, SOLUTION INTRAVENOUS at 09:41

## 2024-11-27 RX ADMIN — GABAPENTIN 600 MG: 300 CAPSULE ORAL at 17:18

## 2024-11-27 RX ADMIN — SUCCINYLCHOLINE CHLORIDE 140 MG: 20 INJECTION, SOLUTION INTRAMUSCULAR; INTRAVENOUS at 06:16

## 2024-11-27 RX ADMIN — EPINEPHRINE 10 MCG: 1 INJECTION INTRAMUSCULAR; INTRAVENOUS; SUBCUTANEOUS at 06:41

## 2024-11-27 RX ADMIN — DEXMEDETOMIDINE HYDROCHLORIDE 1.4 MCG/KG/HR: 100 INJECTION, SOLUTION INTRAVENOUS at 13:55

## 2024-11-27 RX ADMIN — PHENAZOPYRIDINE 200 MG: 200 TABLET ORAL at 12:19

## 2024-11-27 RX ADMIN — CEFEPIME 2 G: 2 INJECTION, POWDER, FOR SOLUTION INTRAVENOUS at 04:27

## 2024-11-27 RX ADMIN — HYDROMORPHONE HYDROCHLORIDE 2 MG: 1 INJECTION, SOLUTION INTRAMUSCULAR; INTRAVENOUS; SUBCUTANEOUS at 13:09

## 2024-11-27 RX ADMIN — SODIUM CHLORIDE, POTASSIUM CHLORIDE, SODIUM LACTATE AND CALCIUM CHLORIDE 2328 ML: 600; 310; 30; 20 INJECTION, SOLUTION INTRAVENOUS at 07:15

## 2024-11-27 RX ADMIN — DEXMEDETOMIDINE HYDROCHLORIDE 0.9 MCG/KG/HR: 100 INJECTION, SOLUTION INTRAVENOUS at 20:42

## 2024-11-27 RX ADMIN — ROCURONIUM BROMIDE 50 MG: 50 INJECTION, SOLUTION INTRAVENOUS at 06:43

## 2024-11-27 RX ADMIN — ROSUVASTATIN CALCIUM 20 MG: 10 TABLET, FILM COATED ORAL at 17:19

## 2024-11-27 RX ADMIN — EPINEPHRINE 10 MCG: 1 INJECTION INTRAMUSCULAR; INTRAVENOUS; SUBCUTANEOUS at 06:37

## 2024-11-27 RX ADMIN — SODIUM CHLORIDE 1000 ML: 9 INJECTION, SOLUTION INTRAVENOUS at 04:27

## 2024-11-27 RX ADMIN — PROPOFOL 40 MCG/KG/MIN: 10 INJECTION, EMULSION INTRAVENOUS at 06:49

## 2024-11-27 RX ADMIN — SODIUM CHLORIDE 1000 ML: 9 INJECTION, SOLUTION INTRAVENOUS at 05:01

## 2024-11-27 RX ADMIN — DEXAMETHASONE SODIUM PHOSPHATE 4 MG: 4 INJECTION INTRA-ARTICULAR; INTRALESIONAL; INTRAMUSCULAR; INTRAVENOUS; SOFT TISSUE at 06:20

## 2024-11-27 RX ADMIN — INSULIN LISPRO 5 UNITS: 100 INJECTION, SOLUTION INTRAVENOUS; SUBCUTANEOUS at 07:27

## 2024-11-27 RX ADMIN — MEROPENEM 500 MG: 500 INJECTION, POWDER, FOR SOLUTION INTRAVENOUS at 08:40

## 2024-11-27 RX ADMIN — MEROPENEM 500 MG: 500 INJECTION, POWDER, FOR SOLUTION INTRAVENOUS at 17:20

## 2024-11-27 RX ADMIN — HEPARIN SODIUM 1450 UNITS/HR: 5000 INJECTION, SOLUTION INTRAVENOUS at 09:51

## 2024-11-27 RX ADMIN — PROPOFOL 120 MG: 10 INJECTION, EMULSION INTRAVENOUS at 06:16

## 2024-11-27 RX ADMIN — HYDROMORPHONE HYDROCHLORIDE 2 MG: 1 INJECTION, SOLUTION INTRAMUSCULAR; INTRAVENOUS; SUBCUTANEOUS at 19:08

## 2024-11-27 RX ADMIN — INSULIN LISPRO 6 UNITS: 100 INJECTION, SOLUTION INTRAVENOUS; SUBCUTANEOUS at 12:34

## 2024-11-27 RX ADMIN — HEPARIN SODIUM 7000 UNITS: 1000 INJECTION, SOLUTION INTRAVENOUS; SUBCUTANEOUS at 09:51

## 2024-11-27 ASSESSMENT — ENCOUNTER SYMPTOMS
DIARRHEA: 0
ABDOMINAL PAIN: 0
SHORTNESS OF BREATH: 0
CHILLS: 1
PALPITATIONS: 0
CONSTIPATION: 0
COUGH: 0
WHEEZING: 0
FEVER: 1
NAUSEA: 0
VOMITING: 0

## 2024-11-27 ASSESSMENT — PAIN SCALES - GENERAL: PAIN_LEVEL: 0

## 2024-11-27 ASSESSMENT — FIBROSIS 4 INDEX: FIB4 SCORE: 2.15

## 2024-11-27 ASSESSMENT — PAIN DESCRIPTION - PAIN TYPE: TYPE: ACUTE PAIN

## 2024-11-27 NOTE — ANESTHESIA PROCEDURE NOTES
Airway    Date/Time: 11/27/2024 6:16 AM    Performed by: Ramu Lewis M.D.  Authorized by: Ramu Lewis M.D.    Location:  OR  Urgency:  Elective  Difficult Airway: No    Indications for Airway Management:  Anesthesia      Spontaneous Ventilation: absent    Sedation Level:  Deep  Preoxygenated: Yes    Patient Position:  Sniffing  Mask Difficulty Assessment:  0 - not attempted  Final Airway Type:  Endotracheal airway  Final Endotracheal Airway:  ETT  Cuffed: Yes    Technique Used for Successful ETT Placement:  Direct laryngoscopy  Devices/Methods Used in Placement:  Intubating stylet    Insertion Site:  Oral  Blade Type:  Glide  Laryngoscope Blade/Videolaryngoscope Blade Size:  4  ETT Size (mm):  7.5  Measured from:  Teeth  ETT to Teeth (cm):  23  Placement Verified by: auscultation and capnometry    Cormack-Lehane Classification:  Grade I - full view of glottis  Number of Attempts at Approach:  1

## 2024-11-27 NOTE — ED TRIAGE NOTES
Chief Complaint   Patient presents with    N/V     Pt BIBA from home for n/v and chills. Pt woke up around 0100 and was shivering. Pt had x1 emesis. Pt had a ureter stent removed yesterday from previous stones. PT is A&Ox4, a-fib with known history. Pt on 5L O2 NC, saturating at 90%, oral temp 102.7    Chills     Pt received 800mL LR, 6.25 phenergan and 4 zofran en route.

## 2024-11-27 NOTE — ASSESSMENT & PLAN NOTE
This is Sepsis Present on admission  SIRS criteria identified on my evaluation include: Fever, with temperature greater than 100.9 deg F, Tachycardia, with heart rate greater than 90 BPM, and Tachypnea, with respirations greater than 20 per minute  Clinical indicators of end organ dysfunction include Lactic Acid greater than 2 and Acute Respiratory Failure - (mechanical ventilation or BiPap or PaO2/FiO2 ratio < 300)  Source is urinary  Sepsis protocol initiated  Crystalloid Fluid Administration: Fluid resuscitation ordered per standard protocol - 30 mL/kg per current or ideal body weight  IV antibiotics as appropriate for source of sepsis  Reassessment: I have reassessed the patient's hemodynamic status

## 2024-11-27 NOTE — H&P
UROLOGY Consult Note:    Wilson Titus M.D.  Date & Time note created:    11/27/2024   5:44 AM     Referring MD:  Dr. Watt    Patient ID:   Name:             Ramu Barber   YOB: 1950  Age:                 74 y.o.  male   MRN:               [id number]                                                             Reason for Consult:      Left hydronephrosis    History of Present Illness:    This is a 74-year-old male history of left ureteral stent placement on 11/1 for impacted ureteral stone with sepsis secondary to ESBL E. coli followed by left Cults on 11/13 and most recently cystoscopy and left ureteral stent removal on 11/26.  Awoke this evening with shaking chills and fever presented to the ED.  CT on arrival identified left hydronephrosis and read notes 5 mm distal ureteral stone however on further review appears to be vascular calcifications.  There are punctate stones remaining in the kidney, mid ureter and distal ureter.  Patient with signs of sepsis as he is febrile with lactic acidosis, recommended replacement of left ureteral stent after all questions were answered he opted to proceed.    Review of Systems:      As above all others negative           Past Medical History:   Past Medical History:   Diagnosis Date    Arthritis     back, hips    Back pain     Chickenpox     High cholesterol     Hypertension     Obesity     Pain     back pain s/p multiple surgeries and spinal stimulator    Pneumonia     Polycythemia, secondary     Sleep apnea     cpap    Snoring     Type II or unspecified type diabetes mellitus without mention of complication, not stated as uncontrolled     oral meds only     There are no active hospital problems to display for this patient.      Past Surgical History:  Past Surgical History:   Procedure Laterality Date    AR CYSTOURETHROSCOPY N/A 11/1/2024    Procedure: CYSTOSCOPY;  Surgeon: Andrea Galicia M.D.;  Location: SURGERY SAME DAY [facility];   Service: Urology    STENT PLACEMENT Left 11/1/2024    Procedure: STENT PLACEMENT;  Surgeon: Andrea Galicia M.D.;  Location: SURGERY SAME DAY [facility];  Service: Urology    EGD W/CONTROL BLEEDING  8/22/2020         GASTROSCOPY N/A 8/22/2020    Procedure: GASTROSCOPY;  Surgeon: Tawanda Gomez M.D.;  Location: SURGERY [facility];  Service: Gastroenterology    NY INS/RPLC SPI NPG/RCVR POCKET  7/23/2019    Procedure: INSERTION, NEUROSTIMULATOR, PERMANENT, SPINAL CORD;  Surgeon: Teddy Santos M.D.;  Location: SURGERY [facility];  Service: Pain Management    LUMBAR LAMINECTOMY DISKECTOMY  2/18/2018    Procedure: LUMBAR LAMINECTOMY DISKECTOMY- LT L1-2 HEMILAMI-;  Surgeon: Tom Pittman M.D.;  Location: SURGERY [facility];  Service: Neurosurgery    HARDWARE REMOVAL NEURO  2/18/2018    Procedure: HARDWARE REMOVAL NEURO- L2-3 PEDICLE SCREWS;  Surgeon: Tom Pittman M.D.;  Location: SURGERY [facility];  Service: Neurosurgery    INGUINAL HERNIA REPAIR Right 2/16/2016    Procedure: INGUINAL HERNIA REPAIR;  Surgeon: Sj Baird M.D.;  Location: SURGERY [facility];  Service:     FUSION, SPINE, LUMBAR, PLIF  1/21/2015    Performed by Ko Suarez M.D. at [facility]    CARPAL TUNNEL RELEASE Right 2014    SHOULDER ARTHROSCOPY Right 2014    CERVICAL DISK AND FUSION ANTERIOR  2005    LUMBAR DECOMPRESSION  2004    LUMBAR DECOMPRESSION  2003       Hospital Medications:    Current Facility-Administered Medications:     [MAR Hold] acetaminophen (Tylenol) tablet 650 mg, 650 mg, Oral, Q6HRS PRN, Dash Watt, D.O.    [MAR Hold] senna-docusate (Pericolace Or Senokot S) 8.6-50 MG per tablet 2 Tablet, 2 Tablet, Oral, Q EVENING **AND** [MAR Hold] polyethylene glycol/lytes (Miralax) Packet 1 Packet, 1 Packet, Oral, QDAY PRN, Dash Watt, D.O.    [MAR Hold] ondansetron (Zofran) syringe/vial injection 4 mg, 4 mg, Intravenous, Q4HRS PRN, Dash Watt D.O.    [MAR Hold]  ondansetron (Zofran ODT) dispertab 4 mg, 4 mg, Oral, Q4HRS PRN, JUAQUIN HankinsO.    [MAR Hold] allopurinol (Zyloprim) tablet 100 mg, 100 mg, Oral, DAILY, Dash Watt D.O.    [MAR Hold] amLODIPine (Norvasc) tablet 5 mg, 5 mg, Oral, QDAY, ELAINE Hankins.O.    [Held by provider] apixaban (Eliquis) tablet 5 mg, 5 mg, Oral, BID, ELAINE Hankins.O.    [MAR Hold] finasteride (Proscar) tablet 5 mg, 5 mg, Oral, DAILY, ELAINE Hankins.O.    [MAR Hold] gabapentin (Neurontin) capsule 600 mg, 600 mg, Oral, BID, ELAINE Hankins.O.    [MAR Hold] Mirabegron ER TB24 50 mg, 50 mg, Oral, DAILY, ELAINE Hankins.O.    [MAR Hold] PARoxetine (Paxil) tablet 20 mg, 20 mg, Oral, DAILY, ELAINE Hankins.O.    [MAR Hold] phenazopyridine (Pyridium) tablet 200 mg, 200 mg, Oral, TID, ELAINE Hankins.O.    [MAR Hold] rosuvastatin (Crestor) tablet 20 mg, 20 mg, Oral, Q EVENING, ELAINE Hankins.O.    [MAR Hold] terazosin (Hytrin) capsule 10 mg, 10 mg, Oral, DAILY, Dash Watt D.O.    Current Outpatient Medications:  Medications Prior to Admission   Medication Sig Dispense Refill Last Dose/Taking    ciprofloxacin (CIPRO) 500 MG Tab Take 1 Tablet by mouth 2 times a day for 8 days. 16 Tablet 0     Semaglutide, 1 MG/DOSE, (OZEMPIC, 1 MG/DOSE,) 2 MG/1.5ML Solution Pen-injector Inject 1 mg under the skin every 7 days.       phenazopyridine (PYRIDIUM) 200 MG Tab Take 200 mg by mouth 3 times a day. 4 day course started 11/13/24       allopurinol (ZYLOPRIM) 100 MG Tab Take 100 mg by mouth every day.       SM VITAMIN D3 25 MCG (1000 UT) Tab Take 1,000 Units by mouth every day.       Mirabegron ER 50 MG TABLET SR 24 HR Take 50 mg by mouth every day.       ELIQUIS 5 MG Tab Take 5 mg by mouth 2 times a day.       amLODIPine (NORVASC) 5 MG Tab Take 5 mg by mouth every day.       gabapentin (NEURONTIN) 600 MG tablet Take 600 mg by mouth 2 times a day.       rosuvastatin (CRESTOR) 20 MG Tab Take 1 Tablet  by mouth every evening. 30 Tablet 11     glipiZIDE SR (GLUCOTROL) 2.5 MG TABLET SR 24 HR Take 2.5 mg by mouth 2 times a day.       terazosin (HYTRIN) 10 MG capsule Take 10 mg by mouth every day.       finasteride (PROSCAR) 5 MG Tab Take 1 Tablet by mouth every day.       PARoxetine (PAXIL) 20 MG Tab Take 20 mg by mouth every day.          Medication Allergy:  No Known Allergies    Family History:  Family History   Problem Relation Age of Onset    Sleep Apnea Mother     Heart Attack Father     Heart Attack Brother        Social History:  Social History     Socioeconomic History    Marital status:      Spouse name: Not on file    Number of children: Not on file    Years of education: Not on file    Highest education level: Not on file   Occupational History    Not on file   Tobacco Use    Smoking status: Never    Smokeless tobacco: Former     Types: Chew     Quit date: 12/2018   Vaping Use    Vaping status: Never Used   Substance and Sexual Activity    Alcohol use: Yes     Alcohol/week: 1.2 oz     Types: 2 Shots of liquor per week     Comment: 2 a week    Drug use: No    Sexual activity: Not on file   Other Topics Concern    Not on file   Social History Narrative    Not on file     Social Drivers of Health     Financial Resource Strain: Not on file   Food Insecurity: No Food Insecurity (11/18/2024)    Hunger Vital Sign     Worried About Running Out of Food in the Last Year: Never true     Ran Out of Food in the Last Year: Never true   Transportation Needs: No Transportation Needs (11/18/2024)    PRAPARE - Transportation     Lack of Transportation (Medical): No     Lack of Transportation (Non-Medical): No   Physical Activity: Not on file   Stress: Not on file   Social Connections: Not on file   Intimate Partner Violence: Not At Risk (11/18/2024)    Humiliation, Afraid, Rape, and Kick questionnaire     Fear of Current or Ex-Partner: No     Emotionally Abused: No     Physically Abused: No     Sexually Abused: No    Housing Stability: Low Risk  (11/18/2024)    Housing Stability Vital Sign     Unable to Pay for Housing in the Last Year: No     Number of Times Moved in the Last Year: 0     Homeless in the Last Year: No         Physical Exam:  Vitals/ General Appearance:   Weight/BMI: Body mass index is 34.58 kg/m².  /65   Pulse 99   Temp (!) 39.3 °C (102.7 °F) (Oral)   Resp 20   Ht 1.829 m (6')   Wt 116 kg (255 lb)   SpO2 94%   Vitals:    11/27/24 0329 11/27/24 0331 11/27/24 0406 11/27/24 0501   BP:  (!) 160/72 130/79 119/65   Pulse:  (!) 122 (!) 115 99   Resp:  (!) 22 20 20   Temp:  (!) 39.3 °C (102.7 °F)     TempSrc:  Oral     SpO2:  90% 90% 94%   Weight: 116 kg (255 lb)      Height: 1.829 m (6')        Oxygen Therapy:  Pulse Oximetry: 94 %, O2 (LPM): 5, O2 Delivery Device: Nasal Cannula    Constitutional:   Well developed, Well nourished, No acute distress  HENMT:  Normocephalic, Atraumatic  Eyes:  No discharge.  Neck:  Normal range of motion  Cardiovascular:  Normal heart rate  Lungs:  equal chest rise   Abdomen: BSoft, No tenderness, No guarding, No rebound  : left cvat  Skin: Warm, Dry  Neurologic: Alert & oriented x 3  Psychiatric: Affect normal      MDM (Data Review):     Records reviewed and summarized in current documentation    Lab Data Review:  Recent Results (from the past 24 hours)   Lactic Acid    Collection Time: 11/27/24  3:31 AM   Result Value Ref Range    Lactic Acid 3.7 (H) 0.5 - 2.0 mmol/L   CBC with Differential    Collection Time: 11/27/24  3:31 AM   Result Value Ref Range    WBC 8.5 4.8 - 10.8 K/uL    RBC 4.34 (L) 4.70 - 6.10 M/uL    Hemoglobin 13.5 (L) 14.0 - 18.0 g/dL    Hematocrit 40.7 (L) 42.0 - 52.0 %    MCV 93.8 81.4 - 97.8 fL    MCH 31.1 27.0 - 33.0 pg    MCHC 33.2 32.3 - 36.5 g/dL    RDW 47.8 35.9 - 50.0 fL    Platelet Count 243 164 - 446 K/uL    MPV 10.1 9.0 - 12.9 fL    Neutrophils-Polys 92.90 (H) 44.00 - 72.00 %    Lymphocytes 4.30 (L) 22.00 - 41.00 %    Monocytes 0.60 0.00 -  13.40 %    Eosinophils 1.10 0.00 - 6.90 %    Basophils 0.40 0.00 - 1.80 %    Immature Granulocytes 0.70 0.00 - 0.90 %    Nucleated RBC 0.00 0.00 - 0.20 /100 WBC    Neutrophils (Absolute) 7.94 (H) 1.82 - 7.42 K/uL    Lymphs (Absolute) 0.37 (L) 1.00 - 4.80 K/uL    Monos (Absolute) 0.05 0.00 - 0.85 K/uL    Eos (Absolute) 0.09 0.00 - 0.51 K/uL    Baso (Absolute) 0.03 0.00 - 0.12 K/uL    Immature Granulocytes (abs) 0.06 0.00 - 0.11 K/uL    NRBC (Absolute) 0.00 K/uL   Complete Metabolic Panel    Collection Time: 24  3:31 AM   Result Value Ref Range    Sodium 137 135 - 145 mmol/L    Potassium 3.9 3.6 - 5.5 mmol/L    Chloride 103 96 - 112 mmol/L    Co2 20 20 - 33 mmol/L    Anion Gap 14.0 7.0 - 16.0    Glucose 340 (H) 65 - 99 mg/dL    Bun 22 8 - 22 mg/dL    Creatinine 1.39 0.50 - 1.40 mg/dL    Calcium 8.7 8.5 - 10.5 mg/dL    Correct Calcium 9.1 8.5 - 10.5 mg/dL    AST(SGOT) 27 12 - 45 U/L    ALT(SGPT) 25 2 - 50 U/L    Alkaline Phosphatase 103 (H) 30 - 99 U/L    Total Bilirubin 0.4 0.1 - 1.5 mg/dL    Albumin 3.5 3.2 - 4.9 g/dL    Total Protein 6.5 6.0 - 8.2 g/dL    Globulin 3.0 1.9 - 3.5 g/dL    A-G Ratio 1.2 g/dL   ESTIMATED GFR    Collection Time: 24  3:31 AM   Result Value Ref Range    GFR (CKD-EPI) 53 (A) >60 mL/min/1.73 m 2   POC CoV-2, FLU A/B, RSV by PCR    Collection Time: 24  3:40 AM   Result Value Ref Range    POC Influenza A RNA, PCR Negative Negative    POC Influenza B RNA, PCR Negative Negative    POC RSV, by PCR Negative Negative    POC SARS-CoV-2, PCR NotDetected NotDetected   EKG (Now)    Collection Time: 24  4:32 AM   Result Value Ref Range    Report       [facility] Emergency Dept.    Test Date:  2024  Pt Name:    SEAMUS ALVAREZ                   Department: ER  MRN:        [id number]                      Room:       [facility]  Gender:     Male                         Technician: [id number]  :        1950                   Requested By:PETR YEN  Order #:     [id number]                    Reading MD:    Measurements  Intervals                                Axis  Rate:       114                          P:          0  SD:         0                            QRS:        0  QRSD:       90                           T:          20  QT:         356  QTc:        491    Interpretive Statements  Atrial fibrillation  Borderline prolonged QT interval  Compared to ECG 11/17/2024 20:40:27  Sinus rhythm no longer present     Urinalysis    Collection Time: 11/27/24  4:48 AM    Specimen: Urine   Result Value Ref Range    Color Yellow     Character Clear     Specific Gravity 1.014 <1.035    Ph 5.5 5.0 - 8.0    Glucose >=1000 (A) Negative mg/dL    Ketones Negative Negative mg/dL    Protein 30 (A) Negative mg/dL    Bilirubin Negative Negative    Urobilinogen, Urine 0.2 <=1.0 EU/dL    Nitrite Positive (A) Negative    Leukocyte Esterase Trace (A) Negative    Occult Blood Large (A) Negative    Micro Urine Req Microscopic    URINE MICROSCOPIC (W/UA)    Collection Time: 11/27/24  4:48 AM   Result Value Ref Range    WBC 11-20 (A) /hpf    RBC 21-50 (A) 0 - 2 /hpf    Bacteria Many (A) None /hpf    Epithelial Cells 0-2 0 - 5 /hpf    Urine Casts 0-2 0 - 2 /lpf       Imaging/Procedures Review:    Reviewed    MDM (Assessment and Plan):     There are no active hospital problems to display for this patient.

## 2024-11-27 NOTE — H&P
Hospital Medicine History & Physical Note    Date of Service  11/27/2024    Primary Care Physician  Chuck Chappell M.D.    Consultants  urology    Specialist Names: Dr. Titus    Code Status  Full Code    Chief Complaint  Chief Complaint   Patient presents with    N/V     Pt BIBA from home for n/v and chills. Pt woke up around 0100 and was shivering. Pt had x1 emesis. Pt had a ureter stent removed yesterday from previous stones. PT is A&Ox4, a-fib with known history. Pt on 5L O2 NC, saturating at 90%, oral temp 102.7    Chills       History of Presenting Illness  Patient is a 74-year-old male with past medical history of nephrolithiasis complicated by urinary tract infection status post stent placement 11/1 with removal 11/26 and positive urine cultures with Pseudomonas from recent discharge from hospital 11/17/2024, history of ESBL E. coli, atrial fibrillation and pulmonary embolism on Eliquis who presents due to rigors, nausea vomiting, and chills that started around 1 AM.  Assessed patient in PACU, recently had stent removed yesterday, however redeveloped symptoms that are concerning for potential worsening infection and therefore presented to emergency department found to have multiple nephrolithiasis causing obstruction with fever, tachycardia, tachypnea and elevated lactic acid likely secondary to sepsis.  Patient was also noted to have 5 L nasal cannula requirement, denies any chest pain, cough, shortness of breath, sputum production with fairly unremarkable chest x-ray.  States he has been compliant with his Eliquis except for a missed dose this morning due to his illness.    In ED: Temperature 39.3 °C, pulse 122, respiratory rate 22, blood pressure 160/72, oxygen saturation 90% on 5 L nasal cannula  Labs: Hemoglobin 13.5, creatinine 1.39, lactic acid 3.7, urinalysis positive for blood, protein, nitrites, leukocyte esterase  CT ureteral colic with left nephrolithiasis with distal left ureteral junction  stones resulting in outflow tract obstruction as well as renal cyst lesions    I discussed the plan of care with patient.    Review of Systems  Review of Systems   Constitutional:  Positive for chills and fever.   Respiratory:  Negative for cough, shortness of breath and wheezing.    Cardiovascular:  Negative for chest pain, palpitations and leg swelling.   Gastrointestinal:  Negative for abdominal pain, constipation, diarrhea, nausea and vomiting.       Past Medical History   has a past medical history of Arthritis, Back pain, Chickenpox, High cholesterol, Hypertension, Obesity, Pain, Pneumonia, Polycythemia, secondary, Sleep apnea, Snoring, and Type II or unspecified type diabetes mellitus without mention of complication, not stated as uncontrolled.    Surgical History   has a past surgical history that includes fusion, spine, lumbar, plif (1/21/2015); inguinal hernia repair (Right, 2/16/2016); lumbar laminectomy diskectomy (2/18/2018); hardware removal neuro (2/18/2018); carpal tunnel release (Right, 2014); shoulder arthroscopy (Right, 2014); lumbar decompression (2004); lumbar decompression (2003); cervical disk and fusion anterior (2005); pr ins/rplc spi npg/rcvr pocket (7/23/2019); egd w/control bleeding (8/22/2020); gastroscopy (N/A, 8/22/2020); pr cystourethroscopy (N/A, 11/1/2024); and stent placement (Left, 11/1/2024).     Family History  family history includes Heart Attack in his brother and father; Sleep Apnea in his mother.   Family history reviewed with patient. There is no family history that is pertinent to the chief complaint.     Social History   reports that he has never smoked. He quit smokeless tobacco use about 5 years ago.  His smokeless tobacco use included chew. He reports current alcohol use of about 1.2 oz of alcohol per week. He reports that he does not use drugs.    Allergies  No Known Allergies    Medications  Prior to Admission Medications   Prescriptions Last Dose Informant Patient  Reported? Taking?   ELIQUIS 5 MG Tab  Patient's Home Pharmacy Yes No   Sig: Take 5 mg by mouth 2 times a day.   Mirabegron ER 50 MG TABLET SR 24 HR  Patient's Home Pharmacy Yes No   Sig: Take 50 mg by mouth every day.   PARoxetine (PAXIL) 20 MG Tab  Patient's Home Pharmacy Yes No   Sig: Take 20 mg by mouth every day.   SM VITAMIN D3 25 MCG (1000 UT) Tab  Patient's Home Pharmacy Yes No   Sig: Take 1,000 Units by mouth every day.   Semaglutide, 1 MG/DOSE, (OZEMPIC, 1 MG/DOSE,) 2 MG/1.5ML Solution Pen-injector   Yes No   Sig: Inject 1 mg under the skin every 7 days.   allopurinol (ZYLOPRIM) 100 MG Tab  Patient's Home Pharmacy Yes No   Sig: Take 100 mg by mouth every day.   amLODIPine (NORVASC) 5 MG Tab  Patient's Home Pharmacy Yes No   Sig: Take 5 mg by mouth every day.   ciprofloxacin (CIPRO) 500 MG Tab   No No   Sig: Take 1 Tablet by mouth 2 times a day for 8 days.   finasteride (PROSCAR) 5 MG Tab  Patient's Home Pharmacy Yes No   Sig: Take 1 Tablet by mouth every day.   gabapentin (NEURONTIN) 600 MG tablet  Patient's Home Pharmacy Yes No   Sig: Take 600 mg by mouth 2 times a day.   glipiZIDE SR (GLUCOTROL) 2.5 MG TABLET SR 24 HR  Patient's Home Pharmacy Yes No   Sig: Take 2.5 mg by mouth 2 times a day.   phenazopyridine (PYRIDIUM) 200 MG Tab   Yes No   Sig: Take 200 mg by mouth 3 times a day. 4 day course started 11/13/24   rosuvastatin (CRESTOR) 20 MG Tab  Patient's Home Pharmacy No No   Sig: Take 1 Tablet by mouth every evening.   terazosin (HYTRIN) 10 MG capsule  Patient's Home Pharmacy Yes No   Sig: Take 10 mg by mouth every day.      Facility-Administered Medications: None       Physical Exam  Temp:  [36.9 °C (98.4 °F)-39.3 °C (102.7 °F)] 36.9 °C (98.4 °F)  Pulse:  [] 98  Resp:  [20-22] 22  BP: (119-160)/(65-79) 120/71  SpO2:  [90 %-96 %] 96 %  Blood Pressure : 120/71   Temperature: 36.9 °C (98.4 °F)   Pulse: 98   Respiration: (!) 22   Pulse Oximetry: 96 %       Physical Exam  Vitals and nursing note  "reviewed.   Constitutional:       General: He is not in acute distress.  HENT:      Head: Normocephalic and atraumatic.      Mouth/Throat:      Mouth: Mucous membranes are moist.   Eyes:      General: No scleral icterus.     Extraocular Movements: Extraocular movements intact.   Cardiovascular:      Rate and Rhythm: Tachycardia present. Rhythm irregular.      Heart sounds: No murmur heard.     No gallop.   Pulmonary:      Effort: Pulmonary effort is normal. No respiratory distress.      Breath sounds: No wheezing, rhonchi or rales.   Abdominal:      General: Abdomen is flat. Bowel sounds are normal. There is no distension.      Palpations: Abdomen is soft.      Tenderness: There is no abdominal tenderness.   Musculoskeletal:         General: No swelling or tenderness.      Cervical back: Neck supple.   Skin:     General: Skin is warm.   Neurological:      General: No focal deficit present.      Mental Status: He is alert.   Psychiatric:         Mood and Affect: Mood normal.         Laboratory:  Recent Labs     11/27/24  0331   WBC 8.5   RBC 4.34*   HEMOGLOBIN 13.5*   HEMATOCRIT 40.7*   MCV 93.8   MCH 31.1   MCHC 33.2   RDW 47.8   PLATELETCT 243   MPV 10.1     Recent Labs     11/27/24  0331   SODIUM 137   POTASSIUM 3.9   CHLORIDE 103   CO2 20   GLUCOSE 340*   BUN 22   CREATININE 1.39   CALCIUM 8.7     Recent Labs     11/27/24  0331   ALTSGPT 25   ASTSGOT 27   ALKPHOSPHAT 103*   TBILIRUBIN 0.4   GLUCOSE 340*         No results for input(s): \"NTPROBNP\" in the last 72 hours.      No results for input(s): \"TROPONINT\" in the last 72 hours.    Imaging:  DX-CHEST-PORTABLE (1 VIEW)   Final Result         1.  Left basilar atelectasis, no focal infiltrate      CT-RENAL COLIC EVALUATION(A/P W/O)   Final Result         1.  Left nephrolithiasis with distal left ureteral and ureterovesicular junction stones resulting in left urinary outflow tract obstructive changes   2.  Intermediate density right upper pole renal lesion, could " represent hemorrhagic cyst otherwise indeterminate, recommend follow-up 3 phase CT kidneys for further characterization   3.  Diffuse bladder wall thickening, consider changes of cystitis versus bladder outlet obstruction, correlate with urinalysis.   4.  Small fat-containing bilateral inguinal hernias   5.  Small pericardial effusion   6.  Diverticulosis   7.  Atherosclerosis and atherosclerotic coronary artery disease      DX-PORTABLE FLUOROSCOPY < 1 HOUR Reason For Exam: Main OR    (Results Pending)       X-Ray:  I have personally reviewed the images and compared with prior images.  EKG:  I have personally reviewed the images and compared with prior images.    Assessment/Plan:  Justification for Admission Status  I anticipate this patient will require at least two midnights for appropriate medical management, necessitating inpatient admission because sepsis secondary to urinary tract infection complicated by stone    Patient will need a IMCU bed on MEDICAL service .  The need is secondary to sepsis secondary to urinary tract infection.    * Sepsis with acute organ dysfunction without septic shock (HCC)- (present on admission)  Assessment & Plan  This is Sepsis Present on admission  SIRS criteria identified on my evaluation include: Fever, with temperature greater than 100.9 deg F, Tachycardia, with heart rate greater than 90 BPM, and Tachypnea, with respirations greater than 20 per minute  Clinical indicators of end organ dysfunction include Lactic Acid greater than 2  Source is urinary tract  Sepsis protocol initiated  Crystalloid Fluid Administration: Resuscitation volume of 2 L ordered. Reason that resuscitation volume of less than 30ml/kg was ordered concern for fluid overload  IV antibiotics as appropriate for source of sepsis  Reassessment: I have reassessed the patient's hemodynamic status    Septic stone noted on CT imaging with urinary tract infection, previously positive for Pseudomonas  - Follow blood  and urine cultures  - Empiric cefepime every 12 hours IV  - Urology consulted, taking patient to OR  - Does have remote history of ESBL E. coli in 2023, however most recent culture growing Pseudomonas 11/2024    Kidney cysts  Assessment & Plan  Follow-up CT renal in 3 months, 4.6 and 6.6 cm with 15 Hounsfield units    Acute respiratory failure with hypoxia (HCC)- (present on admission)  Assessment & Plan  Requiring 5 L nasal cannula, denies any symptoms of shortness of breath, history of pulmonary embolism on Eliquis  - Unclear etiology, chest x-ray demonstrating potential bibasilar atelectasis  - Monitor after surgery    Type 2 diabetes mellitus without complication, without long-term current use of insulin (HCC)- (present on admission)  Assessment & Plan  - Ordered hemoglobin A1c  - Insulin sliding scale  - Holding home glycemic medications  - Titrate glucose between 140 and 180          VTE prophylaxis: therapeutic anticoagulation with eliquis

## 2024-11-27 NOTE — DISCHARGE PLANNING
Care Transition Team Assessment  RN CM completed chart review.   Patient is a 74 year old male admitted for sepsis. Please see patient's H&P for prior medical history. Patient lives in a ss home with his spouse.   Prior to admission patient was independent with ADL's and IADL's. Patient does not use any DME at baseline. However, he does have a cane,walker, and wheelchair if needed.   Patient has ACP documents patient's wife Nona Barber is listed [phone]. Patient's wife provided a copy.  Patient is insured through [facility] and Medicare.   Patient's preferred pharmacy is [facility].   Patient's family is a great support to him.       Information Source  Orientation Level: Oriented to place, Oriented to situation, Oriented to person, Disoriented to time  Information Given By: Patient  Who is responsible for making decisions for patient? : Patient    Readmission Evaluation  Is this a readmission?: Yes - unplanned readmission    Elopement Risk  Legal Hold: No  Ambulatory or Self Mobile in Wheelchair: No-Not an Elopement Risk       Discharge Preparedness  What is your plan after discharge?: Home with help  What are your discharge supports?: Spouse  Prior Functional Level: Drives Self, Independent with Activities of Daily Living, Independent with Medication Management  Difficulity with ADLs: None  Difficulity with IADLs: None    Functional Assesment  Prior Functional Level: Drives Self, Independent with Activities of Daily Living, Independent with Medication Management    Finances  Financial Barriers to Discharge: No  Prescription Coverage: Yes      Advance Directive  Advance Directive?: None    Domestic Abuse  Have you ever been the victim of abuse or violence?: No    Psychological Assessment  History of Substance Abuse: None  History of Psychiatric Problems: No  Non-compliant with Treatment: No  Newly Diagnosed Illness: No    Discharge Risks or Barriers  Discharge risks or barriers?: Complex medical  needs    Anticipated Discharge Information  Discharge Disposition: Discharged to home/self care (01)

## 2024-11-27 NOTE — ASSESSMENT & PLAN NOTE
Monitor accuchecks, cover with SSI  Hypoglycemic protocol  Diabetic diet  A1c:8.7 in past 11 days.

## 2024-11-27 NOTE — PROGRESS NOTES
4 Eyes Skin Assessment Completed by ARIAN Kramer and ARIAN Schmidt.    Head WDL  Ears WDL  Nose WDL  Mouth WDL  Neck WDL  Breast/Chest WDL  Shoulder Blades WDL  Spine WDL  (R) Arm/Elbow/Hand Redness, Blanching, and Bruising  (L) Arm/Elbow/Hand Redness, Blanching, and Scab  Abdomen WDL  Groin WDL  Scrotum/Coccyx/Buttocks Redness and Blanching  (R) Leg Scab, dusky  (L) Leg Scab, dusky  (R) Heel/Foot/Toe Redness, Blanching, and Boggy  (L) Heel/Foot/Toe Redness, Blanching, and Boggy          Devices In Places ECG, Tele Box, Blood Pressure Cuff, Pulse Ox, Michaels, SCD's, and ET Tube      Interventions In Place TAP System, Pillows, Q2 Turns, Low Air Loss Mattress, ZFlo Pillow, Heels Loaded W/Pillows, and Pressure Redistribution Mattress    Possible Skin Injury No    Pictures Uploaded Into Epic N/A  Wound Consult Placed N/A  RN Wound Prevention Protocol Ordered No

## 2024-11-27 NOTE — ASSESSMENT & PLAN NOTE
Requiring 5 L nasal cannula, denies any symptoms of shortness of breath, history of pulmonary embolism on Eliquis  Encourage mobility and deep breathing exercises  Watch nocturnal hypoxia with hx of LYLE  - Unclear etiology, chest x-ray demonstrating potential bibasilar atelectasis  - Monitor after surgery

## 2024-11-27 NOTE — PROCEDURES
Date of Procedure:  11/27/2024    Title of Procedure:  Diagnostic and therapeutic flexible fiberoptic bronchoscopy with bronchoalveolar lavage    Indication for Procedure:   Atelectasis    Post-procedure Diagnoses:    1.  Normal endobronchial anatomy  2.  No endobronchial tumor identified  3.  Copious amounts of juicy white secretions in the left lower lobe bronchi and right lower lobe bronchi    Narrative:    A time out was performed identifying the correct patient, correct procedure and correct location prior to this procedure.    The patient was sedated, intubated and ventilated at the time of this procedure.  The flexible fiberoptic bronchoscope was inserted through the lumen of the endotracheal tube and advanced into the distal trachea without difficulty.  The airways were examined to the subsegmental bronchus level bilaterally.    The endobronchial anatomy was normal.  No tumor was identified.    There were copious amounts of juicy white secretions seen in the left lower lobe bronchi and right lower lobe bronchi.  I therapeutically suctioned these secretions.    Bronchoalveolar lavage was carried out in the basilar segments of the left lower lobe bronchi using the standard technique with good fluid return.    Bronchoalveolar lavage fluid from the left lower lobe is submitted to the laboratory for cytology, gram stain, culture and sensitivity, acid fast bacilli smear and culture and fungal culture.    The patient tolerated the procedure quite nicely.  No complications were apparent.  The heart rate and rhythm, blood pressure and oxygenation saturation were continuously monitored.    This was an EMERGENT procedure.  It was medically necessary to perform this procedure emergently to prevent life threatening deterioration.    Aleksander Bell MD  Pulmonary and Critical Care Medicine

## 2024-11-27 NOTE — ANESTHESIA POSTPROCEDURE EVALUATION
Patient: Ramu Barber    Procedure Summary       Date: 11/27/24 Room / Location: [facility] 06 / SURGERY [facility]    Anesthesia Start: 0605 Anesthesia Stop: 0708    Procedure: CYSTOSCOPY, WITH URETERAL STENT INSERTION (Left: Ureter) Diagnosis:     Surgeons: Wilson Titus M.D. Responsible Provider: Ramu Lewis M.D.    Anesthesia Type: general ASA Status: 4 - Emergent            Final Anesthesia Type: general  Last vitals  BP   Blood Pressure : 120/71    Temp   36.9 °C (98.4 °F)    Pulse   98   Resp   (!) 22    SpO2   96 %      Anesthesia Post Evaluation    Patient location during evaluation: ICU  Patient participation: waiting for patient participation  Level of consciousness: obtunded/minimal responses  Pain score: 0    Airway patency: patent  Anesthetic complications: no  Cardiovascular status: adequate and tachycardic  Respiratory status: ETT and ventilator  Hydration status: hypovolemic  Patient has been marked as needing Post Surgery Rounding  PONV: none          No notable events documented.     Nurse Pain Score: 0 (NPRS)

## 2024-11-27 NOTE — ED PROVIDER NOTES
ED Provider Note    CHIEF COMPLAINT  Chief Complaint   Patient presents with    N/V     Pt BIBA from home for n/v and chills. Pt woke up around 0100 and was shivering. Pt had x1 emesis. Pt had a ureter stent removed yesterday from previous stones. PT is A&Ox4, a-fib with known history. Pt on 5L O2 NC, saturating at 90%, oral temp 102.7    Chills       EXTERNAL RECORDS REVIEWED  Inpatient Notes patient was discharged from the hospital 11/19/2024 after admission 2 days prior to that.  Notably with a history of ESBL UTI, atrial fibrillation, CKD 3, hypertension, sleep apnea, diabetes, PE.  He was admitted with nephrolithiasis and a UTI at the beginning of the month and had a stent placed at that time.  Prior urine culture grew Pseudomonas and he was discharged on ciprofloxacin which was changed to Macrobid.  Urinalysis grossly infected.  He was initiated on meropenem and then this was changed to ciprofloxacin at the time of discharge with a plan for stent removal with urology on 1126.    HPI/ROS  LIMITATION TO HISTORY   Select: Altered mental status / Confusion  OUTSIDE HISTORIAN(S):  Family significant other at bedside reporting that the patient awoke this evening around 1 in the morning with generalized chills and shaking, generalized weakness and an episode of vomiting.  They called EMS and on arrival patient was found to be hypoxic necessitating 5 L supplemental oxygen.  He was found to be febrile at 102.7 degrees.  He was tachycardic with atrial fibrillation and RVR.  She notes that he had a ureteral stent removed on the left side yesterday and was recently admitted to the hospital with sepsis and a UTI.  She notes that he does not typically wear oxygen at baseline, only CPAP at night    Ramu Barber is a 74 y.o. male who presents to the ER for evaluation of nausea vomiting chills and fever.  Currently he reports feeling tired and endorses pain in his left lower abdomen.  He denies shortness of breath or  having a cough.  He denies chest pain.  He is currently somewhat confused with respect to his current presentation and the majority of history is coming from his wife.    PAST MEDICAL HISTORY   has a past medical history of Arthritis, Back pain, Chickenpox, High cholesterol, Hypertension, Obesity, Pain, Pneumonia, Polycythemia, secondary, Sleep apnea, Snoring, and Type II or unspecified type diabetes mellitus without mention of complication, not stated as uncontrolled.    SURGICAL HISTORY   has a past surgical history that includes fusion, spine, lumbar, plif (1/21/2015); inguinal hernia repair (Right, 2/16/2016); lumbar laminectomy diskectomy (2/18/2018); hardware removal neuro (2/18/2018); carpal tunnel release (Right, 2014); shoulder arthroscopy (Right, 2014); lumbar decompression (2004); lumbar decompression (2003); cervical disk and fusion anterior (2005); ins/rplc spi npg/rcvr pocket (7/23/2019); egd w/control bleeding (8/22/2020); gastroscopy (N/A, 8/22/2020); cystourethroscopy (N/A, 11/1/2024); and stent placement (Left, 11/1/2024).    FAMILY HISTORY  Family History   Problem Relation Age of Onset    Sleep Apnea Mother     Heart Attack Father     Heart Attack Brother        SOCIAL HISTORY  Social History     Tobacco Use    Smoking status: Never    Smokeless tobacco: Former     Types: Chew     Quit date: 12/2018   Vaping Use    Vaping status: Never Used   Substance and Sexual Activity    Alcohol use: Yes     Alcohol/week: 1.2 oz     Types: 2 Shots of liquor per week     Comment: 2 a week    Drug use: No    Sexual activity: Not on file       CURRENT MEDICATIONS  Home Medications    **Home medications have not yet been reviewed for this encounter**       Audit from Redirected Encounters    **Home medications have not yet been reviewed for this encounter**         ALLERGIES  No Known Allergies    PHYSICAL EXAM  VITAL SIGNS: BP (!) 160/72   Pulse (!) 122   Temp (!) 39.3 °C (102.7 °F) (Oral)   Resp (!) 22   Ht  1.829 m (6')   Wt 116 kg (255 lb)   SpO2 90%   BMI 34.58 kg/m²    Constitutional: Ill-appearing  HEENT: Atraumatic, normocephalic, pupils are equal round reactive to light, nose normal, mouth shows dry mucous membranes  Neck: Supple, no JVD, no tracheal deviation  Cardiovascular: Tachycardic, irregularly irregular  Thorax & Lungs: Tachypneic, distant breath sounds secondary to body habitus.  GI: Soft, non-distended, non-tender, no rebound  Skin: Warm, dry, no acute rash or lesion  Musculoskeletal: Moving all extremities, no acute deformity, symmetric 1+ bilateral lower extremity edema  Neurologic: A&Ox3, mildly confused with respect to current presentation.  No focal deficit.  Psychiatric: Appropriate affect for situation at this time        EKG/LABS  Labs Reviewed   LACTIC ACID - Abnormal; Notable for the following components:       Result Value    Lactic Acid 3.7 (*)     All other components within normal limits   CBC WITH DIFFERENTIAL - Abnormal; Notable for the following components:    RBC 4.34 (*)     Hemoglobin 13.5 (*)     Hematocrit 40.7 (*)     Neutrophils-Polys 92.90 (*)     Lymphocytes 4.30 (*)     Neutrophils (Absolute) 7.94 (*)     Lymphs (Absolute) 0.37 (*)     All other components within normal limits   COMP METABOLIC PANEL - Abnormal; Notable for the following components:    Glucose 340 (*)     Alkaline Phosphatase 103 (*)     All other components within normal limits   URINALYSIS - Abnormal; Notable for the following components:    Glucose >=1000 (*)     Protein 30 (*)     Nitrite Positive (*)     Leukocyte Esterase Trace (*)     Occult Blood Large (*)     All other components within normal limits   ESTIMATED GFR - Abnormal; Notable for the following components:    GFR (CKD-EPI) 53 (*)     All other components within normal limits   URINE MICROSCOPIC (W/UA) - Abnormal; Notable for the following components:    WBC 11-20 (*)     RBC 21-50 (*)     Bacteria Many (*)     All other components within  normal limits   BLOOD CULTURE   BLOOD CULTURE   LACTIC ACID   LACTIC ACID   URINE CULTURE(NEW)   LACTIC ACID   POCT COV-2, FLU A/B, RSV BY PCR   POC COV-2, FLU A/B, RSV BY PCR     Results for orders placed or performed during the hospital encounter of 24   EKG (Now)   Result Value Ref Range    Report       [facility] Emergency Dept.    Test Date:  2024  Pt Name:    SEAMUS ALVAREZ                   Department: ER  MRN:        [id number]                      Room:        08  Gender:     Male                         Technician: [id number]  :        1950                   Requested By:PETR YEN  Order #:    [id number]                    Reading MD:    Measurements  Intervals                                Axis  Rate:       114                          P:          0  UT:         0                            QRS:        0  QRSD:       90                           T:          20  QT:         356  QTc:        491    Interpretive Statements  Atrial fibrillation  Borderline prolonged QT interval  Compared to ECG 2024 20:40:27  Sinus rhythm no longer present         I have independently interpreted this EKG    RADIOLOGY/PROCEDURES   I have independently interpreted the diagnostic imaging associated with this visit and am waiting the final reading from the radiologist.   My preliminary interpretation is as follows: Chest x-ray demonstrates no focal pneumonia.  CT scan with left obstructing ureterolithiasis and hydronephrosis    Radiologist interpretation:  DX-CHEST-PORTABLE (1 VIEW)   Final Result         1.  Left basilar atelectasis, no focal infiltrate      CT-RENAL COLIC EVALUATION(A/P W/O)   Final Result         1.  Left nephrolithiasis with distal left ureteral and ureterovesicular junction stones resulting in left urinary outflow tract obstructive changes   2.  Intermediate density right upper pole renal lesion, could represent hemorrhagic cyst otherwise indeterminate, recommend  follow-up 3 phase CT kidneys for further characterization   3.  Diffuse bladder wall thickening, consider changes of cystitis versus bladder outlet obstruction, correlate with urinalysis.   4.  Small fat-containing bilateral inguinal hernias   5.  Small pericardial effusion   6.  Diverticulosis   7.  Atherosclerosis and atherosclerotic coronary artery disease      DX-PORTABLE FLUOROSCOPY < 1 HOUR Reason For Exam: Main OR    (Results Pending)       COURSE & MEDICAL DECISION MAKING    ASSESSMENT, COURSE AND PLAN  Care Narrative:     Patient presents to the ER today brought in by EMS for evaluation of chills nausea vomiting and fever that started suddenly this evening.  As above was recently admitted with urosepsis and an obstructing stone.  Recently had his ureteral stent removed by urology yesterday.  On arrival patient is ill-appearing, febrile, tachycardic in atrial fibrillation with RVR, tachypneic and hypoxic with a new 5 L O2 requirement.  He is mildly confused.  Chief concern is for recurrent sepsis in the setting of UTI and likely recurrent obstruction.  Bedside ultrasound performed confirming ongoing left hydronephrosis.  Broad workup undertaken including lactate and blood cultures, bilateral IVs were placed and IV fluid boluses were initiated judiciously given his new onset hypoxia and concern for fluid overload with peripheral edema on exam.  Broad-spectrum antibiotics were initiated as well in consultation with pharmacy.  Patient was sent for a CT renal study which does indeed demonstrate recurrent obstructing ureterolithiasis and hydronephrosis.  Urinalysis with evidence of ongoing infection.  Case was discussed with on-call urologist Dr. Titus who plans to take the patient to the operating room for stent placement.  Patient's workup was ultimately remarkable for a lactic acidosis with a lactate of 3.7 but no GENI or electrolyte abnormality, leukocytosis.  Chest x-ray was obtained with no significant  pulmonary findings to attribute the patient's hypoxia.  Considered pulmonary embolism however also considered developing ARDS from sepsis.  Given his complex clinical picture and acute illness I elected for [facility] admission in the interim.  Case was discussed with intensivist Dr. Marcum who agrees with this plan.  Case discussed with Dr. Watt accepts patient for admission.  Fortunately in the emergency department with fluid resuscitation patient's pulmonary status did not worsen and his heart rate improved.    Hydration: Based on the patient's presentation of Dehydration, Sepsis, and Tachycardia the patient was given IV fluids. IV Hydration was used because oral hydration was not adequate alone. Upon recheck following hydration, the patient was improved.      CRITICAL CARE  The very real possibilty of a deterioration of this patient's condition required the highest level of my preparedness for sudden, emergent intervention.  I provided critical care services, which included medication orders, frequent reevaluations of the patient's condition and response to treatment, ordering and reviewing test results, and discussing the case with various consultants.  The critical care time associated with the care of the patient was 60 minutes. Review chart for interventions. This time is exclusive of any other billable procedures.       ADDITIONAL PROBLEMS MANAGED  Sepsis  Atrial fibrillation with RVR  Ureteral obstruction  Urosepsis    DISPOSITION AND DISCUSSIONS  I have discussed management of the patient with the following physicians and JORDYN's: Intensivist, hospitalist, urologist as above    Decision tools and prescription drugs considered including, but not limited to: Antibiotics cefepime .    FINAL DIAGNOSIS  1. Sepsis with acute organ dysfunction without septic shock, due to unspecified organism, unspecified organ dysfunction type (HCC)    2. Ureterolithiasis    3. Hydronephrosis with urinary obstruction due to  renal calculus         Electronically signed by: Chuck Uribe M.D., 11/27/2024 3:37 AM

## 2024-11-27 NOTE — OP REPORT
[facility] Operative Report    Pre-operative Diagnosis: 1. Left hydronephrosis, sepsis   Post-operative Diagnosis: Same as above   Procedure: Left ureteroscopy, ureteral stone basketing, left ureteral stent placement   Surgeon Wilson Titus M.D., MD   Assistant: None   Anesthesia: General   Estimated Blood Loss: minimal       Specimens: 1.  None   Drains: 6 x 28 double-J ureteral stent on left  16 Ethiopian Michaels catheter       Condition and complications Guarded, admit to ICU   Disposition:  PACU   Findings: 1.  Steinstrasse distal left ureter, cleared with 0 tip nitinol basket and 6 x 20 double-J ureteral stent placed.  2.  Significant debris in bladder consistent with cystitis, no pus proximal to UVJ  3.  Unable to extubate in OR, transferred to ICU for recovery     Indications for Procedure:  This is a 74-year-old male history of left ureteral stent placement on 11/1 for impacted ureteral stone with sepsis secondary to ESBL E. coli followed by left Cults on 11/13 and most recently cystoscopy and left ureteral stent removal on 11/26.  Awoke this evening with shaking chills and fever presented to the ED.  CT on arrival identified left hydronephrosis and read notes 5 mm distal ureteral stone however on further review appears to be vascular calcifications.  There are punctate stones remaining in the kidney, mid ureter and distal ureter.  Patient with signs of sepsis as he is febrile with lactic acidosis, recommended replacement of left ureteral stent after all questions were answered he opted to proceed.      Procedure in Detail:  Patient was explained the risks and benefits of the procedure including bleeding, infection, bladder ruptured, pain, hematuria, and elects to proceed. In the lithotomy position, she was prepped and draped in the usual sterile fashion, given IV cefepime in the ED. A 22-Ethiopian rigid cystoscope with 30-degree  lens was introduced per urethra sterilely. Pancystoscopy was performed  revealing significant debris and erythema consistent with cystitis. The ureteral orifices were in normal position and orientation.     0.35 guidewire advanced to the operative port of the scope to the left ureteral orifice, unable to advance past the UO.  Cystoscope was withdrawn and rigid ureteroscope under gravity drainage was advanced into the distal ureteral orifice revealing significant distal edema with small Steinstrasse proximally.  Small stones were grasped and removed over the period of 1 to 2 minutes after which wire was advanced up the left renal pelvis and 6 x 28 double-J ureteral stent advanced over the wire until good curl noted both the left renal pelvis and the bladder seen on fluoroscopy direct visualization.  Clear E flux noted upon placement.  Bladder was's evacuated and a 16 Swedish Michaels catheter placed at the conclusion.     The patient had persistently low oxygen saturations throughout required some pressure support so per anesthesia recommendation will be transferred to the ICU, intubated, for recovery.       Wilson Titus MD  [facility] [id number]  [place] [id number]  [phone]

## 2024-11-27 NOTE — ASSESSMENT & PLAN NOTE
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

## 2024-11-27 NOTE — ASSESSMENT & PLAN NOTE
Previous cultures have revealed Pseudomonas, Klebsiella and ESBL Escherichia coli species  Empiric meropenem pending culture results

## 2024-11-27 NOTE — CONSULTS
Critical Care Consultation    Date of Service: 11/27/2024    Date of Admission:  11/27/2024  3:21 AM    Consulting Physician: MARY Najera*    Chief Complaint: Acute respiratory failure and sepsis     History of Present Illness: Mr Ramu Barber is a 75 yo male with medical history of DM2 (A1c 8.7%), LYLE, dyslipidemia, BPH, paroxysmal aFib and PE on Eliquis, and recurrent nephrolithiasis - L cystoscopy, lithotripsy, with ureteral stent on 11/13, came back on 11/17 with UTI (treated with ciprofloxacin) and underwent stent removal 11/26. Overnight developed N/V and chills shortly after being discharged.  In ED he was febrile, tachycardic and requiring 5lt O2 NC. CT abdomen showed two obstructing stones on the left. Labs remarkable for lactate to 3.7, inflammatory UA with hematuria, creatinine 1.39 . Patient was given 2L NS and cefepime. Urology was consulted and patient was taken to left ureteroscopy, ureteral stone basketing, left ureteral stent placement c/b persistent low oxygen saturations unable to extubate.     Urine cultures:  11/1/2024: Pseudomonas pansensitive   12/2023: Pseudomonas and klebsiella  10/2023: ESBL E Coli, sensitive to ertapenem, meropenem, macrobid, zosyn, tetracycline, tobramycin, and bactrim.      Review of Systems   Unable to perform ROS: Critical illness       Home Medications       Reviewed by Noé Abdi R.N. (Registered Nurse) on 11/27/24 at 0625  Med List Status: <None>     Medication Last Dose Status   acetaminophen (Tylenol) tablet 650 mg  Active   allopurinol (ZYLOPRIM) 100 MG Tab  Active   allopurinol (Zyloprim) tablet 100 mg  Active   amLODIPine (NORVASC) 5 MG Tab  Active   amLODIPine (Norvasc) tablet 5 mg  Active   apixaban (Eliquis) tablet 5 mg  Active   cefepime (Maxipime) 2 g in  mL IVPB  Active   ciprofloxacin (CIPRO) 500 MG Tab  Active   dexamethasone (Decadron) injection  Active   dextrose 50% (D50W) injection 25 g  Active   ELIQUIS 5 MG Tab  Active    finasteride (PROSCAR) 5 MG Tab  Active   finasteride (Proscar) tablet 5 mg  Active   gabapentin (NEURONTIN) 600 MG tablet  Active   gabapentin (Neurontin) capsule 600 mg  Active   glipiZIDE SR (GLUCOTROL) 2.5 MG TABLET SR 24 HR  Active   insulin lispro (HumaLOG,AdmeLOG) subcutaneous injection  Active   Lactated Ringers  Active   lidocaine PF (Xylocaine-MPF) 2 % injection PF  Active   LR (Bolus) infusion 2,328 mL  Active   Mirabegron ER 50 MG TABLET SR 24 HR  Active   Mirabegron ER TB24 50 mg  Active   ondansetron (Zofran ODT) dispertab 4 mg  Active   ondansetron (Zofran) syringe/vial injection  Active   ondansetron (Zofran) syringe/vial injection 4 mg  Active   PARoxetine (PAXIL) 20 MG Tab  Active   PARoxetine (Paxil) tablet 20 mg  Active   phenazopyridine (PYRIDIUM) 200 MG Tab  Active   phenazopyridine (Pyridium) tablet 200 mg  Active   polyethylene glycol/lytes (Miralax) Packet 1 Packet  Active   propofol (DIPRIVAN) injection  Active   rosuvastatin (CRESTOR) 20 MG Tab  Active   rosuvastatin (Crestor) tablet 20 mg  Active   Semaglutide, 1 MG/DOSE, (OZEMPIC, 1 MG/DOSE,) 2 MG/1.5ML Solution Pen-injector  Active   senna-docusate (Pericolace Or Senokot S) 8.6-50 MG per tablet 2 Tablet  Active   SM VITAMIN D3 25 MCG (1000 UT) Tab  Active   succinylcholine (Anectine) injection  Active   terazosin (HYTRIN) 10 MG capsule  Active   terazosin (Hytrin) capsule 10 mg  Active                  Audit from Redirected Encounters    **Home medications have not yet been reviewed for this encounter**         Social History     Tobacco Use    Smoking status: Never    Smokeless tobacco: Former     Types: Chew     Quit date: 12/2018   Vaping Use    Vaping status: Never Used   Substance Use Topics    Alcohol use: Yes     Alcohol/week: 1.2 oz     Types: 2 Shots of liquor per week     Comment: 2 a week    Drug use: No        Past Medical History:   Diagnosis Date    Arthritis     back, hips    Back pain     Chickenpox     High cholesterol      Hypertension     Obesity     Pain     back pain s/p multiple surgeries and spinal stimulator    Pneumonia     Polycythemia, secondary     Sleep apnea     cpap    Snoring     Type II or unspecified type diabetes mellitus without mention of complication, not stated as uncontrolled     oral meds only       Past Surgical History:   Procedure Laterality Date    AR CYSTOURETHROSCOPY N/A 11/1/2024    Procedure: CYSTOSCOPY;  Surgeon: Andrea Galicia M.D.;  Location: SURGERY SAME DAY [facility];  Service: Urology    STENT PLACEMENT Left 11/1/2024    Procedure: STENT PLACEMENT;  Surgeon: Andrea Galicia M.D.;  Location: SURGERY SAME DAY [facility];  Service: Urology    EGD W/CONTROL BLEEDING  8/22/2020         GASTROSCOPY N/A 8/22/2020    Procedure: GASTROSCOPY;  Surgeon: Tawanda Gomez M.D.;  Location: [facility];  Service: Gastroenterology    AR INS/RPLC SPI NPG/RCVR POCKET  7/23/2019    Procedure: INSERTION, NEUROSTIMULATOR, PERMANENT, SPINAL CORD;  Surgeon: Teddy Santos M.D.;  Location: [facility];  Service: Pain Management    LUMBAR LAMINECTOMY DISKECTOMY  2/18/2018    Procedure: LUMBAR LAMINECTOMY DISKECTOMY- LT L1-2 HEMILAMI-;  Surgeon: Tom Pittman M.D.;  Location: [facility];  Service: Neurosurgery    HARDWARE REMOVAL NEURO  2/18/2018    Procedure: HARDWARE REMOVAL NEURO- L2-3 PEDICLE SCREWS;  Surgeon: Tom Pittman M.D.;  Location: [facility];  Service: Neurosurgery    INGUINAL HERNIA REPAIR Right 2/16/2016    Procedure: INGUINAL HERNIA REPAIR;  Surgeon: Sj Baird M.D.;  Location: [facility];  Service:     FUSION, SPINE, LUMBAR, PLIF  1/21/2015    Performed by Ko Suarez M.D. at [facility]    CARPAL TUNNEL RELEASE Right 2014    SHOULDER ARTHROSCOPY Right 2014    CERVICAL DISK AND FUSION ANTERIOR  2005    LUMBAR DECOMPRESSION  2004    LUMBAR DECOMPRESSION  2003       Allergies: Patient has no known  allergies.    Family History   Problem Relation Age of Onset    Sleep Apnea Mother     Heart Attack Father     Heart Attack Brother        Vitals:    11/27/24 0329 11/27/24 0331 11/27/24 0406 11/27/24 0501   Height: 1.829 m (6')      Weight: 116 kg (255 lb)      Weight % change since last entry.: 0 %      BP:  (!) 160/72 130/79 119/65   Pulse:  (!) 122 (!) 115 99   BMI (Calculated): 34.58      Resp:  (!) 22 20 20   Temp:  (!) 39.3 °C (102.7 °F)     TempSrc:  Oral      11/27/24 0545   Height:    Weight:    Weight % change since last entry.:    BP: 120/71   Pulse: 98   BMI (Calculated):    Resp: (!) 22   Temp: 36.9 °C (98.4 °F)   TempSrc: Temporal       Physical Examination  Physical Exam  Vitals and nursing note reviewed.   Constitutional:       Comments: Sedated   HENT:      Head: Normocephalic and atraumatic.      Mouth/Throat:      Comments: ETT and OGT  Eyes:      Extraocular Movements: Extraocular movements intact.      Conjunctiva/sclera: Conjunctivae normal.      Pupils: Pupils are equal, round, and reactive to light.   Cardiovascular:      Rate and Rhythm: Normal rate. Rhythm irregular.      Pulses: Normal pulses.      Heart sounds: Normal heart sounds.   Pulmonary:      Effort: Pulmonary effort is normal.      Comments: Some crackles in both bases   Abdominal:      General: Abdomen is flat. Bowel sounds are normal. There is no distension.      Palpations: Abdomen is soft.   Genitourinary:     Penis: Normal.       Testes: Normal.      Comments: Michaels in place with pink urine   Musculoskeletal:         General: Swelling (Trace pitting edema) present. Normal range of motion.      Cervical back: Normal range of motion.   Skin:     General: Skin is warm.      Capillary Refill: Capillary refill takes less than 2 seconds.   Neurological:      General: No focal deficit present.      Comments: Sedated, RASS -1, withdraws to pain in all 4         Intake/Output Summary (Last 24 hours) at 11/27/2024 0735  Last data  filed at 11/27/2024 0708  Gross per 24 hour   Intake 1317.9 ml   Output 5 ml   Net 1312.9 ml       Recent Labs     11/27/24  0331   WBC 8.5   NEUTSPOLYS 92.90*   LYMPHOCYTES 4.30*   MONOCYTES 0.60   EOSINOPHILS 1.10   BASOPHILS 0.40   ASTSGOT 27   ALTSGPT 25   ALKPHOSPHAT 103*   TBILIRUBIN 0.4     Recent Labs     11/27/24  0331   SODIUM 137   POTASSIUM 3.9   CHLORIDE 103   CO2 20   BUN 22   CREATININE 1.39   CALCIUM 8.7     Recent Labs     11/27/24  0331   ALTSGPT 25   ASTSGOT 27   ALKPHOSPHAT 103*   TBILIRUBIN 0.4   GLUCOSE 340*         DX-CHEST-PORTABLE (1 VIEW)   Final Result         1.  Left basilar atelectasis, no focal infiltrate      CT-RENAL COLIC EVALUATION(A/P W/O)   Final Result         1.  Left nephrolithiasis with distal left ureteral and ureterovesicular junction stones resulting in left urinary outflow tract obstructive changes   2.  Intermediate density right upper pole renal lesion, could represent hemorrhagic cyst otherwise indeterminate, recommend follow-up 3 phase CT kidneys for further characterization   3.  Diffuse bladder wall thickening, consider changes of cystitis versus bladder outlet obstruction, correlate with urinalysis.   4.  Small fat-containing bilateral inguinal hernias   5.  Small pericardial effusion   6.  Diverticulosis   7.  Atherosclerosis and atherosclerotic coronary artery disease      DX-PORTABLE FLUOROSCOPY < 1 HOUR Reason For Exam: Main OR    (Results Pending)   DX-CHEST-PORTABLE (1 VIEW)    (Results Pending)       Patient Active Problem List   Diagnosis    Blood glucose abnormal    Polycythemia, secondary    Sleep apnea    Essential hypertension    Overweight    Pyoderma gangrenosum    Spinal stenosis, lumbar region, without neurogenic claudication    Right inguinal hernia    Spinal stenosis, lumbar region, with neurogenic claudication    Obesity (BMI 30-39.9)    Acute cystitis without hematuria    Benign prostatic hyperplasia with urinary frequency    Type 2 diabetes  mellitus without complication, without long-term current use of insulin (HCC)    Hyperlipidemia    Pulmonary embolism, bilateral (HCC)    Depression    History of duodenal ulcer    Acute respiratory failure with hypoxia (HCC)    Acute deep vein thrombosis (DVT) of femoral vein of right lower extremity (HCC)    Pulmonary hypertension (HCC)    Heart failure with preserved ejection fraction, borderline, class II (HCC)    SIRS (systemic inflammatory response syndrome) (HCC)    UTI (urinary tract infection)    ACP (advance care planning)    Stage 3a chronic kidney disease    Sepsis with acute organ dysfunction without septic shock (HCC)    Acute kidney injury superimposed on stage 3a chronic kidney disease (HCC)    Ureterolithiasis    Leukocytosis    Weakness    Acute pyelonephritis    AF (atrial fibrillation) (HCC)    Nephrolithiasis    High anion gap metabolic acidosis    Kidney cysts       Assessment and Plan:    #Severe sepsis   A/w fever, chills, tachycardia. Lactic acid 3.7.   Suspect due to UTI (urosepsis) and some component of aspiration PNA/pneumonitis.  Hemodynamically stable, lactic acid improving.    - Empiric Meropenem   - Received IVF x3lt in ER  - Trending lactic acid   - Monitor blood and urine cultures   - See plan for UTI   - Monitor     #Complicated UTI  #L nephrolithiasis s/p lithotripsy and stent placement   Recurrent nephrolithiasis with recurrent UTIs. Prior stent 11/13 - removed yesterday and presented with symptoms again after the procedure, found to have obstructing ureteral stones (UV junction) with associated bladder thickening. Underwent new L laser lithotripsy with new stent placement.   - Started on meropenem for empiric coverage ( prior isolation of Pseudomonas, Klebsiella and E. Coli ESBL in urine cultures within last year)  - Monitor urine cultures  - Urology following, appreciate recs     #Acute hypoxemic respiratory  failure   Could not be extubated after urology procedure 11/27 AM  with high FiO2 needs  CXR and CT with lower lobe bilateral infiltrates.   Suspect due to aspiration in the setting of confusion with N/V upon admission.   Has history of PE but he is not tachycardic and is compliant with AC- low suspicion at this time.   Bronchoscopy performed today with abundant LLL and RLL secretions.  - A-F bundle   - SAT/SBT daily   - Follow BAL gram and culture  - OGT to intermittent suction  - HOB>30    #History of PE   Dx in 4/2023 - no interventions. Compliant with apixaban.   -Heparin drip for now, may resume apixaban tomorrow   -Montinor O2 needs/degree of hypoxemia to consider PE w/u   -May consider DVT study    #Paroxysmal Afib  Currently SR normal rate.  CHADSVASC 3   - On heparin drip as above  - Not on rate/rhythm control at home   - Monitor    #CKD stage IIIA  Scr/GFR appreas at baseline. Prior GENI resolved.   - Monitor in the setting of obstruction and sepsis as above     #DM2 with hyperglycemia  BG 300s A1c 8.7%   - SSI q6 while NPO    F: OGT to intermittent suction - care   A: PRN opiods  S: Propofol   T: Therapeutic heparin  U: Famotidine   G: Medium SSI - POCG monitor   I: PVIs and sexton in place   D: Meropenem     Adeline ADAMSON PGY-3  Internal Medicine [employer]

## 2024-11-27 NOTE — ASSESSMENT & PLAN NOTE
Septic ureter stone noted on CT imaging with urinary tract infection, previously positive for Pseudomonas  11/27 urine cultures showing Ecoli.  Await sensitivities  Was on 3 days of cefepime every 12 hours but now on meropenem.   Urology consulted, taking patient to OR   Does have remote history of ESBL E. coli in 2023, however most recent culture growing Pseudomonas 11/2024

## 2024-11-27 NOTE — CONSULTS
I was asked by Dr Uribe to evaluate Mr Barber for potential admission to the ICU or IMCU. Mr Barber is a 75 yo M w/ a PMH of recurrent nephrolithiasis and recent admission for such, DMII, and AF on eliquis who presents from home with confusion, rigors and vomiting. In ED he underwent CT abdomen, which showed two obstructing stones on the left. Labs are notable for elevated lactate to 3.7. VS notable for fever, tachycardia and normal blood pressure. Patient has been given 2L NS and cefepime. Urology have been consulted. I recommend admission to IMCU at this time in light of sepsis without overt shock and potential risk for decompensation.     Rolan Marcum MD

## 2024-11-27 NOTE — ANESTHESIA PREPROCEDURE EVALUATION
Case: [id number] Date/Time: 11/27/24 0530    Procedure: CYSTOSCOPY, WITH URETERAL STENT INSERTION (Left)    Location: [facility] OR 06 / SURGERY [facility]    Surgeons: Wilson Titus M.D.            Relevant Problems   ANESTHESIA   (positive) Sleep apnea      CARDIAC   (positive) AF (atrial fibrillation) (HCC)   (positive) Acute deep vein thrombosis (DVT) of femoral vein of right lower extremity (HCC)   (positive) Essential hypertension   (positive) Pulmonary embolism, bilateral (HCC)   (positive) Pulmonary hypertension (HCC)         (positive) Acute kidney injury superimposed on stage 3a chronic kidney disease (HCC)   (positive) Acute pyelonephritis   (positive) Kidney cysts   (positive) Nephrolithiasis   (positive) Stage 3a chronic kidney disease      ENDO   (positive) Type 2 diabetes mellitus without complication, without long-term current use of insulin (HCC)       Physical Exam    Airway   Mallampati: III  TM distance: >3 FB  Neck ROM: limited       Cardiovascular   Rhythm: irregular  Rate: normal  (+) weak pulses, peripheral edema     Dental - normal exam           Pulmonary   (+) decreased breath sounds     Abdominal   (+) obese     Neurological - abnormal exam                   Anesthesia Plan    ASA 4- EMERGENT   ASA physical status 4 criteria: sepsisASA physical status emergent criteria: acutely contaminated wound or identified infection source    Plan - general       Airway plan will be ETT          Induction: intravenous    Postoperative Plan: Postoperative administration of opioids is intended.    Pertinent diagnostic labs and testing reviewed    Informed Consent:    Anesthetic plan and risks discussed with patient and spouse.    Use of blood products discussed with: patient and spouse whom consented to blood products.       Patient with chills, hypoxia, elevated troponins, BNP, hx of PE, HFpEF, DMII, on ozempic here for emergent stent placement in the setting of sepsis.

## 2024-11-27 NOTE — ASSESSMENT & PLAN NOTE
S/P left ureteral stent placement from 11/1/2024 to 11/26/2024  S/P placement of new left ureteral stent on 11/27/2024

## 2024-11-27 NOTE — CONSULTS
PULMONARY AND CRITICAL CARE MEDICINE CONSULTATION    Date of Consultation:  11/27/2024    Requesting Physician:  Wilson Titus MD    Consulting Physician:  Aleksander Bell MD    Reason for Consultation: Respiratory failure, sepsis    Chief Complaint: Respiratory failure, sepsis    History of Present Illness:    I was kindly asked to see and evaluate Ramu Barber, a 74 y.o. male for evaluation and management of the above problem.    The entire history is obtained from healthcare providers and the medical record as this gentleman cannot provide me with any history.  This gentleman has a history of nephrolithiasis, atrial fibrillation on chronic anticoagulation with apixaban, pulmonary embolism, primary hypertension, DM type II, dyslipidemia, LYEL on CPAP and obesity.  He had a left ureteral stent placed from on or about 11/1/2024 to 11/26/2024.  He presented to the ED in the wee hours of the morning with nausea, vomiting and chills.  He was febrile to 39.3 degrees.  Imaging revealed left nephrolithiasis with distal left ureteral and ureterovesicular junction stones with obstruction.  He was emergently taken to the operating theater and underwent left ureteral stent placement.  He required a significant amount of supplemental oxygen while in the OR and was unable to be safely liberated from the ventilator following his procedure.  He is now in the ICU on full mechanical ventilatory support.  He reportedly has been compliant with his apixaban.    Medications Prior to Admission:    No current facility-administered medications on file prior to encounter.     Current Outpatient Medications on File Prior to Encounter   Medication Sig Dispense Refill    ciprofloxacin (CIPRO) 500 MG Tab Take 1 Tablet by mouth 2 times a day for 8 days. 16 Tablet 0    Semaglutide, 1 MG/DOSE, (OZEMPIC, 1 MG/DOSE,) 2 MG/1.5ML Solution Pen-injector Inject 1 mg under the skin every 7 days.      phenazopyridine (PYRIDIUM) 200 MG Tab  Take 200 mg by mouth 3 times a day. 4 day course started 11/13/24      allopurinol (ZYLOPRIM) 100 MG Tab Take 100 mg by mouth every day.      SM VITAMIN D3 25 MCG (1000 UT) Tab Take 1,000 Units by mouth every day.      Mirabegron ER 50 MG TABLET SR 24 HR Take 50 mg by mouth every day.      ELIQUIS 5 MG Tab Take 5 mg by mouth 2 times a day.      amLODIPine (NORVASC) 5 MG Tab Take 5 mg by mouth every day.      gabapentin (NEURONTIN) 600 MG tablet Take 600 mg by mouth 2 times a day.      rosuvastatin (CRESTOR) 20 MG Tab Take 1 Tablet by mouth every evening. 30 Tablet 11    glipiZIDE SR (GLUCOTROL) 2.5 MG TABLET SR 24 HR Take 2.5 mg by mouth 2 times a day.      terazosin (HYTRIN) 10 MG capsule Take 10 mg by mouth every day.      finasteride (PROSCAR) 5 MG Tab Take 1 Tablet by mouth every day.      PARoxetine (PAXIL) 20 MG Tab Take 20 mg by mouth every day.         Current Medications:      Current Facility-Administered Medications:     ondansetron (Zofran) syringe/vial injection 4 mg, 4 mg, Intravenous, Q4HRS PRN, Dash Watt D.O.    [Held by provider] apixaban (Eliquis) tablet 5 mg, 5 mg, Oral, BID, Dash Watt, D.O.    [Held by provider] finasteride (Proscar) tablet 5 mg, 5 mg, Oral, DAILY, Dash Watt, D.O.    [Held by provider] Mirabegron ER TB24 50 mg, 50 mg, Oral, DAILY, Dash Watt, D.O.    lactated ringers infusion, , Intravenous, Continuous, Ramu Lewis M.D., Last Rate: 50 mL/hr at 11/27/24 0941, New Bag at 11/27/24 0941    albuterol (Proventil) 2.5mg/0.5ml nebulizer solution 2.5 mg, 2.5 mg, Inhalation, Q10 MIN PRN, Ramu Lewis M.D.    Pharmacy Consult: Enteral tube insertion - review meds/change route/product selection, 1 Each, Other, PHARMACY TO DOSE, Adeline Bird, M.D.    insulin lispro (HumaLOG,AdmeLOG) subcutaneous injection, 2-9 Units, Subcutaneous, Q6HRS **AND** POC blood glucose manual result, , , Q6H **AND** NOTIFY MD and PharmD, , , Once **AND** Administer 20 grams of  glucose (approximately 8 ounces of fruit juice) every 15 minutes PRN FSBG less than 70 mg/dL, , , PRN **AND** dextrose 50% (D50W) injection 25 g, 25 g, Intravenous, Q15 MIN PRN, Adeline Pang M.D.    Respiratory Therapy Consult, , Nebulization, Continuous RT, Aleksander Bell M.D.    MD Alert...ICU Electrolyte Replacement per Pharmacy, , Other, PHARMACY TO DOSE, Aleksander Bell M.D.    lidocaine (Xylocaine) 1 % injection 2 mL, 2 mL, Tracheal Tube, Q30 MIN PRN, Aleksander Bell M.D.    dexmedetomidine (Precedex) 400 mcg/100mL infusion, 0.1-1.5 mcg/kg/hr (Ideal), Intravenous, Continuous, Aleksander Bell M.D., Last Rate: 3.9 mL/hr at 11/27/24 0911, 0.2 mcg/kg/hr at 11/27/24 0911    HYDROmorphone (Dilaudid) injection 0.5-2 mg, 0.5-2 mg, Intravenous, Q HOUR PRN, Aleksander Bell M.D., 2 mg at 11/27/24 0816    meropenem (Merrem) 500 mg in  mL IV-MBP, 500 mg, Intravenous, Q6HRS, Aleksander Bell M.D., Stopped at 11/27/24 0910    [START ON 11/28/2024] allopurinol (Zyloprim) tablet 100 mg, 100 mg, Enteral Tube, DAILY, Aleksander Bell M.D.    [START ON 11/28/2024] amLODIPine (Norvasc) tablet 5 mg, 5 mg, Enteral Tube, QDAY, Aleksander Bell M.D.    gabapentin (Neurontin) capsule 600 mg, 600 mg, Enteral Tube, BID, Aleksander Bell M.D.    [START ON 11/28/2024] PARoxetine (Paxil) tablet 20 mg, 20 mg, Enteral Tube, DAILY, Aleksander Bell M.D.    phenazopyridine (Pyridium) tablet 200 mg, 200 mg, Enteral Tube, TID, Aleksander Bell M.D.    rosuvastatin (Crestor) tablet 20 mg, 20 mg, Enteral Tube, Q EVENING, Aleksander Bell M.D.    [START ON 11/28/2024] terazosin (Hytrin) capsule 10 mg, 10 mg, Enteral Tube, DAILY, Aleksander Bell M.D.    acetaminophen (Tylenol) tablet 650 mg, 650 mg, Enteral Tube, Q6HRS PRN, Aleksander Bell M.D.    ondansetron (Zofran ODT) dispertab 4 mg, 4 mg, Enteral Tube, Q4HRS PRN, Aleksander Bell M.D.     PHENYLEPHRINE HCL-NACL 1-0.9 MG/10ML-% IV SOSY, , , ,     heparin injection 7,000 Units, 7,000 Units, Intravenous, Once **AND** heparin injection 3,800 Units, 3,800 Units, Intravenous, PRN **AND** heparin infusion 25,000 Units in 500 mL 0.45% NACL, , Intravenous, Continuous **AND** Protocol 440 Heparin Weight Based DO NOT GIVE ANY HEPARIN BOLUS TO STROKE PATIENT, , , CONTINUOUS **AND** Protocol 440 Heparin Weight Based Discontinue Enoxaparin (Lovenox), Dabigatran (Pradaxa), Rivaroxaban (Xarelto), Apixaban (Eliquis), Edoxaban (Savaysa, Lixiana), Fondaparinux (Arixtra) and Argatroban prior to heparin administration, , , CONTINUOUS **AND** Protocol 440 Heparin Weight Based Draw baseline aPTT, PT, and platelet count if not already done, , , CONTINUOUS **AND** Protocol 440 Heparin Weight Based Draw aPTT 6 hours after beginning infusion., , , CONTINUOUS **AND** Protocol 440 Heparin Weight Based Draw Platelet count every three days. Contact MD if platelet is 50% lower than baseline count., , , CONTINUOUS **AND** Protocol 440 Heparin Weight Based Record Patient Data, , , CONTINUOUS **AND** Protocol 440 Heparin Weight Based INSTRUCTIONS, , , CONTINUOUS **AND** Protocol 440 Heparin Weight Based Review aPTT results 6 hours after infusion is begun as detailed, , , CONTINUOUS **AND** Protocol 440 Heparin Weight Based Adjust heparin to maintain aPTT between 55-96 sec, , , CONTINUOUS **AND** Protocol 440 Heparin Weight Based Order aPTT 6 hours after any rate change or hold until aPTT is therapeutic (55-96 seconds), , , CONTINUOUS **AND** Protocol 440 Heparin Weight Based Documentation and verification, , , CONTINUOUS, Bryson Cain M.D.    Allergies:    Patient has no known allergies.    Past Surgical History:    Past Surgical History:   Procedure Laterality Date    [place] CYSTOURETHROSCOPY N/A 11/1/2024    Procedure: CYSTOSCOPY;  Surgeon: Andrea Galicia M.D.;  Location: SURGERY SAME DAY [facility];  Service: Urology    STENT  PLACEMENT Left 11/1/2024    Procedure: STENT PLACEMENT;  Surgeon: Andrea Galicia M.D.;  Location: SURGERY SAME DAY [facility];  Service: Urology    EGD W/CONTROL BLEEDING  8/22/2020         GASTROSCOPY N/A 8/22/2020    Procedure: GASTROSCOPY;  Surgeon: Tawanda Gomez M.D.;  Location: SURGERY [facility];  Service: Gastroenterology    PA INS/RPLC SPI NPG/RCVR POCKET  7/23/2019    Procedure: INSERTION, NEUROSTIMULATOR, PERMANENT, SPINAL CORD;  Surgeon: Teddy Santos M.D.;  Location: SURGERY [facility];  Service: Pain Management    LUMBAR LAMINECTOMY DISKECTOMY  2/18/2018    Procedure: LUMBAR LAMINECTOMY DISKECTOMY- LT L1-2 HEMILAMI-;  Surgeon: Tom Pittman M.D.;  Location: SURGERY [facility];  Service: Neurosurgery    HARDWARE REMOVAL NEURO  2/18/2018    Procedure: HARDWARE REMOVAL NEURO- L2-3 PEDICLE SCREWS;  Surgeon: Tom Pittman M.D.;  Location: SURGERY [facility];  Service: Neurosurgery    INGUINAL HERNIA REPAIR Right 2/16/2016    Procedure: INGUINAL HERNIA REPAIR;  Surgeon: Sj Baidr M.D.;  Location: SURGERY [facility];  Service:     FUSION, SPINE, LUMBAR, PLIF  1/21/2015    Performed by Ko Suarez M.D. at [facility]    CARPAL TUNNEL RELEASE Right 2014    SHOULDER ARTHROSCOPY Right 2014    CERVICAL DISK AND FUSION ANTERIOR  2005    LUMBAR DECOMPRESSION  2004    LUMBAR DECOMPRESSION  2003       Past Medical History:    Past Medical History:   Diagnosis Date    Arthritis     back, hips    Back pain     Chickenpox     High cholesterol     Hypertension     Obesity     Pain     back pain s/p multiple surgeries and spinal stimulator    Pneumonia     Polycythemia, secondary     Sleep apnea     cpap    Snoring     Type II or unspecified type diabetes mellitus without mention of complication, not stated as uncontrolled     oral meds only       Social History:    Social History     Socioeconomic History    Marital status:      Spouse name: Not on file     Number of children: Not on file    Years of education: Not on file    Highest education level: Not on file   Occupational History    Not on file   Tobacco Use    Smoking status: Never    Smokeless tobacco: Former     Types: Chew     Quit date: 12/2018   Vaping Use    Vaping status: Never Used   Substance and Sexual Activity    Alcohol use: Yes     Alcohol/week: 1.2 oz     Types: 2 Shots of liquor per week     Comment: 2 a week    Drug use: No    Sexual activity: Not on file   Other Topics Concern    Not on file   Social History Narrative    Not on file     Social Drivers of Health     Financial Resource Strain: Not on file   Food Insecurity: No Food Insecurity (11/18/2024)    Hunger Vital Sign     Worried About Running Out of Food in the Last Year: Never true     Ran Out of Food in the Last Year: Never true   Transportation Needs: No Transportation Needs (11/18/2024)    PRAPARE - Transportation     Lack of Transportation (Medical): No     Lack of Transportation (Non-Medical): No   Physical Activity: Not on file   Stress: Not on file   Social Connections: Not on file   Intimate Partner Violence: Not At Risk (11/18/2024)    Humiliation, Afraid, Rape, and Kick questionnaire     Fear of Current or Ex-Partner: No     Emotionally Abused: No     Physically Abused: No     Sexually Abused: No   Housing Stability: Low Risk  (11/18/2024)    Housing Stability Vital Sign     Unable to Pay for Housing in the Last Year: No     Number of Times Moved in the Last Year: 0     Homeless in the Last Year: No       Family History:    Family History   Problem Relation Age of Onset    Sleep Apnea Mother     Heart Attack Father     Heart Attack Brother        Review of System:    Review of Systems   Unable to perform ROS: Acuity of condition       Physical Examination:    /53   Pulse 78   Temp 36.9 °C (98.4 °F) (Temporal)   Resp 18   Ht 1.829 m (6')   Wt 116 kg (255 lb)   SpO2 94%   BMI 34.58 kg/m²   Physical  Exam  Constitutional:       Appearance: He is obese. He is not diaphoretic.   HENT:      Head: Normocephalic.   Eyes:      Pupils: Pupils are equal, round, and reactive to light.   Cardiovascular:      Comments: Sinus rhythm with paroxysmal atrial fibrillation, PACs and PVCs  Pulmonary:      Breath sounds: No wheezing or rales.   Abdominal:      General: There is no distension.      Tenderness: There is no abdominal tenderness.   Musculoskeletal:      Right lower leg: Edema present.      Left lower leg: Edema present.   Skin:     General: Skin is warm.   Neurological:      Comments: Sedated         Laboratory Data:    Recent Labs     11/27/24  0844   ISTATAPH 7.285*   ISTATAPCO2 48.7*   ISTATAPO2 78*   ISTATATCO2 25*   GRKWPIW2BBC 94   ISTATARTHCO3 23.2   ISTATARTBE -4   ISTATTEMP 36.7 C   ISTATFIO2 100   ISTATSPEC Arterial   ISTATAPHTC 7.290*   FHDNXOZP1TB 77     Recent Labs     11/27/24  0331   WBC 8.5   RBC 4.34*   HEMOGLOBIN 13.5*   HEMATOCRIT 40.7*   MCV 93.8   MCH 31.1   MCHC 33.2   RDW 47.8   PLATELETCT 243   MPV 10.1     Recent Labs     11/27/24  0331   SODIUM 137   POTASSIUM 3.9   CHLORIDE 103   CO2 20   GLUCOSE 340*   BUN 22   CREATININE 1.39   CALCIUM 8.7                   Imaging:    I personally viewed all the available CXR and CT scan images as well as reviewed the radiology interpretation reports.    DX-ABDOMEN FOR TUBE PLACEMENT   Final Result      NG tube tip projects at the stomach.      DX-CHEST-PORTABLE (1 VIEW)   Final Result         1.  Hazy bilateral lower lobe infiltrates   2.  Cardiomegaly      DX-PORTABLE FLUOROSCOPY < 1 HOUR Reason For Exam: Main OR   Preliminary Result      Portable fluoroscopy utilized for 3 seconds.         INTERPRETING LOCATION: [address] [place] [address], [id number]      DX-CHEST-PORTABLE (1 VIEW)   Final Result         1.  Left basilar atelectasis, no focal infiltrate      CT-RENAL COLIC EVALUATION(A/P W/O)   Final Result         1.  Left nephrolithiasis with distal left  ureteral and ureterovesicular junction stones resulting in left urinary outflow tract obstructive changes   2.  Intermediate density right upper pole renal lesion, could represent hemorrhagic cyst otherwise indeterminate, recommend follow-up 3 phase CT kidneys for further characterization   3.  Diffuse bladder wall thickening, consider changes of cystitis versus bladder outlet obstruction, correlate with urinalysis.   4.  Small fat-containing bilateral inguinal hernias   5.  Small pericardial effusion   6.  Diverticulosis   7.  Atherosclerosis and atherosclerotic coronary artery disease          Assessment and Plan:    * Sepsis (HCC)- (present on admission)  Assessment & Plan  This is Sepsis Present on admission  SIRS criteria identified on my evaluation include: Fever, with temperature greater than 100.9 deg F, Tachycardia, with heart rate greater than 90 BPM, and Tachypnea, with respirations greater than 20 per minute  Clinical indicators of end organ dysfunction include Lactic Acid greater than 2 and Acute Respiratory Failure - (mechanical ventilation or BiPap or PaO2/FiO2 ratio < 300)  Source is urinary  Sepsis protocol initiated  Crystalloid Fluid Administration: Fluid resuscitation ordered per standard protocol - 30 mL/kg per current or ideal body weight  IV antibiotics as appropriate for source of sepsis  Reassessment: I have reassessed the patient's hemodynamic status    Acute pyelonephritis- (present on admission)  Assessment & Plan  Previous cultures have revealed Pseudomonas, Klebsiella and ESBL Escherichia coli species  Begin empiric meropenem pending culture results    Acute respiratory failure with hypoxia (HCC)- (present on admission)  Assessment & Plan  Intubated 11/27  ABCDEF bundle  SAT/SBT as appropriate  Mobility level 1-2    Nephrolithiasis- (present on admission)  Assessment & Plan  S/P left ureteral stent placement from 11/1/2024 to 11/26/2024  S/P placement of new left ureteral stent on  11/27/2024    AF (atrial fibrillation) (HCC)- (present on admission)  Assessment & Plan  History of paroxysmal atrial fibrillation  On chronic anticoagulation with apixaban  Optimize potassium and magnesium  Begin full anticoagulation with heparin infusion with anti-Xa monitoring    Pulmonary embolism, bilateral (HCC)- (present on admission)  Assessment & Plan  History of pulmonary embolism in April 2023  On chronic anticoagulation with apixaban  Begin full anticoagulation with heparin with anti-Xa monitoring    Type 2 diabetes mellitus (HCC)- (present on admission)  Assessment & Plan  Glycohemoglobin 8.7  Sliding scale insulin    Primary hypertension- (present on admission)  Assessment & Plan  Goal SBP less than 160  Continue amlodipine, 5 mg daily  Continue terazosin, 10 mg daily    Hyperlipidemia- (present on admission)  Assessment & Plan  Continue rosuvastatin, 20 mg daily    Morbid obesity (HCC)- (present on admission)  Assessment & Plan  BMI 35    Sleep apnea- (present on admission)  Assessment & Plan  History of severe LYLE with AHI of 72  On CPAP at 8 cm of water        High risk of deterioration and worsening vital organ dysfunction and death without the above critical care interventions.    I have assessed and reassessed his respiratory status with ventilator adjustments, airway mechanics, ventilator waveforms, blood pressure, hemodynamics, cardiovascular status as well as his neurologic status.  He is at increased risk for worsening respiratory and cardiovascular system dysfunction.    Thank you for allowing me to participate in the care of this gentleman.  I will continue to follow him with great interest.    The patient is critically ill.  Critical care time = 105 minutes in directly providing and coordinating critical care and extensive data review.  No time overlap and excludes procedures.    Discussed with Dr. Titus, Dr. Lewis, RN, RT    Aleksander Bell MD  Pulmonary and Critical Care  Medicine

## 2024-11-27 NOTE — ED NOTES
Pt transferred out of ED at this time with transport tech. Pt is A&Ox4, with stable vital signs and no apparent distress upon transfer.  All paperwork and personal belongings sent with pt.   Report given to transporter.    Pt Transferred on 5L O2 with adequate O2 volume in bed tank.

## 2024-11-27 NOTE — ANESTHESIA TIME REPORT
Anesthesia Start and Stop Event Times       Date Time Event    11/27/2024 0605 Anesthesia Start     0708 Anesthesia Stop          Responsible Staff  11/27/24      Name Role Begin End    Ramu Lewis M.D. Anesth 0605 0708          Overtime Reason:  overtime    Comments:

## 2024-11-27 NOTE — ASSESSMENT & PLAN NOTE
History of paroxysmal atrial fibrillation  On chronic anticoagulation with apixaban  Optimize potassium and magnesium  Begin full anticoagulation with heparin infusion with anti-Xa monitoring

## 2024-11-28 ENCOUNTER — APPOINTMENT (OUTPATIENT)
Dept: RADIOLOGY | Facility: MEDICAL CENTER | Age: 74
DRG: 853 | End: 2024-11-28
Attending: INTERNAL MEDICINE
Payer: COMMERCIAL

## 2024-11-28 LAB
ANION GAP SERPL CALC-SCNC: 11 MMOL/L (ref 7–16)
APTT PPP: 90.3 SEC (ref 24.7–36)
ARTERIAL PATENCY WRIST A: ABNORMAL
BASE EXCESS BLDA CALC-SCNC: 4 MMOL/L (ref -4–3)
BASOPHILS # BLD AUTO: 0.4 % (ref 0–1.8)
BASOPHILS # BLD: 0.07 K/UL (ref 0–0.12)
BODY TEMPERATURE: ABNORMAL DEGREES
BREATHS SETTING VENT: 22
BUN SERPL-MCNC: 14 MG/DL (ref 8–22)
CALCIUM SERPL-MCNC: 8.4 MG/DL (ref 8.5–10.5)
CHLORIDE SERPL-SCNC: 104 MMOL/L (ref 96–112)
CO2 BLDA-SCNC: 27 MMOL/L (ref 32–48)
CO2 SERPL-SCNC: 22 MMOL/L (ref 20–33)
CREAT SERPL-MCNC: 1 MG/DL (ref 0.5–1.4)
DELSYS IDSYS: ABNORMAL
END TIDAL CARBON DIOXIDE IECO2: 29 MMHG
EOSINOPHIL # BLD AUTO: 0.05 K/UL (ref 0–0.51)
EOSINOPHIL NFR BLD: 0.3 % (ref 0–6.9)
ERYTHROCYTE [DISTWIDTH] IN BLOOD BY AUTOMATED COUNT: 49.2 FL (ref 35.9–50)
FUNGUS SPEC CULT: NORMAL
FUNGUS SPEC FUNGUS STN: NORMAL
GFR SERPLBLD CREATININE-BSD FMLA CKD-EPI: 79 ML/MIN/1.73 M 2
GLUCOSE BLD STRIP.AUTO-MCNC: 178 MG/DL (ref 65–99)
GLUCOSE BLD STRIP.AUTO-MCNC: 222 MG/DL (ref 65–99)
GLUCOSE BLD STRIP.AUTO-MCNC: 226 MG/DL (ref 65–99)
GLUCOSE BLD STRIP.AUTO-MCNC: 244 MG/DL (ref 65–99)
GLUCOSE SERPL-MCNC: 243 MG/DL (ref 65–99)
HCO3 BLDA-SCNC: 26.3 MMOL/L (ref 21–28)
HCT VFR BLD AUTO: 42.3 % (ref 42–52)
HGB BLD-MCNC: 13.9 G/DL (ref 14–18)
HOROWITZ INDEX BLDA+IHG-RTO: 328 MM[HG]
IMM GRANULOCYTES # BLD AUTO: 0.11 K/UL (ref 0–0.11)
IMM GRANULOCYTES NFR BLD AUTO: 0.6 % (ref 0–0.9)
LYMPHOCYTES # BLD AUTO: 1.42 K/UL (ref 1–4.8)
LYMPHOCYTES NFR BLD: 7.2 % (ref 22–41)
MAGNESIUM SERPL-MCNC: 1.8 MG/DL (ref 1.5–2.5)
MCH RBC QN AUTO: 31.2 PG (ref 27–33)
MCHC RBC AUTO-ENTMCNC: 32.9 G/DL (ref 32.3–36.5)
MCV RBC AUTO: 95.1 FL (ref 81.4–97.8)
MODE IMODE: ABNORMAL
MONOCYTES # BLD AUTO: 1.24 K/UL (ref 0–0.85)
MONOCYTES NFR BLD AUTO: 6.3 % (ref 0–13.4)
MYCOBACTERIUM SPEC CULT: NORMAL
NEUTROPHILS # BLD AUTO: 16.81 K/UL (ref 1.82–7.42)
NEUTROPHILS NFR BLD: 85.2 % (ref 44–72)
NRBC # BLD AUTO: 0 K/UL
NRBC BLD-RTO: 0 /100 WBC (ref 0–0.2)
O2/TOTAL GAS SETTING VFR VENT: 40 %
PCO2 BLDA: 33.4 MMHG (ref 32–48)
PCO2 TEMP ADJ BLDA: 32 MMHG (ref 32–48)
PEEP END EXPIRATORY PRESSURE IPEEP: 8 CMH20
PH BLDA: 7.5 [PH] (ref 7.35–7.45)
PH TEMP ADJ BLDA: 7.52 [PH] (ref 7.35–7.45)
PHOSPHATE SERPL-MCNC: 2.2 MG/DL (ref 2.5–4.5)
PLATELET # BLD AUTO: 240 K/UL (ref 164–446)
PMV BLD AUTO: 10.1 FL (ref 9–12.9)
PO2 BLDA: 131 MMHG (ref 83–108)
PO2 TEMP ADJ BLDA: 124 MMHG (ref 64–87)
POTASSIUM SERPL-SCNC: 3.5 MMOL/L (ref 3.6–5.5)
RBC # BLD AUTO: 4.45 M/UL (ref 4.7–6.1)
RHODAMINE-AURAMINE STN SPEC: NORMAL
RHODAMINE-AURAMINE STN SPEC: NORMAL
SAO2 % BLDA: 99 % (ref 93–99)
SIGNIFICANT IND 70042: NORMAL
SITE SITE: NORMAL
SODIUM SERPL-SCNC: 137 MMOL/L (ref 135–145)
SOURCE SOURCE: NORMAL
SPECIMEN DRAWN FROM PATIENT: ABNORMAL
TIDAL VOLUME IVT: 460 ML
WBC # BLD AUTO: 19.7 K/UL (ref 4.8–10.8)

## 2024-11-28 PROCEDURE — 71045 X-RAY EXAM CHEST 1 VIEW: CPT

## 2024-11-28 PROCEDURE — 700102 HCHG RX REV CODE 250 W/ 637 OVERRIDE(OP): Performed by: INTERNAL MEDICINE

## 2024-11-28 PROCEDURE — 84100 ASSAY OF PHOSPHORUS: CPT

## 2024-11-28 PROCEDURE — 94150 VITAL CAPACITY TEST: CPT

## 2024-11-28 PROCEDURE — 99291 CRITICAL CARE FIRST HOUR: CPT | Performed by: EMERGENCY MEDICINE

## 2024-11-28 PROCEDURE — 85730 THROMBOPLASTIN TIME PARTIAL: CPT

## 2024-11-28 PROCEDURE — 700102 HCHG RX REV CODE 250 W/ 637 OVERRIDE(OP): Performed by: EMERGENCY MEDICINE

## 2024-11-28 PROCEDURE — A9270 NON-COVERED ITEM OR SERVICE: HCPCS

## 2024-11-28 PROCEDURE — 94799 UNLISTED PULMONARY SVC/PX: CPT

## 2024-11-28 PROCEDURE — 85025 COMPLETE CBC W/AUTO DIFF WBC: CPT

## 2024-11-28 PROCEDURE — 80048 BASIC METABOLIC PNL TOTAL CA: CPT

## 2024-11-28 PROCEDURE — A9270 NON-COVERED ITEM OR SERVICE: HCPCS | Performed by: EMERGENCY MEDICINE

## 2024-11-28 PROCEDURE — 700102 HCHG RX REV CODE 250 W/ 637 OVERRIDE(OP)

## 2024-11-28 PROCEDURE — 82962 GLUCOSE BLOOD TEST: CPT

## 2024-11-28 PROCEDURE — 36600 WITHDRAWAL OF ARTERIAL BLOOD: CPT

## 2024-11-28 PROCEDURE — 94660 CPAP INITIATION&MGMT: CPT

## 2024-11-28 PROCEDURE — 770020 HCHG ROOM/CARE - TELE (206)

## 2024-11-28 PROCEDURE — 83735 ASSAY OF MAGNESIUM: CPT

## 2024-11-28 PROCEDURE — 94003 VENT MGMT INPAT SUBQ DAY: CPT

## 2024-11-28 PROCEDURE — 700105 HCHG RX REV CODE 258: Performed by: INTERNAL MEDICINE

## 2024-11-28 PROCEDURE — 700105 HCHG RX REV CODE 258: Performed by: ANESTHESIOLOGY

## 2024-11-28 PROCEDURE — 700111 HCHG RX REV CODE 636 W/ 250 OVERRIDE (IP): Mod: JZ | Performed by: INTERNAL MEDICINE

## 2024-11-28 PROCEDURE — 700111 HCHG RX REV CODE 636 W/ 250 OVERRIDE (IP): Performed by: HOSPITALIST

## 2024-11-28 PROCEDURE — 700111 HCHG RX REV CODE 636 W/ 250 OVERRIDE (IP): Performed by: EMERGENCY MEDICINE

## 2024-11-28 PROCEDURE — 99233 SBSQ HOSP IP/OBS HIGH 50: CPT | Performed by: HOSPITALIST

## 2024-11-28 PROCEDURE — A9270 NON-COVERED ITEM OR SERVICE: HCPCS | Performed by: INTERNAL MEDICINE

## 2024-11-28 PROCEDURE — 82803 BLOOD GASES ANY COMBINATION: CPT

## 2024-11-28 PROCEDURE — 700101 HCHG RX REV CODE 250: Performed by: INTERNAL MEDICINE

## 2024-11-28 RX ORDER — ACETAMINOPHEN 325 MG/1
650 TABLET ORAL EVERY 6 HOURS PRN
Status: DISCONTINUED | OUTPATIENT
Start: 2024-11-28 | End: 2024-11-30 | Stop reason: HOSPADM

## 2024-11-28 RX ORDER — AMLODIPINE BESYLATE 5 MG/1
5 TABLET ORAL DAILY
Status: DISCONTINUED | OUTPATIENT
Start: 2024-11-29 | End: 2024-11-30 | Stop reason: HOSPADM

## 2024-11-28 RX ORDER — GABAPENTIN 300 MG/1
600 CAPSULE ORAL 2 TIMES DAILY
Status: DISCONTINUED | OUTPATIENT
Start: 2024-11-28 | End: 2024-11-30 | Stop reason: HOSPADM

## 2024-11-28 RX ORDER — ONDANSETRON 4 MG/1
4 TABLET, ORALLY DISINTEGRATING ORAL EVERY 4 HOURS PRN
Status: DISCONTINUED | OUTPATIENT
Start: 2024-11-28 | End: 2024-11-30 | Stop reason: HOSPADM

## 2024-11-28 RX ORDER — MAGNESIUM SULFATE HEPTAHYDRATE 40 MG/ML
2 INJECTION, SOLUTION INTRAVENOUS ONCE
Status: COMPLETED | OUTPATIENT
Start: 2024-11-28 | End: 2024-11-28

## 2024-11-28 RX ORDER — ALLOPURINOL 100 MG/1
100 TABLET ORAL DAILY
Status: DISCONTINUED | OUTPATIENT
Start: 2024-11-29 | End: 2024-11-30 | Stop reason: HOSPADM

## 2024-11-28 RX ORDER — POTASSIUM CHLORIDE 1500 MG/1
40 TABLET, EXTENDED RELEASE ORAL ONCE
Status: COMPLETED | OUTPATIENT
Start: 2024-11-28 | End: 2024-11-28

## 2024-11-28 RX ORDER — PAROXETINE 20 MG/1
20 TABLET, FILM COATED ORAL DAILY
Status: DISCONTINUED | OUTPATIENT
Start: 2024-11-29 | End: 2024-11-30 | Stop reason: HOSPADM

## 2024-11-28 RX ORDER — PHENAZOPYRIDINE HYDROCHLORIDE 200 MG/1
200 TABLET, FILM COATED ORAL 3 TIMES DAILY
Status: COMPLETED | OUTPATIENT
Start: 2024-11-28 | End: 2024-11-28

## 2024-11-28 RX ORDER — ROSUVASTATIN CALCIUM 20 MG/1
20 TABLET, COATED ORAL EVERY EVENING
Status: DISCONTINUED | OUTPATIENT
Start: 2024-11-28 | End: 2024-11-30 | Stop reason: HOSPADM

## 2024-11-28 RX ORDER — TERAZOSIN 5 MG/1
10 CAPSULE ORAL DAILY
Status: DISCONTINUED | OUTPATIENT
Start: 2024-11-29 | End: 2024-11-30 | Stop reason: HOSPADM

## 2024-11-28 RX ORDER — LANSOPRAZOLE 30 MG/1
30 TABLET, ORALLY DISINTEGRATING, DELAYED RELEASE ORAL DAILY
Status: DISCONTINUED | OUTPATIENT
Start: 2024-11-28 | End: 2024-11-28

## 2024-11-28 RX ADMIN — INSULIN LISPRO 2 UNITS: 100 INJECTION, SOLUTION INTRAVENOUS; SUBCUTANEOUS at 17:40

## 2024-11-28 RX ADMIN — HYDROMORPHONE HYDROCHLORIDE 2 MG: 1 INJECTION, SOLUTION INTRAMUSCULAR; INTRAVENOUS; SUBCUTANEOUS at 02:03

## 2024-11-28 RX ADMIN — GABAPENTIN 600 MG: 300 CAPSULE ORAL at 05:54

## 2024-11-28 RX ADMIN — HEPARIN SODIUM 1450 UNITS/HR: 5000 INJECTION, SOLUTION INTRAVENOUS at 02:36

## 2024-11-28 RX ADMIN — PHENAZOPYRIDINE 200 MG: 200 TABLET ORAL at 12:13

## 2024-11-28 RX ADMIN — DEXMEDETOMIDINE HYDROCHLORIDE 1 MCG/KG/HR: 100 INJECTION, SOLUTION INTRAVENOUS at 02:34

## 2024-11-28 RX ADMIN — PHENAZOPYRIDINE 200 MG: 200 TABLET ORAL at 05:55

## 2024-11-28 RX ADMIN — ROSUVASTATIN CALCIUM 20 MG: 20 TABLET, FILM COATED ORAL at 17:39

## 2024-11-28 RX ADMIN — TERAZOSIN HYDROCHLORIDE 10 MG: 5 CAPSULE ORAL at 05:53

## 2024-11-28 RX ADMIN — PHENAZOPYRIDINE 200 MG: 200 TABLET ORAL at 18:09

## 2024-11-28 RX ADMIN — POTASSIUM CHLORIDE 40 MEQ: 1500 TABLET, EXTENDED RELEASE ORAL at 08:35

## 2024-11-28 RX ADMIN — ALLOPURINOL 100 MG: 100 TABLET ORAL at 05:56

## 2024-11-28 RX ADMIN — GABAPENTIN 600 MG: 300 CAPSULE ORAL at 17:39

## 2024-11-28 RX ADMIN — DEXMEDETOMIDINE HYDROCHLORIDE 1 MCG/KG/HR: 100 INJECTION, SOLUTION INTRAVENOUS at 08:18

## 2024-11-28 RX ADMIN — MAGNESIUM SULFATE HEPTAHYDRATE 2 G: 2 INJECTION, SOLUTION INTRAVENOUS at 08:33

## 2024-11-28 RX ADMIN — INSULIN LISPRO 3 UNITS: 100 INJECTION, SOLUTION INTRAVENOUS; SUBCUTANEOUS at 06:06

## 2024-11-28 RX ADMIN — SODIUM CHLORIDE, POTASSIUM CHLORIDE, SODIUM LACTATE AND CALCIUM CHLORIDE: 600; 310; 30; 20 INJECTION, SOLUTION INTRAVENOUS at 05:21

## 2024-11-28 RX ADMIN — DIBASIC SODIUM PHOSPHATE, MONOBASIC POTASSIUM PHOSPHATE AND MONOBASIC SODIUM PHOSPHATE 500 MG: 852; 155; 130 TABLET ORAL at 08:35

## 2024-11-28 RX ADMIN — LANSOPRAZOLE 30 MG: 30 TABLET, ORALLY DISINTEGRATING, DELAYED RELEASE ORAL at 08:35

## 2024-11-28 RX ADMIN — INSULIN GLARGINE-YFGN 10 UNITS: 100 INJECTION, SOLUTION SUBCUTANEOUS at 11:32

## 2024-11-28 RX ADMIN — INSULIN LISPRO 3 UNITS: 100 INJECTION, SOLUTION INTRAVENOUS; SUBCUTANEOUS at 00:38

## 2024-11-28 RX ADMIN — MEROPENEM 500 MG: 500 INJECTION, POWDER, FOR SOLUTION INTRAVENOUS at 00:27

## 2024-11-28 RX ADMIN — PAROXETINE HYDROCHLORIDE 20 MG: 20 TABLET, FILM COATED ORAL at 05:54

## 2024-11-28 RX ADMIN — INSULIN LISPRO 3 UNITS: 100 INJECTION, SOLUTION INTRAVENOUS; SUBCUTANEOUS at 11:31

## 2024-11-28 RX ADMIN — MEROPENEM 500 MG: 500 INJECTION, POWDER, FOR SOLUTION INTRAVENOUS at 05:59

## 2024-11-28 RX ADMIN — HEPARIN SODIUM 1450 UNITS/HR: 5000 INJECTION, SOLUTION INTRAVENOUS at 20:06

## 2024-11-28 RX ADMIN — MEROPENEM 500 MG: 500 INJECTION, POWDER, FOR SOLUTION INTRAVENOUS at 17:39

## 2024-11-28 RX ADMIN — AMLODIPINE BESYLATE 5 MG: 10 TABLET ORAL at 05:54

## 2024-11-28 RX ADMIN — MEROPENEM 500 MG: 500 INJECTION, POWDER, FOR SOLUTION INTRAVENOUS at 11:26

## 2024-11-28 ASSESSMENT — PAIN DESCRIPTION - PAIN TYPE
TYPE: ACUTE PAIN

## 2024-11-28 ASSESSMENT — COGNITIVE AND FUNCTIONAL STATUS - GENERAL
SUGGESTED CMS G CODE MODIFIER MOBILITY: CK
DRESSING REGULAR UPPER BODY CLOTHING: A LITTLE
WALKING IN HOSPITAL ROOM: A LOT
SUGGESTED CMS G CODE MODIFIER DAILY ACTIVITY: CJ
DRESSING REGULAR LOWER BODY CLOTHING: A LITTLE
CLIMB 3 TO 5 STEPS WITH RAILING: A LOT
MOVING FROM LYING ON BACK TO SITTING ON SIDE OF FLAT BED: A LITTLE
TOILETING: A LITTLE
DAILY ACTIVITIY SCORE: 20
HELP NEEDED FOR BATHING: A LITTLE
STANDING UP FROM CHAIR USING ARMS: A LITTLE
TURNING FROM BACK TO SIDE WHILE IN FLAT BAD: A LITTLE
MOBILITY SCORE: 16
MOVING TO AND FROM BED TO CHAIR: A LITTLE

## 2024-11-28 ASSESSMENT — ENCOUNTER SYMPTOMS
HEADACHES: 0
FEVER: 0
VOMITING: 0
FLANK PAIN: 0
ABDOMINAL PAIN: 0
CHILLS: 0
DIZZINESS: 0
BACK PAIN: 0
NAUSEA: 0

## 2024-11-28 ASSESSMENT — COPD QUESTIONNAIRES
COPD SCREENING SCORE: 3
HAVE YOU SMOKED AT LEAST 100 CIGARETTES IN YOUR ENTIRE LIFE: NO/DON'T KNOW
DURING THE PAST 4 WEEKS HOW MUCH DID YOU FEEL SHORT OF BREATH: SOME OF THE TIME
DO YOU EVER COUGH UP ANY MUCUS OR PHLEGM?: NO/ONLY WITH OCCASIONAL COLDS OR INFECTIONS

## 2024-11-28 ASSESSMENT — FIBROSIS 4 INDEX
FIB4 SCORE: 1.665
FIB4 SCORE: 1.665

## 2024-11-28 ASSESSMENT — PULMONARY FUNCTION TESTS: FVC: 2.7

## 2024-11-28 NOTE — ASSESSMENT & PLAN NOTE
Secondary to complicated UTI. Prior Ucx 11/01 with pan-sensitive Pseudomonas, 12/2023 with Pseudomonas and Klebsiella, 10/2023 with ESBL E. Coli sensitive to minocycline, nitrofurantoin, piperacillin/tazobactam, tigecycline, and tobramycin.   -Meropenem  -Follow Bcx, Ucx, sputum Cx  -Urology following

## 2024-11-28 NOTE — ASSESSMENT & PLAN NOTE
Intubated 11/27, unable to extubate post-procedure, extubated 11/28.  -Supplemental O2  -RT protocol

## 2024-11-28 NOTE — CARE PLAN
Problem: Ventilation  Goal: Ability to achieve and maintain unassisted ventilation or tolerate decreased levels of ventilator support  Description: Target End Date:  4 days     Document on Vent flowsheet    1.  Support and monitor invasive and noninvasive mechanical ventilation  2.  Monitor ventilator weaning response  3.  Perform ventilator associated pneumonia prevention interventions  4.  Manage ventilation therapy by monitoring diagnostic test results  Outcome: Progressing     Ventilator Daily Summary    Vent Day #1  Airway: 7.5@23    Ventilator settings: CMV R22 [phone]    Weaning trials: Yes Apnea  Respiratory Procedures:     Plan: Continue current ventilator settings and wean mechanical ventilation as tolerated per physician orders.

## 2024-11-28 NOTE — PROGRESS NOTES
Hospital Medicine Daily Progress Note    Date of Service  11/29/2024    Chief Complaint  Nausea, chills    Hospital Course  Ramu Barber is a 74 y.o. male with a history of nephrolithiasis, atrial fibrillation on chronic anticoagulation with apixaban, prior pulmonary embolism, hypertension, diabetes mellitus type 2, dyslipidemia, obstructive sleep apnea on CPAP, and obesity.     At the beginning of this month 11/1The patient was admitted to the hospital for weakness and concern of UTI secondary to nephrolithiasis.  He was seen by urology who placed a left ureteral stent on 11/1.  Urine culture from 11/1 grew out Pseudomonas aeruginosa that was pansensitive.  The patient had completed outpatient antibiotics the patient had repeat urine culture and blood cultures on 11/18 and 19 respectively, showing no growth.  On 11/26 urology performed a cystoscopy and left ureteral stent removal.  Status post stent removal the patient had recurrence of chills and fever.  On 11/27 he presented back to the hospital with concerns of worsening infection.    On 11/27 in the emergency room a CT renal scan showed left nephrolithiasis with distal left ureteral and ureteral vesicular junction stones resulting in left urinary output tract obstructive changes.  There is concern of diffuse bladder wall thickening possible cystitis versus bladder outlet obstruction.  WBC: 8.5.  The patient later that day on 11/27 was taken by urology with a left ureteroscopic he and left ureteral stone basketing with a left ureteral stent placed once more.  Later that same day WBC was 23 with a lactic acid: 3.7.  The patient was admitted for further acuity with concern of sepsis.  Patient remained intubated status post his procedure.      Interval Problem Update  11/28 patient was liberated from ventilator.  WBC 19.7, blood cultures to date show no growth.    I have discussed this patient's plan of care and discharge plan at IDT rounds today with Case  Management, Nursing, Nursing leadership, and other members of the IDT team.    Consultants/Specialty  urology    Code Status  Full Code    Disposition  The patient is not medically cleared for discharge to home or a post-acute facility.      I have placed the appropriate orders for post-discharge needs.    Review of Systems  Review of Systems   Constitutional:  Negative for fever and malaise/fatigue.   Eyes:  Negative for blurred vision and double vision.   Respiratory:  Negative for shortness of breath.    Cardiovascular:  Negative for chest pain.   Gastrointestinal:  Negative for abdominal pain and nausea.   Genitourinary:  Negative for hematuria and urgency.   Musculoskeletal:  Negative for joint pain.   Neurological:  Negative for dizziness and headaches.   Psychiatric/Behavioral:  The patient is not nervous/anxious.    All other systems reviewed and are negative.       Physical Exam  Temp:  [36 °C (96.8 °F)-36.9 °C (98.4 °F)] 36.7 °C (98.1 °F)  Pulse:  [47-62] 61  Resp:  [10-35] 16  BP: ()/(55-98) 139/87  SpO2:  [92 %-98 %] 98 %    Physical Exam  Vitals reviewed.   Constitutional:       Appearance: He is obese. He is not ill-appearing.   HENT:      Head: Normocephalic and atraumatic.      Nose: No congestion.      Mouth/Throat:      Mouth: Mucous membranes are moist.   Eyes:      Conjunctiva/sclera: Conjunctivae normal.   Cardiovascular:      Rate and Rhythm: Normal rate and regular rhythm.      Pulses: Normal pulses.   Pulmonary:      Effort: No respiratory distress.      Breath sounds: Normal breath sounds.   Abdominal:      General: Bowel sounds are normal. There is no distension.   Musculoskeletal:      Cervical back: Neck supple. No tenderness.      Right lower leg: No edema.      Left lower leg: No edema.   Skin:     General: Skin is dry.   Neurological:      General: No focal deficit present.      Mental Status: He is alert.         Fluids    Intake/Output Summary (Last 24 hours) at 11/29/2024  0704  Last data filed at 11/28/2024 1200  Gross per 24 hour   Intake 614.22 ml   Output 625 ml   Net -10.78 ml        Laboratory  Recent Labs     11/27/24  1400 11/28/24  0357 11/29/24  0358   WBC 23.5* 19.7* 13.5*   RBC 4.45* 4.45* 4.15*   HEMOGLOBIN 13.7* 13.9* 13.0*   HEMATOCRIT 42.5 42.3 39.0*   MCV 95.5 95.1 94.0   MCH 30.8 31.2 31.3   MCHC 32.2* 32.9 33.3   RDW 49.1 49.2 49.6   PLATELETCT 258 240 215   MPV 10.2 10.1 10.4     Recent Labs     11/27/24  1400 11/28/24  0357 11/29/24 0358   SODIUM 138 137 140   POTASSIUM 4.3 3.5* 3.9   CHLORIDE 106 104 105   CO2 21 22 24   GLUCOSE 345* 243* 147*   BUN 17 14 25*   CREATININE 1.10 1.00 1.43*   CALCIUM 8.0* 8.4* 8.5     Recent Labs     11/27/24  0859 11/27/24  1615 11/27/24  2209 11/28/24  0357 11/29/24  0358   APTT 31.1   < > 87.7* 90.3* 59.6*   INR 1.54*  --   --   --   --     < > = values in this interval not displayed.               Imaging  DX-CHEST-PORTABLE (1 VIEW)   Final Result      Mild improvement in aeration of the lung bases.      DX-ABDOMEN FOR TUBE PLACEMENT   Final Result      NG tube tip projects at the stomach.      DX-CHEST-PORTABLE (1 VIEW)   Final Result         1.  Hazy bilateral lower lobe infiltrates   2.  Cardiomegaly      DX-PORTABLE FLUOROSCOPY < 1 HOUR Reason For Exam: Main OR   Final Result      Portable fluoroscopy utilized for 3 seconds.         INTERPRETING LOCATION: [address] [place] [address], [id number]      DX-CHEST-PORTABLE (1 VIEW)   Final Result         1.  Left basilar atelectasis, no focal infiltrate      CT-RENAL COLIC EVALUATION(A/P W/O)   Final Result         1.  Left nephrolithiasis with distal left ureteral and ureterovesicular junction stones resulting in left urinary outflow tract obstructive changes   2.  Intermediate density right upper pole renal lesion, could represent hemorrhagic cyst otherwise indeterminate, recommend follow-up 3 phase CT kidneys for further characterization   3.  Diffuse bladder wall thickening, consider  changes of cystitis versus bladder outlet obstruction, correlate with urinalysis.   4.  Small fat-containing bilateral inguinal hernias   5.  Small pericardial effusion   6.  Diverticulosis   7.  Atherosclerosis and atherosclerotic coronary artery disease      DX-CHEST-PORTABLE (1 VIEW)    (Results Pending)        Assessment/Plan  * Sepsis (HCC)- (present on admission)  Assessment & Plan  Septic ureter stone noted on CT imaging with urinary tract infection, previously positive for Pseudomonas  11/27 urine cultures showing Ecoli.  Await sensitivities  Was on 3 days of cefepime every 12 hours but now on meropenem.   Urology consulted, taking patient to OR   Does have remote history of ESBL E. coli in 2023, however most recent culture growing Pseudomonas 11/2024    Nephrolithiasis- (present on admission)  Assessment & Plan  Urology consulting  S/p cystoscopy and stent placement  antibiotics    AF (atrial fibrillation) (HCC)- (present on admission)  Assessment & Plan  Anticoagulation  Monitor on telemetry  Monitor vitals.    Acute respiratory failure with hypoxia (HCC)- (present on admission)  Assessment & Plan  Requiring 5 L nasal cannula, denies any symptoms of shortness of breath, history of pulmonary embolism on Eliquis  Encourage mobility and deep breathing exercises  Watch nocturnal hypoxia with hx of LYLE  - Unclear etiology, chest x-ray demonstrating potential bibasilar atelectasis  - Monitor after surgery    Pulmonary embolism, bilateral (HCC)- (present on admission)  Assessment & Plan  Apixaban 5mg BID    Hyperlipidemia- (present on admission)  Assessment & Plan  Rosuvastatin for active management.    Type 2 diabetes mellitus (HCC)- (present on admission)  Assessment & Plan  Monitor accuchecks, cover with SSI  Hypoglycemic protocol  Diabetic diet  A1c:8.7 in past 11 days.      Morbid obesity (HCC)- (present on admission)  Assessment & Plan  Body mass index is 36.36 kg/m².  Encourage healthy lifestyle  choices    Primary hypertension- (present on admission)  Assessment & Plan  Monitor vitals.  Amlodipine, terazosin         VTE prophylaxis:   SCDs/TEDs   therapeutic anticoagulation with eliquis 5 mg BID      I have performed a physical exam and reviewed and updated ROS and Plan today (11/29/2024). In review of yesterday's note (11/28/2024), there are no changes except as documented above.

## 2024-11-28 NOTE — PROGRESS NOTES
Note to reader: this note follows the APSO format rather than the historical SOAP format. Assessment and plan located at the top of the note for ease of use.    Chief Complaint  74 y.o. year old male here with     Assessment/Plan  Interval History   Sepsis  L Nephrolithiasis  Pyelonephritis  - Discontinue sexton  - Antibiotics per critical care team and culture results  - Consider oxybutynin, tamsulosin, pyridium for stent discomfort  - Consider repeat imaging for worsening of kidney function      Case discussed with patient, family, RN, and Dr Foley - Urology.    Patient seen and examined    11/28.  Patient resting comfortably in bed in no obvious distress.  Intubated and on a ventilator.  He is alert and oriented nodding and shaking his head appropriately in response to questions.  No acute changes overnight.  Sexton in place draining Pyridium orange urine. No hematuria or clot in sexton tubing. WBC improving now 19.7. Bradycardic and hypertensive.          Review of Systems  Physical Exam   Review of Systems   Gastrointestinal:  Negative for abdominal pain, nausea and vomiting.   Genitourinary:  Negative for flank pain and hematuria.   Musculoskeletal:  Negative for back pain.   Neurological:  Negative for headaches.     Vitals:    11/28/24 0600 11/28/24 0700 11/28/24 0800 11/28/24 0900   BP: (!) 192/89 (!) 150/79 (!) 182/98 (!) 161/88   Pulse: (!) 48 (!) 49 (!) 50 (!) 47   Resp: 20 20 20 20   Temp:   36 °C (96.8 °F)    TempSrc:   Bladder    SpO2: 95% 96% 96% 95%   Weight: 118 kg (259 lb 11.2 oz)      Height:         Physical Exam  Vitals and nursing note reviewed.   HENT:      Head: Normocephalic.      Mouth/Throat:      Comments: Intubated, ventilated  Eyes:      Pupils: Pupils are equal, round, and reactive to light.   Pulmonary:      Comments: Ventilator  Abdominal:      General: Abdomen is flat. There is no distension.      Palpations: Abdomen is soft.      Tenderness: There is no abdominal tenderness.    Genitourinary:     Comments: Michaels in place draining pyridium orange urine  Skin:     General: Skin is warm and dry.      Capillary Refill: Capillary refill takes less than 2 seconds.      Coloration: Skin is not jaundiced.   Neurological:      General: No focal deficit present.      Mental Status: He is alert.          Hematology Chemistry   Lab Results   Component Value Date/Time    WBC 19.7 (H) 11/28/2024 03:57 AM    HEMOGLOBIN 13.9 (L) 11/28/2024 03:57 AM    HEMATOCRIT 42.3 11/28/2024 03:57 AM    PLATELETCT 240 11/28/2024 03:57 AM     Lab Results   Component Value Date/Time    SODIUM 137 11/28/2024 03:57 AM    POTASSIUM 3.5 (L) 11/28/2024 03:57 AM    CHLORIDE 104 11/28/2024 03:57 AM    CO2 22 11/28/2024 03:57 AM    GLUCOSE 243 (H) 11/28/2024 03:57 AM    BUN 14 11/28/2024 03:57 AM    CREATININE 1.00 11/28/2024 03:57 AM         Labs not explicitly included in this progress note were reviewed by the author.   Radiology/imaging not explicitly included in this progress note was reviewed by the author.     Medications reviewed and Labs reviewed

## 2024-11-28 NOTE — CARE PLAN
Problem: Ventilation  Goal: Ability to achieve and maintain unassisted ventilation or tolerate decreased levels of ventilator support  Description: Target End Date:  4 days     Document on Vent flowsheet    1.  Support and monitor invasive and noninvasive mechanical ventilation  2.  Monitor ventilator weaning response  3.  Perform ventilator associated pneumonia prevention interventions  4.  Manage ventilation therapy by monitoring diagnostic test results  Outcome: Progressing       Ventilator Daily Summary    Vent Day # 2  Airway: 7.5 @ 23    Ventilator settings:  apvcmv: 20/460/8/30   Weaning trials: none  Respiratory Procedures: none     Plan: Continue current ventilator settings and wean mechanical ventilation as tolerated per physician orders.

## 2024-11-28 NOTE — PROGRESS NOTES
0216 notified resident, Dr. Diams, that the patient had experienced intermittent agitation and attempted to extubate. Dr. Dimas placed orders for prn fentanyl.

## 2024-11-28 NOTE — CARE PLAN
The patient is Watcher - Medium risk of patient condition declining or worsening    Shift Goals  Clinical Goals: SBP less than 180, SAT/SBT in the AM  Patient Goals: GABRIELA  Family Goals: updates    Progress made toward(s) clinical / shift goals:    Problem: Pain - Standard  Goal: Alleviation of pain or a reduction in pain to the patient’s comfort goal  Outcome: Progressing  Note: Medication administered per MAR.      Problem: Safety - Medical Restraint  Goal: Remains free of injury from restraints (Restraint for Interference with Medical Device)  Outcome: Progressing  Flowsheets (Taken 11/28/2024 0244)  Addressed this shift: Remains free of injury from restraints (restraint for interference with medical device):   Determine that other, less restrictive measures have been tried or would not be effective before applying the restraint   Evaluate the patient's condition at the time of restraint application   Inform patient/family regarding the reason for restraint   Every 2 hours: Monitor safety, psychosocial status, comfort, nutrition and hydration     Problem: Skin Integrity  Goal: Skin integrity is maintained or improved  Outcome: Progressing  Note: Q2H turns, wedges, pillows for positioning, mepilex, barrier paste/wipes, and containment device utilized.      Problem: Fall Risk  Goal: Patient will remain free from falls  Outcome: Progressing

## 2024-11-28 NOTE — PROGRESS NOTES
Telephone report called to BELL Pimentel RN. Patient and family updated of transfer. Reviewed chief complaint, course of stay & plan of care. Answered all questions. Patient transported to 701 with SABINO Camilo

## 2024-11-28 NOTE — CARE PLAN
The patient is Watcher - Medium risk of patient condition declining or worsening    Shift Goals  Clinical Goals: SBP less than 180, SAT/SBT in the AM  Patient Goals: GABRIELA  Family Goals: updates    Progress made toward(s) clinical / shift goals:    Problem: Knowledge Deficit - Standard  Goal: Patient and family/care givers will demonstrate understanding of plan of care, disease process/condition, diagnostic tests and medications  Outcome: Progressing     Problem: Hemodynamics  Goal: Patient's hemodynamics, fluid balance and neurologic status will be stable or improve  Outcome: Progressing     Problem: Respiratory  Goal: Patient will achieve/maintain optimum respiratory ventilation and gas exchange  Outcome: Progressing     Problem: Mechanical Ventilation  Goal: Safe management of artificial airway and ventilation  Outcome: Progressing     Problem: Physical Regulation  Goal: Diagnostic test results will improve  Outcome: Progressing     Problem: Pain - Standard  Goal: Alleviation of pain or a reduction in pain to the patient’s comfort goal  Outcome: Progressing     Problem: Safety - Medical Restraint  Goal: Remains free of injury from restraints (Restraint for Interference with Medical Device)  Outcome: Progressing     Problem: Skin Integrity  Goal: Skin integrity is maintained or improved  Outcome: Progressing     Problem: Fall Risk  Goal: Patient will remain free from falls  Outcome: Progressing       Patient is not progressing towards the following goals:       No

## 2024-11-28 NOTE — PROGRESS NOTES
Critical Care Progress Note    Date of admission  11/27/2024    Chief Complaint  74 y.o. male with history of nephrolithiasis, atrial fibrillation on apixaban, pulmonary embolism, hypertension, type 2 diabetes, hyperlipidemia, sleep apnea on CPAP, obesity, who had a recent left ureteral stent from 11/1 to 11/26, and presented to the emergency department on 11/27 with fever, nausea, vomiting.  He was found to have left distal ureteral and UVJ stones with obstruction and was taken to the operating room for replacement of the left ureteral stent.  He was unable to be extubated and transferred to the ICU for further management.  He was initiated on meropenem given history of ESBL E. coli and pseudomonas UTI    Hospital Course  11/27: Left ureteral stent replacement, admitted to the ICU intubated    Interval Problem Update  Events in last 24 hours: Left ureteral stent replacement, admitted to the ICU intubated  N: alert, following commands, writing on dexmedetomidine, goto edge of bed  CV: sinus nick, PVCs, PRN hydral for HTN  R: [id number], 30%, 8 PEEP.  SBT c/b apnea. 7.52/32/124.  FEN/GI: OG to low suction, famotidine  /Renal: bloody UOP  I/O net neg 2.5L over last 24 hours  Heme: heparin for AF  ID: meropenem - hx ESBL.  Blood cultures 11/27 NGTD, BAL pending  Skin/MSK:   Endo: glargine + SSI    Family: updated wife and daughter at bedside  Home medications reconciled       Review of Systems  ROS     Vital Signs for last 24 hours   Temp:  [35.9 °C (96.6 °F)-36.8 °C (98.2 °F)] 36.3 °C (97.3 °F)  Pulse:  [47-78] 51  Resp:  [10-40] 13  BP: (128-192)/() 128/76  SpO2:  [95 %-98 %] 96 %    Hemodynamic parameters for last 24 hours       Respiratory Information for the last 24 hours  Vent Mode: Spont  Rate (breaths/min): 20  Vt Target (mL): 460  PEEP/CPAP: 8  P Support: 15  MAP: 12  Control VTE (exp VT): 443    Physical Exam   Physical Exam  Vitals and nursing note reviewed.   Constitutional:       General: He is not  in acute distress.     Appearance: He is not toxic-appearing.   HENT:      Head: Normocephalic and atraumatic.   Cardiovascular:      Rate and Rhythm: Normal rate. Rhythm irregular.   Pulmonary:      Effort: No respiratory distress.   Abdominal:      General: There is no distension.      Palpations: Abdomen is soft.   Genitourinary:     Comments: Michaels with orange/red urine  Musculoskeletal:         General: No swelling.   Skin:     General: Skin is warm.   Neurological:      Mental Status: He is alert.      Comments: Awake, conversing, fluent speech  Moves all four extremities         Medications  Current Facility-Administered Medications   Medication Dose Route Frequency Provider Last Rate Last Admin    fentaNYL (Sublimaze) injection 25 mcg  25 mcg Intravenous Q HOUR PRN Deanna Dimas D.O.        insulin GLARGINE (Lantus,Semglee) injection  10 Units Subcutaneous QAM INSULIN Trung Ely M.D.   10 Units at 11/28/24 1132    [START ON 11/29/2024] allopurinol (Zyloprim) tablet 100 mg  100 mg Oral DAILY Trung Ely M.D.        [START ON 11/29/2024] amLODIPine (Norvasc) tablet 5 mg  5 mg Oral DAILY Trung Ely M.D.        gabapentin (Neurontin) capsule 600 mg  600 mg Oral BID Trung Ely M.D.        [START ON 11/29/2024] omeprazole (PriLOSEC) capsule 20 mg  20 mg Oral DAILY Trung Ely M.D.        [START ON 11/29/2024] PARoxetine (Paxil) tablet 20 mg  20 mg Oral DAILY Trung Ely M.D.        phenazopyridine (Pyridium) tablet 200 mg  200 mg Oral TID Trung Ely M.D.   200 mg at 11/28/24 1213    rosuvastatin (Crestor) tablet 20 mg  20 mg Oral Q EVENING Trung Ely M.D.        [START ON 11/29/2024] terazosin (Hytrin) capsule 10 mg  10 mg Oral DAILY Trung Ely M.D.        acetaminophen (Tylenol) tablet 650 mg  650 mg Oral Q6HRS PRN Trung Ely M.D.        ondansetron (Zofran ODT) dispertab 4 mg  4 mg Oral Q4HRS PRN Trung M Block, M.D.        ondansetron (Zofran) syringe/vial injection 4 mg  4 mg  Intravenous Q4HRS PRN Dash Watt D.O.        [Held by provider] apixaban (Eliquis) tablet 5 mg  5 mg Oral BID Dash Watt D.O.        finasteride (Proscar) tablet 5 mg  5 mg Oral DAILY Trung Ely M.D.        Mirabegron ER TB24 50 mg  50 mg Oral DAILY Trung Ely M.D.        lactated ringers infusion   Intravenous Continuous Ramu Lewis M.D. 50 mL/hr at 11/28/24 0521 New Bag at 11/28/24 0521    albuterol (Proventil) 2.5mg/0.5ml nebulizer solution 2.5 mg  2.5 mg Inhalation Q10 MIN PRN Ramu Lewis M.D.        insulin lispro (HumaLOG,AdmeLOG) subcutaneous injection  2-9 Units Subcutaneous Q6HRS Adeline Pang M.D.   3 Units at 11/28/24 1131    And    dextrose 50% (D50W) injection 25 g  25 g Intravenous Q15 MIN PRN Adeline Pang M.D.        Respiratory Therapy Consult   Nebulization Continuous RT Aleksander Bell M.D. MD Alert...ICU Electrolyte Replacement per Pharmacy   Other PHARMACY TO DOSE Aleksander Bell M.D.        lidocaine (Xylocaine) 1 % injection 2 mL  2 mL Tracheal Tube Q30 MIN PRN Aleksander Bell M.D.        HYDROmorphone (Dilaudid) injection 0.5-2 mg  0.5-2 mg Intravenous Q HOUR PRN Aleksander Bell M.D.   2 mg at 11/28/24 0203    meropenem (Merrem) 500 mg in  mL IV-MBP  500 mg Intravenous Q6HRS Aleksander Bell M.D.   Stopped at 11/28/24 1156    heparin injection 3,800 Units  3,800 Units Intravenous PRN Bryson Cain M.D.        And    heparin infusion 25,000 Units in 500 mL 0.45% NACL   Intravenous Continuous Bryson Cain M.D. 29 mL/hr at 11/28/24 0700 1,450 Units/hr at 11/28/24 0700    hydrALAZINE (Apresoline) injection 10 mg  10 mg Intravenous Q8HRS PRN Adeline Pang M.D.   10 mg at 11/27/24 2118       Fluids    Intake/Output Summary (Last 24 hours) at 11/28/2024 1327  Last data filed at 11/28/2024 1200  Gross per 24 hour   Intake 2774.77 ml   Output 5750 ml   Net -2975.23 ml       Laboratory  Recent Labs     11/27/24  0828  11/27/24  1143 11/28/24  0438   ISTATAPH 7.285* 7.377 7.505*   ISTATAPCO2 48.7* 38.7 33.4   ISTATAPO2 78* 230* 131*   ISTATATCO2 25* 24* 27*   QOCNDEN9NTM 94 100* 99   ISTATARTHCO3 23.2 22.8 26.3   ISTATARTBE -4 -2 4*   ISTATTEMP 36.7 C 36.6 C 36.0 C   ISTATFIO2 100 100 40   ISTATSPEC Arterial Arterial Arterial   ISTATAPHTC 7.290* 7.383 7.521*   [id number] 77 228* 124*         Recent Labs     11/27/24  0331 11/27/24  1400 11/28/24  0357   SODIUM 137 138 137   POTASSIUM 3.9 4.3 3.5*   CHLORIDE 103 106 104   CO2 20 21 22   BUN 22 17 14   CREATININE 1.39 1.10 1.00   MAGNESIUM  --   --  1.8   PHOSPHORUS  --   --  2.2*   CALCIUM 8.7 8.0* 8.4*     Recent Labs     11/27/24  0331 11/27/24  1400 11/28/24  0357   ALTSGPT 25  --   --    ASTSGOT 27  --   --    ALKPHOSPHAT 103*  --   --    TBILIRUBIN 0.4  --   --    GLUCOSE 340* 345* 243*     Recent Labs     11/27/24  0331 11/27/24  1400 11/28/24  0357   WBC 8.5 23.5* 19.7*   NEUTSPOLYS 92.90* 94.90* 85.20*   LYMPHOCYTES 4.30* 2.30* 7.20*   MONOCYTES 0.60 2.00 6.30   EOSINOPHILS 1.10 0.00 0.30   BASOPHILS 0.40 0.20 0.40   ASTSGOT 27  --   --    ALTSGPT 25  --   --    ALKPHOSPHAT 103*  --   --    TBILIRUBIN 0.4  --   --      Recent Labs     11/27/24  0331 11/27/24  0859 11/27/24  0859 11/27/24  1400 11/27/24  1615 11/27/24  2209 11/28/24  0357   RBC 4.34*  --   --  4.45*  --   --  4.45*   HEMOGLOBIN 13.5*  --   --  13.7*  --   --  13.9*   HEMATOCRIT 40.7*  --   --  42.5  --   --  42.3   PLATELETCT 243  --   --  258  --   --  240   PROTHROMBTM  --  18.5*  --   --   --   --   --    APTT  --  31.1   < >  --  119.8* 87.7* 90.3*   INR  --  1.54*  --   --   --   --   --     < > = values in this interval not displayed.       Imaging  X-Ray:  I have personally reviewed the images and compared with prior images.    Assessment/Plan  * Sepsis (HCC)- (present on admission)  Assessment & Plan  This is Sepsis Present on admission  SIRS criteria identified on my evaluation include: Fever, with  temperature greater than 100.9 deg F, Tachycardia, with heart rate greater than 90 BPM, and Tachypnea, with respirations greater than 20 per minute  Clinical indicators of end organ dysfunction include Lactic Acid greater than 2 and Acute Respiratory Failure - (mechanical ventilation or BiPap or PaO2/FiO2 ratio < 300)  Source is urinary  Sepsis protocol initiated  Crystalloid Fluid Administration: Fluid resuscitation ordered per standard protocol - 30 mL/kg per current or ideal body weight  IV antibiotics as appropriate for source of sepsis  Reassessment: I have reassessed the patient's hemodynamic status    Nephrolithiasis- (present on admission)  Assessment & Plan  S/P left ureteral stent placement from 11/1/2024 to 11/26/2024  S/P placement of new left ureteral stent on 11/27/2024    AF (atrial fibrillation) ([facility])- (present on admission)  Assessment & Plan  History of paroxysmal atrial fibrillation  On chronic anticoagulation with apixaban  Optimize potassium and magnesium  Begin full anticoagulation with heparin infusion with anti-Xa monitoring    Acute pyelonephritis- (present on admission)  Assessment & Plan  Previous cultures have revealed Pseudomonas, Klebsiella and ESBL Escherichia coli species  Empiric meropenem pending culture results    Acute respiratory failure with hypoxia ([facility])- (present on admission)  Assessment & Plan  Intubated 11/27, extubated 11/28    Pulmonary embolism, bilateral ([facility])- (present on admission)  Assessment & Plan  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  On chronic anticoagulation with apixaban  Begin full anticoagulation with heparin with anti-Xa monitoring    Hyperlipidemia- (present on admission)  Assessment & Plan  Continue rosuvastatin, 20 mg daily    Type 2 diabetes mellitus (HCC)- (present on admission)  Assessment & Plan  Glycohemoglobin 8.7  Glargine+Sliding scale insulin    Morbid obesity (HCC)- (present on admission)  Assessment & Plan  BMI  35    Primary hypertension- (present on admission)  Assessment & Plan  Goal SBP less than 160  Continue amlodipine, 5 mg daily  Continue terazosin, 10 mg daily    Sleep apnea- (present on admission)  Assessment & Plan  History of severe LYLE with AHI of 72  On CPAP at 8 cm of water         VTE:  Heparin  Ulcer: Not Indicated  Lines: Michaels    Critical care time: 35 minutes, excluding procedure.    Trung Ely MD, [facility]  Critical Care Medicine  1:27 PM

## 2024-11-28 NOTE — ASSESSMENT & PLAN NOTE
Hx of prior left ureteral stent placement 11/13-11/26, c/b sepsis 2/2 UTI.   S/p left ureteral stent placement 11/27.  -Urology following

## 2024-11-28 NOTE — CARE PLAN
Problem: Knowledge Deficit - Standard  Goal: Patient and family/care givers will demonstrate understanding of plan of care, disease process/condition, diagnostic tests and medications  Outcome: Progressing     Problem: Hemodynamics  Goal: Patient's hemodynamics, fluid balance and neurologic status will be stable or improve  Outcome: Progressing     Problem: Fluid Volume  Goal: Fluid volume balance will be maintained  Outcome: Progressing     Problem: Urinary - Renal Perfusion  Goal: Ability to achieve and maintain adequate renal perfusion and functioning will improve  Outcome: Progressing     Problem: Respiratory  Goal: Patient will achieve/maintain optimum respiratory ventilation and gas exchange  Outcome: Progressing     Problem: Pain - Standard  Goal: Alleviation of pain or a reduction in pain to the patient’s comfort goal  Outcome: Progressing     Problem: Safety - Medical Restraint  Goal: Remains free of injury from restraints (Restraint for Interference with Medical Device)  Outcome: Progressing   The patient is Stable - Low risk of patient condition declining or worsening         Progress made toward(s) clinical / shift goals:  hemodynamically stable, making good urine    Patient is not progressing towards the following goals: Failed SBT will attempt in AM

## 2024-11-28 NOTE — PROGRESS NOTES
[facility] ICU Progress Note      Admit Date: 11/27/2024    Resident(s): Sarahi Skaggs M.D.   Attending:  CITLALY ZHOU/ Dr. Ely    Patient ID:    Name:  Ramu Barber   YOB: 1950  Age:  74 y.o.  male   MRN:  [id number]    Hospital Course (carried forward and updated):  Ramu Barber is a 74 y.o. male with a PMH of paroxysmal atrial fibrillation on apixaban, Hx of PE 2023 on indefinite apixaban, BPH, T2DM, DLD, LYLE, and recurrent nephrolithiasis (recent left ureteral stent placement 11/13, removed 11/26), and recent admission 11/27/2024 for sepsis secondary to complicated urinary tract infection. Seen by urology on admission s/p left ureteral stent placement, transferred to ICU for acute hypoxic respiratory failure after inability to extubate.       Consultants:  Critical Care     Interval Events:    11/28: Extubated today, passed swallow evaluation. Has been intermittently sinus bradycardic, lowest HR 46 overnight. Weaned off Precedex. Medically stable for transfer to telemetry.      Vitals Range last 24h:  Temp:  [35.9 °C (96.6 °F)-36.8 °C (98.2 °F)] (P) 36.1 °C (97 °F)  Pulse:  [47-78] (P) 55  Resp:  [10-40] (P) 20  BP: (128-192)/() (P) 94/55  SpO2:  [95 %-98 %] (P) 96 %      Intake/Output Summary (Last 24 hours) at 11/28/2024 1510  Last data filed at 11/28/2024 1200  Gross per 24 hour   Intake 2472.75 ml   Output 5055 ml   Net -2582.25 ml        Review of Systems   Constitutional:  Negative for chills and fever.   Cardiovascular:  Negative for chest pain.   Gastrointestinal:  Negative for abdominal pain.   Neurological:  Negative for dizziness and headaches.       PHYSICAL EXAM:  Vitals:    11/28/24 1200 11/28/24 1300 11/28/24 1400 11/28/24 1506   BP: 128/76 131/78  (P) 94/55   Pulse: (!) 51 (!) 49 (!) 55 (!) (P) 55   Resp: 13 13 (!) 33 (P) 20   Temp: 36.3 °C (97.3 °F)   (P) 36.1 °C (97 °F)   TempSrc: Bladder   (P) Temporal   SpO2: 96% 97% 96% (P) 96%   Weight:    (P) 121 kg (266 lb 5.1  oz)   Height:        Body mass index is 36.12 kg/m² (pended).    O2 therapy: Pulse Oximetry: (P) 96 %, O2 (LPM): (P) 4, O2 Delivery Device: (P) Silicone Nasal Cannula    Date 11/28/24 0700 - 11/29/24 0659   Shift 2006-1692 [id number] 9754-2117 24 Hour Total   INTAKE   I.V. 977.9   977.9     Magnesium Sulfate Volume 36.1   36.1     Precedex Volume 152.4   152.4     Heparin Volume 289.9   289.9     Volume (mL) (lactated ringers infusion) 499.5   499.5   IV Piggyback 194.3   194.3     Volume (mL) (meropenem (Merrem) 500 mg in  mL IV-MBP) 194.3   194.3   Enteral 30   30     Free Water / Tube Flush 30   30   Shift Total 1202.2   1202.2   OUTPUT   Urine 625   625     Output (mL) ([REMOVED] Urethral Catheter 11/28/24 1430) 625   625   Stool         Number of Times Stooled 1 x   1 x   Shift Total 625   625   .2   577.2        Physical Exam  Constitutional:       Appearance: He is obese.   HENT:      Head: Normocephalic and atraumatic.   Eyes:      Extraocular Movements: Extraocular movements intact.      Conjunctiva/sclera: Conjunctivae normal.   Cardiovascular:      Rate and Rhythm: Regular rhythm. Bradycardia present.   Pulmonary:      Effort: Pulmonary effort is normal. No respiratory distress.   Abdominal:      General: There is distension.      Tenderness: There is no abdominal tenderness.   Musculoskeletal:      Right lower leg: Edema present.      Left lower leg: Edema present.   Skin:     General: Skin is warm and dry.   Neurological:      General: No focal deficit present.      Mental Status: He is alert and oriented to person, place, and time.   Psychiatric:         Mood and Affect: Mood normal.         Behavior: Behavior normal.         Recent Labs     11/27/24  0844 11/27/24  1143 11/28/24  0438   ISTATAPH 7.285* 7.377 7.505*   ISTATAPCO2 48.7* 38.7 33.4   ISTATAPO2 78* 230* 131*   ISTATATCO2 25* 24* 27*   CKHQIKP2TFC 94 100* 99   ISTATARTHCO3 23.2 22.8 26.3   ISTATARTBE -4 -2 4*   ISTATTEMP 36.7 C  36.6 C 36.0 C   ISTATFIO2 100 100 40   ISTATSPEC Arterial Arterial Arterial   ISTATAPHTC 7.290* 7.383 7.521*   [id number] 77 228* 124*     Recent Labs     11/27/24 0331 11/27/24 1400 11/28/24 0357   SODIUM 137 138 137   POTASSIUM 3.9 4.3 3.5*   CHLORIDE 103 106 104   CO2 20 21 22   BUN 22 17 14   CREATININE 1.39 1.10 1.00   MAGNESIUM  --   --  1.8   PHOSPHORUS  --   --  2.2*   CALCIUM 8.7 8.0* 8.4*     Recent Labs     11/27/24 0331 11/27/24 1400 11/28/24 0357   ALTSGPT 25  --   --    ASTSGOT 27  --   --    ALKPHOSPHAT 103*  --   --    TBILIRUBIN 0.4  --   --    GLUCOSE 340* 345* 243*     Recent Labs     11/27/24 0331 11/27/24  0859 11/27/24  0859 11/27/24 1400 11/27/24  1615 11/27/24  2209 11/28/24 0357   RBC 4.34*  --   --  4.45*  --   --  4.45*   HEMOGLOBIN 13.5*  --   --  13.7*  --   --  13.9*   HEMATOCRIT 40.7*  --   --  42.5  --   --  42.3   PLATELETCT 243  --   --  258  --   --  240   PROTHROMBTM  --  18.5*  --   --   --   --   --    APTT  --  31.1   < >  --  119.8* 87.7* 90.3*   INR  --  1.54*  --   --   --   --   --     < > = values in this interval not displayed.     Recent Labs     11/27/24 0331 11/27/24 1400 11/28/24 0357   WBC 8.5 23.5* 19.7*   NEUTSPOLYS 92.90* 94.90* 85.20*   LYMPHOCYTES 4.30* 2.30* 7.20*   MONOCYTES 0.60 2.00 6.30   EOSINOPHILS 1.10 0.00 0.30   BASOPHILS 0.40 0.20 0.40   ASTSGOT 27  --   --    ALTSGPT 25  --   --    ALKPHOSPHAT 103*  --   --    TBILIRUBIN 0.4  --   --        Meds:   fentaNYL  25 mcg      insulin GLARGINE  10 Units      [START ON 11/29/2024] allopurinol  100 mg      [START ON 11/29/2024] amLODIPine  5 mg      gabapentin  600 mg      [START ON 11/29/2024] omeprazole  20 mg      [START ON 11/29/2024] PARoxetine  20 mg      phenazopyridine  200 mg      rosuvastatin  20 mg      [START ON 11/29/2024] terazosin  10 mg      acetaminophen  650 mg      ondansetron  4 mg      ondansetron  4 mg      [Held by provider] apixaban  5 mg      finasteride  5 mg       Mirabegron ER  50 mg      LR   50 mL/hr at 11/28/24 0521    albuterol  2.5 mg      insulin lispro  2-9 Units      And    dextrose bolus  25 g      Respiratory Therapy Consult        MD Alert...Adult ICU Electrolyte Replacement per Pharmacy        lidocaine  2 mL      HYDROmorphone  0.5-2 mg      meropenem  500 mg Stopped (11/28/24 1156)    heparin  3,800 Units      And    heparin   1,450 Units/hr (11/28/24 0700)    hydrALAZINE  10 mg          Procedures:  Left ureteroscopy, ureteral stone basketing, ureteral stent placement 11/27  Intubated 11/27    Imaging:  DX-CHEST-PORTABLE (1 VIEW)   Final Result      Mild improvement in aeration of the lung bases.      DX-ABDOMEN FOR TUBE PLACEMENT   Final Result      NG tube tip projects at the stomach.      DX-CHEST-PORTABLE (1 VIEW)   Final Result         1.  Hazy bilateral lower lobe infiltrates   2.  Cardiomegaly      DX-PORTABLE FLUOROSCOPY < 1 HOUR Reason For Exam: Main OR   Final Result      Portable fluoroscopy utilized for 3 seconds.         INTERPRETING LOCATION: [address] [place] [address], [facility]      DX-CHEST-PORTABLE (1 VIEW)   Final Result         1.  Left basilar atelectasis, no focal infiltrate      CT-RENAL COLIC EVALUATION(A/P W/O)   Final Result         1.  Left nephrolithiasis with distal left ureteral and ureterovesicular junction stones resulting in left urinary outflow tract obstructive changes   2.  Intermediate density right upper pole renal lesion, could represent hemorrhagic cyst otherwise indeterminate, recommend follow-up 3 phase CT kidneys for further characterization   3.  Diffuse bladder wall thickening, consider changes of cystitis versus bladder outlet obstruction, correlate with urinalysis.   4.  Small fat-containing bilateral inguinal hernias   5.  Small pericardial effusion   6.  Diverticulosis   7.  Atherosclerosis and atherosclerotic coronary artery disease          ASSESSEMENT and PLAN:    * Sepsis (HCC)- (present on admission)  Assessment &  Plan  Secondary to complicated UTI. Prior Ucx 11/01 with pan-sensitive Pseudomonas, 12/2023 with Pseudomonas and Klebsiella, 10/2023 with ESBL E. Coli sensitive to minocycline, nitrofurantoin, piperacillin/tazobactam, tigecycline, and tobramycin.   -Meropenem  -Follow Bcx, Ucx, sputum Cx  -Urology following    Nephrolithiasis- (present on admission)  Assessment & Plan  Hx of prior left ureteral stent placement 11/13-11/26, c/b sepsis 2/2 UTI.   S/p left ureteral stent placement 11/27.  -Urology following    AF (atrial fibrillation) (HCC)- (present on admission)  Assessment & Plan  -Heparin GTT  -Holding outpatient apixaban due to concern for hematuria (urine with red sediment)    Acute respiratory failure with hypoxia (HCC)- (present on admission)  Assessment & Plan  Intubated 11/27, unable to extubate post-procedure, extubated 11/28.  -Supplemental O2  -RT protocol    Pulmonary embolism, bilateral (HCC)- (present on admission)  Assessment & Plan  -Heparin GTT  -Holding outpatient apixaban due to concern for hematuria (urine with red sediment)    Acute pyelonephritis- (present on admission)  Assessment & Plan  See assessment and plan for sepsis    Sleep apnea- (present on admission)  Assessment & Plan  -Continue CPAP        DISPO: Transfer to telemetry    CODE STATUS: FULL CODE    Quality Measures:  Feeding: Diabetic  Analgesia: Hydromorphone  Sedation: None  Thromboprophylaxis: Heparin GTT  Head of bed: >30 degrees  Ulcer prophylaxis: Lansoprazole  Glycemic control: Correctional: Lispro / Basal: Glargine  Bowel care: Bowel regimen  Indwelling lines: PIV, Michaels catheter  Deescalation of antibiotics: Meropenem      Sarahi Skaggs M.D.

## 2024-11-29 ENCOUNTER — APPOINTMENT (OUTPATIENT)
Dept: RADIOLOGY | Facility: MEDICAL CENTER | Age: 74
DRG: 853 | End: 2024-11-29
Attending: INTERNAL MEDICINE
Payer: COMMERCIAL

## 2024-11-29 LAB
ANION GAP SERPL CALC-SCNC: 11 MMOL/L (ref 7–16)
APTT PPP: 59.6 SEC (ref 24.7–36)
BACTERIA BRONCH AEROBE CULT: NORMAL
BACTERIA UR CULT: ABNORMAL
BACTERIA UR CULT: ABNORMAL
BASOPHILS # BLD AUTO: 0.7 % (ref 0–1.8)
BASOPHILS # BLD: 0.1 K/UL (ref 0–0.12)
BUN SERPL-MCNC: 25 MG/DL (ref 8–22)
CALCIUM SERPL-MCNC: 8.5 MG/DL (ref 8.5–10.5)
CHLORIDE SERPL-SCNC: 105 MMOL/L (ref 96–112)
CO2 SERPL-SCNC: 24 MMOL/L (ref 20–33)
CREAT SERPL-MCNC: 1.43 MG/DL (ref 0.5–1.4)
EKG IMPRESSION: NORMAL
EOSINOPHIL # BLD AUTO: 0.25 K/UL (ref 0–0.51)
EOSINOPHIL NFR BLD: 1.9 % (ref 0–6.9)
ERYTHROCYTE [DISTWIDTH] IN BLOOD BY AUTOMATED COUNT: 49.6 FL (ref 35.9–50)
GFR SERPLBLD CREATININE-BSD FMLA CKD-EPI: 51 ML/MIN/1.73 M 2
GLUCOSE BLD STRIP.AUTO-MCNC: 153 MG/DL (ref 65–99)
GLUCOSE BLD STRIP.AUTO-MCNC: 159 MG/DL (ref 65–99)
GLUCOSE BLD STRIP.AUTO-MCNC: 194 MG/DL (ref 65–99)
GLUCOSE BLD STRIP.AUTO-MCNC: 317 MG/DL (ref 65–99)
GLUCOSE SERPL-MCNC: 147 MG/DL (ref 65–99)
GRAM STN SPEC: NORMAL
HCT VFR BLD AUTO: 39 % (ref 42–52)
HGB BLD-MCNC: 13 G/DL (ref 14–18)
IMM GRANULOCYTES # BLD AUTO: 0.07 K/UL (ref 0–0.11)
IMM GRANULOCYTES NFR BLD AUTO: 0.5 % (ref 0–0.9)
LYMPHOCYTES # BLD AUTO: 2.14 K/UL (ref 1–4.8)
LYMPHOCYTES NFR BLD: 15.8 % (ref 22–41)
MAGNESIUM SERPL-MCNC: 2.1 MG/DL (ref 1.5–2.5)
MCH RBC QN AUTO: 31.3 PG (ref 27–33)
MCHC RBC AUTO-ENTMCNC: 33.3 G/DL (ref 32.3–36.5)
MCV RBC AUTO: 94 FL (ref 81.4–97.8)
MONOCYTES # BLD AUTO: 0.91 K/UL (ref 0–0.85)
MONOCYTES NFR BLD AUTO: 6.7 % (ref 0–13.4)
NEUTROPHILS # BLD AUTO: 10.04 K/UL (ref 1.82–7.42)
NEUTROPHILS NFR BLD: 74.4 % (ref 44–72)
NRBC # BLD AUTO: 0 K/UL
NRBC BLD-RTO: 0 /100 WBC (ref 0–0.2)
PHOSPHATE SERPL-MCNC: 3 MG/DL (ref 2.5–4.5)
PLATELET # BLD AUTO: 215 K/UL (ref 164–446)
PMV BLD AUTO: 10.4 FL (ref 9–12.9)
POTASSIUM SERPL-SCNC: 3.9 MMOL/L (ref 3.6–5.5)
RBC # BLD AUTO: 4.15 M/UL (ref 4.7–6.1)
SIGNIFICANT IND 70042: ABNORMAL
SIGNIFICANT IND 70042: NORMAL
SITE SITE: ABNORMAL
SITE SITE: NORMAL
SODIUM SERPL-SCNC: 140 MMOL/L (ref 135–145)
SOURCE SOURCE: ABNORMAL
SOURCE SOURCE: NORMAL
WBC # BLD AUTO: 13.5 K/UL (ref 4.8–10.8)

## 2024-11-29 PROCEDURE — 770020 HCHG ROOM/CARE - TELE (206)

## 2024-11-29 PROCEDURE — 700105 HCHG RX REV CODE 258: Performed by: INTERNAL MEDICINE

## 2024-11-29 PROCEDURE — 700102 HCHG RX REV CODE 250 W/ 637 OVERRIDE(OP): Performed by: EMERGENCY MEDICINE

## 2024-11-29 PROCEDURE — 71045 X-RAY EXAM CHEST 1 VIEW: CPT

## 2024-11-29 PROCEDURE — 85025 COMPLETE CBC W/AUTO DIFF WBC: CPT

## 2024-11-29 PROCEDURE — 93010 ELECTROCARDIOGRAM REPORT: CPT | Performed by: INTERNAL MEDICINE

## 2024-11-29 PROCEDURE — A9270 NON-COVERED ITEM OR SERVICE: HCPCS | Performed by: STUDENT IN AN ORGANIZED HEALTH CARE EDUCATION/TRAINING PROGRAM

## 2024-11-29 PROCEDURE — 700102 HCHG RX REV CODE 250 W/ 637 OVERRIDE(OP): Performed by: HOSPITALIST

## 2024-11-29 PROCEDURE — 93005 ELECTROCARDIOGRAM TRACING: CPT | Performed by: STUDENT IN AN ORGANIZED HEALTH CARE EDUCATION/TRAINING PROGRAM

## 2024-11-29 PROCEDURE — 82962 GLUCOSE BLOOD TEST: CPT

## 2024-11-29 PROCEDURE — RXMED WILLOW AMBULATORY MEDICATION CHARGE: Performed by: STUDENT IN AN ORGANIZED HEALTH CARE EDUCATION/TRAINING PROGRAM

## 2024-11-29 PROCEDURE — A9270 NON-COVERED ITEM OR SERVICE: HCPCS | Performed by: HOSPITALIST

## 2024-11-29 PROCEDURE — 99233 SBSQ HOSP IP/OBS HIGH 50: CPT | Performed by: STUDENT IN AN ORGANIZED HEALTH CARE EDUCATION/TRAINING PROGRAM

## 2024-11-29 PROCEDURE — 85730 THROMBOPLASTIN TIME PARTIAL: CPT

## 2024-11-29 PROCEDURE — A9270 NON-COVERED ITEM OR SERVICE: HCPCS | Performed by: EMERGENCY MEDICINE

## 2024-11-29 PROCEDURE — 94660 CPAP INITIATION&MGMT: CPT

## 2024-11-29 PROCEDURE — 80048 BASIC METABOLIC PNL TOTAL CA: CPT

## 2024-11-29 PROCEDURE — 84100 ASSAY OF PHOSPHORUS: CPT

## 2024-11-29 PROCEDURE — 700111 HCHG RX REV CODE 636 W/ 250 OVERRIDE (IP): Performed by: INTERNAL MEDICINE

## 2024-11-29 PROCEDURE — 83735 ASSAY OF MAGNESIUM: CPT

## 2024-11-29 PROCEDURE — 700102 HCHG RX REV CODE 250 W/ 637 OVERRIDE(OP): Performed by: STUDENT IN AN ORGANIZED HEALTH CARE EDUCATION/TRAINING PROGRAM

## 2024-11-29 RX ORDER — NITROFURANTOIN 25; 75 MG/1; MG/1
100 CAPSULE ORAL 2 TIMES DAILY
Qty: 8 CAPSULE | Refills: 0 | Status: ACTIVE | OUTPATIENT
Start: 2024-11-29 | End: 2024-12-04

## 2024-11-29 RX ORDER — CIPROFLOXACIN 500 MG/1
500 TABLET, FILM COATED ORAL 2 TIMES DAILY
Qty: 16 TABLET | Refills: 0 | Status: SHIPPED | OUTPATIENT
Start: 2024-11-29 | End: 2024-11-29

## 2024-11-29 RX ORDER — NITROFURANTOIN 25; 75 MG/1; MG/1
100 CAPSULE ORAL 2 TIMES DAILY WITH MEALS
Status: DISCONTINUED | OUTPATIENT
Start: 2024-11-29 | End: 2024-11-30 | Stop reason: HOSPADM

## 2024-11-29 RX ADMIN — NITROFURANTOIN MONOHYDRATE/MACROCRYSTALS 100 MG: 75; 25 CAPSULE ORAL at 17:23

## 2024-11-29 RX ADMIN — PAROXETINE HYDROCHLORIDE 20 MG: 20 TABLET, FILM COATED ORAL at 06:00

## 2024-11-29 RX ADMIN — AMLODIPINE BESYLATE 5 MG: 5 TABLET ORAL at 06:00

## 2024-11-29 RX ADMIN — APIXABAN 5 MG: 5 TABLET, FILM COATED ORAL at 17:19

## 2024-11-29 RX ADMIN — INSULIN GLARGINE-YFGN 10 UNITS: 100 INJECTION, SOLUTION SUBCUTANEOUS at 06:12

## 2024-11-29 RX ADMIN — INSULIN LISPRO 2 UNITS: 100 INJECTION, SOLUTION INTRAVENOUS; SUBCUTANEOUS at 00:16

## 2024-11-29 RX ADMIN — ROSUVASTATIN CALCIUM 20 MG: 20 TABLET, FILM COATED ORAL at 17:19

## 2024-11-29 RX ADMIN — OMEPRAZOLE 20 MG: 20 CAPSULE, DELAYED RELEASE ORAL at 06:01

## 2024-11-29 RX ADMIN — NITROFURANTOIN MONOHYDRATE/MACROCRYSTALS 100 MG: 75; 25 CAPSULE ORAL at 11:37

## 2024-11-29 RX ADMIN — GABAPENTIN 600 MG: 300 CAPSULE ORAL at 17:19

## 2024-11-29 RX ADMIN — VIBEGRON 75 MG: 75 TABLET, FILM COATED ORAL at 06:02

## 2024-11-29 RX ADMIN — ALLOPURINOL 100 MG: 100 TABLET ORAL at 06:00

## 2024-11-29 RX ADMIN — INSULIN LISPRO 2 UNITS: 100 INJECTION, SOLUTION INTRAVENOUS; SUBCUTANEOUS at 11:37

## 2024-11-29 RX ADMIN — GABAPENTIN 600 MG: 300 CAPSULE ORAL at 06:00

## 2024-11-29 RX ADMIN — MEROPENEM 500 MG: 500 INJECTION, POWDER, FOR SOLUTION INTRAVENOUS at 06:05

## 2024-11-29 RX ADMIN — INSULIN LISPRO 2 UNITS: 100 INJECTION, SOLUTION INTRAVENOUS; SUBCUTANEOUS at 06:10

## 2024-11-29 RX ADMIN — INSULIN LISPRO 6 UNITS: 100 INJECTION, SOLUTION INTRAVENOUS; SUBCUTANEOUS at 17:23

## 2024-11-29 RX ADMIN — TERAZOSIN HYDROCHLORIDE 10 MG: 5 CAPSULE ORAL at 06:00

## 2024-11-29 RX ADMIN — FINASTERIDE 5 MG: 5 TABLET, FILM COATED ORAL at 06:00

## 2024-11-29 RX ADMIN — MEROPENEM 500 MG: 500 INJECTION, POWDER, FOR SOLUTION INTRAVENOUS at 00:33

## 2024-11-29 ASSESSMENT — ENCOUNTER SYMPTOMS
NERVOUS/ANXIOUS: 0
HEADACHES: 0
BLURRED VISION: 0
FLANK PAIN: 0
CHILLS: 0
ABDOMINAL PAIN: 0
DOUBLE VISION: 0
NAUSEA: 0
SHORTNESS OF BREATH: 0
FEVER: 0
BACK PAIN: 0
VOMITING: 0
DIZZINESS: 0

## 2024-11-29 ASSESSMENT — FIBROSIS 4 INDEX: FIB4 SCORE: 1.86

## 2024-11-29 NOTE — CARE PLAN
Problem: Knowledge Deficit - Standard  Goal: Patient and family/care givers will demonstrate understanding of plan of care, disease process/condition, diagnostic tests and medications  Outcome: Progressing     Problem: Hemodynamics  Goal: Patient's hemodynamics, fluid balance and neurologic status will be stable or improve  Outcome: Progressing     Problem: Fluid Volume  Goal: Fluid volume balance will be maintained  Outcome: Progressing     Problem: Urinary - Renal Perfusion  Goal: Ability to achieve and maintain adequate renal perfusion and functioning will improve  Outcome: Progressing     Problem: Respiratory  Goal: Patient will achieve/maintain optimum respiratory ventilation and gas exchange  Outcome: Progressing     Problem: Mechanical Ventilation  Goal: Safe management of artificial airway and ventilation  Outcome: Progressing  Goal: Successful weaning off mechanical ventilator, spontaneously maintains adequate gas exchange  Outcome: Progressing  Goal: Patient will be able to express needs and understand communication  Outcome: Progressing     Problem: Physical Regulation  Goal: Diagnostic test results will improve  Outcome: Progressing  Goal: Signs and symptoms of infection will decrease  Outcome: Progressing     Problem: Pain - Standard  Goal: Alleviation of pain or a reduction in pain to the patient’s comfort goal  Outcome: Progressing     Problem: Safety - Medical Restraint  Goal: Remains free of injury from restraints (Restraint for Interference with Medical Device)  Outcome: Progressing  Goal: Free from restraint(s) (Restraint for Interference with Medical Device)  Outcome: Progressing     Problem: Skin Integrity  Goal: Skin integrity is maintained or improved  Outcome: Progressing     Problem: Fall Risk  Goal: Patient will remain free from falls  Outcome: Progressing   The patient is Stable - Low risk of patient condition declining or worsening    Shift Goals  Clinical Goals: SBP less than 180,  SAT/SBT in the AM  Patient Goals: GABRIELA  Family Goals: updates    Progress made toward(s) clinical / shift goals:  yes    Patient is not progressing towards the following goals:

## 2024-11-29 NOTE — CARE PLAN
The patient is Stable - Low risk of patient condition declining or worsening    Shift Goals  Clinical Goals: VSS, adequate oxygenation  Patient Goals: rest  Family Goals: GABRIELA    Progress made toward(s) clinical / shift goals:    Problem: Hemodynamics  Goal: Patient's hemodynamics, fluid balance and neurologic status will be stable or improve  Outcome: Progressing     Problem: Respiratory  Goal: Patient will achieve/maintain optimum respiratory ventilation and gas exchange  Outcome: Progressing     Problem: Fall Risk  Goal: Patient will remain free from falls  Outcome: Progressing       Patient is not progressing towards the following goals:

## 2024-11-29 NOTE — PROGRESS NOTES
Monitor Summary  Rhythm: SB  Rate: 56-68  Ectopy: PAC, PVC, Coup, Trig  Measurements: .18/.09/.48  ---12 hr Chart Review---

## 2024-11-29 NOTE — DISCHARGE SUMMARY
Discharge Summary    CHIEF COMPLAINT ON ADMISSION  Chief Complaint   Patient presents with    N/V     Pt BIBA from home for n/v and chills. Pt woke up around 0100 and was shivering. Pt had x1 emesis. Pt had a ureter stent removed yesterday from previous stones. PT is A&Ox4, a-fib with known history. Pt on 5L O2 NC, saturating at 90%, oral temp 102.7    Chills       Reason for Admission  Sepsis (HCC)     Admission Date  11/27/2024    CODE STATUS  Full Code    HPI & HOSPITAL COURSE  Ramu Barber is a 74 y.o. male with a history of nephrolithiasis, atrial fibrillation on chronic anticoagulation with apixaban, prior pulmonary embolism, hypertension, diabetes mellitus type 2, dyslipidemia, obstructive sleep apnea on CPAP, and obesity.      At the beginning of this month 11/1The patient was admitted to the hospital for weakness and concern of UTI secondary to nephrolithiasis.  He was seen by urology who placed a left ureteral stent on 11/1.  Urine culture from 11/1 grew out Pseudomonas aeruginosa that was pansensitive.  The patient had completed outpatient antibiotics the patient had repeat urine culture and blood cultures on 11/18 and 19 respectively, showing no growth.  On 11/26 urology performed a cystoscopy and left ureteral stent removal.  Status post stent removal the patient had recurrence of chills and fever.  On 11/27 he presented back to the hospital with concerns of worsening infection.     On 11/27 in the emergency room a CT renal scan showed left nephrolithiasis with distal left ureteral and ureteral vesicular junction stones resulting in left urinary output tract obstructive changes.  There is concern of diffuse bladder wall thickening possible cystitis versus bladder outlet obstruction.  WBC: 8.5.  The patient later that day on 11/27 was taken by urology with a left ureteroscopic he and left ureteral stone basketing with a left ureteral stent placed once more.  Later that same day WBC was 23 with a  lactic acid: 3.7.  The patient was admitted for further acuity with concern of sepsis.  He required intubation and was liberated from the ventilator on 11/28 and transferred out of the ICU.  White blood cell count continued to improve.  Vital signs were stable.  Patient reported feeling well and was very eager for discharge.  Sensitivities were not available at time of discharge, discussed sending on ciprofloxacin to complete 10-day course of antibiotics for complicated UTI.  Discussed with patient increased creatinine suggestive of kidney injury and discussed further observation and monitoring hospital however patient declined this, patient was insistent on discharge.  Discussed with his daughter and son-in-law who is a physician and we all agreed to watch on p.o. antibiotics for 1 more day.  Overnight patient did well, vital signs stable, white count has normalized, he feels well and is eager for discharge.  Prescriptions were sent, discussed with pharmacy.  He was encouraged to probably follow-up with PCP for repeat labs and we will follow-up with urology as well.    Therefore, he is discharged in fair and stable condition to home with close outpatient follow-up.    The patient met 2-midnight criteria for an inpatient stay at the time of discharge.    Discharge Date  11/29    FOLLOW UP ITEMS POST DISCHARGE  Follow-up with urology and PCP    DISCHARGE DIAGNOSES  Principal Problem:    Sepsis (HCC) (POA: Yes)  Active Problems:    Sleep apnea (Chronic) (POA: Yes)    Primary hypertension (POA: Yes)    Morbid obesity (HCC) (POA: Yes)    Type 2 diabetes mellitus (HCC) (POA: Yes)    Hyperlipidemia (POA: Yes)    Pulmonary embolism, bilateral (HCC) (POA: Yes)    Acute respiratory failure with hypoxia (HCC) (POA: Yes)    Acute pyelonephritis (POA: Yes)    AF (atrial fibrillation) (HCC) (POA: Yes)    Nephrolithiasis (POA: Yes)    Atelectasis (POA: Unknown)  Resolved Problems:    * No resolved hospital problems. *      FOLLOW  UP  No future appointments.  No follow-up provider specified.    MEDICATIONS ON DISCHARGE     Medication List        CONTINUE taking these medications        Instructions   allopurinol 100 MG Tabs  Commonly known as: Zyloprim   Take 100 mg by mouth every day.  Dose: 100 mg     amLODIPine 5 MG Tabs  Commonly known as: Norvasc   Take 5 mg by mouth every day.  Dose: 5 mg     ciprofloxacin 500 MG Tabs  Commonly known as: Cipro   Take 1 Tablet by mouth 2 times a day for 8 days.  Dose: 500 mg     Eliquis 5 MG Tabs  Generic drug: apixaban   Take 5 mg by mouth 2 times a day.  Dose: 5 mg     finasteride 5 MG Tabs  Commonly known as: Proscar   Take 1 Tablet by mouth every day.  Dose: 5 mg     gabapentin 600 MG tablet  Commonly known as: Neurontin   Take 600 mg by mouth 2 times a day.  Dose: 600 mg     glipiZIDE ER 2.5 MG Tb24  Commonly known as: Glucotrol XL   Take 2.5 mg by mouth 2 times a day.  Dose: 2.5 mg     Mirabegron ER 50 MG Tb24   Take 50 mg by mouth every day.  Dose: 50 mg     Ozempic (1 MG/DOSE) 2 MG/1.5ML Sopn  Generic drug: Semaglutide (1 MG/DOSE)   Inject 1 mg under the skin every 7 days.  Dose: 1 mg     PARoxetine 20 MG Tabs  Commonly known as: Paxil   Take 20 mg by mouth every day.  Dose: 20 mg     phenazopyridine 200 MG Tabs  Commonly known as: Pyridium   Take 200 mg by mouth 3 times a day. 4 day course started 11/13/24  Dose: 200 mg     rosuvastatin 20 MG Tabs  Commonly known as: Crestor   Take 1 Tablet by mouth every evening.  Dose: 20 mg     SM Vitamin D3 25 MCG (1000 UT) Tabs  Generic drug: vitamin D   Take 1,000 Units by mouth every day.  Dose: 1,000 Units     terazosin 10 MG capsule  Commonly known as: Hytrin   Take 10 mg by mouth every day.  Dose: 10 mg              Allergies  No Known Allergies    DIET  Orders Placed This Encounter   Procedures    Diet Order Diet: Consistent CHO (Diabetic)     Standing Status:   Standing     Number of Occurrences:   1     Order Specific Question:   Diet:     Answer:    Consistent CHO (Diabetic) [4]       ACTIVITY  As tolerated.  Weight bearing as tolerated    CONSULTATIONS  Urology    PROCEDURES  Ureteral stent placement    LABORATORY  Lab Results   Component Value Date    SODIUM 140 11/29/2024    POTASSIUM 3.9 11/29/2024    CHLORIDE 105 11/29/2024    CO2 24 11/29/2024    GLUCOSE 147 (H) 11/29/2024    BUN 25 (H) 11/29/2024    CREATININE 1.43 (H) 11/29/2024        Lab Results   Component Value Date    WBC 13.5 (H) 11/29/2024    HEMOGLOBIN 13.0 (L) 11/29/2024    HEMATOCRIT 39.0 (L) 11/29/2024    PLATELETCT 215 11/29/2024        Total time of the discharge process exceeds 41 minutes.

## 2024-11-29 NOTE — CARE PLAN
Problem: Knowledge Deficit - Standard  Goal: Patient and family/care givers will demonstrate understanding of plan of care, disease process/condition, diagnostic tests and medications  Outcome: Progressing     Problem: Hemodynamics  Goal: Patient's hemodynamics, fluid balance and neurologic status will be stable or improve  Outcome: Progressing     Problem: Fluid Volume  Goal: Fluid volume balance will be maintained  Outcome: Progressing     Problem: Urinary - Renal Perfusion  Goal: Ability to achieve and maintain adequate renal perfusion and functioning will improve  Outcome: Progressing     Problem: Respiratory  Goal: Patient will achieve/maintain optimum respiratory ventilation and gas exchange  Outcome: Progressing     Problem: Mechanical Ventilation  Goal: Safe management of artificial airway and ventilation  Outcome: Progressing  Goal: Successful weaning off mechanical ventilator, spontaneously maintains adequate gas exchange  Outcome: Progressing  Goal: Patient will be able to express needs and understand communication  Outcome: Progressing     Problem: Physical Regulation  Goal: Diagnostic test results will improve  Outcome: Progressing  Goal: Signs and symptoms of infection will decrease  Outcome: Progressing     Problem: Pain - Standard  Goal: Alleviation of pain or a reduction in pain to the patient’s comfort goal  Outcome: Progressing     Problem: Safety - Medical Restraint  Goal: Remains free of injury from restraints (Restraint for Interference with Medical Device)  Outcome: Progressing  Goal: Free from restraint(s) (Restraint for Interference with Medical Device)  Outcome: Progressing     Problem: Skin Integrity  Goal: Skin integrity is maintained or improved  Outcome: Progressing     Problem: Fall Risk  Goal: Patient will remain free from falls  Outcome: Progressing   The patient is Stable - Low risk of patient condition declining or worsening    Shift Goals  Clinical Goals: VSS, adequate  oxygenation  Patient Goals: rest  Family Goals: GABRIELA    Progress made toward(s) clinical / shift goals:  yes    Patient is not progressing towards the following goals:

## 2024-11-29 NOTE — PROGRESS NOTES
Hospital Medicine Daily Progress Note    Date of Service  11/29/2024    Chief Complaint  Nausea, chills    Hospital Course  Ramu Barber is a 74 y.o. male with a history of nephrolithiasis, atrial fibrillation on chronic anticoagulation with apixaban, prior pulmonary embolism, hypertension, diabetes mellitus type 2, dyslipidemia, obstructive sleep apnea on CPAP, and obesity.     At the beginning of this month 11/1The patient was admitted to the hospital for weakness and concern of UTI secondary to nephrolithiasis.  He was seen by urology who placed a left ureteral stent on 11/1.  Urine culture from 11/1 grew out Pseudomonas aeruginosa that was pansensitive.  The patient had completed outpatient antibiotics the patient had repeat urine culture and blood cultures on 11/18 and 19 respectively, showing no growth.  On 11/26 urology performed a cystoscopy and left ureteral stent removal.  Status post stent removal the patient had recurrence of chills and fever.  On 11/27 he presented back to the hospital with concerns of worsening infection.    On 11/27 in the emergency room a CT renal scan showed left nephrolithiasis with distal left ureteral and ureteral vesicular junction stones resulting in left urinary output tract obstructive changes.  There is concern of diffuse bladder wall thickening possible cystitis versus bladder outlet obstruction.  WBC: 8.5.  The patient later that day on 11/27 was taken by urology with a left ureteroscopic he and left ureteral stone basketing with a left ureteral stent placed once more.  Later that same day WBC was 23 with a lactic acid: 3.7.  The patient was admitted for further acuity with concern of sepsis.  Patient remained intubated status post his procedure.      Interval Problem Update  11/29   patient was liberated from ventilator yesterday.  WBC 13.5, blood cultures to date show no growth.  Feeling well, eager for discharge home    I have discussed this patient's plan of care  and discharge plan at IDT rounds today with Case Management, Nursing, Nursing leadership, and other members of the IDT team.    Consultants/Specialty  urology    Code Status  Full Code    Disposition  Medically Cleared  I have placed the appropriate orders for post-discharge needs.    Review of Systems  Review of Systems   Constitutional:  Negative for fever and malaise/fatigue.   Eyes:  Negative for blurred vision and double vision.   Respiratory:  Negative for shortness of breath.    Cardiovascular:  Negative for chest pain.   Gastrointestinal:  Negative for abdominal pain and nausea.   Genitourinary:  Negative for hematuria and urgency.   Musculoskeletal:  Negative for joint pain.   Neurological:  Negative for dizziness and headaches.   Psychiatric/Behavioral:  The patient is not nervous/anxious.    All other systems reviewed and are negative.       Physical Exam  Temp:  [36.1 °C (97 °F)-36.9 °C (98.4 °F)] 36.5 °C (97.7 °F)  Pulse:  [47-72] 72  Resp:  [10-35] 16  BP: ()/(55-96) 121/62  SpO2:  [92 %-98 %] 96 %    Physical Exam  Vitals reviewed.   Constitutional:       Appearance: He is obese. He is not ill-appearing.   HENT:      Head: Normocephalic and atraumatic.      Nose: No congestion.      Mouth/Throat:      Mouth: Mucous membranes are moist.   Eyes:      Conjunctiva/sclera: Conjunctivae normal.   Cardiovascular:      Rate and Rhythm: Normal rate and regular rhythm.      Pulses: Normal pulses.   Pulmonary:      Effort: No respiratory distress.      Breath sounds: Normal breath sounds.   Abdominal:      General: Bowel sounds are normal. There is no distension.   Musculoskeletal:      Cervical back: Neck supple. No tenderness.      Right lower leg: No edema.      Left lower leg: No edema.   Skin:     General: Skin is dry.   Neurological:      General: No focal deficit present.      Mental Status: He is alert.         Fluids    Intake/Output Summary (Last 24 hours) at 11/29/2024 0807  Last data filed at  11/28/2024 1200  Gross per 24 hour   Intake 485.69 ml   Output 300 ml   Net 185.69 ml        Laboratory  Recent Labs     11/27/24  1400 11/28/24  0357 11/29/24  0358   WBC 23.5* 19.7* 13.5*   RBC 4.45* 4.45* 4.15*   HEMOGLOBIN 13.7* 13.9* 13.0*   HEMATOCRIT 42.5 42.3 39.0*   MCV 95.5 95.1 94.0   MCH 30.8 31.2 31.3   MCHC 32.2* 32.9 33.3   RDW 49.1 49.2 49.6   PLATELETCT 258 240 215   MPV 10.2 10.1 10.4     Recent Labs     11/27/24  1400 11/28/24  0357 11/29/24 0358   SODIUM 138 137 140   POTASSIUM 4.3 3.5* 3.9   CHLORIDE 106 104 105   CO2 21 22 24   GLUCOSE 345* 243* 147*   BUN 17 14 25*   CREATININE 1.10 1.00 1.43*   CALCIUM 8.0* 8.4* 8.5     Recent Labs     11/27/24  0859 11/27/24  1615 11/27/24  2209 11/28/24  0357 11/29/24 0358   APTT 31.1   < > 87.7* 90.3* 59.6*   INR 1.54*  --   --   --   --     < > = values in this interval not displayed.               Imaging  DX-CHEST-PORTABLE (1 VIEW)   Final Result      Mild improvement in aeration of the lung bases.      DX-ABDOMEN FOR TUBE PLACEMENT   Final Result      NG tube tip projects at the stomach.      DX-CHEST-PORTABLE (1 VIEW)   Final Result         1.  Hazy bilateral lower lobe infiltrates   2.  Cardiomegaly      DX-PORTABLE FLUOROSCOPY < 1 HOUR Reason For Exam: Main OR   Final Result      Portable fluoroscopy utilized for 3 seconds.         INTERPRETING LOCATION: [address] [place] [address], [id number]      DX-CHEST-PORTABLE (1 VIEW)   Final Result         1.  Left basilar atelectasis, no focal infiltrate      CT-RENAL COLIC EVALUATION(A/P W/O)   Final Result         1.  Left nephrolithiasis with distal left ureteral and ureterovesicular junction stones resulting in left urinary outflow tract obstructive changes   2.  Intermediate density right upper pole renal lesion, could represent hemorrhagic cyst otherwise indeterminate, recommend follow-up 3 phase CT kidneys for further characterization   3.  Diffuse bladder wall thickening, consider changes of cystitis versus  bladder outlet obstruction, correlate with urinalysis.   4.  Small fat-containing bilateral inguinal hernias   5.  Small pericardial effusion   6.  Diverticulosis   7.  Atherosclerosis and atherosclerotic coronary artery disease      DX-CHEST-PORTABLE (1 VIEW)    (Results Pending)        Assessment/Plan  * Sepsis (HCC)- (present on admission)  Assessment & Plan  Septic ureter stone noted on CT imaging with urinary tract infection, previously positive for Pseudomonas  11/27 urine cultures showing Ecoli.  Await sensitivities  Was on 3 days of cefepime every 12 hours but now on meropenem.   Urology consulted, taking patient to OR   Does have remote history of ESBL E. coli in 2023, however most recent culture growing Pseudomonas 11/2024    Nephrolithiasis- (present on admission)  Assessment & Plan  Urology consulting  S/p cystoscopy and stent placement  antibiotics    AF (atrial fibrillation) (HCC)- (present on admission)  Assessment & Plan  Anticoagulation  Monitor on telemetry  Monitor vitals.    Acute respiratory failure with hypoxia (HCC)- (present on admission)  Assessment & Plan  Requiring 5 L nasal cannula, denies any symptoms of shortness of breath, history of pulmonary embolism on Eliquis  Encourage mobility and deep breathing exercises  Watch nocturnal hypoxia with hx of LYLE  - Unclear etiology, chest x-ray demonstrating potential bibasilar atelectasis  - Monitor after surgery    Pulmonary embolism, bilateral (HCC)- (present on admission)  Assessment & Plan  Apixaban 5mg BID    Hyperlipidemia- (present on admission)  Assessment & Plan  Rosuvastatin for active management.    Type 2 diabetes mellitus (HCC)- (present on admission)  Assessment & Plan  Monitor accuchecks, cover with SSI  Hypoglycemic protocol  Diabetic diet  A1c:8.7 in past 11 days.      Morbid obesity (HCC)- (present on admission)  Assessment & Plan  Body mass index is 36.36 kg/m².  Encourage healthy lifestyle choices    Primary hypertension-  (present on admission)  Assessment & Plan  Monitor vitals.  Amlodipine, terazosin         VTE prophylaxis: VTE Selection    I have performed a physical exam and reviewed and updated ROS and Plan today (11/29/2024). In review of yesterday's note (11/28/2024), there are no changes except as documented above.

## 2024-11-29 NOTE — PROGRESS NOTES
Note to reader: this note follows the APSO format rather than the historical SOAP format. Assessment and plan located at the top of the note for ease of use.    Chief Complaint  74 y.o. year old male here with obstructing L ureteral stone and pyelonephritis.    Assessment/Plan  Interval History   Sepsis  Acute pyelonephritis  - Culture positive for ESBL e coli  - Oral options include nitrofurantoin and tetracylines  - Recommend doxycycline for better tissue penetration  - Our office will contact to schedule cystoscopy and stent removal in 2-3 weeks     Urology signing off, call with questions or concerns.  Case discussed with Dr Cruz, Dr. Nails, and patient.    Patient seen and examined    11/29. Patient sitting upright at in bedside chair brushing his teeth. No obvious distress. He has been extubated and downgraded from ICU. Creatinine increased since sexton removal, denies difficulty emptying his bladder. Reports minor frequency and urgency with stent in place. Denies pain. Planning on discharging tomorrow on oral antibiotics.         Review of Systems  Physical Exam   Review of Systems   Constitutional:  Negative for chills, fever and malaise/fatigue.   Gastrointestinal:  Negative for abdominal pain, nausea and vomiting.   Genitourinary:  Positive for frequency and urgency. Negative for dysuria, flank pain and hematuria.   Musculoskeletal:  Negative for back pain.     Vitals:    11/29/24 0525 11/29/24 0742 11/29/24 0743 11/29/24 1122   BP:  121/62  111/73   Pulse:  72  81   Resp:  16  16   Temp:  36.5 °C (97.7 °F)  36.5 °C (97.7 °F)   TempSrc:  Temporal  Temporal   SpO2:  97% 96% 95%   Weight: 122 kg (268 lb 1.3 oz)      Height:         Physical Exam  Vitals and nursing note reviewed.   Eyes:      Pupils: Pupils are equal, round, and reactive to light.   Pulmonary:      Effort: Pulmonary effort is normal.      Comments: Nasal cannula  Abdominal:      General: Abdomen is flat. There is no distension.       Palpations: Abdomen is soft.      Tenderness: There is no abdominal tenderness.   Genitourinary:     Penis: Normal.    Musculoskeletal:         General: Normal range of motion.   Skin:     General: Skin is warm and dry.      Capillary Refill: Capillary refill takes less than 2 seconds.      Coloration: Skin is not jaundiced.   Neurological:      General: No focal deficit present.      Mental Status: He is alert and oriented to person, place, and time.          Hematology Chemistry   Lab Results   Component Value Date/Time    WBC 13.5 (H) 11/29/2024 03:58 AM    HEMOGLOBIN 13.0 (L) 11/29/2024 03:58 AM    HEMATOCRIT 39.0 (L) 11/29/2024 03:58 AM    PLATELETCT 215 11/29/2024 03:58 AM     Lab Results   Component Value Date/Time    SODIUM 140 11/29/2024 03:58 AM    POTASSIUM 3.9 11/29/2024 03:58 AM    CHLORIDE 105 11/29/2024 03:58 AM    CO2 24 11/29/2024 03:58 AM    GLUCOSE 147 (H) 11/29/2024 03:58 AM    BUN 25 (H) 11/29/2024 03:58 AM    CREATININE 1.43 (H) 11/29/2024 03:58 AM         Labs not explicitly included in this progress note were reviewed by the author.   Radiology/imaging not explicitly included in this progress note was reviewed by the author.     Medications reviewed and Labs reviewed

## 2024-11-29 NOTE — DISCHARGE PLANNING
RNCM discussed pt during IDT rounds. Possible discharge tomorrow. CM discussed discharge plan with Dr. Cruz. Met with pt at bedside to discuss discharge plan. Pt has available transportation. Pt is medically cleared to discharge to home. Discharge plan is pending discharge orders. No CM needs at this time.

## 2024-11-29 NOTE — PROGRESS NOTES
4 Eyes Skin Assessment Completed by Loren Potter, ARIAN and Darvin JIMENEZ RN.    Head WDL  Ears WDL  Nose WDL  Mouth WDL  Neck WDL  Breast/Chest WDL  Shoulder Blades WDL  Spine WDL  (R) Arm/Elbow/Hand WDL  (L) Arm/Elbow/Hand WDL  Abdomen WDL  Groin WDL  Scrotum/Coccyx/Buttocks WDL  (R) Leg WDL  (L) Leg Scab and Abrasion  (R) Heel/Foot/Toe WDL  (L) Heel/Foot/Toe WDL nodule on achilles/nack part of ankle          Devices In Places Tele Box      Interventions In Place Heel Mepilex, Sacral Mepilex, and Barrier Cream    Possible Skin Injury No    Pictures Uploaded Into Epic N/A  Wound Consult Placed N/A  RN Wound Prevention Protocol Ordered Yes

## 2024-11-30 ENCOUNTER — PHARMACY VISIT (OUTPATIENT)
Dept: PHARMACY | Facility: MEDICAL CENTER | Age: 74
End: 2024-11-30
Payer: COMMERCIAL

## 2024-11-30 ENCOUNTER — APPOINTMENT (OUTPATIENT)
Dept: RADIOLOGY | Facility: MEDICAL CENTER | Age: 74
DRG: 853 | End: 2024-11-30
Attending: INTERNAL MEDICINE
Payer: COMMERCIAL

## 2024-11-30 VITALS
WEIGHT: 264.55 LBS | TEMPERATURE: 97.9 F | BODY MASS INDEX: 35.83 KG/M2 | RESPIRATION RATE: 16 BRPM | HEIGHT: 72 IN | HEART RATE: 60 BPM | OXYGEN SATURATION: 94 % | DIASTOLIC BLOOD PRESSURE: 93 MMHG | SYSTOLIC BLOOD PRESSURE: 177 MMHG

## 2024-11-30 LAB
ANION GAP SERPL CALC-SCNC: 12 MMOL/L (ref 7–16)
BASOPHILS # BLD AUTO: 0.7 % (ref 0–1.8)
BASOPHILS # BLD: 0.07 K/UL (ref 0–0.12)
BUN SERPL-MCNC: 29 MG/DL (ref 8–22)
CALCIUM SERPL-MCNC: 8.9 MG/DL (ref 8.5–10.5)
CHLORIDE SERPL-SCNC: 103 MMOL/L (ref 96–112)
CO2 SERPL-SCNC: 22 MMOL/L (ref 20–33)
CREAT SERPL-MCNC: 1.46 MG/DL (ref 0.5–1.4)
EOSINOPHIL # BLD AUTO: 0.23 K/UL (ref 0–0.51)
EOSINOPHIL NFR BLD: 2.4 % (ref 0–6.9)
ERYTHROCYTE [DISTWIDTH] IN BLOOD BY AUTOMATED COUNT: 49 FL (ref 35.9–50)
ERYTHROCYTE [DISTWIDTH] IN BLOOD BY AUTOMATED COUNT: 49 FL (ref 35.9–50)
GFR SERPLBLD CREATININE-BSD FMLA CKD-EPI: 50 ML/MIN/1.73 M 2
GLUCOSE BLD STRIP.AUTO-MCNC: 165 MG/DL (ref 65–99)
GLUCOSE BLD STRIP.AUTO-MCNC: 192 MG/DL (ref 65–99)
GLUCOSE SERPL-MCNC: 170 MG/DL (ref 65–99)
HCT VFR BLD AUTO: 41.4 % (ref 42–52)
HCT VFR BLD AUTO: 41.4 % (ref 42–52)
HGB BLD-MCNC: 13.9 G/DL (ref 14–18)
HGB BLD-MCNC: 13.9 G/DL (ref 14–18)
IMM GRANULOCYTES # BLD AUTO: 0.03 K/UL (ref 0–0.11)
IMM GRANULOCYTES NFR BLD AUTO: 0.3 % (ref 0–0.9)
LYMPHOCYTES # BLD AUTO: 1.55 K/UL (ref 1–4.8)
LYMPHOCYTES NFR BLD: 16.2 % (ref 22–41)
MAGNESIUM SERPL-MCNC: 2.1 MG/DL (ref 1.5–2.5)
MCH RBC QN AUTO: 31.2 PG (ref 27–33)
MCH RBC QN AUTO: 31.2 PG (ref 27–33)
MCHC RBC AUTO-ENTMCNC: 33.6 G/DL (ref 32.3–36.5)
MCHC RBC AUTO-ENTMCNC: 33.6 G/DL (ref 32.3–36.5)
MCV RBC AUTO: 93 FL (ref 81.4–97.8)
MCV RBC AUTO: 93 FL (ref 81.4–97.8)
MONOCYTES # BLD AUTO: 0.78 K/UL (ref 0–0.85)
MONOCYTES NFR BLD AUTO: 8.2 % (ref 0–13.4)
NEUTROPHILS # BLD AUTO: 6.89 K/UL (ref 1.82–7.42)
NEUTROPHILS NFR BLD: 72.2 % (ref 44–72)
NRBC # BLD AUTO: 0 K/UL
NRBC BLD-RTO: 0 /100 WBC (ref 0–0.2)
PHOSPHATE SERPL-MCNC: 3 MG/DL (ref 2.5–4.5)
PLATELET # BLD AUTO: 264 K/UL (ref 164–446)
PLATELET # BLD AUTO: 264 K/UL (ref 164–446)
PMV BLD AUTO: 10.6 FL (ref 9–12.9)
PMV BLD AUTO: 10.6 FL (ref 9–12.9)
POTASSIUM SERPL-SCNC: 4.3 MMOL/L (ref 3.6–5.5)
RBC # BLD AUTO: 4.45 M/UL (ref 4.7–6.1)
RBC # BLD AUTO: 4.45 M/UL (ref 4.7–6.1)
SODIUM SERPL-SCNC: 137 MMOL/L (ref 135–145)
WBC # BLD AUTO: 9.3 K/UL (ref 4.8–10.8)
WBC # BLD AUTO: 9.3 K/UL (ref 4.8–10.8)

## 2024-11-30 PROCEDURE — A9270 NON-COVERED ITEM OR SERVICE: HCPCS | Performed by: HOSPITALIST

## 2024-11-30 PROCEDURE — 82962 GLUCOSE BLOOD TEST: CPT

## 2024-11-30 PROCEDURE — 71045 X-RAY EXAM CHEST 1 VIEW: CPT

## 2024-11-30 PROCEDURE — A9270 NON-COVERED ITEM OR SERVICE: HCPCS | Performed by: EMERGENCY MEDICINE

## 2024-11-30 PROCEDURE — 84100 ASSAY OF PHOSPHORUS: CPT

## 2024-11-30 PROCEDURE — 99239 HOSP IP/OBS DSCHRG MGMT >30: CPT | Performed by: STUDENT IN AN ORGANIZED HEALTH CARE EDUCATION/TRAINING PROGRAM

## 2024-11-30 PROCEDURE — 80048 BASIC METABOLIC PNL TOTAL CA: CPT

## 2024-11-30 PROCEDURE — 83735 ASSAY OF MAGNESIUM: CPT

## 2024-11-30 PROCEDURE — A9270 NON-COVERED ITEM OR SERVICE: HCPCS | Performed by: STUDENT IN AN ORGANIZED HEALTH CARE EDUCATION/TRAINING PROGRAM

## 2024-11-30 PROCEDURE — 94660 CPAP INITIATION&MGMT: CPT

## 2024-11-30 PROCEDURE — 700102 HCHG RX REV CODE 250 W/ 637 OVERRIDE(OP): Performed by: HOSPITALIST

## 2024-11-30 PROCEDURE — 85025 COMPLETE CBC W/AUTO DIFF WBC: CPT

## 2024-11-30 PROCEDURE — 700102 HCHG RX REV CODE 250 W/ 637 OVERRIDE(OP): Performed by: STUDENT IN AN ORGANIZED HEALTH CARE EDUCATION/TRAINING PROGRAM

## 2024-11-30 PROCEDURE — 700102 HCHG RX REV CODE 250 W/ 637 OVERRIDE(OP): Performed by: EMERGENCY MEDICINE

## 2024-11-30 RX ADMIN — OMEPRAZOLE 20 MG: 20 CAPSULE, DELAYED RELEASE ORAL at 06:52

## 2024-11-30 RX ADMIN — ALLOPURINOL 100 MG: 100 TABLET ORAL at 06:52

## 2024-11-30 RX ADMIN — GABAPENTIN 600 MG: 300 CAPSULE ORAL at 06:51

## 2024-11-30 RX ADMIN — INSULIN GLARGINE-YFGN 10 UNITS: 100 INJECTION, SOLUTION SUBCUTANEOUS at 06:57

## 2024-11-30 RX ADMIN — VIBEGRON 75 MG: 75 TABLET, FILM COATED ORAL at 06:54

## 2024-11-30 RX ADMIN — TERAZOSIN HYDROCHLORIDE 10 MG: 5 CAPSULE ORAL at 06:51

## 2024-11-30 RX ADMIN — INSULIN LISPRO 2 UNITS: 100 INJECTION, SOLUTION INTRAVENOUS; SUBCUTANEOUS at 01:03

## 2024-11-30 RX ADMIN — PAROXETINE HYDROCHLORIDE 20 MG: 20 TABLET, FILM COATED ORAL at 06:52

## 2024-11-30 RX ADMIN — AMLODIPINE BESYLATE 5 MG: 5 TABLET ORAL at 06:52

## 2024-11-30 RX ADMIN — FINASTERIDE 5 MG: 5 TABLET, FILM COATED ORAL at 06:53

## 2024-11-30 RX ADMIN — INSULIN LISPRO 2 UNITS: 100 INJECTION, SOLUTION INTRAVENOUS; SUBCUTANEOUS at 06:55

## 2024-11-30 RX ADMIN — APIXABAN 5 MG: 5 TABLET, FILM COATED ORAL at 06:53

## 2024-11-30 RX ADMIN — NITROFURANTOIN MONOHYDRATE/MACROCRYSTALS 100 MG: 75; 25 CAPSULE ORAL at 07:56

## 2024-11-30 ASSESSMENT — COGNITIVE AND FUNCTIONAL STATUS - GENERAL
SUGGESTED CMS G CODE MODIFIER MOBILITY: CK
STANDING UP FROM CHAIR USING ARMS: A LITTLE
MOVING TO AND FROM BED TO CHAIR: A LITTLE
HELP NEEDED FOR BATHING: A LITTLE
CLIMB 3 TO 5 STEPS WITH RAILING: A LITTLE
WALKING IN HOSPITAL ROOM: A LITTLE
MOBILITY SCORE: 18
DAILY ACTIVITIY SCORE: 21
MOVING FROM LYING ON BACK TO SITTING ON SIDE OF FLAT BED: A LITTLE
DRESSING REGULAR LOWER BODY CLOTHING: A LITTLE
TURNING FROM BACK TO SIDE WHILE IN FLAT BAD: A LITTLE
SUGGESTED CMS G CODE MODIFIER DAILY ACTIVITY: CJ
TOILETING: A LITTLE

## 2024-11-30 ASSESSMENT — FIBROSIS 4 INDEX: FIB4 SCORE: 1.51

## 2024-11-30 ASSESSMENT — PAIN DESCRIPTION - PAIN TYPE: TYPE: ACUTE PAIN

## 2024-11-30 NOTE — CARE PLAN
The patient is Stable - Low risk of patient condition declining or worsening    Shift Goals  Clinical Goals: safety, education, discharge  Patient Goals: rest  Family Goals: updates      Problem: Safety  Goal: Will remain free from falls  Outcome: Progressing     Problem: Pain Management  Goal: Pain level will decrease to patient's comfort goal  Outcome: Progressing     Problem: Infection  Goal: Will remain free from infection  Outcome: Progressing

## 2024-11-30 NOTE — PROGRESS NOTES
Discharge orders received.  Patient arrived to the discharge lounge.  PIV removed by floor RN. Meds to beds medications verified by discharge RN, bag with medication given to patient.  Instructions given, medications reviewed and general discharge education provided to patient.  Follow up appointments discussed.  Patient verbalized understanding of dc instructions and prescriptions.  Patient signed discharge instructions.  Patient verbalized he had all belongings with him, Denied having any home medications locked in our inpatient pharmacy that  he needs back. Patient ambulated with steady gait and use of cane from discharge lounge to private vehicle. Patient left via car with spouse to home in stable condition.

## 2024-11-30 NOTE — PROGRESS NOTES
Monitor Summary  Rhythm: AFib  Rate:   Ectopy: PVC (R), coup (R)  Measurements: .16/.07/.37  ---12 hr Chart Review---

## 2024-12-10 NOTE — DOCUMENTATION QUERY
"                                                                         Formerly [facility]                                                                       Query Response Note      PATIENT:               SEAMUS ALVAREZ  ACCT #:                  [id number]  MRN:                     [id number]  :                      1950  ADMIT DATE:       2024 3:21 AM  DISCH DATE:        2024 9:43 AM  RESPONDING  PROVIDER #:        [id number]           QUERY TEXT:    Sepsis has developed after stent removal.  Can the relationship be further specified?    The patient's Clinical Indicators include:  75yo with dx of ESBL, sepsis, HTN, pyonephrosis, acidosis     H&P \"...recently cystoscopy and left ureteral stent removal on \"    Risk factors: advanced age, left ureteral stent removal on   Treatments: imaging, IVF, sepsis protocol, monitoring, labwork    If you agree with the above dx, please document in the medical record.      Thank you,  Flavia Singh, ALESSIO, CDI  [email or web address]  Options provided:   -- Sepsis is related the procedure performed   -- Sepsis is not related to the procedure performed   -- Other explanation   -- Unable to determine      Query created by: Flavia Singh on 2024 4:09 AM    RESPONSE TEXT:    Unable to determine          Electronically signed by:  RODRIGUE YATES MD 12/10/2024 7:27 AM              " Awake

## 2024-12-24 LAB
FUNGUS SPEC CULT: NORMAL
FUNGUS SPEC FUNGUS STN: NORMAL
SIGNIFICANT IND 70042: NORMAL
SITE SITE: NORMAL
SOURCE SOURCE: NORMAL

## 2025-01-09 LAB
MYCOBACTERIUM SPEC CULT: NORMAL
RHODAMINE-AURAMINE STN SPEC: NORMAL
SIGNIFICANT IND 70042: NORMAL
SITE SITE: NORMAL
SOURCE SOURCE: NORMAL

## 2025-06-09 ENCOUNTER — APPOINTMENT (OUTPATIENT)
Dept: URBAN - METROPOLITAN AREA CLINIC 6 | Facility: CLINIC | Age: 75
Setting detail: DERMATOLOGY
End: 2025-06-09

## 2025-06-09 DIAGNOSIS — D485 NEOPLASM OF UNCERTAIN BEHAVIOR OF SKIN: ICD-10-CM

## 2025-06-09 DIAGNOSIS — L91.8 OTHER HYPERTROPHIC DISORDERS OF THE SKIN: ICD-10-CM

## 2025-06-09 PROBLEM — D48.5 NEOPLASM OF UNCERTAIN BEHAVIOR OF SKIN: Status: ACTIVE | Noted: 2025-06-09

## 2025-06-09 PROCEDURE — ? COUNSELING

## 2025-06-09 PROCEDURE — ? BIOPSY BY SHAVE METHOD

## 2025-06-09 ASSESSMENT — LOCATION SIMPLE DESCRIPTION DERM
LOCATION SIMPLE: LEFT THIGH
LOCATION SIMPLE: CHEST

## 2025-06-09 ASSESSMENT — LOCATION ZONE DERM
LOCATION ZONE: LEG
LOCATION ZONE: TRUNK

## 2025-06-09 ASSESSMENT — LOCATION DETAILED DESCRIPTION DERM
LOCATION DETAILED: LEFT ANTERIOR PROXIMAL THIGH
LOCATION DETAILED: RIGHT LATERAL INFERIOR CHEST

## (undated) DEVICE — CONNECTOR HOSE NEPTUNE FOR CYSTO ROOM

## (undated) DEVICE — GOWN WARMING STANDARD FLEX - (30/CA)

## (undated) DEVICE — KIT ROOM DECONTAMINATION

## (undated) DEVICE — NEEDLE NON SAFETY 25 GA X 1 1/2 IN HYPO (100EA/BX)

## (undated) DEVICE — TUBING CLEARLINK DUO-VENT - C-FLO (48EA/CA)

## (undated) DEVICE — SUTURE 2-0 VICRYL PLUS CT-1 - 8 X 18 INCH(12/BX)

## (undated) DEVICE — DRAPE IOBAN II INCISE 23X17 - (10EA/BX 4BX/CA)

## (undated) DEVICE — SET EXTENSION WITH 2 PORTS (48EA/CA) ***PART #2C8610 IS A SUBSTITUTE*****

## (undated) DEVICE — SPONGE GAUZESTER 4 X 4 4PLY - (128PK/CA)

## (undated) DEVICE — JELLY SURGILUBE STERILE TUBE 4.25 OZ (1/EA)

## (undated) DEVICE — SYRINGE LOSS OF RESIST. 50/BX - (50/CA)

## (undated) DEVICE — KIT SURGIFLO W/OUT THROMBIN - (6EA/CA)

## (undated) DEVICE — MASK ANESTHESIA ADULT  - (100/CA)

## (undated) DEVICE — GLOVE BIOGEL SZ 7.5 SURGICAL PF LTX - (50PR/BX 4BX/CA)

## (undated) DEVICE — DRAPE LARGE 3 QUARTER - (20/CA)

## (undated) DEVICE — NEPTUNE 4 PORT MANIFOLD - (20/PK)

## (undated) DEVICE — GLOVE SZ 8 BIOGEL PI MICRO - PF LF (50PR/BX)

## (undated) DEVICE — SUTURE 1 VICRYL PLUS CTX - 8 X 18 INCH (12/BX)

## (undated) DEVICE — COVER FOOT UNIVERSAL DISP. - (25EA/CA)

## (undated) DEVICE — SUCTION INSTRUMENT YANKAUER BULBOUS TIP W/O VENT (50EA/CA)

## (undated) DEVICE — NEEDLE NON SAFETY HYPO 22 GA X 1 1/2 IN (100/BX)

## (undated) DEVICE — WATER IRRIGATION STERILE 1000ML (12EA/CA)

## (undated) DEVICE — SET LEADWIRE 5 LEAD BEDSIDE DISPOSABLE ECG (1SET OF 5/EA)

## (undated) DEVICE — PROTECTOR ULNA NERVE - (36PR/CA)

## (undated) DEVICE — ELECTRODE 850 FOAM ADHESIVE - HYDROGEL RADIOTRNSPRNT (50/PK)

## (undated) DEVICE — SENSOR OXIMETER ADULT SPO2 RD SET (20EA/BX)

## (undated) DEVICE — CANISTER SUCTION RIGID RED 1500CC (40EA/CA)

## (undated) DEVICE — BOVIE NEEDLE TIP INSULATD NON-SAFETY 2CM (50/PK)

## (undated) DEVICE — SYRINGE SAFETY 10 ML 18 GA X 1 1/2 BLUNT LL (100/BX 4BX/CA)

## (undated) DEVICE — GLOVE BIOGEL PI INDICATOR SZ 7.0 SURGICAL PF LF - (50/BX 4BX/CA)

## (undated) DEVICE — GOLD PROBE 7FR (5/BX)

## (undated) DEVICE — GLOVE, LITE (PAIR)

## (undated) DEVICE — SUTURE 3-0 VICRYL PLUS SH - 8X 18 INCH (12/BX)

## (undated) DEVICE — ELECTRODE DUAL RETURN W/ CORD - (50/PK)

## (undated) DEVICE — BLANKET WARMING LOWER BODY - (10/CA) INACTIVE USE #8585

## (undated) DEVICE — GLOVE BIOGEL PI INDICATOR SZ 8.0 SURGICAL PF LF -(50/BX 4BX/CA)

## (undated) DEVICE — KIT  I.V. START (100EA/CA)

## (undated) DEVICE — GOWN SURGEONS X-LARGE - DISP. (30/CA)

## (undated) DEVICE — SUTURE GENERAL

## (undated) DEVICE — KIT ANESTHESIA W/CIRCUIT & 3/LT BAG W/FILTER (20EA/CA)

## (undated) DEVICE — CANISTER SUCTION 3000ML MECHANICAL FILTER AUTO SHUTOFF MEDI-VAC NONSTERILE LF DISP  (40EA/CA)

## (undated) DEVICE — DRESSING TRANSPARENT FILM TEGADERM 4 X 4.75" (50EA/BX)"

## (undated) DEVICE — SLEEVE, VASO, THIGH, MED

## (undated) DEVICE — CANISTER SUCTION 3000ML MECHANICAL FILTER AUTO SHUTOFF MEDI-VAC NONSTERILE LF DISP (40EA/CA)

## (undated) DEVICE — WIRE GUIDE SENSOR DUAL FLEX - 5/BX

## (undated) DEVICE — GLOVE BIOGEL INDICATOR SZ 8 SURGICAL PF LTX - (50/BX 4BX/CA)

## (undated) DEVICE — DRAPE LAPAROTOMY T SHEET - (12EA/CA)

## (undated) DEVICE — CANNULA O2 COMFORT SOFT EAR ADULT 7 FT TUBING (50/CA)

## (undated) DEVICE — PACK MINOR BASIN - (2EA/CA)

## (undated) DEVICE — PAD SANITARY 11IN MAXI IND WRAPPED (12EA/PK 24PK/CA)

## (undated) DEVICE — SYRINGE SAFETY 3 ML 18 GA X 1 1/2 BLUNT LL (100/BX 8BX/CA)

## (undated) DEVICE — COVER LIGHT HANDLE ALC PLUS DISP (18EA/BX)

## (undated) DEVICE — GLOVE BIOGEL PI INDICATOR SZ 8.5 SURGICAL PF LF - (50PR/BX 4BX/CA)

## (undated) DEVICE — BITE BLOCK ADULT 60FR (100EA/CA)

## (undated) DEVICE — GLOVE BIOGEL SZ 8 SURGICAL PF LTX - (50PR/BX 4BX/CA)

## (undated) DEVICE — SODIUM CHL IRRIGATION 0.9% 1000ML (12EA/CA)

## (undated) DEVICE — SENSOR SPO2 NEO LNCS ADHESIVE (20/BX) SEE USER NOTES

## (undated) DEVICE — INTRAOP NEURO IN OR 1:1 PER 15 MIN

## (undated) DEVICE — SET IRRIGATION CYSTOSCOPY TUBE L80 IN (20EA/CA)

## (undated) DEVICE — SYRINGE STERILE 10 ML LL (200/BX)

## (undated) DEVICE — SLEEVE VASO DVT COMPRESSION CALF MED - (10PR/CA)

## (undated) DEVICE — STENT UROLOGICAL POLARIS 6X26 ULTRA: Type: IMPLANTABLE DEVICE | Site: URETER | Status: NON-FUNCTIONAL

## (undated) DEVICE — MASK OXYGEN VNYL ADLT MED CONC WITH 7 FOOT TUBING - (50EA/CA)

## (undated) DEVICE — CHLORAPREP 26 ML APPLICATOR - ORANGE TINT(25/CA)

## (undated) DEVICE — HEADREST PRONEVIEW LARGE - (10/CA)

## (undated) DEVICE — TAPE CLOTH MEDIPORE 6 INCH - (12RL/CA)

## (undated) DEVICE — TOOL DISSECT MATCH HEAD

## (undated) DEVICE — SYRINGE ASEPTO - (50EA/CA

## (undated) DEVICE — CLOSURE SKIN STRIP 1/2 X 4 IN - (STERI STRIP) (50/BX 4BX/CA)

## (undated) DEVICE — HEAD HOLDER JUNIOR/ADULT

## (undated) DEVICE — SODIUM CHL. IRRIGATION 0.9% 3000ML (4EA/CA 65CA/PF)

## (undated) DEVICE — GLOVE BIOGEL PI INDICATOR SZ 7.5 SURGICAL PF LF -(50/BX 4BX/CA)

## (undated) DEVICE — ARMREST CRADLE FOAM - (2PR/PK 12PR/CA)

## (undated) DEVICE — HUMID-VENT HEAT AND MOISTURE EXCHANGE- (50/BX)

## (undated) DEVICE — BAG DRAINAGE LINGEMAN CYSTO FOR GE/OEC 2600/2800 TABLES (20EA/CA)

## (undated) DEVICE — SHEET PEDIATRIC LAPAROTOMY - (10/CA)

## (undated) DEVICE — DRESSING PETROLEUM GAUZE 5 X 9" (50EA/BX 4BX/CA)"

## (undated) DEVICE — SUTURE 0 ETHIBOND MO6 C/R - (12/BX) 8-18 INCH ETHICON

## (undated) DEVICE — EXTERNAL NEUROSTIMULATOR

## (undated) DEVICE — TOWELS CLOTH SURGICAL - (4/PK 20PK/CA)

## (undated) DEVICE — BINDER ABDOMINAL 62-74 INCH - 12 IN (1/EA)

## (undated) DEVICE — PACK CYSTO III (2EA/CA)

## (undated) DEVICE — CONTROLLER

## (undated) DEVICE — TUBE CONNECTING SUCTION - CLEAR PLASTIC STERILE 72 IN (50EA/CA)

## (undated) DEVICE — DRAPE STRLE REG TOWEL 18X24 - (10/BX 4BX/CA)"

## (undated) DEVICE — GLOVE BIOGEL SZ 6.5 SURGICAL PF LTX (50PR/BX 4BX/CA)

## (undated) DEVICE — Device

## (undated) DEVICE — NEEDLE NON-SAFETY HYPO 21 GA X 1 1/2 IN HYPO (100/BX)

## (undated) DEVICE — STAPLER SKIN DISP - (6/BX 10BX/CA) VISISTAT

## (undated) DEVICE — GLOVE SZ 6.5 BIOGEL PI MICRO - PF LF (50PR/BX)

## (undated) DEVICE — CATHERTER CLEAR SINGLE USE INJECTION THERAPY NEEDLE 25GA X 4MM  2.3MM X 240CM (5EA/BX)

## (undated) DEVICE — KIT CUSTOM PROCEDURE SINGLE FOR ENDO  (15/CA)

## (undated) DEVICE — PACK NEURO - (2EA/CA)

## (undated) DEVICE — BINDER ABDOMINAL 24X30X12 IN - FITS 24-30IN WAIST 12IN HIGH

## (undated) DEVICE — APPLICATOR COTTON TIP 6 IN - STERILE (2EA/PK 100PK/BX)

## (undated) DEVICE — ADHESIVE MASTISOL - (48/BX)

## (undated) DEVICE — WATER IRRIG. STER 3000 ML - (4/CA)

## (undated) DEVICE — STERI STRIP COMPOUND BENZOIN - TINCTURE 0.6ML WITH APPLICATOR (40EA/BX)

## (undated) DEVICE — MIDAS LUBRICATOR DIFFUSER PACK (4EA/CA)

## (undated) DEVICE — TOWEL STOP TIMEOUT SAFETY FLAG (40EA/CA)

## (undated) DEVICE — LACTATED RINGERS INJ 1000 ML - (14EA/CA 60CA/PF)

## (undated) DEVICE — CATHETER URETHRAL OPEN END AXXCESS (10EA/BX)

## (undated) DEVICE — GLOVE BIOGEL SZ 8.5 SURGICAL PF LTX - (50PR/BX 4BX/CA)

## (undated) DEVICE — BOVIE BLADE COATED &INSULATED - 25/PK

## (undated) DEVICE — CANNULA W/ SUPPLY TUBING O2 - (50/CA)

## (undated) DEVICE — DEVICE MONOPOLAR RF PEAK PLASMABLADE 3.0S

## (undated) DEVICE — GLOVE BIOGEL SZ 7 SURGICAL PF LTX - (50PR/BX 4BX/CA)

## (undated) DEVICE — TUBING C&T SET FLYING LEADS DRAIN TUBING (10EA/BX)

## (undated) DEVICE — CLOSURE WOUND 1/4 X 4 (STERI - STRIP) (50/BX 4BX/CA)

## (undated) DEVICE — GVL 3 STAT DISPOSABLE - (10/BX)

## (undated) DEVICE — SUTURE 3-0 VICRYL PLUS RB-1 - 8 X 18 INCH (12/BX)

## (undated) DEVICE — LACTATED RINGERS INJ. 500 ML - (24EA/CA)

## (undated) DEVICE — SUTURE GOR-TEX CV-6 TT-9 (36PK/BX)

## (undated) DEVICE — DEVICE HEMOSTATIC CLIPPING RESOLUTION 360 DEGREES (20EA/BX)

## (undated) DEVICE — SUTURE 0 VICRYL PLUS CT-1 - 8 X 18 INCH (12/BX)

## (undated) DEVICE — DRAPE C-ARM LARGE 41IN X 74 IN - (10/BX 2BX/CA)

## (undated) DEVICE — SHEET TRANSVERSE LAP - (12EA/CA)

## (undated) DEVICE — TUBE E-T HI-LO CUFF 7.5MM (10EA/PK)

## (undated) DEVICE — SYRINGE 10 ML CONTROL LL (25EA/BX 4BX/CA)

## (undated) DEVICE — CONTAINER SPECIMEN BAG OR - STERILE 4 OZ W/LID (100EA/CA)

## (undated) DEVICE — BAG URODRAIN WITH TUBING - (20/CA)

## (undated) DEVICE — GLOVE BIOGEL INDICATOR SZ 7SURGICAL PF LTX - (50/BX 4BX/CA)